# Patient Record
Sex: FEMALE | Race: WHITE | NOT HISPANIC OR LATINO
[De-identification: names, ages, dates, MRNs, and addresses within clinical notes are randomized per-mention and may not be internally consistent; named-entity substitution may affect disease eponyms.]

---

## 2017-03-31 ENCOUNTER — APPOINTMENT (OUTPATIENT)
Dept: ELECTROPHYSIOLOGY | Facility: CLINIC | Age: 82
End: 2017-03-31

## 2017-03-31 VITALS
HEART RATE: 58 BPM | DIASTOLIC BLOOD PRESSURE: 58 MMHG | HEIGHT: 63 IN | WEIGHT: 135 LBS | BODY MASS INDEX: 23.92 KG/M2 | SYSTOLIC BLOOD PRESSURE: 143 MMHG

## 2017-06-15 RX ORDER — ASPIRIN/CALCIUM CARB/MAGNESIUM 324 MG
0 TABLET ORAL
Qty: 0 | Refills: 0 | COMMUNITY

## 2017-06-15 NOTE — ASU PATIENT PROFILE, ADULT - PSH
Cardiac abnormality  internal cardiac monitor placed 8/2013  H/O: hysterectomy    S/P cataract surgery  Left eye.

## 2017-06-16 ENCOUNTER — OUTPATIENT (OUTPATIENT)
Dept: OUTPATIENT SERVICES | Facility: HOSPITAL | Age: 82
LOS: 1 days | Discharge: HOME | End: 2017-06-16
Payer: MEDICARE

## 2017-06-16 VITALS
SYSTOLIC BLOOD PRESSURE: 140 MMHG | TEMPERATURE: 98 F | RESPIRATION RATE: 18 BRPM | WEIGHT: 138.01 LBS | HEIGHT: 62 IN | DIASTOLIC BLOOD PRESSURE: 71 MMHG | HEART RATE: 61 BPM

## 2017-06-16 VITALS
HEART RATE: 60 BPM | DIASTOLIC BLOOD PRESSURE: 86 MMHG | RESPIRATION RATE: 18 BRPM | SYSTOLIC BLOOD PRESSURE: 121 MMHG | OXYGEN SATURATION: 100 %

## 2017-06-16 DIAGNOSIS — Z98.49 CATARACT EXTRACTION STATUS, UNSPECIFIED EYE: Chronic | ICD-10-CM

## 2017-06-16 DIAGNOSIS — Z90.710 ACQUIRED ABSENCE OF BOTH CERVIX AND UTERUS: Chronic | ICD-10-CM

## 2017-06-16 PROCEDURE — 33284: CPT | Mod: 26

## 2017-06-16 RX ORDER — BETHANECHOL CHLORIDE 25 MG
0 TABLET ORAL
Qty: 0 | Refills: 0 | COMMUNITY

## 2017-06-16 RX ORDER — AMLODIPINE BESYLATE 2.5 MG/1
0 TABLET ORAL
Qty: 0 | Refills: 0 | COMMUNITY

## 2017-06-16 RX ORDER — VANCOMYCIN HCL 1 G
500 VIAL (EA) INTRAVENOUS ONCE
Qty: 0 | Refills: 0 | Status: DISCONTINUED | OUTPATIENT
Start: 2017-06-16 | End: 2017-07-01

## 2017-06-16 RX ORDER — LISINOPRIL 2.5 MG/1
0 TABLET ORAL
Qty: 0 | Refills: 0 | COMMUNITY

## 2017-06-16 RX ORDER — SIMVASTATIN 20 MG/1
0 TABLET, FILM COATED ORAL
Qty: 0 | Refills: 0 | COMMUNITY

## 2017-06-16 RX ORDER — CARVEDILOL PHOSPHATE 80 MG/1
0 CAPSULE, EXTENDED RELEASE ORAL
Qty: 0 | Refills: 0 | COMMUNITY

## 2017-06-23 ENCOUNTER — APPOINTMENT (OUTPATIENT)
Dept: ELECTROPHYSIOLOGY | Facility: CLINIC | Age: 82
End: 2017-06-23

## 2017-06-23 VITALS
HEIGHT: 63 IN | DIASTOLIC BLOOD PRESSURE: 61 MMHG | HEART RATE: 63 BPM | SYSTOLIC BLOOD PRESSURE: 132 MMHG | WEIGHT: 136 LBS | BODY MASS INDEX: 24.1 KG/M2

## 2017-06-27 DIAGNOSIS — Z45.09 ENCOUNTER FOR ADJUSTMENT AND MANAGEMENT OF OTHER CARDIAC DEVICE: ICD-10-CM

## 2019-04-11 ENCOUNTER — APPOINTMENT (OUTPATIENT)
Dept: ORTHOPEDIC SURGERY | Facility: CLINIC | Age: 84
End: 2019-04-11
Payer: MEDICARE

## 2019-04-11 VITALS
WEIGHT: 135 LBS | BODY MASS INDEX: 23.92 KG/M2 | HEIGHT: 63 IN | DIASTOLIC BLOOD PRESSURE: 71 MMHG | SYSTOLIC BLOOD PRESSURE: 116 MMHG | TEMPERATURE: 97.7 F | HEART RATE: 103 BPM

## 2019-04-11 DIAGNOSIS — M22.41 CHONDROMALACIA PATELLAE, RIGHT KNEE: ICD-10-CM

## 2019-04-11 DIAGNOSIS — Z78.9 OTHER SPECIFIED HEALTH STATUS: ICD-10-CM

## 2019-04-11 PROCEDURE — 20610 DRAIN/INJ JOINT/BURSA W/O US: CPT | Mod: RT

## 2019-04-11 PROCEDURE — 73560 X-RAY EXAM OF KNEE 1 OR 2: CPT | Mod: RT

## 2019-04-11 PROCEDURE — 99214 OFFICE O/P EST MOD 30 MIN: CPT | Mod: 25

## 2019-04-22 PROBLEM — M22.41 CHONDROMALACIA OF RIGHT PATELLA: Status: ACTIVE | Noted: 2019-04-22

## 2019-04-22 NOTE — PROCEDURE
[de-identified] : Consent: \par At this time, I have recommended an injection to the right knee.  The risks and benefits of the procedure were discussed with the patient in detail.  Upon verbal consent of the patient, we proceeded with the injection as noted below.  \par \par Procedure:  \par After a sterile prep, the patient underwent an injection of 9 cc of 1% Lidocaine without epinephrine and 1 cc of Kenalog into the right knee.  The patient tolerated the procedure well.  There were no complications.  \par \par

## 2019-04-22 NOTE — HISTORY OF PRESENT ILLNESS
[4] : the ailment interference is 4/10 [3] : the ailment interference is 3/10 [7] : the ailment interference is 7/10 [6] : the ailment interference is 6/10 [de-identified] : The patient comes in today with increasing complaints of pain in the region of her right knee.  She states it has been going on for the last several weeks.  She states it is worse up and down hills and up and down stairs.  The patient states the pain is localized.  The patient describes the pain as sore, especially on stairs.\par  [de-identified] : bending, climbing, descending stairs and kneeling [de-identified] : ice and Aleve [] : No

## 2019-04-22 NOTE — ADDENDUM
[FreeTextEntry1] : This note was written by Nikolay Mohr on 04/22/2019, acting as a scribe for Ángel Mendoza III, MD

## 2019-04-22 NOTE — DISCUSSION/SUMMARY
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  She will be reassessed in 3-4 weeks.\par \par

## 2019-04-22 NOTE — PHYSICAL EXAM
[de-identified] : Left Knee: Range of Motion in Degrees	\par 	                           Claimant:	                                     Normal:	\par Flexion Active	          135 	                                  135-degrees	\par Flexion Passive	          135	                                  135-degrees	\par Extension Active	           0-5	                                  0-5-degrees	\par Extension Passive	           0-5	                                  0-5-degrees	\par \par No weakness to flexion/extension.  No evidence of instability in the AP plane or varus or valgus stress.  Negative  Lachman.  Negative pivot shift.  Negative anterior drawer test.  Negative posterior drawer test.  Negative Rosita.  Negative Apley grind.  No medial or lateral joint line tenderness.  No tenderness over the medial and lateral facet of the patella.  No patellofemoral crepitations.  No lateral tilting patella.  No patellar apprehension.  No crepitation in the medial and lateral femoral condyle.  No proximal or distal swelling, edema or tenderness.  No gross motor or sensory deficits.  No intra-articular swelling.  2+ DP and PT pulses. No varus or valgus malalignment.  Skin is intact.  No rashes, scars or lesions.  \par  \par Right Knee: Range of Motion in Degrees	\par 	                  Claimant:	Normal:	\par Flexion Active	  135 	                135-degrees	\par Flexion Passive	  135	                135-degrees	\par Extension Active	  0-5	                0-5-degrees	\par Extension Passive	  0-5	                0-5-degrees	\par \par No weakness to flexion/extension.  No evidence of instability in the AP plane or varus or valgus stress.  Negative  Lachman.  Negative pivot shift.  Negative anterior drawer test.  Negative posterior drawer test.  Negative Rosita.  Negative Apley grind.  No medial or lateral joint line tenderness.  Positive tenderness over the medial and lateral facet of the patella.  Positive patellofemoral crepitations.  No lateral tilting patella.  No patella apprehension.  Positive crepitation in the medial and lateral femoral condyle.  No proximal or distal swelling, edema or tenderness.  No gross motor or sensory deficits.  Mild intra-articular swelling.  2+ DP and PT pulses.  No varus or valgus malalignment.  Skin is intact.  No rashes, scars or lesions.  \par \par \par  [de-identified] : Gait and Station:  Ambulating with a slightly antalgic to antalgic gait.  Normal Station.  [de-identified] : Appearance:  Well developed, well-nourished female in no acute distress.  [de-identified] : Radiographs, one to two views of the right knee, show moderate degenerative changes.\par

## 2019-05-23 ENCOUNTER — APPOINTMENT (OUTPATIENT)
Dept: ORTHOPEDIC SURGERY | Facility: CLINIC | Age: 84
End: 2019-05-23
Payer: MEDICARE

## 2019-05-23 VITALS
DIASTOLIC BLOOD PRESSURE: 68 MMHG | SYSTOLIC BLOOD PRESSURE: 133 MMHG | WEIGHT: 133 LBS | HEART RATE: 60 BPM | HEIGHT: 63 IN | TEMPERATURE: 97.4 F | BODY MASS INDEX: 23.57 KG/M2

## 2019-05-23 PROCEDURE — 99213 OFFICE O/P EST LOW 20 MIN: CPT

## 2019-05-29 NOTE — DISCUSSION/SUMMARY
[de-identified] : The patient presents with osteoarthritis, right knee.  At this point I recommend she undergo a course of viscosupplementation.  She will be scheduled at this time.

## 2019-05-29 NOTE — ADDENDUM
[FreeTextEntry1] : This note was written by Trinidad Deng on 05/28/2019 acting as scribe for Ángel Mendoza III, MD

## 2019-05-30 ENCOUNTER — APPOINTMENT (OUTPATIENT)
Dept: ORTHOPEDIC SURGERY | Facility: CLINIC | Age: 84
End: 2019-05-30
Payer: MEDICARE

## 2019-05-30 PROCEDURE — 20610 DRAIN/INJ JOINT/BURSA W/O US: CPT | Mod: RT

## 2019-06-03 NOTE — ADDENDUM
[FreeTextEntry1] : This note was written by Nikolay Mohr on 06/03/2019, acting as a scribe for Ángel Mendoza III, MD

## 2019-06-03 NOTE — PROCEDURE
[de-identified] : CLOVIS GONCALVES comes in today for the first Supartz injection to the right knee.  \par \par Physical Examination:\par Right Knee:\par Knee: Range of Motion in Degrees  	\par    	                        Claimant:  	            Normal:  	\par Flexion Active   	         135     	            135-degrees  	\par Flexion Passive  	         135  	            135-degrees  	\par Extension Active  	         0-5  	            0-5-degrees  	\par Extension Passive            0-5  	            0-5-degrees  	\par \par No evidence of instability in the AP plane or varus or valgus stress.  Negative  Lachman. Negative pivot shift.  Negative anterior drawer test.  Negative posterior drawer test. Negative Rosita.  Negative Apley grind.  No medial or lateral joint line tenderness. Positive tenderness over the medial and lateral facet of the patella.  Positive patellofemoral crepitations.  No lateral tilting patella.  No patellar apprehension. Positive crepitation in the medial and lateral femoral condyle.  No proximal or distal swelling, edema or tenderness.  No gross motor or sensory deficits.  Mild intra-articular swelling.  No varus or valgus malalignment.  2+ DP and PT pulses.  Skin is intact.  No rashes, scars or lesions.  \par \par Impression: \par Osteoarthritis of the right knee\par \par Consent:\par The risks and benefits of the procedure were discussed with the patient in detail.  Upon verbal consent of the patient, we proceeded with the Supartz injection as noted below.  \par \par Procedure:\par After sterile prep, the patient underwent a Supartz injection of 25 mg of Sodium Hyaluronate in a 2ML syringe into the right knee.  The patient tolerated the procedure well.  There were no complications.  \par \par NDC#:  [address]-3\par Lot#:    [id number]\par Exp Date:  11/30/21\par \par Plan:\par I have recommended ice and elevation.  The patient will be reassessed in one week for the next Supartz injection for the right knee osteoarthritis.\par \par \par    \par \par \par

## 2019-06-06 ENCOUNTER — APPOINTMENT (OUTPATIENT)
Dept: ORTHOPEDIC SURGERY | Facility: CLINIC | Age: 84
End: 2019-06-06

## 2019-06-13 ENCOUNTER — APPOINTMENT (OUTPATIENT)
Dept: ORTHOPEDIC SURGERY | Facility: CLINIC | Age: 84
End: 2019-06-13
Payer: MEDICARE

## 2019-06-13 PROCEDURE — 20610 DRAIN/INJ JOINT/BURSA W/O US: CPT | Mod: RT

## 2019-06-19 NOTE — PROCEDURE
[de-identified] : CLOVIS GONCALVES comes in today for the second Supartz injection to the right knee.  \par \par Physical Examination:\par Right Knee:\par Knee: Range of Motion in Degrees  	\par    	                        Claimant:  	            Normal:  	\par Flexion Active   	         135     	            135-degrees  	\par Flexion Passive  	         135  	            135-degrees  	\par Extension Active  	         0-5  	            0-5-degrees  	\par Extension Passive            0-5  	            0-5-degrees  	\par \par No evidence of instability in the AP plane or varus or valgus stress.  Negative  Lachman. Negative pivot shift.  Negative anterior drawer test.  Negative posterior drawer test. Negative Rosita.  Negative Apley grind.  No medial or lateral joint line tenderness. Positive tenderness over the medial and lateral facet of the patella.  Positive patellofemoral crepitations.  No lateral tilting patella.  No patellar apprehension. Positive crepitation in the medial and lateral femoral condyle.  No proximal or distal swelling, edema or tenderness.  No gross motor or sensory deficits.  Mild intra-articular swelling.  No varus or valgus malalignment.  2+ DP and PT pulses.  Skin is intact.  No rashes, scars or lesions.  \par \par Impression: \par Osteoarthritis of the right knee\par \par Consent:\par The risks and benefits of the procedure were discussed with the patient in detail.  Upon verbal consent of the patient, we proceeded with the Supartz injection as noted below.  \par \par Procedure:\par After sterile prep, the patient underwent a Supartz injection of 25 mg of Sodium Hyaluronate in a 2ML syringe into the right knee.  The patient tolerated the procedure well.  There were no complications.  \par \par NDC#:  [address]-4\par Lot#:    [id number]\par Exp Date:  11/30/21\par \par Plan:\par I have recommended ice and elevation.  The patient will be reassessed in one week for the next Supartz injection for the right knee osteoarthritis.   \par \par \par

## 2019-06-19 NOTE — ADDENDUM
[FreeTextEntry1] : This note was written by Nikolay Mohr on 06/18/2019, acting as a scribe for Ángel Mendoza III, MD

## 2019-06-20 ENCOUNTER — APPOINTMENT (OUTPATIENT)
Dept: ORTHOPEDIC SURGERY | Facility: CLINIC | Age: 84
End: 2019-06-20
Payer: MEDICARE

## 2019-06-20 PROCEDURE — 20610 DRAIN/INJ JOINT/BURSA W/O US: CPT | Mod: RT

## 2019-06-24 NOTE — PROCEDURE
[de-identified] : CLOVIS GONCALVES comes in today for the third Supartz injection to the right knee.  \par \par Physical Examination:\par Right Knee:\par Knee: Range of Motion in Degrees  	\par    	                        Claimant:  	            Normal:  	\par Flexion Active   	         135     	            135-degrees  	\par Flexion Passive  	         135  	            135-degrees  	\par Extension Active  	         0-5  	            0-5-degrees  	\par Extension Passive            0-5  	            0-5-degrees  	\par \par No evidence of instability in the AP plane or varus or valgus stress.  Negative  Lachman. Negative pivot shift.  Negative anterior drawer test.  Negative posterior drawer test. Negative Rosita.  Negative Apley grind.  No medial or lateral joint line tenderness. Positive tenderness over the medial and lateral facet of the patella.  Positive patellofemoral crepitations.  No lateral tilting patella.  No patellar apprehension. Positive crepitation in the medial and lateral femoral condyle.  No proximal or distal swelling, edema or tenderness.  No gross motor or sensory deficits.  Mild intra-articular swelling.  No varus or valgus malalignment.  2+ DP and PT pulses.  Skin is intact.  No rashes, scars or lesions.  \par \par Impression: \par Osteoarthritis of the right knee\par \par Consent:\par The risks and benefits of the procedure were discussed with the patient in detail.  Upon verbal consent of the patient, we proceeded with the Supartz injection as noted below.  \par \par Procedure:\par After sterile prep, the patient underwent a Supartz injection of 25 mg of Sodium Hyaluronate in a 2ML syringe into the right knee.  The patient tolerated the procedure well.  There were no complications.  \par \par NDC#:  [address]-6\par Lot#:    [id number]\par Exp Date:  11/30/21\par \par Plan:\par I have recommended ice and elevation.  The patient will be reassessed in one week for the next Supartz injection for the right knee osteoarthritis.   \par \par \par

## 2019-06-24 NOTE — ADDENDUM
[FreeTextEntry1] : This note was written by Genny Fontana on 06/24/2019 acting as scribe for HUA Wright MD

## 2019-06-27 ENCOUNTER — APPOINTMENT (OUTPATIENT)
Dept: ORTHOPEDIC SURGERY | Facility: CLINIC | Age: 84
End: 2019-06-27
Payer: MEDICARE

## 2019-06-27 PROCEDURE — 20610 DRAIN/INJ JOINT/BURSA W/O US: CPT | Mod: RT

## 2019-07-03 NOTE — PROCEDURE
[de-identified] : CLOVIS GONCALVES comes in today for the fourth Supartz injection to the right knee.  \par \par Physical Examination:\par Right Knee:\par Knee: Range of Motion in Degrees  	\par    	                        Claimant:  	            Normal:  	\par Flexion Active   	         135     	            135-degrees  	\par Flexion Passive  	         135  	            135-degrees  	\par Extension Active  	         0-5  	            0-5-degrees  	\par Extension Passive            0-5  	            0-5-degrees  	\par \par No evidence of instability in the AP plane or varus or valgus stress.  Negative  Lachman. Negative pivot shift.  Negative anterior drawer test.  Negative posterior drawer test. Negative Rosita.  Negative Apley grind.  No medial or lateral joint line tenderness. Positive tenderness over the medial and lateral facet of the patella.  Positive patellofemoral crepitations.  No lateral tilting patella.  No patellar apprehension. Positive crepitation in the medial and lateral femoral condyle.  No proximal or distal swelling, edema or tenderness.  No gross motor or sensory deficits.  Mild intra-articular swelling.  No varus or valgus malalignment.  2+ DP and PT pulses.  Skin is intact.  No rashes, scars or lesions.  \par \par Impression: \par Osteoarthritis of the right knee\par \par Consent:\par The risks and benefits of the procedure were discussed with the patient in detail.  Upon verbal consent of the patient, we proceeded with the Supartz injection as noted below.  \par \par Procedure:\par After sterile prep, the patient underwent a Supartz injection of 25 mg of Sodium Hyaluronate in a 2ML syringe into the right knee.  The patient tolerated the procedure well.  There were no complications.  \par \par NDC#:  [address]-4\par Lot#:    [id number]\par Exp Date:  11/30/21\par \par Plan:\par I have recommended ice and elevation.  The patient will be reassessed in one week for the next Supartz injection for the right knee osteoarthritis.   \par \par \par

## 2019-07-03 NOTE — ADDENDUM
[FreeTextEntry1] : This note was written by Trinidad Deng on 07/02/2019 acting as scribe for Ángel Mendoza III, MD

## 2019-07-08 ENCOUNTER — APPOINTMENT (OUTPATIENT)
Dept: INTERNAL MEDICINE | Facility: CLINIC | Age: 84
End: 2019-07-08
Payer: MEDICARE

## 2019-07-08 VITALS
HEART RATE: 70 BPM | SYSTOLIC BLOOD PRESSURE: 110 MMHG | WEIGHT: 130 LBS | TEMPERATURE: 97.4 F | OXYGEN SATURATION: 97 % | RESPIRATION RATE: 18 BRPM | BODY MASS INDEX: 23.04 KG/M2 | DIASTOLIC BLOOD PRESSURE: 60 MMHG | HEIGHT: 63 IN

## 2019-07-08 DIAGNOSIS — Z95.818 PRESENCE OF OTHER CARDIAC IMPLANTS AND GRAFTS: ICD-10-CM

## 2019-07-08 DIAGNOSIS — J45.909 UNSPECIFIED ASTHMA, UNCOMPLICATED: ICD-10-CM

## 2019-07-08 PROCEDURE — 94727 GAS DIL/WSHOT DETER LNG VOL: CPT

## 2019-07-08 PROCEDURE — 99204 OFFICE O/P NEW MOD 45 MIN: CPT | Mod: 25

## 2019-07-08 PROCEDURE — ZZZZZ: CPT

## 2019-07-08 PROCEDURE — 94060 EVALUATION OF WHEEZING: CPT

## 2019-07-08 PROCEDURE — 94729 DIFFUSING CAPACITY: CPT

## 2019-07-08 NOTE — HISTORY OF PRESENT ILLNESS
[FreeTextEntry1] : As the first pulmonary visit for this pleasant 84-year-old female with a history including hypertension,. She was tells me she was treated for bronchitis or asthmatic bronchitis many times in the past.  her last episode was about 2006. She was noted only very slight cough during the last few months, she is not having sputum production, wheezing, or hemoptysis.  She does walk about 7-10,000 feet per day. Is not using a rescue inhaler. She is a nonsmoker. Not having fever or chills. \par \par She is not having chest pain, lightheadedness, or syncope. She does have a previous history of atrial fibrillation and is followed by Dr. Covington, cardiologist.\par \par

## 2019-07-08 NOTE — PHYSICAL EXAM
[General Appearance - Well Developed] : well developed [Normal Appearance] : normal appearance [Well Groomed] : well groomed [General Appearance - Well Nourished] : well nourished [No Deformities] : no deformities [General Appearance - In No Acute Distress] : no acute distress [Normal Conjunctiva] : the conjunctiva exhibited no abnormalities [Normal Oropharynx] : normal oropharynx [Eyelids - No Xanthelasma] : the eyelids demonstrated no xanthelasmas [Neck Appearance] : the appearance of the neck was normal [Jugular Venous Distention Increased] : there was no jugular-venous distention [Neck Cervical Mass (___cm)] : no neck mass was observed [Thyroid Diffuse Enlargement] : the thyroid was not enlarged [Thyroid Nodule] : there were no palpable thyroid nodules [Heart Rate And Rhythm] : heart rate and rhythm were normal [Murmurs] : no murmurs present [Heart Sounds] : normal S1 and S2 [Edema] : no peripheral edema present [Respiration, Rhythm And Depth] : normal respiratory rhythm and effort [Exaggerated Use Of Accessory Muscles For Inspiration] : no accessory muscle use [Auscultation Breath Sounds / Voice Sounds] : lungs were clear to auscultation bilaterally [Abdomen Soft] : soft [Abdomen Tenderness] : non-tender [Abnormal Walk] : normal gait [Nail Clubbing] : no clubbing of the fingernails [Cyanosis, Localized] : no localized cyanosis [Skin Turgor] : normal skin turgor [] : no rash [No Focal Deficits] : no focal deficits [Impaired Insight] : insight and judgment were intact [Affect] : the affect was normal [FreeTextEntry1] : infrequent PC

## 2019-07-08 NOTE — REVIEW OF SYSTEMS
[Fever] : no fever [Chills] : no chills [Sputum] : not coughing up ~M sputum [Hemoptysis] : no hemoptysis [Wheezing] : no wheezing [Dizziness] : no dizziness [Chest Discomfort] : no chest discomfort [Edema] : ~T edema was not present [Syncope] : no fainting

## 2019-07-08 NOTE — PROCEDURE
[FreeTextEntry1] : Full pulmonary function testing is within normal limits with an FEV1 of 1.37 or 81% predicted. Diffusing capacity is normal. \par

## 2019-07-09 ENCOUNTER — APPOINTMENT (OUTPATIENT)
Dept: ORTHOPEDIC SURGERY | Facility: CLINIC | Age: 84
End: 2019-07-09
Payer: MEDICARE

## 2019-07-09 ENCOUNTER — FORM ENCOUNTER (OUTPATIENT)
Age: 84
End: 2019-07-09

## 2019-07-09 DIAGNOSIS — Z80.9 FAMILY HISTORY OF MALIGNANT NEOPLASM, UNSPECIFIED: ICD-10-CM

## 2019-07-09 DIAGNOSIS — Z82.61 FAMILY HISTORY OF ARTHRITIS: ICD-10-CM

## 2019-07-09 DIAGNOSIS — Z82.49 FAMILY HISTORY OF ISCHEMIC HEART DISEASE AND OTHER DISEASES OF THE CIRCULATORY SYSTEM: ICD-10-CM

## 2019-07-09 DIAGNOSIS — Z83.3 FAMILY HISTORY OF DIABETES MELLITUS: ICD-10-CM

## 2019-07-09 DIAGNOSIS — Z82.3 FAMILY HISTORY OF STROKE: ICD-10-CM

## 2019-07-09 DIAGNOSIS — Z83.1 FAMILY HISTORY OF OTHER INFECTIOUS AND PARASITIC DISEASES: ICD-10-CM

## 2019-07-09 PROCEDURE — 20610 DRAIN/INJ JOINT/BURSA W/O US: CPT | Mod: RT

## 2019-07-10 ENCOUNTER — APPOINTMENT (OUTPATIENT)
Dept: RADIOLOGY | Facility: CLINIC | Age: 84
End: 2019-07-10
Payer: MEDICARE

## 2019-07-10 ENCOUNTER — OUTPATIENT (OUTPATIENT)
Dept: OUTPATIENT SERVICES | Facility: HOSPITAL | Age: 84
LOS: 1 days | End: 2019-07-10
Payer: MEDICARE

## 2019-07-10 ENCOUNTER — TRANSCRIPTION ENCOUNTER (OUTPATIENT)
Age: 84
End: 2019-07-10

## 2019-07-10 DIAGNOSIS — Z90.710 ACQUIRED ABSENCE OF BOTH CERVIX AND UTERUS: Chronic | ICD-10-CM

## 2019-07-10 DIAGNOSIS — Z98.49 CATARACT EXTRACTION STATUS, UNSPECIFIED EYE: Chronic | ICD-10-CM

## 2019-07-10 DIAGNOSIS — R05 COUGH: ICD-10-CM

## 2019-07-10 PROCEDURE — 71046 X-RAY EXAM CHEST 2 VIEWS: CPT

## 2019-07-10 PROCEDURE — 71046 X-RAY EXAM CHEST 2 VIEWS: CPT | Mod: 26

## 2019-07-14 NOTE — PROCEDURE
[de-identified] : CLOVIS GONCALVES comes in today for the fifth Supartz injection to the right knee.  \par \par Physical Examination:\par Right Knee:\par Knee: Range of Motion in Degrees  	\par    	                        Claimant:  	            Normal:  	\par Flexion Active   	         135     	            135-degrees  	\par Flexion Passive  	         135  	            135-degrees  	\par Extension Active  	         0-5  	            0-5-degrees  	\par Extension Passive            0-5  	            0-5-degrees  	\par \par No evidence of instability in the AP plane or varus or valgus stress.  Negative  Lachman. Negative pivot shift.  Negative anterior drawer test.  Negative posterior drawer test. Negative Rosita.  Negative Apley grind.  No medial or lateral joint line tenderness. Positive tenderness over the medial and lateral facet of the patella.  Positive patellofemoral crepitations.  No lateral tilting patella.  No patellar apprehension. Positive crepitation in the medial and lateral femoral condyle.  No proximal or distal swelling, edema or tenderness.  No gross motor or sensory deficits.  Mild intra-articular swelling.  No varus or valgus malalignment.  2+ DP and PT pulses.  Skin is intact.  No rashes, scars or lesions.  \par \par Impression: \par Osteoarthritis of the right knee\par \par Consent:\par The risks and benefits of the procedure were discussed with the patient in detail.  Upon verbal consent of the patient, we proceeded with the Supartz injection as noted below.  \par \par Procedure:\par After sterile prep, the patient underwent a Supartz injection of 25 mg of Sodium Hyaluronate in a 2ML syringe into the right knee.  The patient tolerated the procedure well.  There were no complications.  \par \par NDC#:  [address]-0\par Lot#:    [id number]\par Exp Date:  11/30/21\par \par Plan:\par I have recommended ice and elevation.  The patient will be reassessed in six to eight weeks following this series of Supartz injections for the right knee osteoarthritis.

## 2019-07-14 NOTE — ADDENDUM
[FreeTextEntry1] : This note was written by Jade Lui on 07/14/2019 acting as scribe for Anuja Granados, REINALDO/RD, PA\par

## 2020-08-01 ENCOUNTER — INPATIENT (INPATIENT)
Facility: HOSPITAL | Age: 85
LOS: 2 days | Discharge: HOME | DRG: 309 | End: 2020-08-04
Attending: INTERNAL MEDICINE | Admitting: HOSPITALIST
Payer: MEDICARE

## 2020-08-01 ENCOUNTER — TRANSCRIPTION ENCOUNTER (OUTPATIENT)
Age: 85
End: 2020-08-01

## 2020-08-01 VITALS
WEIGHT: 132.94 LBS | OXYGEN SATURATION: 95 % | HEART RATE: 109 BPM | SYSTOLIC BLOOD PRESSURE: 152 MMHG | DIASTOLIC BLOOD PRESSURE: 96 MMHG | RESPIRATION RATE: 16 BRPM | TEMPERATURE: 99 F | HEIGHT: 63 IN
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
## 2020-08-01 DIAGNOSIS — Z98.42 CATARACT EXTRACTION STATUS, LEFT EYE: ICD-10-CM

## 2020-08-01 DIAGNOSIS — Z90.710 ACQUIRED ABSENCE OF BOTH CERVIX AND UTERUS: ICD-10-CM

## 2020-08-01 DIAGNOSIS — E86.0 DEHYDRATION: ICD-10-CM

## 2020-08-01 DIAGNOSIS — I48.91 UNSPECIFIED ATRIAL FIBRILLATION: ICD-10-CM

## 2020-08-01 DIAGNOSIS — Z91.013 ALLERGY TO SEAFOOD: ICD-10-CM

## 2020-08-01 DIAGNOSIS — Z88.0 ALLERGY STATUS TO PENICILLIN: ICD-10-CM

## 2020-08-01 DIAGNOSIS — I10 ESSENTIAL (PRIMARY) HYPERTENSION: ICD-10-CM

## 2020-08-01 DIAGNOSIS — I25.10 ATHEROSCLEROTIC HEART DISEASE OF NATIVE CORONARY ARTERY WITHOUT ANGINA PECTORIS: ICD-10-CM

## 2020-08-01 DIAGNOSIS — D72.829 ELEVATED WHITE BLOOD CELL COUNT, UNSPECIFIED: ICD-10-CM
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
## 2020-08-01 DIAGNOSIS — Z98.49 CATARACT EXTRACTION STATUS, UNSPECIFIED EYE: Chronic | ICD-10-CM

## 2020-08-01 DIAGNOSIS — Z90.710 ACQUIRED ABSENCE OF BOTH CERVIX AND UTERUS: Chronic | ICD-10-CM

## 2020-08-01 DIAGNOSIS — Z79.82 LONG TERM (CURRENT) USE OF ASPIRIN: ICD-10-CM

## 2020-08-01 DIAGNOSIS — T67.8XXA OTHER EFFECTS OF HEAT AND LIGHT, INITIAL ENCOUNTER: ICD-10-CM

## 2020-08-01 DIAGNOSIS — E78.5 HYPERLIPIDEMIA, UNSPECIFIED: ICD-10-CM

## 2020-08-01 DIAGNOSIS — E87.1 HYPO-OSMOLALITY AND HYPONATREMIA: ICD-10-CM

## 2020-08-01 DIAGNOSIS — N95.8 OTHER SPECIFIED MENOPAUSAL AND PERIMENOPAUSAL DISORDERS: ICD-10-CM

## 2020-08-01 DIAGNOSIS — Z88.1 ALLERGY STATUS TO OTHER ANTIBIOTIC AGENTS STATUS: ICD-10-CM

## 2020-08-01 LAB
ALBUMIN SERPL ELPH-MCNC: 3.8 G/DL — SIGNIFICANT CHANGE UP (ref 3.3–5)
ALP SERPL-CCNC: 69 U/L — SIGNIFICANT CHANGE UP (ref 40–120)
ALT FLD-CCNC: 29 U/L — SIGNIFICANT CHANGE UP (ref 12–78)
ANION GAP SERPL CALC-SCNC: 6 MMOL/L — SIGNIFICANT CHANGE UP (ref 5–17)
AST SERPL-CCNC: 20 U/L — SIGNIFICANT CHANGE UP (ref 15–37)
BASOPHILS # BLD AUTO: 0.06 K/UL — SIGNIFICANT CHANGE UP (ref 0–0.2)
BASOPHILS NFR BLD AUTO: 0.6 % — SIGNIFICANT CHANGE UP (ref 0–2)
BILIRUB SERPL-MCNC: 0.4 MG/DL — SIGNIFICANT CHANGE UP (ref 0.2–1.2)
BUN SERPL-MCNC: 8 MG/DL — SIGNIFICANT CHANGE UP (ref 7–23)
CALCIUM SERPL-MCNC: 8.6 MG/DL — SIGNIFICANT CHANGE UP (ref 8.5–10.1)
CHLORIDE SERPL-SCNC: 101 MMOL/L — SIGNIFICANT CHANGE UP (ref 96–108)
CO2 SERPL-SCNC: 24 MMOL/L — SIGNIFICANT CHANGE UP (ref 22–31)
CREAT SERPL-MCNC: 0.67 MG/DL — SIGNIFICANT CHANGE UP (ref 0.5–1.3)
EOSINOPHIL # BLD AUTO: 0.17 K/UL — SIGNIFICANT CHANGE UP (ref 0–0.5)
EOSINOPHIL NFR BLD AUTO: 1.8 % — SIGNIFICANT CHANGE UP (ref 0–6)
GLUCOSE SERPL-MCNC: 108 MG/DL — HIGH (ref 70–99)
HCT VFR BLD CALC: 39.4 % — SIGNIFICANT CHANGE UP (ref 34.5–45)
HGB BLD-MCNC: 13.3 G/DL — SIGNIFICANT CHANGE UP (ref 11.5–15.5)
IMM GRANULOCYTES NFR BLD AUTO: 0.4 % — SIGNIFICANT CHANGE UP (ref 0–1.5)
LYMPHOCYTES # BLD AUTO: 1.9 K/UL — SIGNIFICANT CHANGE UP (ref 1–3.3)
LYMPHOCYTES # BLD AUTO: 19.7 % — SIGNIFICANT CHANGE UP (ref 13–44)
MAGNESIUM SERPL-MCNC: 2 MG/DL — SIGNIFICANT CHANGE UP (ref 1.6–2.6)
MCHC RBC-ENTMCNC: 29 PG — SIGNIFICANT CHANGE UP (ref 27–34)
MCHC RBC-ENTMCNC: 33.8 GM/DL — SIGNIFICANT CHANGE UP (ref 32–36)
MCV RBC AUTO: 85.8 FL — SIGNIFICANT CHANGE UP (ref 80–100)
MONOCYTES # BLD AUTO: 0.83 K/UL — SIGNIFICANT CHANGE UP (ref 0–0.9)
MONOCYTES NFR BLD AUTO: 8.6 % — SIGNIFICANT CHANGE UP (ref 2–14)
NEUTROPHILS # BLD AUTO: 6.63 K/UL — SIGNIFICANT CHANGE UP (ref 1.8–7.4)
NEUTROPHILS NFR BLD AUTO: 68.9 % — SIGNIFICANT CHANGE UP (ref 43–77)
PLATELET # BLD AUTO: 314 K/UL — SIGNIFICANT CHANGE UP (ref 150–400)
POTASSIUM SERPL-MCNC: 4.2 MMOL/L — SIGNIFICANT CHANGE UP (ref 3.5–5.3)
POTASSIUM SERPL-SCNC: 4.2 MMOL/L — SIGNIFICANT CHANGE UP (ref 3.5–5.3)
PROT SERPL-MCNC: 7.2 GM/DL — SIGNIFICANT CHANGE UP (ref 6–8.3)
RBC # BLD: 4.59 M/UL — SIGNIFICANT CHANGE UP (ref 3.8–5.2)
RBC # FLD: 12.5 % — SIGNIFICANT CHANGE UP (ref 10.3–14.5)
SODIUM SERPL-SCNC: 131 MMOL/L — LOW (ref 135–145)
TROPONIN I SERPL-MCNC: <0.015 NG/ML — SIGNIFICANT CHANGE UP (ref 0.01–0.04)
WBC # BLD: 9.63 K/UL — SIGNIFICANT CHANGE UP (ref 3.8–10.5)
WBC # FLD AUTO: 9.63 K/UL — SIGNIFICANT CHANGE UP (ref 3.8–10.5)

## 2020-08-01 PROCEDURE — 87635 SARS-COV-2 COVID-19 AMP PRB: CPT

## 2020-08-01 PROCEDURE — 93005 ELECTROCARDIOGRAM TRACING: CPT

## 2020-08-01 PROCEDURE — 85027 COMPLETE CBC AUTOMATED: CPT

## 2020-08-01 PROCEDURE — 83735 ASSAY OF MAGNESIUM: CPT

## 2020-08-01 PROCEDURE — 81003 URINALYSIS AUTO W/O SCOPE: CPT

## 2020-08-01 PROCEDURE — 93306 TTE W/DOPPLER COMPLETE: CPT

## 2020-08-01 PROCEDURE — 36415 COLL VENOUS BLD VENIPUNCTURE: CPT

## 2020-08-01 PROCEDURE — 84100 ASSAY OF PHOSPHORUS: CPT

## 2020-08-01 PROCEDURE — 80053 COMPREHEN METABOLIC PANEL: CPT

## 2020-08-01 PROCEDURE — 93325 DOPPLER ECHO COLOR FLOW MAPG: CPT

## 2020-08-01 PROCEDURE — 93320 DOPPLER ECHO COMPLETE: CPT

## 2020-08-01 PROCEDURE — 84484 ASSAY OF TROPONIN QUANT: CPT

## 2020-08-01 PROCEDURE — 93010 ELECTROCARDIOGRAM REPORT: CPT

## 2020-08-01 PROCEDURE — 71045 X-RAY EXAM CHEST 1 VIEW: CPT | Mod: 26

## 2020-08-01 PROCEDURE — 86769 SARS-COV-2 COVID-19 ANTIBODY: CPT

## 2020-08-01 PROCEDURE — 87086 URINE CULTURE/COLONY COUNT: CPT

## 2020-08-01 PROCEDURE — 85610 PROTHROMBIN TIME: CPT

## 2020-08-01 PROCEDURE — 92960 CARDIOVERSION ELECTRIC EXT: CPT

## 2020-08-01 PROCEDURE — 80048 BASIC METABOLIC PNL TOTAL CA: CPT

## 2020-08-01 PROCEDURE — 85730 THROMBOPLASTIN TIME PARTIAL: CPT

## 2020-08-01 PROCEDURE — 93312 ECHO TRANSESOPHAGEAL: CPT

## 2020-08-01 PROCEDURE — 84443 ASSAY THYROID STIM HORMONE: CPT

## 2020-08-01 RX ORDER — DILTIAZEM HCL 120 MG
10 CAPSULE, EXT RELEASE 24 HR ORAL ONCE
Refills: 0 | Status: COMPLETED | OUTPATIENT
Start: 2020-08-01 | End: 2020-08-01

## 2020-08-01 RX ORDER — SODIUM CHLORIDE 9 MG/ML
1000 INJECTION INTRAMUSCULAR; INTRAVENOUS; SUBCUTANEOUS ONCE
Refills: 0 | Status: COMPLETED | OUTPATIENT
Start: 2020-08-01 | End: 2020-08-01

## 2020-08-01 RX ORDER — ONDANSETRON 8 MG/1
4 TABLET, FILM COATED ORAL EVERY 6 HOURS
Refills: 0 | Status: DISCONTINUED | OUTPATIENT
Start: 2020-08-01 | End: 2020-08-04

## 2020-08-01 RX ORDER — DILTIAZEM HCL 120 MG
60 CAPSULE, EXT RELEASE 24 HR ORAL ONCE
Refills: 0 | Status: COMPLETED | OUTPATIENT
Start: 2020-08-01 | End: 2020-08-01

## 2020-08-01 RX ORDER — ACETAMINOPHEN 500 MG
650 TABLET ORAL EVERY 6 HOURS
Refills: 0 | Status: DISCONTINUED | OUTPATIENT
Start: 2020-08-01 | End: 2020-08-04

## 2020-08-01 RX ADMIN — Medication 60 MILLIGRAM(S): at 20:31

## 2020-08-01 RX ADMIN — SODIUM CHLORIDE 1000 MILLILITER(S): 9 INJECTION INTRAMUSCULAR; INTRAVENOUS; SUBCUTANEOUS at 20:23

## 2020-08-01 RX ADMIN — Medication 10 MILLIGRAM(S): at 20:24

## 2020-08-01 NOTE — ED ADULT NURSE NOTE - NSIMPLEMENTINTERV_GEN_ALL_ED
Implemented All Universal Safety Interventions:  [place] to call system. Call bell, personal items and telephone within reach. Instruct patient to call for assistance. Room bathroom lighting operational. Non-slip footwear when patient is off stretcher. Physically safe environment: no spills, clutter or unnecessary equipment. Stretcher in lowest position, wheels locked, appropriate side rails in place.

## 2020-08-01 NOTE — ED PROVIDER NOTE - CARE PROVIDER_API CALL
Johan Das  CARDIOVASCULAR DISEASE  [address] [place] [address]  Phone: [phone]  Fax: [phone]  Follow Up Time:

## 2020-08-01 NOTE — ED PROVIDER NOTE - OBJECTIVE STATEMENT
Okay to hold for PCP tomorrow.  However, if Mother calls today, then Anamika does not have scoliosis, but she may have Scheuermann's Disease - which can be treated much like scoliosis (such as core strengthening exercises, bracing, etc.)  I highly recommend she see Dr. Ford. 86 y/o female with a PMHx of A-fib, HLD, HTN, Syncope, presents to the ED c/o intermittent palpitations x 1 week. Pt thought sx was due to the heat, states when she is outside she would start breathing deeply and catching her breath with palpitations. Pt was sent from Urgent Care due to EKG showing A-fib with RVR. No hx of ablation.   Cardio: Dr. Covington

## 2020-08-01 NOTE — ED ADULT NURSE NOTE - OBJECTIVE STATEMENT
pt was seen at urgent care today and was sent to er for rapid HR. Pt denies any chest pain. Pt states she was feeling sick for a couple days and was feeling like her heart was racing.

## 2020-08-01 NOTE — ED ADULT TRIAGE NOTE - CHIEF COMPLAINT QUOTE
presents to ed complaining of palpitations, onset began 1 week ago, patient sent from , EKG from there showed 150 afib, with rvr according to ems.

## 2020-08-01 NOTE — ED PROVIDER NOTE - PROGRESS NOTE DETAILS
JG:  Received signout from Dr. Elizabeth to follow up 2nd troponin as patient did not want to be admitted.  Patient still feeling palpitations.  Wants to go home but is nervous.  Discussed need for monitoring, anticoagulation, risk of stroke.  Patient now agreeable to stay for admission.  Hospitalist notified.

## 2020-08-01 NOTE — ED PROVIDER NOTE - NS_ ATTENDINGSCRIBEDETAILS _ED_A_ED_FT
I, Savage Elizabeth MD,  performed the initial face to face bedside interview with this patient regarding history of present illness, review of symptoms and relevant past medical, social and family history.  I completed an independent physical examination.    The history, relevant review of systems, past medical and surgical history, medical decision making, and physical examination was documented by the scribe in my presence and I attest to the accuracy of the documentation.

## 2020-08-01 NOTE — ED PROVIDER NOTE - DISPOSITION TYPE
64 y M pmh prostate CA in remission, dm, htn, previous admission for pancreatitis cc epigastric pain radiating to chest associated with inability to tolerate PO, nausea, same pain as past bout of pancreatitis.
ADMIT

## 2020-08-02 DIAGNOSIS — I48.91 UNSPECIFIED ATRIAL FIBRILLATION: ICD-10-CM

## 2020-08-02 LAB
ADD ON TEST-SPECIMEN IN LAB: SIGNIFICANT CHANGE UP
ANION GAP SERPL CALC-SCNC: 8 MMOL/L — SIGNIFICANT CHANGE UP (ref 5–17)
APPEARANCE UR: CLEAR — SIGNIFICANT CHANGE UP
APTT BLD: 104.6 SEC — HIGH (ref 27.5–35.5)
APTT BLD: 87.4 SEC — HIGH (ref 27.5–35.5)
APTT BLD: > 200 SEC (ref 27.5–35.5)
BILIRUB UR-MCNC: NEGATIVE — SIGNIFICANT CHANGE UP
BUN SERPL-MCNC: 6 MG/DL — LOW (ref 7–23)
CALCIUM SERPL-MCNC: 8.6 MG/DL — SIGNIFICANT CHANGE UP (ref 8.5–10.1)
CHLORIDE SERPL-SCNC: 104 MMOL/L — SIGNIFICANT CHANGE UP (ref 96–108)
CO2 SERPL-SCNC: 24 MMOL/L — SIGNIFICANT CHANGE UP (ref 22–31)
COLOR SPEC: YELLOW — SIGNIFICANT CHANGE UP
CREAT SERPL-MCNC: 0.63 MG/DL — SIGNIFICANT CHANGE UP (ref 0.5–1.3)
DIFF PNL FLD: NEGATIVE — SIGNIFICANT CHANGE UP
GLUCOSE SERPL-MCNC: 105 MG/DL — HIGH (ref 70–99)
GLUCOSE UR QL: NEGATIVE MG/DL — SIGNIFICANT CHANGE UP
HCT VFR BLD CALC: 39.7 % — SIGNIFICANT CHANGE UP (ref 34.5–45)
HGB BLD-MCNC: 13 G/DL — SIGNIFICANT CHANGE UP (ref 11.5–15.5)
INR BLD: 1.1 RATIO — SIGNIFICANT CHANGE UP (ref 0.88–1.16)
KETONES UR-MCNC: NEGATIVE — SIGNIFICANT CHANGE UP
LEUKOCYTE ESTERASE UR-ACNC: NEGATIVE — SIGNIFICANT CHANGE UP
MAGNESIUM SERPL-MCNC: 2.2 MG/DL — SIGNIFICANT CHANGE UP (ref 1.6–2.6)
MCHC RBC-ENTMCNC: 28.3 PG — SIGNIFICANT CHANGE UP (ref 27–34)
MCHC RBC-ENTMCNC: 32.7 GM/DL — SIGNIFICANT CHANGE UP (ref 32–36)
MCV RBC AUTO: 86.3 FL — SIGNIFICANT CHANGE UP (ref 80–100)
NITRITE UR-MCNC: NEGATIVE — SIGNIFICANT CHANGE UP
PH UR: 6.5 — SIGNIFICANT CHANGE UP (ref 5–8)
PHOSPHATE SERPL-MCNC: 3.2 MG/DL — SIGNIFICANT CHANGE UP (ref 2.5–4.5)
PLATELET # BLD AUTO: 319 K/UL — SIGNIFICANT CHANGE UP (ref 150–400)
POTASSIUM SERPL-MCNC: 4.2 MMOL/L — SIGNIFICANT CHANGE UP (ref 3.5–5.3)
POTASSIUM SERPL-SCNC: 4.2 MMOL/L — SIGNIFICANT CHANGE UP (ref 3.5–5.3)
PROT UR-MCNC: NEGATIVE MG/DL — SIGNIFICANT CHANGE UP
PROTHROM AB SERPL-ACNC: 12.8 SEC — SIGNIFICANT CHANGE UP (ref 10.6–13.6)
RBC # BLD: 4.6 M/UL — SIGNIFICANT CHANGE UP (ref 3.8–5.2)
RBC # FLD: 12.7 % — SIGNIFICANT CHANGE UP (ref 10.3–14.5)
SARS-COV-2 IGG SERPL QL IA: NEGATIVE — SIGNIFICANT CHANGE UP
SARS-COV-2 IGM SERPL IA-ACNC: 0.08 INDEX — SIGNIFICANT CHANGE UP
SARS-COV-2 RNA SPEC QL NAA+PROBE: SIGNIFICANT CHANGE UP
SODIUM SERPL-SCNC: 136 MMOL/L — SIGNIFICANT CHANGE UP (ref 135–145)
SP GR SPEC: 1 — LOW (ref 1.01–1.02)
TROPONIN I SERPL-MCNC: <0.015 NG/ML — SIGNIFICANT CHANGE UP (ref 0.01–0.04)
TROPONIN I SERPL-MCNC: <0.015 NG/ML — SIGNIFICANT CHANGE UP (ref 0.01–0.04)
TSH SERPL-MCNC: 1.96 UU/ML — SIGNIFICANT CHANGE UP (ref 0.34–4.82)
UROBILINOGEN FLD QL: NEGATIVE MG/DL — SIGNIFICANT CHANGE UP
WBC # BLD: 11.53 K/UL — HIGH (ref 3.8–10.5)
WBC # FLD AUTO: 11.53 K/UL — HIGH (ref 3.8–10.5)

## 2020-08-02 PROCEDURE — 99221 1ST HOSP IP/OBS SF/LOW 40: CPT

## 2020-08-02 PROCEDURE — 99222 1ST HOSP IP/OBS MODERATE 55: CPT

## 2020-08-02 PROCEDURE — 12345: CPT | Mod: NC,GC

## 2020-08-02 RX ORDER — HEPARIN SODIUM 5000 [USP'U]/ML
5000 INJECTION INTRAVENOUS; SUBCUTANEOUS EVERY 6 HOURS
Refills: 0 | Status: DISCONTINUED | OUTPATIENT
Start: 2020-08-02 | End: 2020-08-03

## 2020-08-02 RX ORDER — HEPARIN SODIUM 5000 [USP'U]/ML
INJECTION INTRAVENOUS; SUBCUTANEOUS
Qty: 25000 | Refills: 0 | Status: DISCONTINUED | OUTPATIENT
Start: 2020-08-02 | End: 2020-08-03

## 2020-08-02 RX ORDER — ACETAMINOPHEN 500 MG
650 TABLET ORAL EVERY 6 HOURS
Refills: 0 | Status: DISCONTINUED | OUTPATIENT
Start: 2020-08-02 | End: 2020-08-04

## 2020-08-02 RX ORDER — SODIUM CHLORIDE 9 MG/ML
500 INJECTION INTRAMUSCULAR; INTRAVENOUS; SUBCUTANEOUS
Refills: 0 | Status: DISCONTINUED | OUTPATIENT
Start: 2020-08-02 | End: 2020-08-04

## 2020-08-02 RX ORDER — ASPIRIN/CALCIUM CARB/MAGNESIUM 324 MG
81 TABLET ORAL DAILY
Refills: 0 | Status: DISCONTINUED | OUTPATIENT
Start: 2020-08-02 | End: 2020-08-04

## 2020-08-02 RX ORDER — HEPARIN SODIUM 5000 [USP'U]/ML
5000 INJECTION INTRAVENOUS; SUBCUTANEOUS ONCE
Refills: 0 | Status: COMPLETED | OUTPATIENT
Start: 2020-08-02 | End: 2020-08-02

## 2020-08-02 RX ORDER — LISINOPRIL 2.5 MG/1
5 TABLET ORAL
Refills: 0 | Status: DISCONTINUED | OUTPATIENT
Start: 2020-08-02 | End: 2020-08-04

## 2020-08-02 RX ORDER — BETHANECHOL CHLORIDE 25 MG
25 TABLET ORAL
Qty: 0 | Refills: 0 | DISCHARGE

## 2020-08-02 RX ORDER — SIMVASTATIN 20 MG/1
20 TABLET, FILM COATED ORAL AT BEDTIME
Refills: 0 | Status: DISCONTINUED | OUTPATIENT
Start: 2020-08-02 | End: 2020-08-04

## 2020-08-02 RX ORDER — CARVEDILOL PHOSPHATE 80 MG/1
6.25 CAPSULE, EXTENDED RELEASE ORAL EVERY 12 HOURS
Refills: 0 | Status: DISCONTINUED | OUTPATIENT
Start: 2020-08-02 | End: 2020-08-04

## 2020-08-02 RX ORDER — DILTIAZEM HCL 120 MG
120 CAPSULE, EXT RELEASE 24 HR ORAL DAILY
Refills: 0 | Status: DISCONTINUED | OUTPATIENT
Start: 2020-08-02 | End: 2020-08-04

## 2020-08-02 RX ORDER — HEPARIN SODIUM 5000 [USP'U]/ML
2500 INJECTION INTRAVENOUS; SUBCUTANEOUS EVERY 6 HOURS
Refills: 0 | Status: DISCONTINUED | OUTPATIENT
Start: 2020-08-02 | End: 2020-08-03

## 2020-08-02 RX ORDER — AMLODIPINE BESYLATE 2.5 MG/1
2.5 TABLET ORAL DAILY
Refills: 0 | Status: DISCONTINUED | OUTPATIENT
Start: 2020-08-02 | End: 2020-08-03

## 2020-08-02 RX ADMIN — SODIUM CHLORIDE 1000 MILLILITER(S): 9 INJECTION INTRAMUSCULAR; INTRAVENOUS; SUBCUTANEOUS at 00:12

## 2020-08-02 RX ADMIN — CARVEDILOL PHOSPHATE 6.25 MILLIGRAM(S): 80 CAPSULE, EXTENDED RELEASE ORAL at 09:06

## 2020-08-02 RX ADMIN — HEPARIN SODIUM 5000 UNIT(S): 5000 INJECTION INTRAVENOUS; SUBCUTANEOUS at 01:25

## 2020-08-02 RX ADMIN — Medication 120 MILLIGRAM(S): at 09:06

## 2020-08-02 RX ADMIN — HEPARIN SODIUM 900 UNIT(S)/HR: 5000 INJECTION INTRAVENOUS; SUBCUTANEOUS at 09:28

## 2020-08-02 RX ADMIN — CARVEDILOL PHOSPHATE 6.25 MILLIGRAM(S): 80 CAPSULE, EXTENDED RELEASE ORAL at 22:02

## 2020-08-02 RX ADMIN — SIMVASTATIN 20 MILLIGRAM(S): 20 TABLET, FILM COATED ORAL at 22:03

## 2020-08-02 RX ADMIN — HEPARIN SODIUM 1100 UNIT(S)/HR: 5000 INJECTION INTRAVENOUS; SUBCUTANEOUS at 01:25

## 2020-08-02 RX ADMIN — HEPARIN SODIUM 0 UNIT(S)/HR: 5000 INJECTION INTRAVENOUS; SUBCUTANEOUS at 08:27

## 2020-08-02 RX ADMIN — HEPARIN SODIUM 800 UNIT(S)/HR: 5000 INJECTION INTRAVENOUS; SUBCUTANEOUS at 23:22

## 2020-08-02 RX ADMIN — AMLODIPINE BESYLATE 2.5 MILLIGRAM(S): 2.5 TABLET ORAL at 09:06

## 2020-08-02 RX ADMIN — LISINOPRIL 5 MILLIGRAM(S): 2.5 TABLET ORAL at 22:02

## 2020-08-02 RX ADMIN — Medication 81 MILLIGRAM(S): at 09:06

## 2020-08-02 RX ADMIN — LISINOPRIL 5 MILLIGRAM(S): 2.5 TABLET ORAL at 09:06

## 2020-08-02 RX ADMIN — HEPARIN SODIUM 800 UNIT(S)/HR: 5000 INJECTION INTRAVENOUS; SUBCUTANEOUS at 16:14

## 2020-08-02 NOTE — CONSULT NOTE ADULT - SUBJECTIVE AND OBJECTIVE BOX
HPI:  Patient is an 86 yo woman seen for AF/Tachy episodes.   She has  a PMH significant for HTN. Patient had AF noted in 2006 and prior syncope in 2017 ( dehydration).  She presented with recent onset palpitations. Episodes lasted few hours and she had mild SOB. No dizziness, chest pain or syncope. Upon evaluation at Urgent care, she was sent to  with EKG displaying Afib with RVR to 150's. At that time she denied any recent fevers,/chills, no known exposures to COVID.   PAST MEDICAL & SURGICAL HISTORY:  A-fib  Afib  Hyperlipidemia  HTN (hypertension)  Syncope: 2013  S/P cataract surgery: Left eye.  H/O: hysterectomy  Cardiac abnormality: internal cardiac monitor placed 2013      PREVIOUS DIAGNOSTIC TESTING:      ECHO  FINDINGS:    STRESS  FINDINGS:    CATHETERIZATION  FINDINGS:      Allergies    erythromycin (Diarrhea)  penicillin (Unknown)  shellfish (Unknown)    Intolerances        MEDICATIONS  (STANDING):  amLODIPine   Tablet 2.5 milliGRAM(s) Oral daily  aspirin  chewable 81 milliGRAM(s) Oral daily  carvedilol 6.25 milliGRAM(s) Oral every 12 hours  diltiazem    milliGRAM(s) Oral daily  heparin  Infusion.  Unit(s)/Hr (11 mL/Hr) IV Continuous <Continuous>  lisinopril 5 milliGRAM(s) Oral two times a day  simvastatin Oral Tab/Cap - Peds 20 milliGRAM(s) Oral at bedtime  sodium chloride 0.9%. 500 milliLiter(s) (75 mL/Hr) IV Continuous <Continuous>    MEDICATIONS  (PRN):  acetaminophen    Suspension .. 650 milliGRAM(s) Oral every 6 hours PRN Moderate Pain (4 - 6)  acetaminophen   Tablet .. 650 milliGRAM(s) Oral every 6 hours PRN Mild Pain (1 - 3)  heparin   Injectable 5000 Unit(s) IV Push every 6 hours PRN For aPTT less than 40  heparin   Injectable 2500 Unit(s) IV Push every 6 hours PRN For aPTT between 40 - 57  ondansetron Injectable 4 milliGRAM(s) IV Push every 6 hours PRN Nausea and/or Vomiting      FAMILY HISTORY:  No pertinent family history in first degree relatives      SOCIAL HISTORY:    CIGARETTES:  none    ALCOHOL:  none    REVIEW OF SYSTEMS:  CONSTITUTIONAL: No fever, weight loss, or fatigue  EYES: No eye pain, visual disturbances, or discharge  ENMT:  No difficulty hearing, tinnitus, vertigo; No sinus or throat pain  NECK: No pain or stiffness  RESPIRATORY: No cough, wheezing, chills or hemoptysis; No Shortness of Breath  GASTROINTESTINAL: No abdominal or epigastric pain. No nausea, vomiting, or hematemesis; No diarrhea or constipation. No melena or hematochezia.  GENITOURINARY: No dysuria, frequency, hematuria,  NEUROLOGICAL:  headaches  SKIN: No itching, burning, rashes, or lesions   LYMPH Nodes: No enlarged glands  HEME/LYMPH: No easy bruising, or bleeding gums  	    Vital Signs Last 24 Hrs  T(C): 36.6 (02 Aug 2020 19:37), Max: 36.6 (02 Aug 2020 19:37)  T(F): 97.9 (02 Aug 2020 19:37), Max: 97.9 (02 Aug 2020 19:37)  HR: 83 (02 Aug 2020 19:37) (83 - 118)  BP: 116/58 (02 Aug 2020 19:37) (114/90 - 158/76)  BP(mean): --  RR: 18 (02 Aug 2020 19:37) (17 - 18)  SpO2: 97% (02 Aug 2020 19:37) (95% - 98%)    Daily     Daily Weight in k.2 (02 Aug 2020 03:18)    I&O's Detail      PHYSICAL EXAM:  Appearance: Normal, well nourished	  HEENT:   sclera non-icteric	    Cardiovascular: irreg. Normal S1 S2, No JVD, No cardiac murmurs, No carotid bruits, No peripheral edema  Respiratory: Lungs clear to auscultation	  Psychiatry: A & O x 3, Mood & affect appropriate  Gastrointestinal:  Soft, Non-tender, + BS, no bruits	  Skin: No rashes, No ecchymoses, No cyanosis  Neurologic: Grossly non-focal   Extremities: No clubbing, cyanosis or edema  Vascular: Peripheral pulses palpable 2+ bilaterally      INTERPRETATION OF TELEMETRY:    ECG: AF    LABS:                        13.0   11.53 )-----------( 319      ( 02 Aug 2020 07:00 )             39.7     08-02    136  |  104  |  6<L>  ----------------------------<  105<H>  4.2   |  24  |  0.63    Ca    8.6      02 Aug 2020 07:00  Phos  3.2     08-02  Mg     2.2     08-02    TPro  7.2  /  Alb  3.8  /  TBili  0.4  /  DBili  x   /  AST  20  /  ALT  29  /  AlkPhos  69  08-01          PT/INR - ( 02 Aug 2020 07:00 )   PT: 12.8 sec;   INR: 1.10 ratio         PTT - ( 02 Aug 2020 15:13 )  PTT:104.6 sec  Urinalysis Basic - ( 02 Aug 2020 02:51 )    Color: Yellow / Appearance: Clear / S.005 / pH: x  Gluc: x / Ketone: Negative  / Bili: Negative / Urobili: Negative mg/dL   Blood: x / Protein: Negative mg/dL / Nitrite: Negative   Leuk Esterase: Negative / RBC: x / WBC x   Sq Epi: x / Non Sq Epi: x / Bacteria: x      I&O's Summary    BNP  RADIOLOGY & ADDITIONAL STUDIES:

## 2020-08-02 NOTE — CONSULT NOTE ADULT - ASSESSMENT
She has new onset AF and has mild symptoms of SOB  HR not well controlled.   No desiree or pauses  Agree with Rate control and AC and plan for DELONTE/CV  Await echo, TFTs  If recurrent Af post CV - will revaluate for AAD/Abl.

## 2020-08-02 NOTE — H&P ADULT - NSICDXPASTSURGICALHX_GEN_ALL_CORE_FT
PAST SURGICAL HISTORY:  Cardiac abnormality internal cardiac monitor placed 8/2013    H/O: hysterectomy     S/P cataract surgery Left eye.

## 2020-08-02 NOTE — H&P ADULT - ASSESSMENT
85F with a PMH significant for HLD, HTN, incidental ASx Afib on EKG in 2006 (w/o Rx management) and Syncope 2/2 Dehydration (2017). She presents to [facility] with a 1 week history of "heart palpitations". Pt reports the onset as over a few weeks ago. She states her palpitations were intermittent in nature and associated with mild dyspnea on exertion, weakness. In the ED he was found to be Afebrile, with RVR to the 140's and remaining vital signs stable. Physical exam was unremarkable. Laboratory findings were significant for Hyponatremia (Na 131) and Negative Trop x 1. CXR revealed no acute pathology/no signs of congestion. Pt was given a 1 time dose of PO + IV Cardizem, bolused 1L NS and initiated on Heparin AC gtt, then admitted to the floor with Afib with RVR , with  cardiology consulted.           #Afib w/RVR  -Admit to Telemetry   -Cardiology Consulted, f/u recc  -TTE ordered, f/u recc   -Continue Coreg 6.25BID  -Continue Heparin gtt per Full ACS normogram?  -CHADSVASc: 3    #Hyponatremia   -Clinically insignificant   -Appears euvolemic, with no signs of fluid over load  -s/p 1L NS IV Bolus   -F/u repeat BMP    #HTN  -Continue Coreg 6.25mg BID  -Continue Norvasc 2.5mg qD (recently decreased from BID)  -Continue Lisinopril 5mg BID     #Menopausal Vasoactive Syndrome  -On a course of Estradiol 1mg Qd  -Administered every 23days; f/b 5d break (ended 8/1)    #CAD Ppx  -Continue ASA    #HLD  -Continue home dose Statin   -Simvastatin 20mg qHs  -Continue DASH diet       #Advanced Directives   -Full Code per pt  -Pt with Capacity   -MOLST form completed on   -HCP: Son-Juwan yAala Jr    #DVT PPX  - Full AC Heparin gtt 85F with a PMH significant for HLD, HTN, incidental ASx Afib on EKG in 2006 (w/o Rx management) and Syncope 2/2 Dehydration (2017). She presents to [facility] with a 1 week history of "heart palpitations". Pt reports the onset as over a few weeks ago. She states her palpitations were intermittent in nature and associated with mild dyspnea on exertion, weakness. In the ED he was found to be Afebrile, with RVR to the 140's and remaining vital signs stable. Physical exam was unremarkable. Laboratory findings were significant for Hyponatremia (Na 131) and Negative Trop x 1. CXR revealed no acute pathology/no signs of congestion. Pt was given a 1 time dose of PO + IV Cardizem, bolused 1L NS and initiated on Heparin AC gtt, then admitted to the floor with Afib with RVR , with  cardiology consulted.           #Afib w/RVR  -Admit to Telemetry   -Cardiology Consulted, f/u recc  -TTE ordered, f/u recc   -Continue Coreg 6.25BID  -Continue Heparin gtt per Full ACS normogram, for possible cardioversion   -CHADSVASc: 4    #Hyponatremia   -Clinically insignificant   -Appears euvolemic, with no signs of fluid over load  -s/p 1L NS IV Bolus   -F/u repeat BMP    #HTN  -Continue Coreg 6.25mg BID  -Continue Norvasc 2.5mg qD (recently decreased from BID)  -Continue Lisinopril 5mg BID     #Menopausal Vasoactive Syndrome  -On a course of Estradiol 1mg Qd  -Administered every 23days; f/b 5d break (ended 8/1)    #CAD Ppx  -Continue ASA    #HLD  -Continue home dose Statin   -Simvastatin 20mg qHs  -Continue DASH diet       #Advanced Directives   -Full Code per pt  -Pt with Capacity   -MOLST form completed on   -HCP: Son-Juwan Ayala Jr    #DVT PPX  - Full AC Heparin gtt 85F with a PMH significant for HLD, HTN, incidental ASx Afib on EKG in 2006 (w/o Rx management) and Syncope 2/2 Dehydration (2017). She presents to [facility] with a 1 week history of "heart palpitations". Pt reports the onset as over a few weeks ago. She states her palpitations were intermittent in nature and associated with mild dyspnea on exertion, weakness. In the ED he was found to be Afebrile, with RVR to the 140's and remaining vital signs stable. Physical exam was unremarkable. Laboratory findings were significant for Hyponatremia (Na 131) and Negative Trop x 1. CXR revealed no acute pathology/no signs of congestion. Pt was given a 1 time dose of PO + IV Cardizem, bolused 1L NS and initiated on Heparin AC gtt, then admitted to the floor with Afib with RVR , with  cardiology consulted.           #Afib w/RVR  -Admit to Telemetry   -Cardiology Consulted, f/u recc  -TTE ordered, f/u recc   -started on Cardizem   -Continue Heparin gtt per Full ACS normogram, for possible cardioversion   -CHADSVASc: 4    #Hyponatremia   -Clinically insignificant   -Appears euvolemic, with no signs of fluid over load  -s/p 1L NS IV Bolus   -F/u repeat BMP    #HTN  -Continue Coreg 6.25mg BID  -Continue Norvasc 2.5mg qD (recently decreased from BID)  -Continue Lisinopril 5mg BID     #Menopausal Vasoactive Syndrome  -On a course of Estradiol 1mg Qd  -Administered every 23days; f/b 5d break (ended 8/1)    #CAD Ppx  -Continue ASA    #HLD  -Continue home dose Statin   -Simvastatin 20mg qHs  -Continue DASH diet       #Advanced Directives   -Full Code per pt  -Pt with Capacity   -MOLST form completed on   -HCP: Son-Juwan Ayala Jr    #DVT PPX  - Full AC Heparin gtt

## 2020-08-02 NOTE — H&P ADULT - HISTORY OF PRESENT ILLNESS
85F with a PMH significant for HLD, HTN and Syncope. She presents to [facility] with a 1 week history of "heart palpitations". Pt reports the onset as over a week ago. She states is was intermittent in nature, associated with mild dyspnea on exertion. Initially she attributed these tachycardic episodes to the heat, but the sxs persisteted dispite cooler temperatures in doors. She denies any recent fevers, URI, ...... On further ROS she denies diaphoresis, chest pain, blurres vision, HA, N/V. Remanig ROS unremarkable 85F with a PMH significant for HLD, HTN, incidental ASx Afib on EKG in 2006 (w/o Rx management) and Syncope 2/2 Dehydration (2017). She presents to ED with a 1 week history of "heart palpitations". Pt reports the onset as over a few weeks ago. She states her palpitations were intermittent in nature and associated with mild dyspnea on exertion, weakness, as well as mild HAs resolved with PO Tylenol. She reports exacerbation of her sxs with exertion (lifting heavy objs, walking up stairs), with relief initially obtained with rest in cooler climates. Initially she attributed these tachycardic episodes to the heat, but later her sxs persisted despite relocating to cooler temperatures in door. Upon evaluation at Urgent care, she was sent to  with EKG displaying Afib with RVR to 150's. At that time she denied any recent fevers,/chills, no known exposures to COVID. On further ROS she denies diaphoresis, chest pain, blurry vision,  N/V. Remaining ROS unremarkable 85F with a PMH significant for HLD, HTN, incidental ASx Afib on EKG in 2006 (w/o Rx management) and Syncope 2/2 Dehydration (2017). She presents to ED with a 1 week history of "heart palpitations". Pt reports the onset as over a few weeks ago. She states her palpitations were intermittent in nature and associated with mild dyspnea on exertion, weakness, as well as mild HAs resolved with PO Tylenol. She reports exacerbation of her sxs with exertion (lifting heavy objs, walking up stairs), with relief initially obtained with rest in cooler climates. Initially she attributed these tachycardic episodes to the heat, but later her sxs persisted despite relocating to cooler temperatures in door. Upon evaluation at Urgent care, she was sent to  with EKG displaying Afib with RVR to 150's. At that time she denied any recent fevers,/chills, no known exposures to COVID. On further ROS she denies diaphoresis, chest pain, blurry vision,  N/V. Remaining ROS unremarkable.

## 2020-08-02 NOTE — H&P ADULT - NSHPREVIEWOFSYSTEMS_GEN_ALL_CORE
REVIEW OF SYSTEMS:  CONSTITUTIONAL: No weakness, fevers or chills  EYES/ENT: No visual changes;  No vertigo or throat pain   NECK: No pain or stiffness  RESPIRATORY: No cough, wheezing, hemoptysis; No shortness of breath  CARDIOVASCULAR: No chest pain or palpitations  GASTROINTESTINAL: No abdominal or epigastric pain. No nausea, vomiting, or hematemesis; No diarrhea or constipation. No melena or hematochezia.  GENITOURINARY: No dysuria, frequency or hematuria  HEMATOLOGIC: No easy bruising, prolonged bleeding from everyday cuts,   NEUROLOGICAL: No numbness or weakness  PSYCH: Denies Insomnia, loss of interest in activities, feelings of guilt, decreased energy, difficulty concentrating, decreased appetite, SI/HI  SKIN: No itching, burning, rashes, or lesions   All other review of systems is negative unless indicated above REVIEW OF SYSTEMS:  CONSTITUTIONAL: No weakness, fevers or chills  EYES/ENT: No visual changes;  No vertigo or throat pain   NECK: No pain or stiffness  RESPIRATORY: No cough, wheezing, hemoptysis; No shortness of breath  CARDIOVASCULAR: No chest pain, +palpitations  GASTROINTESTINAL: No abdominal or epigastric pain. No nausea, vomiting, or hematemesis; No diarrhea or constipation. No melena or hematochezia.  GENITOURINARY: No dysuria, frequency or hematuria  HEMATOLOGIC: No easy bruising, prolonged bleeding from everyday cuts,   NEUROLOGICAL: No numbness or weakness  PSYCH: Denies Insomnia, loss of interest in activities, feelings of guilt, decreased energy, difficulty concentrating, decreased appetite, SI/HI  SKIN: No itching, burning, rashes, or lesions   All other review of systems is negative unless indicated above

## 2020-08-02 NOTE — H&P ADULT - ATTENDING COMMENTS
85F with a PMH significant for HLD, HTN, incidental ASx Afib on EKG in 2006 (w/o Rx management) and Syncope 2/2 Dehydration (2017). She presents to [facility] with a 1 week history of "heart palpitations". Pt reports the onset as over a few weeks ago. She states her palpitations were intermittent in nature and associated with mild dyspnea on exertion, weakness, as well as mild HAs resolved with PO Tylenol. She reports exacerbation of her sxs with exertion (lifting heavy objs, walking up stairs), with relief initially obtained with rest in cooler climates. Initially she attributed these tachycardic episodes to the heat, but later her sxs persisted despite relocating to cooler temperatures in door. Upon evaluation at Urgent care, she was sent to  with EKG displaying Afib with RVR to 150's. At that time she denied any recent fevers,/chills, no known exposures to COVID. On further ROS she denies diaphoresis, chest pain, blurry vision,  N/V. Remaining ROS unremarkable.   ROS: as above.  On exam: as above.  A/P:      #Afib w/RVR  -Admit to Telemetry   -Cardiology Consulted, f/u recc  -TTE ordered, f/u recc   -started on Cardizem   -Continue Heparin gtt per Full ACS normogram, for possible cardioversion   -CHADSVASc: 4    #Hyponatremia   -Clinically insignificant   -Appears euvolemic, with no signs of fluid over load  -s/p 1L NS IV Bolus   -F/u repeat BMP    #HTN  -Continue Coreg 6.25mg BID  -Continue Norvasc 2.5mg qD (recently decreased from BID)  -Continue Lisinopril 5mg BID     #Menopausal Vasoactive Syndrome  -On a course of Estradiol 1mg Qd  -Administered every 23days; f/b 5d break (ended 8/1)    #CAD Ppx  -Continue ASA    #HLD  -Continue home dose Statin   -Simvastatin 20mg qHs  -Continue DASH diet       #Advanced Directives   -Full Code per pt  -Pt with Capacity   -MOLST form completed on   -HCP: Son-Juwan Ayala Jr    #DVT PPX  - Full AC Heparin gtt     Patient is seen and examined with the resident. Patient presented with A fib with RVR. Got IV cardizem and Po cardizem; HR is better controlled now, She has CHADVASC score of 4. started on heparin drip. will follow echo and cardiology consult.

## 2020-08-02 NOTE — H&P ADULT - NSHPSOCIALHISTORY_GEN_ALL_CORE
Pt is a life time non-smoker, who reports occasional ETOH use and denies the use of illicit drugs. She resides at home with her  and self ambulates and drives without assistance at baseline, and is completely independent for their IDLs/IADLs.

## 2020-08-02 NOTE — PROGRESS NOTE ADULT - SUBJECTIVE AND OBJECTIVE BOX
CHIEF COMPLAINT:    SUBJECTIVE:     REVIEW OF SYSTEMS:  CONSTITUTIONAL: No weakness, fevers or chills  EYES/ENT: No visual changes;  No vertigo or throat pain   NECK: No pain or stiffness  RESPIRATORY: No cough, wheezing, hemoptysis; No shortness of breath  CARDIOVASCULAR: No chest pain or palpitations  GASTROINTESTINAL: No abdominal or epigastric pain. No nausea, vomiting, or hematemesis; No diarrhea or constipation. No melena or hematochezia.  GENITOURINARY: No dysuria, frequency or hematuria  NEUROLOGICAL: No numbness or weakness  SKIN: No itching, burning, rashes, or lesions   All other review of systems is negative unless indicated above    Vital Signs Last 24 Hrs  T(C): 36.4 (02 Aug 2020 03:18), Max: 37.1 (01 Aug 2020 19:59)  T(F): 97.5 (02 Aug 2020 03:18), Max: 98.7 (01 Aug 2020 19:59)  HR: 118 (02 Aug 2020 09:08) (88 - 138)  BP: 114/90 (02 Aug 2020 09:08) (110/64 - 158/76)  BP(mean): --  RR: 18 (02 Aug 2020 09:08) (16 - 18)  SpO2: 95% (02 Aug 2020 09:08) (95% - 98%)    I&O's Summary      CAPILLARY BLOOD GLUCOSE          PHYSICAL EXAM:  Constitutional: NAD, awake and alert, well-developed  HEENT: PERR, EOMI, Normal Hearing, MMM  Neck: Soft and supple, No LAD, No JVD  Respiratory: Breath sounds are clear bilaterally, No wheezing, rales or rhonchi  Cardiovascular: S1 and S2, regular rate and rhythm, no Murmurs, gallops or rubs  Gastrointestinal: Bowel Sounds present, soft, nontender, nondistended, no guarding, no rebound  Extremities: No peripheral edema  Vascular: 2+ peripheral pulses  Neurological: A/O x 3, no focal deficits  Musculoskeletal: 5/5 strength b/l upper and lower extremities  Skin: No rashes    MEDICATIONS:  MEDICATIONS  (STANDING):  amLODIPine   Tablet 2.5 milliGRAM(s) Oral daily  aspirin  chewable 81 milliGRAM(s) Oral daily  carvedilol 6.25 milliGRAM(s) Oral every 12 hours  diltiazem    milliGRAM(s) Oral daily  heparin  Infusion.  Unit(s)/Hr (11 mL/Hr) IV Continuous <Continuous>  lisinopril 5 milliGRAM(s) Oral two times a day  simvastatin Oral Tab/Cap - Peds 20 milliGRAM(s) Oral at bedtime  sodium chloride 0.9%. 500 milliLiter(s) (75 mL/Hr) IV Continuous <Continuous>      LABS: All Labs Reviewed:                        13.0   11.53 )-----------( 319      ( 02 Aug 2020 07:00 )             39.7     08-02    136  |  104  |  6<L>  ----------------------------<  105<H>  4.2   |  24  |  0.63    Ca    8.6      02 Aug 2020 07:00  Phos  3.2     08-02  Mg     2.2     08-02    TPro  7.2  /  Alb  3.8  /  TBili  0.4  /  DBili  x   /  AST  20  /  ALT  29  /  AlkPhos  69  08-01    PT/INR - ( 02 Aug 2020 07:00 )   PT: 12.8 sec;   INR: 1.10 ratio         PTT - ( 02 Aug 2020 07:00 )  PTT:> 200 sec  CARDIAC MARKERS ( 02 Aug 2020 02:51 )  <0.015 ng/mL / x     / x     / x     / x      CARDIAC MARKERS ( 01 Aug 2020 23:56 )  <0.015 ng/mL / x     / x     / x     / x      CARDIAC MARKERS ( 01 Aug 2020 20:18 )  <0.015 ng/mL / x     / x     / x     / x          Blood Culture:     RADIOLOGY/EKG:    DVT PPX:    ADVANCED DIRECTIVE:    DISPOSITION: 85F with PMH of HLD, HTN, incidental ASx Afib on EKG in 2006 (w/o Rx management) and Syncope 2/2 Dehydration (2017). Pt presented to ED with a 1 week history of "heart palpitations". Pt reports the onset as over a few weeks ago. She states her palpitations were intermittent in nature and associated with mild dyspnea on exertion, weakness, as well as mild HAs resolved with PO Tylenol. She reports exacerbation of her sxs with exertion (lifting heavy objs, walking up stairs), with relief initially obtained with rest in cooler climates. Initially she attributed these tachycardic episodes to the heat, but later her sxs persisted despite relocating to cooler temperatures in door. Upon evaluation at Urgent care, she was sent to  with EKG displaying Afib with RVR to 150's. At that time she denied any recent fevers,/chills, no known exposures to COVID. On further ROS she denies diaphoresis, chest pain, blurry vision,  N/V. Remaining ROS unremarkable.     SUBJECTIVE: Pt was seen and evaluated. Endorses HPI as above. HR still not controlled. States she feels intermittent heart fluttering. No other complaints or issues.    REVIEW OF SYSTEMS:  CONSTITUTIONAL: No weakness, fevers or chills  EYES/ENT: No visual changes;  No vertigo or throat pain   NECK: No pain or stiffness  RESPIRATORY: No cough, wheezing, hemoptysis; No shortness of breath  CARDIOVASCULAR: No chest pain. + palpitations  GASTROINTESTINAL: No abdominal or epigastric pain. No nausea, vomiting, or hematemesis; No diarrhea or constipation. No melena or hematochezia.  GENITOURINARY: No dysuria, frequency or hematuria  NEUROLOGICAL: No numbness or weakness  SKIN: No itching, burning, rashes, or lesions   All other review of systems is negative unless indicated above    Vital Signs Last 24 Hrs  T(C): 36.4 (02 Aug 2020 03:18), Max: 37.1 (01 Aug 2020 19:59)  T(F): 97.5 (02 Aug 2020 03:18), Max: 98.7 (01 Aug 2020 19:59)  HR: 118 (02 Aug 2020 09:08) (88 - 138)  BP: 114/90 (02 Aug 2020 09:08) (110/64 - 158/76)  RR: 18 (02 Aug 2020 09:08) (16 - 18)  SpO2: 95% (02 Aug 2020 09:08) (95% - 98%)      PHYSICAL EXAM:  Constitutional: NAD, awake and alert, well-developed  HEENT: PERR, EOMI, Normal Hearing, MMM  Neck: Soft and supple, No LAD, No JVD  Respiratory: Breath sounds are clear bilaterally, No wheezing, rales or rhonchi  Cardiovascular: Irregularly irregular heart beat. tachycardic  Gastrointestinal: Bowel Sounds present, soft, nontender, nondistended, no guarding, no rebound  Extremities: No peripheral edema  Vascular: 2+ peripheral pulses  Neurological: A/O x 3, no focal deficits  Skin: No rashes    MEDICATIONS:  MEDICATIONS  (STANDING):  amLODIPine   Tablet 2.5 milliGRAM(s) Oral daily  aspirin  chewable 81 milliGRAM(s) Oral daily  carvedilol 6.25 milliGRAM(s) Oral every 12 hours  diltiazem    milliGRAM(s) Oral daily  heparin  Infusion.  Unit(s)/Hr (11 mL/Hr) IV Continuous <Continuous>  lisinopril 5 milliGRAM(s) Oral two times a day  simvastatin Oral Tab/Cap - Peds 20 milliGRAM(s) Oral at bedtime  sodium chloride 0.9%. 500 milliLiter(s) (75 mL/Hr) IV Continuous <Continuous>      LABS: All Labs Reviewed:                        13.0   11.53 )-----------( 319      ( 02 Aug 2020 07:00 )             39.7     08-02    136  |  104  |  6<L>  ----------------------------<  105<H>  4.2   |  24  |  0.63    Ca    8.6      02 Aug 2020 07:00  Phos  3.2     08-02  Mg     2.2     08-02    TPro  7.2  /  Alb  3.8  /  TBili  0.4  /  DBili  x   /  AST  20  /  ALT  29  /  AlkPhos  69  08-01    PT/INR - ( 02 Aug 2020 07:00 )   PT: 12.8 sec;   INR: 1.10 ratio         PTT - ( 02 Aug 2020 07:00 )  PTT:> 200 sec  CARDIAC MARKERS ( 02 Aug 2020 02:51 )  <0.015 ng/mL / x     / x     / x     / x      CARDIAC MARKERS ( 01 Aug 2020 23:56 )  <0.015 ng/mL / x     / x     / x     / x      CARDIAC MARKERS ( 01 Aug 2020 20:18 )  <0.015 ng/mL / x     / x     / x     / x

## 2020-08-02 NOTE — CONSULT NOTE ADULT - ASSESSMENT
A Fib with RVR probably driven by heat and dehydration  On cardizem drip  On anticoagulation  Chads 2  DELONTE /DCCV in AM

## 2020-08-02 NOTE — H&P ADULT - NSHPLABSRESULTS_GEN_ALL_CORE
13.3   9.63  )-----------( 314      ( 01 Aug 2020 20:18 )             39.4     01 Aug 2020 20:18    131    |  101    |  8      ----------------------------<  108    4.2     |  24     |  0.67     Ca    8.6        01 Aug 2020 20:18  Mg     2.0       01 Aug 2020 20:18    TPro  7.2    /  Alb  3.8    /  TBili  0.4    /  DBili  x      /  AST  20     /  ALT  29     /  AlkPhos  69     01 Aug 2020 20:18    LIVER FUNCTIONS - ( 01 Aug 2020 20:18 )  Alb: 3.8 g/dL / Pro: 7.2 gm/dL / ALK PHOS: 69 U/L / ALT: 29 U/L / AST: 20 U/L / GGT: x           PT/INR - ( 01 Aug 2020 20:18 )   PT: 11.6 sec;   INR: 1.00 ratio         PTT - ( 01 Aug 2020 20:18 )  PTT:31.2 sec  CAPILLARY BLOOD GLUCOSE        CARDIAC MARKERS ( 01 Aug 2020 23:56 )  <0.015 ng/mL / x     / x     / x     / x      CARDIAC MARKERS ( 01 Aug 2020 20:18 )  <0.015 ng/mL / x     / x     / x     / x

## 2020-08-02 NOTE — CONSULT NOTE ADULT - SUBJECTIVE AND OBJECTIVE BOX
Patient is a 85y old  Female who presents with a chief complaint of New Onset PAF (02 Aug 2020 10:10)      HPI:  85F with a PMH significant for HLD, HTN, incidental ASx Afib on EKG in  (w/o Rx management) and Syncope 2/2 Dehydration (). She presents to [facility] with a 1 week history of "heart palpitations". Pt reports the onset as over a few weeks ago. She states her palpitations were intermittent in nature and associated with mild dyspnea on exertion, weakness, as well as mild HAs resolved with PO Tylenol. She reports exacerbation of her sxs with exertion (lifting heavy objs, walking up stairs), with relief initially obtained with rest in cooler climates. Initially she attributed these tachycardic episodes to the heat, but later her sxs persisted despite relocating to cooler temperatures in door. Upon evaluation at Urgent care, she was sent to  with EKG displaying Afib with RVR to 150's. At that time she denied any recent fevers,/chills, no known exposures to COVID. On further ROS she denies diaphoresis, chest pain, blurry vision,  N/V. Remaining ROS unremarkable. (02 Aug 2020 00:45)  Rate better controlled on cardizem drip, and Heparine  Denies any CP or SOB      PAST MEDICAL & SURGICAL HISTORY:  Afib  Hyperlipidemia  HTN (hypertension)  Syncope: 2013  S/P cataract surgery: Left eye.  H/O: hysterectomy  Cardiac abnormality: internal cardiac monitor placed 2013      HPI:                PREVIOUS DIAGNOSTIC TESTING:      ECHO  FINDINGS:    STRESS  FINDINGS:    CATHETERIZATION  FINDINGS:    MEDICATIONS  (STANDING):  amLODIPine   Tablet 2.5 milliGRAM(s) Oral daily  aspirin  chewable 81 milliGRAM(s) Oral daily  carvedilol 6.25 milliGRAM(s) Oral every 12 hours  diltiazem    milliGRAM(s) Oral daily  heparin  Infusion.  Unit(s)/Hr (11 mL/Hr) IV Continuous <Continuous>  lisinopril 5 milliGRAM(s) Oral two times a day  simvastatin Oral Tab/Cap - Peds 20 milliGRAM(s) Oral at bedtime  sodium chloride 0.9%. 500 milliLiter(s) (75 mL/Hr) IV Continuous <Continuous>    MEDICATIONS  (PRN):  acetaminophen    Suspension .. 650 milliGRAM(s) Oral every 6 hours PRN Moderate Pain (4 - 6)  acetaminophen   Tablet .. 650 milliGRAM(s) Oral every 6 hours PRN Mild Pain (1 - 3)  heparin   Injectable 5000 Unit(s) IV Push every 6 hours PRN For aPTT less than 40  heparin   Injectable 2500 Unit(s) IV Push every 6 hours PRN For aPTT between 40 - 57  ondansetron Injectable 4 milliGRAM(s) IV Push every 6 hours PRN Nausea and/or Vomiting      FAMILY HISTORY:  No pertinent family history in first degree relatives      SOCIAL HISTORY:    CIGARETTES:    ALCOHOL      Vital Signs Last 24 Hrs  T(C): 36.4 (02 Aug 2020 03:18), Max: 37.1 (01 Aug 2020 19:59)  T(F): 97.5 (02 Aug 2020 03:18), Max: 98.7 (01 Aug 2020 19:59)  HR: 118 (02 Aug 2020 09:08) (88 - 138)  BP: 114/90 (02 Aug 2020 09:08) (110/64 - 158/76)  BP(mean): --  RR: 18 (02 Aug 2020 09:08) (16 - 18)  SpO2: 95% (02 Aug 2020 09:08) (95% - 98%)    PHYSICAL EXAM-    Constitutional: The patient appears to be normal, well developed, well nourished and alert and oriented to time, place and person. The patient does not appear acutely ill. The patient is alert.     Head: Head is normocephalic and atraumatic.      Neck: The patient's neck is supple without enlargement, has no palpable thyromegaly nor thyroid nodules and has no jugular venous distention. No audible carotid bruits. There are strong carotid pulses bilaterally. No JVD.     Cardiovascular: Irregulary irregular with no significant murmurs    Respiratory:  The patient has no rales and no rhonchi. The patient has no wheezes.     Abdomen: Soft, nontender, nondistended with positive bowel sounds.      Extremity: No tenderness. There is no pitting edema, skin discoloration, clubbing and cyanosis.         INTERPRETATION OF TELEMETRY:    ECG:    I&O's Detail      LABS:                        13.0   11.53 )-----------( 319      ( 02 Aug 2020 07:00 )             39.7     08-02    136  |  104  |  6<L>  ----------------------------<  105<H>  4.2   |  24  |  0.63    Ca    8.6      02 Aug 2020 07:00  Phos  3.2     08-02  Mg     2.2     08-02    TPro  7.2  /  Alb  3.8  /  TBili  0.4  /  DBili  x   /  AST  20  /  ALT  29  /  AlkPhos  69  08-01    CARDIAC MARKERS ( 02 Aug 2020 02:51 )  <0.015 ng/mL / x     / x     / x     / x      CARDIAC MARKERS ( 01 Aug 2020 23:56 )  <0.015 ng/mL / x     / x     / x     / x      CARDIAC MARKERS ( 01 Aug 2020 20:18 )  <0.015 ng/mL / x     / x     / x     / x          PT/INR - ( 02 Aug 2020 07:00 )   PT: 12.8 sec;   INR: 1.10 ratio         PTT - ( 02 Aug 2020 07:00 )  PTT:> 200 sec  Urinalysis Basic - ( 02 Aug 2020 02:51 )    Color: Yellow / Appearance: Clear / S.005 / pH: x  Gluc: x / Ketone: Negative  / Bili: Negative / Urobili: Negative mg/dL   Blood: x / Protein: Negative mg/dL / Nitrite: Negative   Leuk Esterase: Negative / RBC: x / WBC x   Sq Epi: x / Non Sq Epi: x / Bacteria: x      I&O's Summary    BNP  RADIOLOGY & ADDITIONAL STUDIES:

## 2020-08-02 NOTE — H&P ADULT - NSHPPHYSICALEXAM_GEN_ALL_CORE
Vital Signs Last 24 Hrs  T(C): 37.1 (01 Aug 2020 19:59), Max: 37.1 (01 Aug 2020 19:59)  T(F): 98.7 (01 Aug 2020 19:59), Max: 98.7 (01 Aug 2020 19:59)  HR: 88 (01 Aug 2020 20:28) (88 - 138)  BP: 110/64 (01 Aug 2020 20:28) (110/64 - 152/96)  RR: 16 (01 Aug 2020 19:59) (16 - 16)  SpO2: 95% (01 Aug 2020 19:59) (95% - 95%)    PHYSICAL EXAM:  Constitutional: NAD, awake and alert, well-developed   HEENT: PERR, EOMI, Normal Hearing, MMM  Neck: Soft and supple, No LAD, No JVD  Respiratory: Breath sounds are clear bilaterally, No wheezing, rales or rhonchi  Cardiovascular: S1 and S2, irregular rhythm, regular rate no Murmurs, gallops or rubs  Gastrointestinal: Bowel Sounds present, soft, nontender, nondistended, no guarding, no rebound  Extremities: No peripheral edema  Vascular: 2+ peripheral pulses  Neurological: A/O x 3, no focal deficits  Musculoskeletal: 5/5 strength b/l upper and lower extremities  Skin: No rashes Vital Signs Last 24 Hrs  T(C): 37.1 (01 Aug 2020 19:59), Max: 37.1 (01 Aug 2020 19:59)  T(F): 98.7 (01 Aug 2020 19:59), Max: 98.7 (01 Aug 2020 19:59)  HR: 88 (01 Aug 2020 20:28) (88 - 138)  BP: 110/64 (01 Aug 2020 20:28) (110/64 - 152/96)  RR: 16 (01 Aug 2020 19:59) (16 - 16)  SpO2: 95% (01 Aug 2020 19:59) (95% - 95%)    PHYSICAL EXAM:  Constitutional: NAD, awake and alert, well-developed   HEENT: PERR, EOMI, Normal Hearing, MMM  Neck: Soft and supple, No LAD, No JVD  Respiratory: Breath sounds are clear bilaterally, No wheezing, rales or rhonchi  Cardiovascular: S1 and S2, irregular rate and rhythm, no Murmurs, gallops or rubs  Gastrointestinal: Bowel Sounds present, soft, nontender, nondistended, no guarding, no rebound  Extremities: No peripheral edema  Vascular: 2+ peripheral pulses  Neurological: A/O x 3, no focal deficits  Musculoskeletal: 5/5 strength b/l upper and lower extremities  Skin: No rashes

## 2020-08-03 LAB
APTT BLD: 78.9 SEC — HIGH (ref 27.5–35.5)
CULTURE RESULTS: SIGNIFICANT CHANGE UP
HCT VFR BLD CALC: 35.2 % — SIGNIFICANT CHANGE UP (ref 34.5–45)
HGB BLD-MCNC: 11.9 G/DL — SIGNIFICANT CHANGE UP (ref 11.5–15.5)
MCHC RBC-ENTMCNC: 29 PG — SIGNIFICANT CHANGE UP (ref 27–34)
MCHC RBC-ENTMCNC: 33.8 GM/DL — SIGNIFICANT CHANGE UP (ref 32–36)
MCV RBC AUTO: 85.6 FL — SIGNIFICANT CHANGE UP (ref 80–100)
PLATELET # BLD AUTO: 274 K/UL — SIGNIFICANT CHANGE UP (ref 150–400)
RBC # BLD: 4.11 M/UL — SIGNIFICANT CHANGE UP (ref 3.8–5.2)
RBC # FLD: 12.7 % — SIGNIFICANT CHANGE UP (ref 10.3–14.5)
SPECIMEN SOURCE: SIGNIFICANT CHANGE UP
WBC # BLD: 7.81 K/UL — SIGNIFICANT CHANGE UP (ref 3.8–10.5)
WBC # FLD AUTO: 7.81 K/UL — SIGNIFICANT CHANGE UP (ref 3.8–10.5)

## 2020-08-03 PROCEDURE — 93010 ELECTROCARDIOGRAM REPORT: CPT

## 2020-08-03 PROCEDURE — 99232 SBSQ HOSP IP/OBS MODERATE 35: CPT | Mod: GC

## 2020-08-03 RX ORDER — AMLODIPINE BESYLATE 2.5 MG/1
2.5 TABLET ORAL
Qty: 0 | Refills: 0 | DISCHARGE

## 2020-08-03 RX ORDER — SIMVASTATIN 20 MG/1
20 TABLET, FILM COATED ORAL
Qty: 0 | Refills: 0 | DISCHARGE

## 2020-08-03 RX ORDER — CARVEDILOL PHOSPHATE 80 MG/1
6.25 CAPSULE, EXTENDED RELEASE ORAL
Qty: 0 | Refills: 0 | DISCHARGE

## 2020-08-03 RX ORDER — RIVAROXABAN 15 MG-20MG
2.5 KIT ORAL
Refills: 0 | Status: DISCONTINUED | OUTPATIENT
Start: 2020-08-03 | End: 2020-08-03

## 2020-08-03 RX ORDER — RIVAROXABAN 15 MG-20MG
20 KIT ORAL
Refills: 0 | Status: DISCONTINUED | OUTPATIENT
Start: 2020-08-03 | End: 2020-08-04

## 2020-08-03 RX ORDER — LISINOPRIL 2.5 MG/1
10 TABLET ORAL
Qty: 0 | Refills: 0 | DISCHARGE

## 2020-08-03 RX ADMIN — CARVEDILOL PHOSPHATE 6.25 MILLIGRAM(S): 80 CAPSULE, EXTENDED RELEASE ORAL at 14:15

## 2020-08-03 RX ADMIN — Medication 650 MILLIGRAM(S): at 21:50

## 2020-08-03 RX ADMIN — RIVAROXABAN 20 MILLIGRAM(S): KIT at 17:06

## 2020-08-03 RX ADMIN — Medication 120 MILLIGRAM(S): at 14:15

## 2020-08-03 RX ADMIN — CARVEDILOL PHOSPHATE 6.25 MILLIGRAM(S): 80 CAPSULE, EXTENDED RELEASE ORAL at 21:08

## 2020-08-03 RX ADMIN — Medication 650 MILLIGRAM(S): at 21:08

## 2020-08-03 RX ADMIN — Medication 81 MILLIGRAM(S): at 14:12

## 2020-08-03 RX ADMIN — LISINOPRIL 5 MILLIGRAM(S): 2.5 TABLET ORAL at 14:15

## 2020-08-03 RX ADMIN — HEPARIN SODIUM 800 UNIT(S)/HR: 5000 INJECTION INTRAVENOUS; SUBCUTANEOUS at 07:05

## 2020-08-03 RX ADMIN — LISINOPRIL 5 MILLIGRAM(S): 2.5 TABLET ORAL at 21:08

## 2020-08-03 RX ADMIN — SIMVASTATIN 20 MILLIGRAM(S): 20 TABLET, FILM COATED ORAL at 21:08

## 2020-08-03 NOTE — PROGRESS NOTE ADULT - ASSESSMENT
85F with a PMH significant for HLD, HTN, incidental ASx Afib on EKG in 2006 (w/o Rx management) and Syncope 2/2 Dehydration (2017). She presents to [facility] with a 1 week history of "heart palpitations". Pt reports the onset as over a few weeks ago. She states her palpitations were intermittent in nature and associated with mild dyspnea on exertion, weakness. In the ED he was found to be Afebrile, with RVR to the 140's and remaining vital signs stable. Physical exam was unremarkable. Laboratory findings were significant for Hyponatremia (Na 131) and Negative Trop x 1. CXR revealed no acute pathology/no signs of congestion. Pt was given a 1 time dose of PO + IV Cardizem, bolused 1L NS and initiated on Heparin AC gtt, then admitted to the floor with Afib with RVR , with  cardiology consulted.     #Afib w/RVR  - CHADSVASc: 4 (Agex2, HTN, Female sex)  - Start Xarelto 20 with aptt  - Continue cardizem PO  - Cardio consult appreciated: S/P DELONTE/DVVC - Start Xarelto. /C Planning.    #Leukocytosis  - Likely stress related  - No signs of active infection: Pt is afebrile and asymtomatic  - Will monitor    #Hyponatremia   - Resolved  - Will monitor    #HTN  -Continue Coreg 6.25mg BID  -Continue Norvasc 2.5mg qD (recently decreased from BID)  -Continue Lisinopril 5mg BID     #Menopausal Vasoactive Syndrome  -On a course of Estradiol 1mg Qd  -Administered every 23days; f/b 5d break (ended 8/1)    #CAD Ppx  -Continue ASA    #HLD  -Continue home dose Statin   -Simvastatin 20mg qHs  -Continue DASH diet       #Advanced Directives   -Full Code per pt  -Pt with Capacity   -MOLST form completed on   -HCP: Son-Juwan Ayala Jr    #DVT PPX  - Full AC Heparin gtt 85F with a PMH significant for HLD, HTN, incidental ASx Afib on EKG in 2006 (w/o Rx management) and Syncope 2/2 Dehydration (2017). She presents to [facility] with a 1 week history of "heart palpitations". Pt reports the onset as over a few weeks ago. She states her palpitations were intermittent in nature and associated with mild dyspnea on exertion, weakness. In the ED he was found to be Afebrile, with RVR to the 140's and remaining vital signs stable. Physical exam was unremarkable. Laboratory findings were significant for Hyponatremia (Na 131) and Negative Trop x 1. CXR revealed no acute pathology/no signs of congestion. Pt was given a 1 time dose of PO + IV Cardizem, bolused 1L NS and initiated on Heparin AC gtt, then admitted to the floor with Afib with RVR , with  cardiology consulted.     #Afib w/RVR  - CHADSVASc: 4 (Agex2, HTN, Female sex)  - Start Xarelto 20 with aptt  - Continue cardizem PO  - Cardio consult appreciated: S/P DELONTE/DVVC - Start Xarelto. D/C Planning.    #Leukocytosis  - Likely stress related  - No signs of active infection: Pt is afebrile and asymtomatic  - Will monitor    #Hyponatremia   - Resolved  - Will monitor    #HTN  -Continue Coreg 6.25mg BID  -Continue Norvasc 2.5mg qD (recently decreased from BID)  -Continue Lisinopril 5mg BID     #Menopausal Vasoactive Syndrome  -On a course of Estradiol 1mg Qd  -Administered every 23days; f/b 5d break (ended 8/1)    #CAD Ppx  -Continue ASA    #HLD  -Continue home dose Statin   -Simvastatin 20mg qHs  -Continue DASH diet       #Advanced Directives   -Full Code per pt  -Pt with Capacity   -MOLST form completed  -HCP: Son-Juwan Ayala Jr    #DVT PPX  - Xarelto 20 85F with a PMH significant for HLD, HTN, incidental ASx Afib on EKG in 2006 (w/o Rx management) and Syncope 2/2 Dehydration (2017). She presents to [facility] with a 1 week history of "heart palpitations". Pt reports the onset as over a few weeks ago. She states her palpitations were intermittent in nature and associated with mild dyspnea on exertion, weakness. In the ED he was found to be Afebrile, with RVR to the 140's and remaining vital signs stable. Physical exam was unremarkable. Laboratory findings were significant for Hyponatremia (Na 131) and Negative Trop x 1. CXR revealed no acute pathology/no signs of congestion. Pt was given a 1 time dose of PO + IV Cardizem, bolused 1L NS and initiated on Heparin AC gtt, then admitted to the floor with Afib with RVR , with  cardiology consulted.     #Afib w/RVR  - CHADSVASc: 4 (Agex2, HTN, Female sex)  - Start Xarelto 20 with aptt  - Continue cardizem PO  - Cardio consult appreciated: S/P DELONTE/DVVC - Start Xarelto. D/C Planning.    #Leukocytosis  - Resolved  - Will monitor    #HTN  -Continue Coreg 6.25mg BID  -Continue Norvasc 2.5mg qD (recently decreased from BID)  -Continue Lisinopril 5mg BID     #Menopausal Vasoactive Syndrome  -On a course of Estradiol 1mg Qd  -Administered every 23days; f/b 5d break (ended 8/1)    #CAD Ppx  -Continue ASA    #HLD  -Continue home dose Statin   -Simvastatin 20mg qHs  -Continue DASH diet       #Advanced Directives   -Full Code per pt  -Pt with Capacity   -MOLST form completed  -HCP: Son-Juwan Ayala Jr    #DVT PPX  - Xarelto 20

## 2020-08-03 NOTE — PROGRESS NOTE ADULT - ATTENDING COMMENTS
As above, pt seen and examined with house staff and treatment plan formulated on rounds.
Patient seen and examined with Family Medicine Residents Constantino Calderón and Tod Jordan on the Family Medicine Teaching Service.  Agree with history, physical, labs and plan which were reviewed in detail after a face to face encounter with the patient.

## 2020-08-03 NOTE — CHART NOTE - NSCHARTNOTEFT_GEN_A_CORE
Pt seen and examined with house staff.  Plan formulated and reviewed on rounds     Briefly,  84 y/o female with HTN and HL admitted with new onset AFib.  For DELONTE and DCCV today.  On UFH gtt  No events overnight   ROS o/w negative     Awake and alert  NAD  Stable vitals  No fevers    Grossly non focal     Labs reviewed     NPO  DELONTE/DCCV  AC  Cont with statin and ACEI.  Would DC amlodipine and cont with Dilt CD for rate control--no need for both CCBx    DVT prophy    Tele

## 2020-08-03 NOTE — PROCEDURAL SAFETY CHECKLIST WITH OR WITHOUT SEDATION - NSPOSTCOMMENTFT_GEN_ALL_CORE
Pt. given viscous lidocaine, swish and swallow, by Dr. jJ at 1210; DELONTE probe inserted by Dr. Norwood at 1223; study completed and DELONTE probe removed at 1228; cardioversion shock of 200 joules delivered at 1229 w/ conversion to SR, 70's on cardiac monitor; ervin. well.

## 2020-08-03 NOTE — PROGRESS NOTE ADULT - SUBJECTIVE AND OBJECTIVE BOX
85F with PMH of HLD, HTN, incidental ASx Afib on EKG in 2006 (w/o Rx management) and Syncope 2/2 Dehydration (2017). Pt presented to ED with a 1 week history of "heart palpitations". Pt reports the onset as over a few weeks ago. She states her palpitations were intermittent in nature and associated with mild dyspnea on exertion, weakness, as well as mild HAs resolved with PO Tylenol. She reports exacerbation of her sxs with exertion (lifting heavy objs, walking up stairs), with relief initially obtained with rest in cooler climates. Initially she attributed these tachycardic episodes to the heat, but later her sxs persisted despite relocating to cooler temperatures in door. Upon evaluation at Urgent care, she was sent to  with EKG displaying Afib with RVR to 150's. At that time she denied any recent fevers,/chills. On further ROS she denies diaphoresis, chest pain, blurry vision,  N/V. Remaining ROS unremarkable. Patient S/P DELONTE/DCCV      SUBJECTIVE: Pt was seen and evaluated. Endorses HPI as above. HR still not controlled. She no longer feels intermittent heart fluttering. No other complaints or issues.      REVIEW OF SYSTEMS:  CONSTITUTIONAL: No weakness, fevers or chills  EYES/ENT: No visual changes;  No vertigo or throat pain   NECK: No pain or stiffness  RESPIRATORY: No cough, wheezing, hemoptysis; No shortness of breath  CARDIOVASCULAR: No chest pain or palpitations  GASTROINTESTINAL: No abdominal or epigastric pain. No nausea, vomiting, or hematemesis; No diarrhea or constipation. No melena or hematochezia.  GENITOURINARY: No dysuria, frequency or hematuria  NEUROLOGICAL: No numbness or weakness  SKIN: No itching, burning, rashes, or lesions   All other review of systems is negative unless indicated above    PHYSICAL EXAM:  Vital Signs Last 24 Hrs  T(C): 36.4 (03 Aug 2020 12:00), Max: 36.6 (02 Aug 2020 19:37)  T(F): 97.6 (03 Aug 2020 12:00), Max: 97.9 (02 Aug 2020 19:37)  HR: 69 (03 Aug 2020 13:25) (67 - 106)  BP: 150/67 (03 Aug 2020 13:25) (111/61 - 150/67)  BP(mean): 87 (03 Aug 2020 13:25) (72 - 94)  RR: 16 (03 Aug 2020 13:25) (16 - 18)  SpO2: 94% (03 Aug 2020 13:25) (94% - 99%)    Constitutional: Pt lying in bed, awake and alert, NAD  HEENT: EOMI, normal hearing, moist mucous membranes  Neck: Soft and supple, no JVD  Respiratory: CTABL, No wheezing, rales or rhonchi  Cardiovascular: S1S2+, RRR, no M/G/R  Gastrointestinal: BS+, soft, NT/ND, no guarding, no rebound  Extremities: No peripheral edema  Vascular: 2+ peripheral pulses  Neurological: AAOx3, no focal deficits  Musculoskeletal: 5/5 strength b/l upper and lower extremities  Skin: No rashes    MEDICATIONS:  MEDICATIONS  (STANDING):  aspirin  chewable 81 milliGRAM(s) Oral daily  carvedilol 6.25 milliGRAM(s) Oral every 12 hours  diltiazem    milliGRAM(s) Oral daily  lisinopril 5 milliGRAM(s) Oral two times a day  rivaroxaban 20 milliGRAM(s) Oral with dinner  simvastatin Oral Tab/Cap - Peds 20 milliGRAM(s) Oral at bedtime  sodium chloride 0.9%. 500 milliLiter(s) (75 mL/Hr) IV Continuous <Continuous>      LABS: All Labs Reviewed:                        11.9   7.81  )-----------( 274      ( 03 Aug 2020 05:38 )             35.2     08-02    136  |  104  |  6<L>  ----------------------------<  105<H>  4.2   |  24  |  0.63    Ca    8.6      02 Aug 2020 07:00  Phos  3.2     08-02  Mg     2.2     08-02    TPro  7.2  /  Alb  3.8  /  TBili  0.4  /  DBili  x   /  AST  20  /  ALT  29  /  AlkPhos  69  08-01    PT/INR - ( 02 Aug 2020 07:00 )   PT: 12.8 sec;   INR: 1.10 ratio         PTT - ( 03 Aug 2020 05:38 )  PTT:78.9 sec  CARDIAC MARKERS ( 02 Aug 2020 02:51 )  <0.015 ng/mL / x     / x     / x     / x      CARDIAC MARKERS ( 01 Aug 2020 23:56 )  <0.015 ng/mL / x     / x     / x     / x      CARDIAC MARKERS ( 01 Aug 2020 20:18 )  <0.015 ng/mL / x     / x     / x     / x          Blood Culture:   I&O's Summary

## 2020-08-03 NOTE — PACU DISCHARGE NOTE - COMMENTS
Report given to miller Hall RN on [facility].  Pt. replaced on portable cardiac telemetry monitor and reading confirmed w/ Abdoulaye, [facility] telemetry monitor tech.   Pt. transferred to inpt. room on [facility] on cardiac monitor via stretcher accompanied by transport tech.

## 2020-08-03 NOTE — PROGRESS NOTE ADULT - SUBJECTIVE AND OBJECTIVE BOX
Patient is a 85y old  Female who presents with a chief complaint of New Onset PAF (02 Aug 2020 10:10)      HPI:  85F with a PMH significant for HLD, HTN, incidental ASx Afib on EKG in  (w/o Rx management) and Syncope 2/2 Dehydration (). She presents to [facility] with a 1 week history of "heart palpitations". Pt reports the onset as over a few weeks ago. She states her palpitations were intermittent in nature and associated with mild dyspnea on exertion, weakness, as well as mild HAs resolved with PO Tylenol. She reports exacerbation of her sxs with exertion (lifting heavy objs, walking up stairs), with relief initially obtained with rest in cooler climates. Initially she attributed these tachycardic episodes to the heat, but later her sxs persisted despite relocating to cooler temperatures in door. Upon evaluation at Urgent care, she was sent to  with EKG displaying Afib with RVR to 150's. At that time she denied any recent fevers,/chills, no known exposures to COVID. On further ROS she denies diaphoresis, chest pain, blurry vision,  N/V. Remaining ROS unremarkable. (02 Aug 2020 00:45)  Rate better controlled on cardizem drip, and Heparine  Denies any CP or SOB    S/P DELONTE DCCV sinus at present    PAST MEDICAL & SURGICAL HISTORY:  Afib  Hyperlipidemia  HTN (hypertension)  Syncope: 2013  S/P cataract surgery: Left eye.  H/O: hysterectomy  Cardiac abnormality: internal cardiac monitor placed 2013      HPI:                PREVIOUS DIAGNOSTIC TESTING:      ECHO  FINDINGS:    STRESS  FINDINGS:    CATHETERIZATION  FINDINGS:    MEDICATIONS  (STANDING):  amLODIPine   Tablet 2.5 milliGRAM(s) Oral daily  aspirin  chewable 81 milliGRAM(s) Oral daily  carvedilol 6.25 milliGRAM(s) Oral every 12 hours  diltiazem    milliGRAM(s) Oral daily  heparin  Infusion.  Unit(s)/Hr (11 mL/Hr) IV Continuous <Continuous>  lisinopril 5 milliGRAM(s) Oral two times a day  simvastatin Oral Tab/Cap - Peds 20 milliGRAM(s) Oral at bedtime  sodium chloride 0.9%. 500 milliLiter(s) (75 mL/Hr) IV Continuous <Continuous>    MEDICATIONS  (PRN):  acetaminophen    Suspension .. 650 milliGRAM(s) Oral every 6 hours PRN Moderate Pain (4 - 6)  acetaminophen   Tablet .. 650 milliGRAM(s) Oral every 6 hours PRN Mild Pain (1 - 3)  heparin   Injectable 5000 Unit(s) IV Push every 6 hours PRN For aPTT less than 40  heparin   Injectable 2500 Unit(s) IV Push every 6 hours PRN For aPTT between 40 - 57  ondansetron Injectable 4 milliGRAM(s) IV Push every 6 hours PRN Nausea and/or Vomiting      FAMILY HISTORY:  No pertinent family history in first degree relatives      SOCIAL HISTORY:    CIGARETTES:    ALCOHOL      Vital Signs Last 24 Hrs  T(C): 36.4 (02 Aug 2020 03:18), Max: 37.1 (01 Aug 2020 19:59)  T(F): 97.5 (02 Aug 2020 03:18), Max: 98.7 (01 Aug 2020 19:59)  HR: 118 (02 Aug 2020 09:08) (88 - 138)  BP: 114/90 (02 Aug 2020 09:08) (110/64 - 158/76)  BP(mean): --  RR: 18 (02 Aug 2020 09:08) (16 - 18)  SpO2: 95% (02 Aug 2020 09:08) (95% - 98%)    PHYSICAL EXAM-    Constitutional: The patient appears to be normal, well developed, well nourished and alert and oriented to time, place and person. The patient does not appear acutely ill. The patient is alert.     Head: Head is normocephalic and atraumatic.      Neck: The patient's neck is supple without enlargement, has no palpable thyromegaly nor thyroid nodules and has no jugular venous distention. No audible carotid bruits. There are strong carotid pulses bilaterally. No JVD.     Cardiovascular: Irregulary irregular with no significant murmurs    Respiratory:  The patient has no rales and no rhonchi. The patient has no wheezes.     Abdomen: Soft, nontender, nondistended with positive bowel sounds.      Extremity: No tenderness. There is no pitting edema, skin discoloration, clubbing and cyanosis.         INTERPRETATION OF TELEMETRY:    ECG:    I&O's Detail      LABS:                        13.0   11.53 )-----------( 319      ( 02 Aug 2020 07:00 )             39.7     08-02    136  |  104  |  6<L>  ----------------------------<  105<H>  4.2   |  24  |  0.63    Ca    8.6      02 Aug 2020 07:00  Phos  3.2     08-02  Mg     2.2     08-02    TPro  7.2  /  Alb  3.8  /  TBili  0.4  /  DBili  x   /  AST  20  /  ALT  29  /  AlkPhos  69  08-01    CARDIAC MARKERS ( 02 Aug 2020 02:51 )  <0.015 ng/mL / x     / x     / x     / x      CARDIAC MARKERS ( 01 Aug 2020 23:56 )  <0.015 ng/mL / x     / x     / x     / x      CARDIAC MARKERS ( 01 Aug 2020 20:18 )  <0.015 ng/mL / x     / x     / x     / x          PT/INR - ( 02 Aug 2020 07:00 )   PT: 12.8 sec;   INR: 1.10 ratio         PTT - ( 02 Aug 2020 07:00 )  PTT:> 200 sec  Urinalysis Basic - ( 02 Aug 2020 02:51 )    Color: Yellow / Appearance: Clear / S.005 / pH: x  Gluc: x / Ketone: Negative  / Bili: Negative / Urobili: Negative mg/dL   Blood: x / Protein: Negative mg/dL / Nitrite: Negative   Leuk Esterase: Negative / RBC: x / WBC x   Sq Epi: x / Non Sq Epi: x / Bacteria: x      I&O's Summary    BNP  RADIOLOGY & ADDITIONAL STUDIES:

## 2020-08-03 NOTE — PROGRESS NOTE ADULT - ASSESSMENT
A Fib with RVR probably driven by heat and dehydration  On cardizem drip will change to PO  On anticoagulation to change on xaralto  S/P DELONTE /DCCV   D/C planning

## 2020-08-04 ENCOUNTER — TRANSCRIPTION ENCOUNTER (OUTPATIENT)
Age: 85
End: 2020-08-04

## 2020-08-04 VITALS
SYSTOLIC BLOOD PRESSURE: 121 MMHG | RESPIRATION RATE: 18 BRPM | DIASTOLIC BLOOD PRESSURE: 73 MMHG | TEMPERATURE: 98 F | OXYGEN SATURATION: 98 % | HEART RATE: 59 BPM

## 2020-08-04 LAB
ALBUMIN SERPL ELPH-MCNC: 3.1 G/DL — LOW (ref 3.3–5)
ALP SERPL-CCNC: 59 U/L — SIGNIFICANT CHANGE UP (ref 40–120)
ALT FLD-CCNC: 24 U/L — SIGNIFICANT CHANGE UP (ref 12–78)
ANION GAP SERPL CALC-SCNC: 7 MMOL/L — SIGNIFICANT CHANGE UP (ref 5–17)
APTT BLD: 32.5 SEC — SIGNIFICANT CHANGE UP (ref 27.5–35.5)
AST SERPL-CCNC: 19 U/L — SIGNIFICANT CHANGE UP (ref 15–37)
BILIRUB SERPL-MCNC: 0.7 MG/DL — SIGNIFICANT CHANGE UP (ref 0.2–1.2)
BUN SERPL-MCNC: 9 MG/DL — SIGNIFICANT CHANGE UP (ref 7–23)
CALCIUM SERPL-MCNC: 8.6 MG/DL — SIGNIFICANT CHANGE UP (ref 8.5–10.1)
CHLORIDE SERPL-SCNC: 104 MMOL/L — SIGNIFICANT CHANGE UP (ref 96–108)
CO2 SERPL-SCNC: 25 MMOL/L — SIGNIFICANT CHANGE UP (ref 22–31)
CREAT SERPL-MCNC: 0.58 MG/DL — SIGNIFICANT CHANGE UP (ref 0.5–1.3)
GLUCOSE SERPL-MCNC: 91 MG/DL — SIGNIFICANT CHANGE UP (ref 70–99)
HCT VFR BLD CALC: 36 % — SIGNIFICANT CHANGE UP (ref 34.5–45)
HGB BLD-MCNC: 11.8 G/DL — SIGNIFICANT CHANGE UP (ref 11.5–15.5)
MCHC RBC-ENTMCNC: 28.6 PG — SIGNIFICANT CHANGE UP (ref 27–34)
MCHC RBC-ENTMCNC: 32.8 GM/DL — SIGNIFICANT CHANGE UP (ref 32–36)
MCV RBC AUTO: 87.2 FL — SIGNIFICANT CHANGE UP (ref 80–100)
PLATELET # BLD AUTO: 270 K/UL — SIGNIFICANT CHANGE UP (ref 150–400)
POTASSIUM SERPL-MCNC: 3.8 MMOL/L — SIGNIFICANT CHANGE UP (ref 3.5–5.3)
POTASSIUM SERPL-SCNC: 3.8 MMOL/L — SIGNIFICANT CHANGE UP (ref 3.5–5.3)
PROT SERPL-MCNC: 6.3 GM/DL — SIGNIFICANT CHANGE UP (ref 6–8.3)
RBC # BLD: 4.13 M/UL — SIGNIFICANT CHANGE UP (ref 3.8–5.2)
RBC # FLD: 12.5 % — SIGNIFICANT CHANGE UP (ref 10.3–14.5)
SODIUM SERPL-SCNC: 136 MMOL/L — SIGNIFICANT CHANGE UP (ref 135–145)
WBC # BLD: 7.59 K/UL — SIGNIFICANT CHANGE UP (ref 3.8–10.5)
WBC # FLD AUTO: 7.59 K/UL — SIGNIFICANT CHANGE UP (ref 3.8–10.5)

## 2020-08-04 PROCEDURE — 93010 ELECTROCARDIOGRAM REPORT: CPT

## 2020-08-04 PROCEDURE — 99239 HOSP IP/OBS DSCHRG MGMT >30: CPT

## 2020-08-04 RX ORDER — AMLODIPINE BESYLATE 2.5 MG/1
1 TABLET ORAL
Qty: 0 | Refills: 0 | DISCHARGE

## 2020-08-04 RX ORDER — RIVAROXABAN 15 MG-20MG
1 KIT ORAL
Qty: 30 | Refills: 0
Start: 2020-08-04 | End: 2020-09-02

## 2020-08-04 RX ORDER — DILTIAZEM HCL 120 MG
1 CAPSULE, EXT RELEASE 24 HR ORAL
Qty: 30 | Refills: 0
Start: 2020-08-04 | End: 2020-09-02

## 2020-08-04 RX ADMIN — CARVEDILOL PHOSPHATE 6.25 MILLIGRAM(S): 80 CAPSULE, EXTENDED RELEASE ORAL at 09:28

## 2020-08-04 RX ADMIN — LISINOPRIL 5 MILLIGRAM(S): 2.5 TABLET ORAL at 09:28

## 2020-08-04 RX ADMIN — Medication 81 MILLIGRAM(S): at 09:28

## 2020-08-04 RX ADMIN — Medication 120 MILLIGRAM(S): at 09:28

## 2020-08-04 NOTE — DISCHARGE NOTE PROVIDER - HOSPITAL COURSE
85F with PMH of HLD, HTN, incidental ASx Afib on EKG in 2006 (w/o Rx management) and Syncope 2/2 Dehydration (2017) presented to [facility] with a 1 week history of "heart palpitations" which were intermittent in nature and was associated with mild dyspnea on exertion and weakness. In the ED she was found to be in Afib with RVR to the 140's on EKG. Remaining vital signs stable. Physical exam was unremarkable. Laboratory findings were significant for Hyponatremia (Na 131) and Negative Troponin. CXR revealed no acute pathology/no signs of congestion. Pt was given a 1 time dose of PO + IV Cardizem, bolused 1L NS and initiated on Heparin AC gtt, then admitted to the floor with Afib with RVR. TTE was obtained. Pt was evaluated by cardiology. S/p DELONTE cardioversion with successful conversion to sinus rhythm. Pt was monitored overnight and is currently still in sinus rhythm with HR controlled. Pt is medically stable currently and will be discharged home on oral anticoagulation and follow up with cardiology.        SUBJECTIVE: Pt was seen and examined. No acute complaints today. Anticipating discharge home. Discharge instructions discussed. Pt will f/u with cardiologist outpatient.         REVIEW OF SYSTEMS:    CONSTITUTIONAL: No weakness, fevers or chills    EYES/ENT: No visual changes;  No vertigo or throat pain     NECK: No pain or stiffness    RESPIRATORY: No cough, wheezing, hemoptysis; No shortness of breath    CARDIOVASCULAR: No chest pain or palpitations    GASTROINTESTINAL: No abdominal or epigastric pain. No nausea, vomiting, or hematemesis; No diarrhea or constipation. No melena or hematochezia.    GENITOURINARY: No dysuria, frequency or hematuria    NEUROLOGICAL: No numbness or weakness    SKIN: No itching, burning, rashes, or lesions     All other review of systems is negative unless indicated above        Vital Signs Last 24 Hrs    T(C): 36.4 (04 Aug 2020 08:17), Max: 37.2 (03 Aug 2020 19:35)    T(F): 97.5 (04 Aug 2020 08:17), Max: 98.9 (03 Aug 2020 19:35)    HR: 59 (04 Aug 2020 08:17) (59 - 106)    BP: 121/73 (04 Aug 2020 08:17) (111/61 - 150/67)    BP(mean): 87 (03 Aug 2020 13:25) (72 - 94)    RR: 18 (04 Aug 2020 08:17) (16 - 18)    SpO2: 98% (04 Aug 2020 08:17) (94% - 99%)                PHYSICAL EXAM:    Constitutional: NAD, awake and alert, well-developed    HEENT: PERR, EOMI, Normal Hearing, MMM    Neck: Soft and supple, No LAD, No JVD    Respiratory: Breath sounds are clear bilaterally, No wheezing, rales or rhonchi    Cardiovascular: S1 and S2, regular rate and rhythm, no Murmurs, gallops or rubs    Gastrointestinal: Bowel Sounds present, soft, nontender, nondistended, no guarding, no rebound    Extremities: No peripheral edema    Vascular: 2+ peripheral pulses    Neurological: A/O x 3, no focal deficits    Musculoskeletal: 5/5 strength b/l upper and lower extremities    Skin: No rashes

## 2020-08-04 NOTE — DISCHARGE NOTE PROVIDER - CARE PROVIDER_API CALL
Kalin Iniguez  FAMILY MEDICINE  [address] [place] [address]  Phone: [phone]  Fax: [phone]  Follow Up Time:     Rachelle Covington  CARDIOVASCULAR DISEASE  [address] [place] [address]  Phone: [phone]  Fax: [phone]  Follow Up Time:

## 2020-08-04 NOTE — PROGRESS NOTE ADULT - ASSESSMENT
Afib s/p DC CV- in SR.  DC home on current meds including full dose anticoagulation.   Normal LVEF.    HTN-  continue current meds.    Hyperlipidemia - continue simvastatin    Other medical issues- Management per primary team.   Thank you for allowing me to participate in the care of this patient. Please feel free to contact me with any questions.

## 2020-08-04 NOTE — CHART NOTE - NSCHARTNOTEFT_GEN_A_CORE
Pt seen and examined with house staff.  Plan formulated and reviewed on rounds    Briefly,  84 y/o female with HTN and HL admitted with new onset AFib.  S/P DELONTE and DCCV today.  Started on rivaroxaban   Remains in NSR  No events overnight   ROS o/w negative     Awake and alert  NAD  Stable vitals  No fevers    Grossly non focal     Labs reviewed     AC  Cont with statin and ACEI and Dilt CD for rate control--started on BBx  OOB    Stable for DC home--follow up outpt with cards

## 2020-08-04 NOTE — DISCHARGE NOTE PROVIDER - NSDCCPCAREPLAN_GEN_ALL_CORE_FT
PRINCIPAL DISCHARGE DIAGNOSIS  Diagnosis: A-fib  Assessment and Plan of Treatment: You were noted to be in Afib with elevated heart rate in the hospital.  - Take Xarelto 20mg at bedtime.   - Take cardizem 120mg daily  - Follow up with cardiology within 1 week.      SECONDARY DISCHARGE DIAGNOSES  Diagnosis: HTN (hypertension)  Assessment and Plan of Treatment: -Continue Coreg 6.25mg twice daily  -Continue Lisinopril 5mg daily  -Stop taking Amlodipine due to the addition of cardizem to your medications since it controls blood pressure as well. Follow up with your PCP for management for your blood pressure.    Diagnosis: Menopausal state  Assessment and Plan of Treatment: - Continue Estradiol 1mg daily    Diagnosis: CAD (coronary artery disease)  Assessment and Plan of Treatment: - Continue Aspirin    Diagnosis: HLD (hyperlipidemia)  Assessment and Plan of Treatment: -Continue simvastatin 20mg at bedtime

## 2020-08-04 NOTE — PROGRESS NOTE ADULT - SUBJECTIVE AND OBJECTIVE BOX
Patient is a 85y old  Female who presents with a chief complaint of New Onset PAF       HPI:  85F with a PMH significant for HLD, HTN, incidental ASx Afib on EKG in 2006 (w/o Rx management) and Syncope 2/2 Dehydration (2017). She presents to [facility] with a 1 week history of "heart palpitations". Pt reports the onset as over a few weeks ago. She states her palpitations were intermittent in nature and associated with mild dyspnea on exertion, weakness, as well as mild HAs resolved with PO Tylenol.     S/p DELONTE and CV which was succesful  this am c/op sore throat but no other symptoms.        PAST MEDICAL & SURGICAL HISTORY:  A-fib  Afib  Hyperlipidemia  HTN (hypertension)  Syncope: 7/2/2013  S/P cataract surgery: Left eye.  H/O: hysterectomy  Cardiac abnormality: internal cardiac monitor placed 8/2013      MEDICATIONS  (STANDING):  aspirin  chewable 81 milliGRAM(s) Oral daily  carvedilol 6.25 milliGRAM(s) Oral every 12 hours  diltiazem    milliGRAM(s) Oral daily  lisinopril 5 milliGRAM(s) Oral two times a day  rivaroxaban 20 milliGRAM(s) Oral with dinner  simvastatin Oral Tab/Cap - Peds 20 milliGRAM(s) Oral at bedtime  sodium chloride 0.9%. 500 milliLiter(s) (75 mL/Hr) IV Continuous <Continuous>    MEDICATIONS  (PRN):  acetaminophen    Suspension .. 650 milliGRAM(s) Oral every 6 hours PRN Moderate Pain (4 - 6)  acetaminophen   Tablet .. 650 milliGRAM(s) Oral every 6 hours PRN Mild Pain (1 - 3)  ondansetron Injectable 4 milliGRAM(s) IV Push every 6 hours PRN Nausea and/or Vomiting      FAMILY HISTORY:  No pertinent family history in first degree relatives      SOCIAL HISTORY: no recent smoking      REVIEW OF SYSTEMS:  CONSTITUTIONAL:    No fatigue, malaise, lethargy.  No fever or chills.     RESPIRATORY:  No cough.  No wheeze.  No hemoptysis.  No shortness of breath.  CARDIOVASCULAR:  No chest pains.  No palpitations. No shortness of breath, No orthopnea or PND.  GASTROINTESTINAL:  No abdominal pain.  No nausea or vomiting.    GENITOURINARY:    No hematuria.    MUSCULOSKELETAL:  No musculoskeletal pain.  No joint swelling.  No arthritis.  NEUROLOGICAL:  No tingling or numbness or weakness.  PSYCHIATRIC:  No confusion  SKIN:  No rashes.            Vital Signs Last 24 Hrs  T(C): 36.4 (04 Aug 2020 08:17), Max: 37.2 (03 Aug 2020 19:35)  T(F): 97.5 (04 Aug 2020 08:17), Max: 98.9 (03 Aug 2020 19:35)  HR: 59 (04 Aug 2020 08:17) (59 - 106)  BP: 121/73 (04 Aug 2020 08:17) (111/61 - 150/67)  BP(mean): 87 (03 Aug 2020 13:25) (72 - 94)  RR: 18 (04 Aug 2020 08:17) (16 - 18)  SpO2: 98% (04 Aug 2020 08:17) (94% - 99%)    PHYSICAL EXAM-    Constitutional: The patient appears to be normal, well developed, well nourished and alert and oriented to time, place and person. The patient does not appear acutely ill.     Head: Head is normocephalic and atraumatic.      Neck: No jugular venous distention. No audible carotid bruits. There are strong carotid pulses bilaterally. No JVD.     Cardiovascular: Regular rate and rhythm without S3, S4. No murmurs or rubs are appreciated.      Respiratory: Breathsounds are normal. No rales. No wheezing.    Abdomen: Soft, nontender, nondistended with positive bowel sounds.      Extremity: No tenderness. No  pitting edema     Neurologic: The patient is alert and oriented.      Skin: No rash, no obvious lesions noted.      Psychiatric: The patient appears to be emotionally stable.      INTERPRETATION OF TELEMETRY: SR     ECG:   I&O's Detail      LABS:                        11.8   7.59  )-----------( 270      ( 04 Aug 2020 06:56 )             36.0     08-04    136  |  104  |  9   ----------------------------<  91  3.8   |  25  |  0.58    Ca    8.6      04 Aug 2020 06:56    TPro  6.3  /  Alb  3.1<L>  /  TBili  0.7  /  DBili  x   /  AST  19  /  ALT  24  /  AlkPhos  59  08-04        PTT - ( 04 Aug 2020 06:56 )  PTT:32.5 sec    I&O's Summary    BNP  RADIOLOGY & ADDITIONAL STUDIES:

## 2020-08-04 NOTE — DISCHARGE NOTE PROVIDER - NSDCMRMEDTOKEN_GEN_ALL_CORE_FT
aspirin 81 mg oral tablet: 1 tab(s) orally once a day  Coreg 6.25 mg oral tablet: 1 tab(s) orally 2 times a day  dilTIAZem 120 mg/24 hours oral capsule, extended release: 1 cap(s) orally once a day  estradiol 1 mg oral tablet: 1 tab(s) orally once a day  lisinopril 5 mg oral tablet: 1 tab(s) orally 2 times a day  Melatonin 5 mg oral tablet: 1 tab(s) orally once a day (at bedtime), As Needed  Multiple Vitamins oral tablet: 1 tab(s) orally once a day  rivaroxaban 20 mg oral tablet: 1 tab(s) orally once a day (before a meal)  simvastatin 20 mg oral tablet: 1 tab(s) orally once a day (at bedtime)  Vitamin B-12 1000 mcg oral tablet: 2 tab(s) orally once a day

## 2020-08-04 NOTE — DISCHARGE NOTE NURSING/CASE MANAGEMENT/SOCIAL WORK - PATIENT PORTAL LINK FT
You can access the FollowMyHealth Patient Portal offered by [facility] by registering at the following website: [email or web address]. By joining [employer]’s FollowMyHealth portal, you will also be able to view your health information using other applications (apps) compatible with our system.

## 2020-09-04 ENCOUNTER — RX RENEWAL (OUTPATIENT)
Age: 85
End: 2020-09-04

## 2021-03-12 PROBLEM — I48.91 UNSPECIFIED ATRIAL FIBRILLATION: Chronic | Status: ACTIVE | Noted: 2020-08-02

## 2021-03-17 ENCOUNTER — APPOINTMENT (OUTPATIENT)
Dept: ORTHOPEDIC SURGERY | Facility: CLINIC | Age: 86
End: 2021-03-17
Payer: MEDICARE

## 2021-03-17 DIAGNOSIS — M20.12 HALLUX VALGUS (ACQUIRED), LEFT FOOT: ICD-10-CM

## 2021-03-17 DIAGNOSIS — M20.5X2 OTHER DEFORMITIES OF TOE(S) (ACQUIRED), LEFT FOOT: ICD-10-CM

## 2021-03-17 PROCEDURE — 73630 X-RAY EXAM OF FOOT: CPT | Mod: LT

## 2021-03-17 PROCEDURE — 99214 OFFICE O/P EST MOD 30 MIN: CPT

## 2021-03-17 NOTE — ADDENDUM
[FreeTextEntry1] : I, Quang Jacobs, acted solely as a scribe for Dr. Jose Mckenna on this date 03/17/2021 .\par All medical record entries made by the Scribe were at my, Dr. Jose Mckenna, direction and personally dictated by me on 03/17/2021 . I have reviewed the chart and agree that the record accurately reflects my personal performance of the history, physical exam, assessment and plan. I have also personally directed, reviewed, and agreed with the chart.

## 2021-03-17 NOTE — CONSULT LETTER
[Consult Letter:] : I had the pleasure of evaluating your patient, [unfilled]. [Please see my note below.] : Please see my note below. [Consult Closing:] : Thank you very much for allowing me to participate in the care of this patient.  If you have any questions, please do not hesitate to contact me. [Sincerely,] : Sincerely, [FreeTextEntry3] : Jose Mckenna, DO\par Foot and Ankle Surgery\par

## 2021-03-17 NOTE — DISCUSSION/SUMMARY
[de-identified] : Today I had a lengthy discussion with the patient regarding their left foot pain. I have addressed all the patient's concerns surrounding the pathology of their condition. A discussion was had about conservative treatment vs a possible fusion, hammertoe correction, and metatarsal shortening, surgery. A discussion was had about shoe wear modifications. I advised the patient to utilize a wide, supportive shoe. The patient can follow up with the office when she would like to move forward with surgery, likely in the fall. I would like to see the patient back in the office PRN to reassess their condition. The patient understood and verbally agreed to the treatment plan. All of their questions were answered and they were satisfied with the visit. The patient should call the office if they have any questions or experience worsening symptoms.

## 2021-03-17 NOTE — PHYSICAL EXAM
[de-identified] : General: Alert and oriented x3. In no acute distress. Pleasant in nature with a normal affect. No apparent respiratory distress.\par \par L Foot Exam\par Skin: Clean, dry, intact\par Inspection: +Hallux valgus, +rigid hammertoe 2nd toe with crossover toe over first toe. No masses, no swelling, no effusion\par Pulses: 2+ DP/PT pulses\par ROM: FOOT Full ROM of digits, ANKLE 10 degrees of dorsiflexion, 40 degrees of plantarflexion, 10 degrees of subtalar motion.\par Painful ROM: None\par Tenderness: No tenderness over the medial malleolus, No tenderness over the lateral malleolus, no CFL/ATFL/PTFL pain, no deltoid ligament pain. No heel pain. No Achilles tenderness. No 5th metatarsal pain. No pain to the LisFranc joint. No ttp over the posterior tibial tendon.\par Stability: Negative anterior/posterior drawer.\par Strength: 5/5 ADD/ABD/TA/GS/EHL/FHL/EDL\par Neuro: Sensation in tact to light touch throughout\par Additional tests: Negative Mortons test, negative tarsal tunnel tinels, negative single heel rise.  [de-identified] : 3V of the left foot were ordered obtained and reviewed by me today, 03/17/2021 , revealed: Hallux valgus. Hammertoe, crossover toe deformity

## 2021-04-02 ENCOUNTER — TRANSCRIPTION ENCOUNTER (OUTPATIENT)
Age: 86
End: 2021-04-02

## 2021-04-02 ENCOUNTER — APPOINTMENT (OUTPATIENT)
Dept: CT IMAGING | Facility: CLINIC | Age: 86
End: 2021-04-02
Payer: MEDICARE

## 2021-04-02 ENCOUNTER — OUTPATIENT (OUTPATIENT)
Dept: OUTPATIENT SERVICES | Facility: HOSPITAL | Age: 86
LOS: 1 days | End: 2021-04-02
Payer: MEDICARE

## 2021-04-02 DIAGNOSIS — Z90.710 ACQUIRED ABSENCE OF BOTH CERVIX AND UTERUS: Chronic | ICD-10-CM

## 2021-04-02 DIAGNOSIS — Z98.49 CATARACT EXTRACTION STATUS, UNSPECIFIED EYE: Chronic | ICD-10-CM

## 2021-04-02 DIAGNOSIS — S09.90XA UNSPECIFIED INJURY OF HEAD, INITIAL ENCOUNTER: ICD-10-CM

## 2021-04-02 PROCEDURE — 76376 3D RENDER W/INTRP POSTPROCES: CPT

## 2021-04-02 PROCEDURE — 72125 CT NECK SPINE W/O DYE: CPT | Mod: 26,MH

## 2021-04-02 PROCEDURE — 76376 3D RENDER W/INTRP POSTPROCES: CPT | Mod: 26

## 2021-04-02 PROCEDURE — 70450 CT HEAD/BRAIN W/O DYE: CPT

## 2021-04-02 PROCEDURE — 70450 CT HEAD/BRAIN W/O DYE: CPT | Mod: 26,MH

## 2021-04-02 PROCEDURE — 72125 CT NECK SPINE W/O DYE: CPT

## 2021-12-02 ENCOUNTER — APPOINTMENT (OUTPATIENT)
Dept: ORTHOPEDIC SURGERY | Facility: CLINIC | Age: 86
End: 2021-12-02
Payer: MEDICARE

## 2021-12-02 DIAGNOSIS — M17.11 UNILATERAL PRIMARY OSTEOARTHRITIS, RIGHT KNEE: ICD-10-CM

## 2021-12-02 PROCEDURE — 99441: CPT | Mod: 95

## 2021-12-08 ENCOUNTER — APPOINTMENT (OUTPATIENT)
Dept: ORTHOPEDIC SURGERY | Facility: CLINIC | Age: 86
End: 2021-12-08
Payer: MEDICARE

## 2021-12-08 VITALS
HEIGHT: 62 IN | WEIGHT: 121 LBS | BODY MASS INDEX: 22.26 KG/M2 | DIASTOLIC BLOOD PRESSURE: 100 MMHG | SYSTOLIC BLOOD PRESSURE: 160 MMHG | HEART RATE: 123 BPM

## 2021-12-08 PROCEDURE — 99213 OFFICE O/P EST LOW 20 MIN: CPT | Mod: 25

## 2021-12-08 PROCEDURE — 20610 DRAIN/INJ JOINT/BURSA W/O US: CPT | Mod: RT

## 2021-12-09 PROBLEM — M17.11 PRIMARY OSTEOARTHRITIS OF RIGHT KNEE: Status: ACTIVE | Noted: 2019-04-11

## 2021-12-10 NOTE — DISCUSSION/SUMMARY
[de-identified] : At this time, due to trochanteric bursitis of the right hip, I recommend ice, elevation and topical anti-inflammatory cream.  She will be reassessed in one week.

## 2021-12-10 NOTE — PROCEDURE
[de-identified] : Consent: \par At this time, I have recommended an injection to the right hip.  The risks and benefits of the procedure were discussed with the patient in detail.  Upon verbal consent of the patient, we proceeded with the injection as noted below.  \par \par Procedure:  \par After a sterile prep, the patient underwent an injection of 9 cc of 1% Lidocaine without epinephrine and 1 cc of Kenalog into the right hip.  The patient tolerated the procedure well.  There were no complications.  \par

## 2021-12-10 NOTE — HISTORY OF PRESENT ILLNESS
[de-identified] : The patient comes in today with complaints of pain in her right hip.  It has been going on for the last several weeks and it is getting worse recently.

## 2021-12-10 NOTE — PHYSICAL EXAM
[de-identified] : Left Hip: \par Range of Motion in Degrees:\par 	                                 Claimant:	   Normal:	\par Flexion (Active) 	                 120 	   120-degrees	\par Flexion (Passive)	                 120	   120-degrees	\par Extension (Active)	                 -30	   -30-degrees	\par Extension (Passive)	 -30	   -30-degrees	\par Abduction (Active)	                 45-50	   16-91-pkgmgyt	\par Abduction (Passive)	 45-50	   12-67-cjvkntw	\par Adduction (Active)  	 20-30	   09-05-nwemavh	\par Adduction (Passive)	 20-30	   22-07-pginunx	\par Internal Rotation (Active) 	 35	   35-degrees	\par Internal Rotation (Passive)	 35	   35-degrees	\par External Rotation (Active)	 45	   45-degrees	\par External Rotation (Passive)	 45	   45-degrees	\par \par No tenderness with internal or external rotation or axial load.  No tenderness to palpation over the greater trochanter.  Negative Trendelenburg.  No tenderness with resisted abduction.  No weakness to flexion, extension, abduction or adduction.  No evidence of instability.  No motor or sensory deficits.  2+ DP and PT pulses.  Skin is intact.  No scars, rashes or lesions.  \par  \par Right Hip: \par Range of Motion in Degrees:\par 	                                 Claimant:	          Normal:	\par Flexion (Active) 	                 120 	          120-degrees	\par Flexion (Passive)	                 120	          120-degrees	\par Extension (Active)	                 -30	          -30-degrees	\par Extension (Passive)	 -30	          -30-degrees	\par Abduction (Active)	                45-50	          37-63-ucovbij	\par Abduction (Passive)	45-50	          22-82-pyaczdx	\par Adduction (Active)                	20-30	          70-00-nefdwpn	\par Adduction (Passive)	20-30	          76-42-tgrkwhp	\par Internal Rotation (Active) 	35	          35-degrees	\par Internal Rotation (Passive)	35	          35-degrees	\par External Rotation (Active)	45	          45-degrees	\par External Rotation (Passive)	45	          45-degrees	\par \par No tenderness with internal or external rotation or axial load.  Point tenderness over the greater trochanter with pain with resisted abduction.  Negative Trendelenburg.  No weakness to flexion, extension, abduction or adduction.  No evidence of instability.  No motor or sensory deficits.  2+ DP and PT pulses.  Skin is intact.  No scars, rashes or lesions.  \par   [de-identified] : Ambulating with a slightly antalgic to antalgic gait.  Station:  Normal.  [de-identified] : Appearance:  Well-developed, well-nourished female in no acute distress.\par   [de-identified] : Outside radiographs were reviewed and show minimal degenerative changes.

## 2021-12-10 NOTE — ADDENDUM
[FreeTextEntry1] : This note was written by Jennifer Ellis on 12/10/2021 acting as a scribe for NOEL STARKS III, MD

## 2021-12-15 ENCOUNTER — APPOINTMENT (OUTPATIENT)
Dept: ORTHOPEDIC SURGERY | Facility: CLINIC | Age: 86
End: 2021-12-15
Payer: MEDICARE

## 2021-12-15 DIAGNOSIS — M70.61 TROCHANTERIC BURSITIS, RIGHT HIP: ICD-10-CM

## 2021-12-15 PROCEDURE — 99441: CPT | Mod: 95

## 2021-12-29 PROBLEM — M70.61 TROCHANTERIC BURSITIS OF RIGHT HIP: Status: ACTIVE | Noted: 2021-12-08

## 2022-05-15 ENCOUNTER — EMERGENCY (EMERGENCY)
Facility: HOSPITAL | Age: 87
LOS: 0 days | Discharge: HOME | End: 2022-05-15
Attending: EMERGENCY MEDICINE
Payer: MEDICARE

## 2022-05-15 VITALS
OXYGEN SATURATION: 94 % | DIASTOLIC BLOOD PRESSURE: 86 MMHG | TEMPERATURE: 98 F | SYSTOLIC BLOOD PRESSURE: 150 MMHG | RESPIRATION RATE: 18 BRPM | HEART RATE: 87 BPM

## 2022-05-15 VITALS — HEIGHT: 62 IN | WEIGHT: 119.93 LBS

## 2022-05-15 DIAGNOSIS — R00.2 PALPITATIONS: ICD-10-CM

## 2022-05-15 DIAGNOSIS — Z88.1 ALLERGY STATUS TO OTHER ANTIBIOTIC AGENTS STATUS: ICD-10-CM

## 2022-05-15 DIAGNOSIS — R53.1 WEAKNESS: ICD-10-CM

## 2022-05-15 DIAGNOSIS — Z79.01 LONG TERM (CURRENT) USE OF ANTICOAGULANTS: ICD-10-CM

## 2022-05-15 DIAGNOSIS — I48.91 UNSPECIFIED ATRIAL FIBRILLATION: ICD-10-CM

## 2022-05-15 DIAGNOSIS — I10 ESSENTIAL (PRIMARY) HYPERTENSION: ICD-10-CM

## 2022-05-15 DIAGNOSIS — Z88.0 ALLERGY STATUS TO PENICILLIN: ICD-10-CM

## 2022-05-15 DIAGNOSIS — Z91.013 ALLERGY TO SEAFOOD: ICD-10-CM

## 2022-05-15 DIAGNOSIS — R19.7 DIARRHEA, UNSPECIFIED: ICD-10-CM

## 2022-05-15 DIAGNOSIS — E78.5 HYPERLIPIDEMIA, UNSPECIFIED: ICD-10-CM

## 2022-05-15 DIAGNOSIS — Z79.82 LONG TERM (CURRENT) USE OF ASPIRIN: ICD-10-CM

## 2022-05-15 DIAGNOSIS — Z98.49 CATARACT EXTRACTION STATUS, UNSPECIFIED EYE: Chronic | ICD-10-CM

## 2022-05-15 DIAGNOSIS — Z90.710 ACQUIRED ABSENCE OF BOTH CERVIX AND UTERUS: Chronic | ICD-10-CM

## 2022-05-15 DIAGNOSIS — Z90.711 ACQUIRED ABSENCE OF UTERUS WITH REMAINING CERVICAL STUMP: ICD-10-CM

## 2022-05-15 LAB
ALBUMIN SERPL ELPH-MCNC: 4 G/DL — SIGNIFICANT CHANGE UP (ref 3.3–5)
ALP SERPL-CCNC: 92 U/L — SIGNIFICANT CHANGE UP (ref 40–120)
ALT FLD-CCNC: 86 U/L — HIGH (ref 12–78)
ANION GAP SERPL CALC-SCNC: 7 MMOL/L — SIGNIFICANT CHANGE UP (ref 5–17)
APPEARANCE UR: CLEAR — SIGNIFICANT CHANGE UP
APTT BLD: 34.2 SEC — SIGNIFICANT CHANGE UP (ref 27.5–35.5)
AST SERPL-CCNC: 57 U/L — HIGH (ref 15–37)
BASOPHILS # BLD AUTO: 0.04 K/UL — SIGNIFICANT CHANGE UP (ref 0–0.2)
BASOPHILS NFR BLD AUTO: 0.4 % — SIGNIFICANT CHANGE UP (ref 0–2)
BILIRUB SERPL-MCNC: 0.7 MG/DL — SIGNIFICANT CHANGE UP (ref 0.2–1.2)
BILIRUB UR-MCNC: NEGATIVE — SIGNIFICANT CHANGE UP
BUN SERPL-MCNC: 6 MG/DL — LOW (ref 7–23)
CALCIUM SERPL-MCNC: 9.2 MG/DL — SIGNIFICANT CHANGE UP (ref 8.5–10.1)
CHLORIDE SERPL-SCNC: 104 MMOL/L — SIGNIFICANT CHANGE UP (ref 96–108)
CO2 SERPL-SCNC: 29 MMOL/L — SIGNIFICANT CHANGE UP (ref 22–31)
COLOR SPEC: YELLOW — SIGNIFICANT CHANGE UP
CREAT SERPL-MCNC: 0.6 MG/DL — SIGNIFICANT CHANGE UP (ref 0.5–1.3)
DIFF PNL FLD: NEGATIVE — SIGNIFICANT CHANGE UP
EGFR: 88 ML/MIN/1.73M2 — SIGNIFICANT CHANGE UP
EOSINOPHIL # BLD AUTO: 0.07 K/UL — SIGNIFICANT CHANGE UP (ref 0–0.5)
EOSINOPHIL NFR BLD AUTO: 0.7 % — SIGNIFICANT CHANGE UP (ref 0–6)
GLUCOSE SERPL-MCNC: 115 MG/DL — HIGH (ref 70–99)
GLUCOSE UR QL: NEGATIVE — SIGNIFICANT CHANGE UP
HCT VFR BLD CALC: 45.1 % — HIGH (ref 34.5–45)
HGB BLD-MCNC: 14.8 G/DL — SIGNIFICANT CHANGE UP (ref 11.5–15.5)
IMM GRANULOCYTES NFR BLD AUTO: 0.2 % — SIGNIFICANT CHANGE UP (ref 0–1.5)
INR BLD: 1.27 RATIO — HIGH (ref 0.88–1.16)
KETONES UR-MCNC: ABNORMAL
LEUKOCYTE ESTERASE UR-ACNC: ABNORMAL
LYMPHOCYTES # BLD AUTO: 1.8 K/UL — SIGNIFICANT CHANGE UP (ref 1–3.3)
LYMPHOCYTES # BLD AUTO: 18.4 % — SIGNIFICANT CHANGE UP (ref 13–44)
MCHC RBC-ENTMCNC: 28.2 PG — SIGNIFICANT CHANGE UP (ref 27–34)
MCHC RBC-ENTMCNC: 32.8 GM/DL — SIGNIFICANT CHANGE UP (ref 32–36)
MCV RBC AUTO: 85.9 FL — SIGNIFICANT CHANGE UP (ref 80–100)
MONOCYTES # BLD AUTO: 0.76 K/UL — SIGNIFICANT CHANGE UP (ref 0–0.9)
MONOCYTES NFR BLD AUTO: 7.8 % — SIGNIFICANT CHANGE UP (ref 2–14)
NEUTROPHILS # BLD AUTO: 7.1 K/UL — SIGNIFICANT CHANGE UP (ref 1.8–7.4)
NEUTROPHILS NFR BLD AUTO: 72.5 % — SIGNIFICANT CHANGE UP (ref 43–77)
NITRITE UR-MCNC: NEGATIVE — SIGNIFICANT CHANGE UP
PH UR: 7 — SIGNIFICANT CHANGE UP (ref 5–8)
PLATELET # BLD AUTO: 320 K/UL — SIGNIFICANT CHANGE UP (ref 150–400)
POTASSIUM SERPL-MCNC: 3.7 MMOL/L — SIGNIFICANT CHANGE UP (ref 3.5–5.3)
POTASSIUM SERPL-SCNC: 3.7 MMOL/L — SIGNIFICANT CHANGE UP (ref 3.5–5.3)
PROT SERPL-MCNC: 7.5 GM/DL — SIGNIFICANT CHANGE UP (ref 6–8.3)
PROT UR-MCNC: NEGATIVE — SIGNIFICANT CHANGE UP
PROTHROM AB SERPL-ACNC: 14.8 SEC — HIGH (ref 10.5–13.4)
RBC # BLD: 5.25 M/UL — HIGH (ref 3.8–5.2)
RBC # FLD: 12.5 % — SIGNIFICANT CHANGE UP (ref 10.3–14.5)
SODIUM SERPL-SCNC: 140 MMOL/L — SIGNIFICANT CHANGE UP (ref 135–145)
SP GR SPEC: 1.01 — SIGNIFICANT CHANGE UP (ref 1.01–1.02)
TROPONIN I, HIGH SENSITIVITY RESULT: 6.02 NG/L — SIGNIFICANT CHANGE UP
UROBILINOGEN FLD QL: NEGATIVE — SIGNIFICANT CHANGE UP
WBC # BLD: 9.79 K/UL — SIGNIFICANT CHANGE UP (ref 3.8–10.5)
WBC # FLD AUTO: 9.79 K/UL — SIGNIFICANT CHANGE UP (ref 3.8–10.5)

## 2022-05-15 PROCEDURE — 99285 EMERGENCY DEPT VISIT HI MDM: CPT | Mod: FS

## 2022-05-15 PROCEDURE — 80053 COMPREHEN METABOLIC PANEL: CPT

## 2022-05-15 PROCEDURE — 85730 THROMBOPLASTIN TIME PARTIAL: CPT

## 2022-05-15 PROCEDURE — 85610 PROTHROMBIN TIME: CPT

## 2022-05-15 PROCEDURE — 71045 X-RAY EXAM CHEST 1 VIEW: CPT | Mod: 26

## 2022-05-15 PROCEDURE — 81001 URINALYSIS AUTO W/SCOPE: CPT

## 2022-05-15 PROCEDURE — 85025 COMPLETE CBC W/AUTO DIFF WBC: CPT

## 2022-05-15 PROCEDURE — 93010 ELECTROCARDIOGRAM REPORT: CPT

## 2022-05-15 PROCEDURE — 84484 ASSAY OF TROPONIN QUANT: CPT

## 2022-05-15 PROCEDURE — 93005 ELECTROCARDIOGRAM TRACING: CPT

## 2022-05-15 PROCEDURE — 99285 EMERGENCY DEPT VISIT HI MDM: CPT | Mod: 25

## 2022-05-15 PROCEDURE — 36415 COLL VENOUS BLD VENIPUNCTURE: CPT

## 2022-05-15 PROCEDURE — 71045 X-RAY EXAM CHEST 1 VIEW: CPT

## 2022-05-15 PROCEDURE — 96374 THER/PROPH/DIAG INJ IV PUSH: CPT

## 2022-05-15 RX ORDER — DIAZEPAM 5 MG
2 TABLET ORAL ONCE
Refills: 0 | Status: DISCONTINUED | OUTPATIENT
Start: 2022-05-15 | End: 2022-05-15

## 2022-05-15 RX ORDER — KETOROLAC TROMETHAMINE 30 MG/ML
15 SYRINGE (ML) INJECTION ONCE
Refills: 0 | Status: DISCONTINUED | OUTPATIENT
Start: 2022-05-15 | End: 2022-05-15

## 2022-05-15 RX ORDER — SODIUM CHLORIDE 9 MG/ML
1000 INJECTION INTRAMUSCULAR; INTRAVENOUS; SUBCUTANEOUS ONCE
Refills: 0 | Status: COMPLETED | OUTPATIENT
Start: 2022-05-15 | End: 2022-05-15

## 2022-05-15 RX ADMIN — Medication 15 MILLIGRAM(S): at 17:35

## 2022-05-15 RX ADMIN — SODIUM CHLORIDE 2000 MILLILITER(S): 9 INJECTION INTRAMUSCULAR; INTRAVENOUS; SUBCUTANEOUS at 17:08

## 2022-05-15 RX ADMIN — Medication 2 MILLIGRAM(S): at 17:36

## 2022-05-15 NOTE — ED STATDOCS - PROGRESS NOTE DETAILS
Barbara elevated LFts and ketones in urine.  Stress importance of food and liquid intake and to to follow with PMD.  Troponin negative and CBC unremarkable.   Elevated BP at DC but patient without HA, N/V.  I discussed return precautions with her.  Larisa Soto PA-C Pt is an 87 year old female presenting with weakness, diarrhoea.  Pt. having watery diarrhea x 1 week which initially improved with Imodium.  .  Finished a course of ABX end of march for UTI.  Neg bloody diarrhea. Mild palpitations without chest pain this morning.  Neg. abdomina pain.  , N/V or recent travel.  Pt. concerned for dehydration.   Pt. also concerned about her blood pressure being elevated.  She took it Multiple times at home and became very anxious.  Larisa Soto PA-C

## 2022-05-15 NOTE — ED STATDOCS - CLINICAL SUMMARY MEDICAL DECISION MAKING FREE TEXT BOX
EKG, labs EKG, labs              Barbara elevated LFts and ketones in urine.  Stress importance of food and liquid intake and to to follow with PMD.  Troponin negative and CBC unremarkable.   Elevated BP at DC but patient without HA, N/V.  I discussed return precautions with her.  Larisa Soto PA-C EKG, labs obtained, And did not reveal any acute process, patient is very anxious due to her blood pressure, she has not taken her second dose of Coreg yet.  She is once again denying any chest pain, abdominal pain, nausea, vomiting, her only symptom is that she has had on and off diarrhea for 2 weeks.  She stated that initially it had gone away and starting yesterday she had a couple episodes of watery diarrhea.,  No recent antibiotics.  Patient's blood pressure has been elevated a couple of readings here but she has remained asymptomatic, at this point in time I discussed with her close follow-up with her PCP, she expresses understanding of this plan of care she is given strict return precautions and she was discharged in stable condition.-MD Barbara Marrero elevated LFts and ketones in urine.  Stress importance of food and liquid intake and to to follow with PMD.  Troponin negative and CBC unremarkable.   Elevated BP at DC but patient without HA, N/V.  I discussed return precautions with her.  Larisa Soto PA-C

## 2022-05-15 NOTE — ED ADULT TRIAGE NOTE - CHIEF COMPLAINT QUOTE
pt presents to ED c/o elevated BP today. Pt was checking BP at home and had readings systolic >139, diastolic >84. Pt states she feels restless.

## 2022-05-15 NOTE — ED STATDOCS - OBJECTIVE STATEMENT
Patient is an 87-year-old female presenting with a chief complaint of weakness, she stated that she has been having watery diarrhea for little over a week, most recently she was on antibiotics in March for UTI, denies any blood in her diarrhea.  She is anticoagulated on Xarelto for history of A. fib, stated that she felt a little palpitations this morning, denies any chest pain, stated that she always feels a little short of breath and this is her baseline.  She stated that when she checked her blood pressure at home it was elevated multiple times, denies any headache, visual changes.  She stated that she has had dehydration many times in the past and was concerned for dehydration.

## 2022-05-15 NOTE — ED STATDOCS - CARE PROVIDER_API CALL
Kalin Iniguez)  Family Medicine  [address]  [address]  [place] [address]  Phone: [phone]  Fax: [phone]  Follow Up Time:     Reno Richter)  Gastroenterology; Internal Medicine  [address] [place] [address], [address]  [place] [address]  Phone: [phone]  Fax: [phone]  Follow Up Time:

## 2022-05-15 NOTE — ED STATDOCS - PHYSICAL EXAMINATION
Constitutional: NAD AAOx3  Eyes: PERRL, EOMI  Head: Normocephalic atraumatic  Mouth: MMM  Cardiac: regular rate   Resp: Lungs CTAB  GI: Abd s/nt/nd  Neuro: CN2-12 intact  Extremities: Intact distal pulses b/l, no calf tenderness, normal ROM b/l UE and LE   Skin: No rashes

## 2022-05-15 NOTE — ED STATDOCS - NSFOLLOWUPINSTRUCTIONS_ED_ALL_ED_FT
Diarrhea, Adult      Diarrhea is frequent loose and watery bowel movements. Diarrhea can make you feel weak and cause you to become dehydrated. Dehydration can make you tired and thirsty, cause you to have a dry mouth, and decrease how often you urinate.    Diarrhea typically lasts 2–3 days. However, it can last longer if it is a sign of something more serious. It is important to treat your diarrhea as told by your health care provider.      Follow these instructions at home:      Eating and drinking                   Follow these recommendations as told by your health care provider:  •Take an oral rehydration solution (ORS). This is an over-the-counter medicine that helps return your body to its normal balance of nutrients and water. It is found at pharmacies and retail stores.      •Drink plenty of fluids, such as water, ice chips, diluted fruit juice, and low-calorie sports drinks. You can drink milk also, if desired.      •Avoid drinking fluids that contain a lot of sugar or caffeine, such as energy drinks, sports drinks, and soda.      •Eat bland, easy-to-digest foods in small amounts as you are able. These foods include bananas, applesauce, rice, lean meats, toast, and crackers.      •Avoid alcohol.      •Avoid spicy or fatty foods.      Medicines     •Take over-the-counter and prescription medicines only as told by your health care provider.      •If you were prescribed an antibiotic medicine, take it as told by your health care provider. Do not stop using the antibiotic even if you start to feel better.        General instructions    •Wash your hands often using soap and water. If soap and water are not available, use a hand . Others in the household should wash their hands as well. Hands should be washed:  •After using the toilet or changing a diaper.       •Before preparing, cooking, or serving food.       •While caring for a sick person or while visiting someone in a hospital.         •Drink enough fluid to keep your urine pale yellow.      •Rest at home while you recover.      •Watch your condition for any changes.      •Take a warm bath to relieve any burning or pain from frequent diarrhea episodes.      •Keep all follow-up visits as told by your health care provider. This is important.        Contact a health care provider if:    •You have a fever.      •Your diarrhea gets worse.      •You have new symptoms.      •You cannot keep fluids down.      •You feel light-headed or dizzy.      •You have a headache.      •You have muscle cramps.        Get help right away if:    •You have chest pain.      •You feel extremely weak or you faint.      •You have bloody or black stools or stools that look like tar.      •You have severe pain, cramping, or bloating in your abdomen.      •You have trouble breathing or you are breathing very quickly.      •Your heart is beating very quickly.      •Your skin feels cold and clammy.      •You feel confused.    •You have signs of dehydration, such as:  •Dark urine, very little urine, or no urine.      •Cracked lips.      •Dry mouth.      •Sunken eyes.      •Sleepiness.      •Weakness.          Summary    •Diarrhea is frequent loose and watery bowel movements. Diarrhea can make you feel weak and cause you to become dehydrated.      •Drink enough fluids to keep your urine pale yellow.      •Make sure that you wash your hands after using the toilet. If soap and water are not available, use hand .      •Contact a health care provider if your diarrhea gets worse or you have new symptoms.      •Get help right away if you have signs of dehydration.      This information is not intended to replace advice given to you by your health care provider. Make sure you discuss any questions you have with your health care provider.      Hypertension, Adult      High blood pressure (hypertension) is when the force of blood pumping through the arteries is too strong. The arteries are the blood vessels that carry blood from the heart throughout the body. Hypertension forces the heart to work harder to pump blood and may cause arteries to become narrow or stiff. Untreated or uncontrolled hypertension can cause a heart attack, heart failure, a stroke, kidney disease, and other problems.    A blood pressure reading consists of a higher number over a lower number. Ideally, your blood pressure should be below 120/80. The first ("top") number is called the systolic pressure. It is a measure of the pressure in your arteries as your heart beats. The second ("bottom") number is called the diastolic pressure. It is a measure of the pressure in your arteries as the heart relaxes.      What are the causes?    The exact cause of this condition is not known. There are some conditions that result in or are related to high blood pressure.      What increases the risk?    Some risk factors for high blood pressure are under your control. The following factors may make you more likely to develop this condition:  •Smoking.      •Having type 2 diabetes mellitus, high cholesterol, or both.      •Not getting enough exercise or physical activity.      •Being overweight.      •Having too much fat, sugar, calories, or salt (sodium) in your diet.      •Drinking too much alcohol.      Some risk factors for high blood pressure may be difficult or impossible to change. Some of these factors include:  •Having chronic kidney disease.      •Having a family history of high blood pressure.      •Age. Risk increases with age.      •Race. You may be at higher risk if you are .      •Gender. Men are at higher risk than women before age 45. After age 65, women are at higher risk than men.      •Having obstructive sleep apnea.      •Stress.        What are the signs or symptoms?    High blood pressure may not cause symptoms. Very high blood pressure (hypertensive crisis) may cause:  •Headache.      •Anxiety.      •Shortness of breath.      •Nosebleed.      •Nausea and vomiting.      •Vision changes.      •Severe chest pain.      •Seizures.        How is this diagnosed?    This condition is diagnosed by measuring your blood pressure while you are seated, with your arm resting on a flat surface, your legs uncrossed, and your feet flat on the floor. The cuff of the blood pressure monitor will be placed directly against the skin of your upper arm at the level of your heart. It should be measured at least twice using the same arm. Certain conditions can cause a difference in blood pressure between your right and left arms.    Certain factors can cause blood pressure readings to be lower or higher than normal for a short period of time:  •When your blood pressure is higher when you are in a health care provider's office than when you are at home, this is called white coat hypertension. Most people with this condition do not need medicines.      •When your blood pressure is higher at home than when you are in a health care provider's office, this is called masked hypertension. Most people with this condition may need medicines to control blood pressure.      If you have a high blood pressure reading during one visit or you have normal blood pressure with other risk factors, you may be asked to:  •Return on a different day to have your blood pressure checked again.      •Monitor your blood pressure at home for 1 week or longer.      If you are diagnosed with hypertension, you may have other blood or imaging tests to help your health care provider understand your overall risk for other conditions.      How is this treated?    This condition is treated by making healthy lifestyle changes, such as eating healthy foods, exercising more, and reducing your alcohol intake. Your health care provider may prescribe medicine if lifestyle changes are not enough to get your blood pressure under control, and if:  •Your systolic blood pressure is above 130.      •Your diastolic blood pressure is above 80.      Your personal target blood pressure may vary depending on your medical conditions, your age, and other factors.      Follow these instructions at home:      Eating and drinking    •Eat a diet that is high in fiber and potassium, and low in sodium, added sugar, and fat. An example eating plan is called the DASH (Dietary Approaches to Stop Hypertension) diet. To eat this way:  •Eat plenty of fresh fruits and vegetables. Try to fill one half of your plate at each meal with fruits and vegetables.      •Eat whole grains, such as whole-wheat pasta, brown rice, or whole-grain bread. Fill about one fourth of your plate with whole grains.      •Eat or drink low-fat dairy products, such as skim milk or low-fat yogurt.      •Avoid fatty cuts of meat, processed or cured meats, and poultry with skin. Fill about one fourth of your plate with lean proteins, such as fish, chicken without skin, beans, eggs, or tofu.      •Avoid pre-made and processed foods. These tend to be higher in sodium, added sugar, and fat.        •Reduce your daily sodium intake. Most people with hypertension should eat less than 1,500 mg of sodium a day.    • Do not drink alcohol if:  •Your health care provider tells you not to drink.      •You are pregnant, may be pregnant, or are planning to become pregnant.      •If you drink alcohol:•Limit how much you use to:  •0–1 drink a day for women.      •0–2 drinks a day for men.        •Be aware of how much alcohol is in your drink. In the [place] one drink equals one 12 oz bottle of beer (355 mL), one 5 oz glass of wine (148 mL), or one 1½ oz glass of hard liquor (44 mL).          Lifestyle      •Work with your health care provider to maintain a healthy body weight or to lose weight. Ask what an ideal weight is for you.      •Get at least 30 minutes of exercise most days of the week. Activities may include walking, swimming, or biking.      •Include exercise to strengthen your muscles (resistance exercise), such as Pilates or lifting weights, as part of your weekly exercise routine. Try to do these types of exercises for 30 minutes at least 3 days a week.      • Do not use any products that contain nicotine or tobacco, such as cigarettes, e-cigarettes, and chewing tobacco. If you need help quitting, ask your health care provider.      •Monitor your blood pressure at home as told by your health care provider.      •Keep all follow-up visits as told by your health care provider. This is important.      Medicines     •Take over-the-counter and prescription medicines only as told by your health care provider. Follow directions carefully. Blood pressure medicines must be taken as prescribed.      • Do not skip doses of blood pressure medicine. Doing this puts you at risk for problems and can make the medicine less effective.      •Ask your health care provider about side effects or reactions to medicines that you should watch for.        Contact a health care provider if you:    •Think you are having a reaction to a medicine you are taking.      •Have headaches that keep coming back (recurring).      •Feel dizzy.      •Have swelling in your ankles.      •Have trouble with your vision.        Get help right away if you:    •Develop a severe headache or confusion.      •Have unusual weakness or numbness.      •Feel faint.      •Have severe pain in your chest or abdomen.      •Vomit repeatedly.      •Have trouble breathing.        Summary    •Hypertension is when the force of blood pumping through your arteries is too strong. If this condition is not controlled, it may put you at risk for serious complications.      •Your personal target blood pressure may vary depending on your medical conditions, your age, and other factors. For most people, a normal blood pressure is less than 120/80.      •Hypertension is treated with lifestyle changes, medicines, or a combination of both. Lifestyle changes include losing weight, eating a healthy, low-sodium diet, exercising more, and limiting alcohol.      This information is not intended to replace advice given to you by your health care provider. Make sure you discuss any questions you have with your health care provider.

## 2022-05-15 NOTE — ED STATDOCS - ATTENDING APP SHARED VISIT CONTRIBUTION OF CARE
I, Paris Cardoza MD, personally saw the patient with ACP.  I have personally performed a face to face diagnostic evaluation on this patient.  I have reviewed the ACP note and agree with the history, exam, and plan of care, except as noted.  I personally saw the patient and performed a substantive portion of the visit including all aspects of the medical decision making.

## 2022-05-15 NOTE — ED STATDOCS - PATIENT PORTAL LINK FT
You can access the FollowMyHealth Patient Portal offered by [facility] by registering at the following website: [email or web address]. By joining [employer]’s FollowMyHealth portal, you will also be able to view your health information using other applications (apps) compatible with our system.

## 2022-05-15 NOTE — ED STATDOCS - NSICDXPASTMEDICALHX_GEN_ALL_CORE_FT
PAST MEDICAL HISTORY:  A-fib     Afib     HTN (hypertension)     Hyperlipidemia     Syncope 7/2/2013

## 2022-05-15 NOTE — ED STATDOCS - NS ED ROS FT
Constitutional: No fever or chills  Eyes: No visual changes  HEENT: No throat pain  CV: No chest pain  Resp: No SOB no cough  GI: No abd pain, nausea or vomiting, +diarrhea  : No dysuria  MSK: No musculoskeletal pain  Skin: No rash  Neuro: No headache

## 2022-05-15 NOTE — ED STATDOCS - NS ED ATTENDING STATEMENT MOD
This was a shared visit with the MATTHEW. I reviewed and verified the documentation and independently performed the documented:

## 2022-05-18 ENCOUNTER — INPATIENT (INPATIENT)
Facility: HOSPITAL | Age: 87
LOS: 4 days | Discharge: HOME HEALTH | DRG: 291 | End: 2022-05-23
Attending: STUDENT IN AN ORGANIZED HEALTH CARE EDUCATION/TRAINING PROGRAM | Admitting: INTERNAL MEDICINE
Payer: MEDICARE

## 2022-05-18 VITALS
HEIGHT: 62 IN | SYSTOLIC BLOOD PRESSURE: 92 MMHG | TEMPERATURE: 98 F | DIASTOLIC BLOOD PRESSURE: 73 MMHG | WEIGHT: 126.99 LBS | HEART RATE: 73 BPM | RESPIRATION RATE: 20 BRPM

## 2022-05-18 DIAGNOSIS — I50.9 HEART FAILURE, UNSPECIFIED: ICD-10-CM

## 2022-05-18 DIAGNOSIS — Z98.49 CATARACT EXTRACTION STATUS, UNSPECIFIED EYE: Chronic | ICD-10-CM

## 2022-05-18 DIAGNOSIS — Z90.710 ACQUIRED ABSENCE OF BOTH CERVIX AND UTERUS: Chronic | ICD-10-CM

## 2022-05-18 LAB
ADD ON TEST-SPECIMEN IN LAB: SIGNIFICANT CHANGE UP
ADD ON TEST-SPECIMEN IN LAB: SIGNIFICANT CHANGE UP
ALBUMIN SERPL ELPH-MCNC: 3.5 G/DL — SIGNIFICANT CHANGE UP (ref 3.3–5)
ALP SERPL-CCNC: 84 U/L — SIGNIFICANT CHANGE UP (ref 40–120)
ALT FLD-CCNC: 67 U/L — SIGNIFICANT CHANGE UP (ref 12–78)
ANION GAP SERPL CALC-SCNC: 9 MMOL/L — SIGNIFICANT CHANGE UP (ref 5–17)
APPEARANCE UR: CLEAR — SIGNIFICANT CHANGE UP
AST SERPL-CCNC: 40 U/L — HIGH (ref 15–37)
BASOPHILS # BLD AUTO: 0.05 K/UL — SIGNIFICANT CHANGE UP (ref 0–0.2)
BASOPHILS NFR BLD AUTO: 0.4 % — SIGNIFICANT CHANGE UP (ref 0–2)
BILIRUB SERPL-MCNC: 0.5 MG/DL — SIGNIFICANT CHANGE UP (ref 0.2–1.2)
BILIRUB UR-MCNC: NEGATIVE — SIGNIFICANT CHANGE UP
BUN SERPL-MCNC: 7 MG/DL — SIGNIFICANT CHANGE UP (ref 7–23)
CALCIUM SERPL-MCNC: 8.8 MG/DL — SIGNIFICANT CHANGE UP (ref 8.5–10.1)
CHLORIDE SERPL-SCNC: 102 MMOL/L — SIGNIFICANT CHANGE UP (ref 96–108)
CO2 SERPL-SCNC: 27 MMOL/L — SIGNIFICANT CHANGE UP (ref 22–31)
COLOR SPEC: YELLOW — SIGNIFICANT CHANGE UP
CREAT SERPL-MCNC: 0.55 MG/DL — SIGNIFICANT CHANGE UP (ref 0.5–1.3)
DIFF PNL FLD: ABNORMAL
EGFR: 90 ML/MIN/1.73M2 — SIGNIFICANT CHANGE UP
EOSINOPHIL # BLD AUTO: 0.15 K/UL — SIGNIFICANT CHANGE UP (ref 0–0.5)
EOSINOPHIL NFR BLD AUTO: 1.3 % — SIGNIFICANT CHANGE UP (ref 0–6)
GLUCOSE SERPL-MCNC: 112 MG/DL — HIGH (ref 70–99)
GLUCOSE UR QL: NEGATIVE — SIGNIFICANT CHANGE UP
HCT VFR BLD CALC: 38.7 % — SIGNIFICANT CHANGE UP (ref 34.5–45)
HGB BLD-MCNC: 12.6 G/DL — SIGNIFICANT CHANGE UP (ref 11.5–15.5)
IMM GRANULOCYTES NFR BLD AUTO: 0.5 % — SIGNIFICANT CHANGE UP (ref 0–1.5)
KETONES UR-MCNC: NEGATIVE — SIGNIFICANT CHANGE UP
LEUKOCYTE ESTERASE UR-ACNC: NEGATIVE — SIGNIFICANT CHANGE UP
LIDOCAIN IGE QN: 54 U/L — LOW (ref 73–393)
LYMPHOCYTES # BLD AUTO: 1.78 K/UL — SIGNIFICANT CHANGE UP (ref 1–3.3)
LYMPHOCYTES # BLD AUTO: 14.9 % — SIGNIFICANT CHANGE UP (ref 13–44)
MCHC RBC-ENTMCNC: 28.2 PG — SIGNIFICANT CHANGE UP (ref 27–34)
MCHC RBC-ENTMCNC: 32.6 GM/DL — SIGNIFICANT CHANGE UP (ref 32–36)
MCV RBC AUTO: 86.6 FL — SIGNIFICANT CHANGE UP (ref 80–100)
MONOCYTES # BLD AUTO: 1.13 K/UL — HIGH (ref 0–0.9)
MONOCYTES NFR BLD AUTO: 9.5 % — SIGNIFICANT CHANGE UP (ref 2–14)
NEUTROPHILS # BLD AUTO: 8.76 K/UL — HIGH (ref 1.8–7.4)
NEUTROPHILS NFR BLD AUTO: 73.4 % — SIGNIFICANT CHANGE UP (ref 43–77)
NITRITE UR-MCNC: NEGATIVE — SIGNIFICANT CHANGE UP
PH UR: 6 — SIGNIFICANT CHANGE UP (ref 5–8)
PLATELET # BLD AUTO: 308 K/UL — SIGNIFICANT CHANGE UP (ref 150–400)
POTASSIUM SERPL-MCNC: 3.2 MMOL/L — LOW (ref 3.5–5.3)
POTASSIUM SERPL-SCNC: 3.2 MMOL/L — LOW (ref 3.5–5.3)
PROT SERPL-MCNC: 6.6 GM/DL — SIGNIFICANT CHANGE UP (ref 6–8.3)
PROT UR-MCNC: NEGATIVE — SIGNIFICANT CHANGE UP
RBC # BLD: 4.47 M/UL — SIGNIFICANT CHANGE UP (ref 3.8–5.2)
RBC # FLD: 12.7 % — SIGNIFICANT CHANGE UP (ref 10.3–14.5)
SODIUM SERPL-SCNC: 138 MMOL/L — SIGNIFICANT CHANGE UP (ref 135–145)
SP GR SPEC: 1.01 — SIGNIFICANT CHANGE UP (ref 1.01–1.02)
UROBILINOGEN FLD QL: NEGATIVE — SIGNIFICANT CHANGE UP
WBC # BLD: 11.93 K/UL — HIGH (ref 3.8–10.5)
WBC # FLD AUTO: 11.93 K/UL — HIGH (ref 3.8–10.5)

## 2022-05-18 PROCEDURE — 36415 COLL VENOUS BLD VENIPUNCTURE: CPT

## 2022-05-18 PROCEDURE — 73564 X-RAY EXAM KNEE 4 OR MORE: CPT | Mod: RT

## 2022-05-18 PROCEDURE — 80048 BASIC METABOLIC PNL TOTAL CA: CPT

## 2022-05-18 PROCEDURE — 93306 TTE W/DOPPLER COMPLETE: CPT

## 2022-05-18 PROCEDURE — 71045 X-RAY EXAM CHEST 1 VIEW: CPT | Mod: 26

## 2022-05-18 PROCEDURE — 99223 1ST HOSP IP/OBS HIGH 75: CPT

## 2022-05-18 PROCEDURE — 99285 EMERGENCY DEPT VISIT HI MDM: CPT

## 2022-05-18 PROCEDURE — 83735 ASSAY OF MAGNESIUM: CPT

## 2022-05-18 PROCEDURE — 97116 GAIT TRAINING THERAPY: CPT | Mod: GP

## 2022-05-18 PROCEDURE — 83880 ASSAY OF NATRIURETIC PEPTIDE: CPT

## 2022-05-18 PROCEDURE — 93010 ELECTROCARDIOGRAM REPORT: CPT

## 2022-05-18 PROCEDURE — 80053 COMPREHEN METABOLIC PANEL: CPT

## 2022-05-18 PROCEDURE — 99497 ADVNCD CARE PLAN 30 MIN: CPT | Mod: 25

## 2022-05-18 PROCEDURE — 85730 THROMBOPLASTIN TIME PARTIAL: CPT

## 2022-05-18 PROCEDURE — 83036 HEMOGLOBIN GLYCOSYLATED A1C: CPT

## 2022-05-18 PROCEDURE — 97163 PT EVAL HIGH COMPLEX 45 MIN: CPT | Mod: GP

## 2022-05-18 PROCEDURE — 85610 PROTHROMBIN TIME: CPT

## 2022-05-18 PROCEDURE — 85027 COMPLETE CBC AUTOMATED: CPT

## 2022-05-18 PROCEDURE — 84484 ASSAY OF TROPONIN QUANT: CPT

## 2022-05-18 PROCEDURE — 87493 C DIFF AMPLIFIED PROBE: CPT

## 2022-05-18 PROCEDURE — 84100 ASSAY OF PHOSPHORUS: CPT

## 2022-05-18 PROCEDURE — 87507 IADNA-DNA/RNA PROBE TQ 12-25: CPT

## 2022-05-18 RX ORDER — FUROSEMIDE 40 MG
20 TABLET ORAL
Refills: 0 | Status: DISCONTINUED | OUTPATIENT
Start: 2022-05-19 | End: 2022-05-22

## 2022-05-18 RX ORDER — FUROSEMIDE 40 MG
40 TABLET ORAL ONCE
Refills: 0 | Status: COMPLETED | OUTPATIENT
Start: 2022-05-18 | End: 2022-05-18

## 2022-05-18 RX ORDER — LANOLIN ALCOHOL/MO/W.PET/CERES
1 CREAM (GRAM) TOPICAL
Qty: 0 | Refills: 0 | DISCHARGE

## 2022-05-18 RX ORDER — SIMVASTATIN 20 MG/1
20 TABLET, FILM COATED ORAL AT BEDTIME
Refills: 0 | Status: DISCONTINUED | OUTPATIENT
Start: 2022-05-18 | End: 2022-05-23

## 2022-05-18 RX ORDER — CARVEDILOL PHOSPHATE 80 MG/1
1 CAPSULE, EXTENDED RELEASE ORAL
Qty: 0 | Refills: 0 | DISCHARGE

## 2022-05-18 RX ORDER — CARVEDILOL PHOSPHATE 80 MG/1
25 CAPSULE, EXTENDED RELEASE ORAL EVERY 12 HOURS
Refills: 0 | Status: DISCONTINUED | OUTPATIENT
Start: 2022-05-18 | End: 2022-05-18

## 2022-05-18 RX ORDER — ACETAMINOPHEN 500 MG
2 TABLET ORAL
Qty: 0 | Refills: 0 | DISCHARGE

## 2022-05-18 RX ORDER — SODIUM CHLORIDE 9 MG/ML
500 INJECTION INTRAMUSCULAR; INTRAVENOUS; SUBCUTANEOUS ONCE
Refills: 0 | Status: COMPLETED | OUTPATIENT
Start: 2022-05-18 | End: 2022-05-18

## 2022-05-18 RX ORDER — ASPIRIN/CALCIUM CARB/MAGNESIUM 324 MG
1 TABLET ORAL
Qty: 0 | Refills: 0 | DISCHARGE

## 2022-05-18 RX ORDER — METHYLCELLULOSE 500 MG
0 TABLET ORAL
Qty: 0 | Refills: 0 | DISCHARGE

## 2022-05-18 RX ORDER — LANOLIN ALCOHOL/MO/W.PET/CERES
3 CREAM (GRAM) TOPICAL
Qty: 0 | Refills: 0 | DISCHARGE

## 2022-05-18 RX ORDER — RIVAROXABAN 15 MG-20MG
20 KIT ORAL
Refills: 0 | Status: DISCONTINUED | OUTPATIENT
Start: 2022-05-18 | End: 2022-05-23

## 2022-05-18 RX ORDER — CARVEDILOL PHOSPHATE 80 MG/1
6.25 CAPSULE, EXTENDED RELEASE ORAL EVERY 12 HOURS
Refills: 0 | Status: DISCONTINUED | OUTPATIENT
Start: 2022-05-18 | End: 2022-05-23

## 2022-05-18 RX ORDER — DILTIAZEM HCL 120 MG
1 CAPSULE, EXT RELEASE 24 HR ORAL
Qty: 0 | Refills: 0 | DISCHARGE

## 2022-05-18 RX ORDER — LISINOPRIL 2.5 MG/1
1 TABLET ORAL
Qty: 0 | Refills: 0 | DISCHARGE

## 2022-05-18 RX ORDER — POTASSIUM CHLORIDE 20 MEQ
40 PACKET (EA) ORAL ONCE
Refills: 0 | Status: COMPLETED | OUTPATIENT
Start: 2022-05-18 | End: 2022-05-18

## 2022-05-18 RX ORDER — DILTIAZEM HCL 120 MG
180 CAPSULE, EXT RELEASE 24 HR ORAL DAILY
Refills: 0 | Status: DISCONTINUED | OUTPATIENT
Start: 2022-05-18 | End: 2022-05-23

## 2022-05-18 RX ORDER — CARVEDILOL PHOSPHATE 80 MG/1
0.5 CAPSULE, EXTENDED RELEASE ORAL
Qty: 0 | Refills: 0 | DISCHARGE

## 2022-05-18 RX ORDER — CHOLECALCIFEROL (VITAMIN D3) 125 MCG
1000 CAPSULE ORAL
Refills: 0 | Status: DISCONTINUED | OUTPATIENT
Start: 2022-05-18 | End: 2022-05-23

## 2022-05-18 RX ADMIN — SODIUM CHLORIDE 500 MILLILITER(S): 9 INJECTION INTRAMUSCULAR; INTRAVENOUS; SUBCUTANEOUS at 19:24

## 2022-05-18 RX ADMIN — CARVEDILOL PHOSPHATE 25 MILLIGRAM(S): 80 CAPSULE, EXTENDED RELEASE ORAL at 22:11

## 2022-05-18 RX ADMIN — Medication 40 MILLIGRAM(S): at 22:10

## 2022-05-18 RX ADMIN — SODIUM CHLORIDE 500 MILLILITER(S): 9 INJECTION INTRAMUSCULAR; INTRAVENOUS; SUBCUTANEOUS at 18:24

## 2022-05-18 RX ADMIN — Medication 40 MILLIEQUIVALENT(S): at 22:10

## 2022-05-18 NOTE — H&P ADULT - NSICDXPASTMEDICALHX_GEN_ALL_CORE_FT
PAST MEDICAL HISTORY:  A-fib     Afib     Bronchitis     HTN (hypertension)     Hyperlipidemia     Syncope 7/2/2013

## 2022-05-18 NOTE — H&P ADULT - HISTORY OF PRESENT ILLNESS
Pt is an 86 yo female with a pmh/o afib on xarelto, HLD, HTN, syncope, dehydration, who presents to Ed due to worsening shortness of breath and diarrhea. Pt states she has had liquid stool x 3 weeks, with up to five non bloody, dark brown, episodes a day without recent abx use or sick contacts. Pt states she was drinking 'plenty of water to stay hydrated' during this time however noticed worsening sob over past few days with inability to lie flat due to wheezing and dry cough. Pt also noted slight leg swelling. Pt was evaluated by GI and cardiology over past few days and has been on pepto bismol and coconut water for sx x two days with reduction to three BM but stool still watery.

## 2022-05-18 NOTE — ED PROVIDER NOTE - OBJECTIVE STATEMENT
86 y/o female with a PMhx of afib on Xarelto, HLD, HTN on simvastatin, cardizem, and caredilol BID, s/p hysterectomy on estradiol, recurrent bronchitis and syncope presents to the ED c/o diarrhea x4 weeks. Pt states loose stool x4 weeks, slightly better today and had stool study done by gastroenterologist Dr. Davis. pt notes developing lightheadedness, SOB/FALLON, wheezing, and worsening weakness x few days. 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 pt was d/c from  on Sunday. Pt had blood pressure of 183 systolic in the hospital. Pt went to cardiologist on 5/17 and was told to take Pepto-Bismol and coconut water. Pt was at PCP on 5/16 who sent pt for CT of chest w/o contrast at [facility]. pt notes b/l LE edema last night, resolved this morning. PCP: Dr. Iniguez. Gastro: Dr. Davis. cardio: Dr. Covington.

## 2022-05-18 NOTE — H&P ADULT - NSICDXFAMILYHX_GEN_ALL_CORE_FT
FAMILY HISTORY:  Family history of CVA  Family history of diabetes mellitus  Family history of primary TB

## 2022-05-18 NOTE — PROVIDER CONTACT NOTE (OTHER) - SITUATION
CHF exacerbation    left message with ans service for dr to see pt in the morning
diarrhea    left message with ans service for dr to see pt in the morning

## 2022-05-18 NOTE — ED ADULT NURSE NOTE - OBJECTIVE STATEMENT
Pt presented to the ER with c/o diarrhea x2 weeks. Pt was at her GI doctor today who referred her to come to the ER for evaluations. Pt stated that she was here for similar symptoms on Sunday and was D/C home. Pt stated that today she felt dizzy. Pt denies any CP, SOB, or N/V at this time.

## 2022-05-18 NOTE — ED PROVIDER NOTE - CARDIAC, MLM
irregularly irregular rhythm, mildly tachycardic.  Heart sounds S1, S2.  No murmurs, rubs or gallops.

## 2022-05-18 NOTE — ED PROVIDER NOTE - NSICDXPASTMEDICALHX_GEN_ALL_CORE_FT
PAST MEDICAL HISTORY:  A-fib     Afib     HTN (hypertension)     Hyperlipidemia     Syncope 7/2/2013      Bronchitis

## 2022-05-18 NOTE — H&P ADULT - ASSESSMENT
88 yo female with a pmh/o afib on xarelto, HLD, HTN, syncope, dehydration, who presents to Ed due to worsening shortness of breath and diarrhea. Admitted  86 yo female with a pmh/o afib on xarelto, HLD, HTN, syncope, dehydration, who presents to Ed due to worsening shortness of breath and diarrhea. Admitted due to:    Acute CHF exacerbation:  admit to telemetry  cardiology consult  strict i/o's  daily weights  TTE  ACE or ARB contraindicated- d/c'd as outpt  Beta blocker cont   EKG afib rbr hr 108  serial troponins  Pro-BnP notably elevated >3000k  cbc, cmp, coags, mg, phos  IV diuresis with close monitoring of renal function  Chest x ray with vascular congestion , np consolidation, effusion    Hypoxia:  likely due to hypervolemia  c/w supplemental O2 with plan to wean  resolved with 2L to 95%  CXR as above    HyoK:  supplemented in ED  f/u AM chem    Leukocytosis:  Intractable diarrhea, likely infectious etiology:  CXR w/o consolidation, pna  f/u covid, flu  monitor on cbc  f/u GI PCR  hold abx as pt afebrile and w/o tenderness  i/o's  hold ivf as pt hypervolemic  GI Consult  isolation  UA w/o evidence of infxn  hold pepto    Transaminitis:  likely due to hepatic congestion, reactive in setting of diarrhea  monitor LFTs, improved from prior    Hyperglycemia:  monitor on serum chem  non fasting  f/u a1c  pening trend and result start aiss/carb rest    Afib RVR:  Resolving  SBP s/p lasix <100  now s/p carvedilol 25, sbp 133     HTN/HLD:  c/w home meds  c/w dash/tlc

## 2022-05-18 NOTE — ED ADULT TRIAGE NOTE - CHIEF COMPLAINT QUOTE
PT to ED for c/o diarrhea x2 weeks. PT seen by MD a couple days ago but has not had improvement. Denies dizziness. Denies nausea, vomiting, and abdominal pain.

## 2022-05-19 LAB
A1C WITH ESTIMATED AVERAGE GLUCOSE RESULT: 6.2 % — HIGH (ref 4–5.6)
ADD ON TEST-SPECIMEN IN LAB: SIGNIFICANT CHANGE UP
ALBUMIN SERPL ELPH-MCNC: 3.5 G/DL — SIGNIFICANT CHANGE UP (ref 3.3–5)
ALP SERPL-CCNC: 83 U/L — SIGNIFICANT CHANGE UP (ref 40–120)
ALT FLD-CCNC: 57 U/L — SIGNIFICANT CHANGE UP (ref 12–78)
ANION GAP SERPL CALC-SCNC: 9 MMOL/L — SIGNIFICANT CHANGE UP (ref 5–17)
APTT BLD: 27.8 SEC — SIGNIFICANT CHANGE UP (ref 27.5–35.5)
AST SERPL-CCNC: 33 U/L — SIGNIFICANT CHANGE UP (ref 15–37)
BILIRUB SERPL-MCNC: 0.9 MG/DL — SIGNIFICANT CHANGE UP (ref 0.2–1.2)
BUN SERPL-MCNC: 6 MG/DL — LOW (ref 7–23)
CALCIUM SERPL-MCNC: 9 MG/DL — SIGNIFICANT CHANGE UP (ref 8.5–10.1)
CHLORIDE SERPL-SCNC: 99 MMOL/L — SIGNIFICANT CHANGE UP (ref 96–108)
CO2 SERPL-SCNC: 29 MMOL/L — SIGNIFICANT CHANGE UP (ref 22–31)
CREAT SERPL-MCNC: 0.52 MG/DL — SIGNIFICANT CHANGE UP (ref 0.5–1.3)
EGFR: 91 ML/MIN/1.73M2 — SIGNIFICANT CHANGE UP
ESTIMATED AVERAGE GLUCOSE: 131 MG/DL — HIGH (ref 68–114)
GLUCOSE SERPL-MCNC: 99 MG/DL — SIGNIFICANT CHANGE UP (ref 70–99)
INR BLD: 1.18 RATIO — HIGH (ref 0.88–1.16)
MAGNESIUM SERPL-MCNC: 2 MG/DL — SIGNIFICANT CHANGE UP (ref 1.6–2.6)
PHOSPHATE SERPL-MCNC: 3.4 MG/DL — SIGNIFICANT CHANGE UP (ref 2.5–4.5)
POTASSIUM SERPL-MCNC: 3.1 MMOL/L — LOW (ref 3.5–5.3)
POTASSIUM SERPL-SCNC: 3.1 MMOL/L — LOW (ref 3.5–5.3)
PROT SERPL-MCNC: 6.7 GM/DL — SIGNIFICANT CHANGE UP (ref 6–8.3)
PROTHROM AB SERPL-ACNC: 13.7 SEC — HIGH (ref 10.5–13.4)
SODIUM SERPL-SCNC: 137 MMOL/L — SIGNIFICANT CHANGE UP (ref 135–145)
TROPONIN I, HIGH SENSITIVITY RESULT: 7.4 NG/L — SIGNIFICANT CHANGE UP

## 2022-05-19 PROCEDURE — 99233 SBSQ HOSP IP/OBS HIGH 50: CPT

## 2022-05-19 PROCEDURE — 93306 TTE W/DOPPLER COMPLETE: CPT | Mod: 26

## 2022-05-19 RX ORDER — POTASSIUM CHLORIDE 20 MEQ
40 PACKET (EA) ORAL EVERY 4 HOURS
Refills: 0 | Status: COMPLETED | OUTPATIENT
Start: 2022-05-19 | End: 2022-05-19

## 2022-05-19 RX ADMIN — CARVEDILOL PHOSPHATE 6.25 MILLIGRAM(S): 80 CAPSULE, EXTENDED RELEASE ORAL at 09:25

## 2022-05-19 RX ADMIN — Medication 20 MILLIGRAM(S): at 18:28

## 2022-05-19 RX ADMIN — Medication 1000 UNIT(S): at 20:54

## 2022-05-19 RX ADMIN — RIVAROXABAN 20 MILLIGRAM(S): KIT at 16:34

## 2022-05-19 RX ADMIN — Medication 180 MILLIGRAM(S): at 09:25

## 2022-05-19 RX ADMIN — Medication 1000 UNIT(S): at 09:25

## 2022-05-19 RX ADMIN — CARVEDILOL PHOSPHATE 6.25 MILLIGRAM(S): 80 CAPSULE, EXTENDED RELEASE ORAL at 20:54

## 2022-05-19 RX ADMIN — Medication 20 MILLIGRAM(S): at 09:25

## 2022-05-19 RX ADMIN — Medication 40 MILLIEQUIVALENT(S): at 16:34

## 2022-05-19 RX ADMIN — Medication 1 TABLET(S): at 09:25

## 2022-05-19 RX ADMIN — Medication 40 MILLIEQUIVALENT(S): at 11:30

## 2022-05-19 RX ADMIN — SIMVASTATIN 20 MILLIGRAM(S): 20 TABLET, FILM COATED ORAL at 20:54

## 2022-05-19 NOTE — PROGRESS NOTE ADULT - SUBJECTIVE AND OBJECTIVE BOX
Chief Complaint: Acute CHF, Hypoxia, Intractable diarrhea     Subjective and objective    Pt is an 88 yo female with a pmh/o afib on xarelto, HLD, HTN, syncope, dehydration, who presents to Ed due to worsening shortness of breath and diarrhea. Pt states she has had liquid stool x 3 weeks, with up to five non bloody, dark brown, episodes a day without recent abx use or sick contacts. Pt states she was drinking 'plenty of water to stay hydrated' during this time however noticed worsening sob over past few days with inability to lie flat due to wheezing and dry cough. Pt also noted slight leg swelling. Pt was evaluated by GI and cardiology over past few days and has been on pepto bismol and coconut water for sx x two days with reduction to three BM but stool still watery.     REVIEW OF SYSTEMS:  All other review of systems is negative unless indicated above    Vital Signs Last 24 Hrs  T(C): 36.4 (19 May 2022 08:13), Max: 36.7 (18 May 2022 22:30)  T(F): 97.5 (19 May 2022 08:13), Max: 98 (18 May 2022 22:30)  HR: 94 (19 May 2022 08:13) (73 - 118)  BP: 155/91 (19 May 2022 08:13) (92/73 - 172/109)  BP(mean): 103 (19 May 2022 00:08) (103 - 103)  RR: 20 (19 May 2022 08:13) (20 - 20)  SpO2: 100% (19 May 2022 08:13) (85% - 100%)    PHYSICAL EXAM:    Constitutional: NAD, awake and alert, well-developed  HEENT: PERR, EOMI, Normal Hearing, MMM  Neck: Soft and supple, No LAD, No JVD  Respiratory: Breath sounds are clear bilaterally, No wheezing, rales or rhonchi  Cardiovascular: S1 and S2, iregularly irregular , no Murmurs, gallops or rubs  Gastrointestinal: Bowel Sounds present, soft, nontender, nondistended, no guarding, no rebound  Extremities: No peripheral edema  Vascular: 2+ peripheral pulses  Neurological: A/O x 3, no focal deficits  Musculoskeletal: 5/5 strength b/l upper and lower extremities  Skin: No rashes    Medications:  MEDICATIONS  (STANDING):  carvedilol 6.25 milliGRAM(s) Oral every 12 hours  cholecalciferol 1000 Unit(s) Oral two times a day  diltiazem    milliGRAM(s) Oral daily  furosemide   Injectable 20 milliGRAM(s) IV Push two times a day  multivitamin 1 Tablet(s) Oral daily  potassium chloride    Tablet ER 40 milliEquivalent(s) Oral every 4 hours  rivaroxaban 20 milliGRAM(s) Oral with dinner  simvastatin 20 milliGRAM(s) Oral at bedtime      Labs: All Labs Reviewed:                        12.6    )-----------( 308      ( 18 May 2022 18:11 )             38.7         137  |  99  |  6<L>  ----------------------------<  99  3.1<L>   |  29  |  0.52    Ca    9.0      19 May 2022 07:47  Phos  3.4       Mg     2.0         TPro  6.7  /  Alb  3.5  /  TBili  0.9  /  DBili  x   /  AST  33  /  ALT  57  /  AlkPhos  83      PT/INR - ( 19 May 2022 07:47 )   PT: 13.7 sec;   INR: 1.18 ratio         PTT - ( 19 May 2022 07:47 )  PTT:27.8 sec  Urinalysis Basic - ( 18 May 2022 18:11 )    Color: Yellow / Appearance: Clear / S.010 / pH: x  Gluc: x / Ketone: Negative  / Bili: Negative / Urobili: Negative   Blood: x / Protein: Negative / Nitrite: Negative   Leuk Esterase: Negative / RBC: 3-5 /HPF / WBC 3-5   Sq Epi: x / Non Sq Epi: Occasional / Bacteria: Occasional    RADIOLOGY/EKG: all tests were reviewed  r< from: Xray Chest 1 View- PORTABLE-Urgent (Xray Chest 1 View- PORTABLE-Urgent .) (22 @ 19:07) >  IMPRESSION:   Mild cardiomegaly.  Bilateral diffuse airspace disease.  Pulmonary vascular congestion..    < end of copied text >      DVT PPX: xarelto     Advance Directive: full code     Disposition: f/u stool tests

## 2022-05-19 NOTE — CONSULT NOTE ADULT - SUBJECTIVE AND OBJECTIVE BOX
HPI:  Pt is an 86 yo female with a pmh/o afib on xarelto, HLD, HTN, syncope, dehydration, who presents to Ed due to worsening shortness of breath and diarrhea. Pt states she has had liquid stool x 3 weeks, with up to five non bloody, dark brown, episodes a day without recent abx use or sick contacts. Pt states she was drinking 'plenty of water to stay hydrated' during this time however noticed worsening sob over past few days with inability to lie flat due to wheezing and dry cough. Pt also noted slight leg swelling. Pt was evaluated by GI and cardiology over past few days and has been on pepto bismol and coconut water for sx x two days with reduction to three BM but stool still watery.  (18 May 2022 23:53)      PAST MEDICAL & SURGICAL HISTORY:  Syncope  7/2/2013      HTN (hypertension)      Hyperlipidemia      Afib      A-fib      Bronchitis      Cardiac abnormality  internal cardiac monitor placed 8/2013      H/O: hysterectomy      S/P cataract surgery  Left eye.          MEDICATIONS  (STANDING):  carvedilol 6.25 milliGRAM(s) Oral every 12 hours  cholecalciferol 1000 Unit(s) Oral two times a day  diltiazem    milliGRAM(s) Oral daily  furosemide   Injectable 20 milliGRAM(s) IV Push two times a day  multivitamin 1 Tablet(s) Oral daily  rivaroxaban 20 milliGRAM(s) Oral with dinner  simvastatin 20 milliGRAM(s) Oral at bedtime    MEDICATIONS  (PRN):      Allergies    erythromycin (Diarrhea)  penicillin (Unknown)  shellfish (Unknown)    Intolerances        SOCIAL HISTORY:    FAMILY HISTORY:  Family history of primary TB    Family history of CVA    Family history of diabetes mellitus     Non-contributory    REVIEW OF SYSTEMS      General:	    Respiratory and Thorax:  	  Cardiovascular:	    Gastrointestinal:	    Musculoskeletal:	   Vital Signs Last 24 Hrs  T(C): 36.3 (19 May 2022 00:30), Max: 36.7 (18 May 2022 22:30)  T(F): 97.3 (19 May 2022 00:30), Max: 98 (18 May 2022 22:30)  HR: 87 (19 May 2022 00:30) (73 - 118)  BP: 134/91 (19 May 2022 00:30) (92/73 - 172/109)  BP(mean): 103 (19 May 2022 00:08) (103 - 103)  RR: 20 (19 May 2022 00:30) (20 - 20)  SpO2: 98% (19 May 2022 00:30) (85% - 100%)    HEENT :No Pallor.No icterus. EOMI,PERLAA  Chest : Few B/L Rales  CVS : S1S2 Normal.No murmurs.  Abdomen: Soft.Non tender .Normal bowel sounds.No Organomegaly.  CNS: Alert.Oriented to Time,Place and Person.No focal deficit.  EXT: Normal Range of motion.No pitting edema.    LABS:                        12.6   11.93 )-----------( 308      ( 18 May 2022 18:11 )             38.7     05-18    138  |  102  |  7   ----------------------------<  112<H>  3.2<L>   |  27  |  0.55    Ca    8.8      18 May 2022 18:11  Mg     2.0     05-18    TPro  6.6  /  Alb  3.5  /  TBili  0.5  /  DBili  x   /  AST  40<H>  /  ALT  67  /  AlkPhos  84  05-18      LIVER FUNCTIONS - ( 18 May 2022 18:11 )  Alb: 3.5 g/dL / Pro: 6.6 gm/dL / ALK PHOS: 84 U/L / ALT: 67 U/L / AST: 40 U/L / GGT: x             RADIOLOGY & ADDITIONAL STUDIES:

## 2022-05-19 NOTE — PHARMACOTHERAPY INTERVENTION NOTE - COMMENTS
recommended potassium supplementation 
Medication reconciliation completed.  Reviewed Medication list and confirmed med allergies with patient; confirmed with Dr. First Medlokesh. 
recommended obtaining mg level

## 2022-05-19 NOTE — PATIENT PROFILE ADULT - FALL HARM RISK - HARM RISK INTERVENTIONS

## 2022-05-19 NOTE — PROGRESS NOTE ADULT - ASSESSMENT
# Acute hypoxic respiratory failure 2/2 Acute on chronic   strict i/o's  daily weights  TTE  ACE or ARB contraindicated- d/c'd as outpt  Beta blocker cont   EKG afib rbr hr 108  Lasix 20 mg ivp bid   serial troponins  Pro-BnP notably elevated >3000k  cbc, cmp, coags, mg, phos  IV diuresis with close monitoring of renal function  Chest x ray with vascular congestion , no consolidation, effusion  Cardiology consult - Dr. Stein     # Hypokalemia   in the setting of prolonged diarrhea   magnesium - wnl  replete and monitor     # Leukocytosis  Intractable diarrhea, likely infectious etiology:  CXR w/o consolidation, pna  f/u covid, flu  monitor on cbc  f/u GI PCR and C.diff  hold abx as pt afebrile and w/o tenderness  i/o's  hold ivf as pt hypervolemic  GI Consult appreciated   isolation  UA w/o evidence of infxn  hold pepto    # Transaminitis - resolved   likely due to hepatic congestion, reactive in setting of diarrhea    # Hyperglycemia - resolved   monitor on serum chem    # Afib RVR:  now rate controlled  cont. cardizem and coreg     # HTN/HLD:  c/w home meds  c/w dash/tlc

## 2022-05-19 NOTE — CONSULT NOTE ADULT - SUBJECTIVE AND OBJECTIVE BOX
Patient is a 87y old  Female who presents with a chief complaint of Acute CHF, Hypoxia, Intractable diarrhea.     HPI:  Pt is an 86 yo female with a pmh/o afib on xarelto, HLD, HTN, syncope, dehydration, who presents to Ed due to worsening shortness of breath and diarrhea.   Pt states that she was having         PAST MEDICAL & SURGICAL HISTORY:  Syncope  2013      HTN (hypertension)      Hyperlipidemia      Afib      A-fib      Bronchitis      Cardiac abnormality  internal cardiac monitor placed 2013      H/O: hysterectomy      S/P cataract surgery  Left eye.          MEDICATIONS  (STANDING):  carvedilol 6.25 milliGRAM(s) Oral every 12 hours  cholecalciferol 1000 Unit(s) Oral two times a day  diltiazem    milliGRAM(s) Oral daily  furosemide   Injectable 20 milliGRAM(s) IV Push two times a day  multivitamin 1 Tablet(s) Oral daily  potassium chloride    Tablet ER 40 milliEquivalent(s) Oral every 4 hours  rivaroxaban 20 milliGRAM(s) Oral with dinner  simvastatin 20 milliGRAM(s) Oral at bedtime    MEDICATIONS  (PRN):      FAMILY HISTORY:  Family history of primary TB    Family history of CVA    Family history of diabetes mellitus        SOCIAL HISTORY:    REVIEW OF SYSTEMS:  CONSTITUTIONAL:    No fatigue, malaise, lethargy.  No fever or chills.  HEENT:  Eyes:  No visual changes.     ENT:  No epistaxis.  No sinus pain.    RESPIRATORY:  No cough.  No wheeze.  No hemoptysis.  No shortness of breath.  CARDIOVASCULAR:  No chest pains.  No palpitations. No shortness of breath, No orthopnea or PND.  GASTROINTESTINAL:  No abdominal pain.  No nausea or vomiting.    GENITOURINARY:    No hematuria.    MUSCULOSKELETAL:  No musculoskeletal pain.  No joint swelling.  No arthritis.  NEUROLOGICAL:  No tingling or numbness or weakness.  PSYCHIATRIC:  No confusion  SKIN:  No rashes.    ENDOCRINE:  No unexplained weight loss.  No polydipsia.   HEMATOLOGIC:  No anemia.  No prolonged or excessive bleeding.   ALLERGIC AND IMMUNOLOGIC:  No pruritus.          Vital Signs Last 24 Hrs  T(C): 36.4 (19 May 2022 08:13), Max: 36.7 (18 May 2022 22:30)  T(F): 97.5 (19 May 2022 08:13), Max: 98 (18 May 2022 22:30)  HR: 94 (19 May 2022 08:13) (73 - 118)  BP: 155/91 (19 May 2022 08:13) (92/73 - 172/109)  BP(mean): 103 (19 May 2022 00:08) (103 - 103)  RR: 20 (19 May 2022 08:13) (20 - 20)  SpO2: 100% (19 May 2022 08:13) (85% - 100%)    PHYSICAL EXAM-    Constitutional: The patient appears to be normal, well developed, well nourished and alert and oriented to time, place and person. The patient does not appear acutely ill.     Head: Head is normocephalic and atraumatic.      Neck: No jugular venous distention. No audible carotid bruits. There are strong carotid pulses bilaterally. No JVD.     Cardiovascular: Regular rate and rhythm without S3, S4. No murmurs or rubs are appreciated.      Respiratory: Breathsounds are normal. No rales. No wheezing.    Abdomen: Soft, nontender, nondistended with positive bowel sounds.      Extremity: No tenderness. No  pitting edema     Neurologic: The patient is alert and oriented.      Skin: No rash, no obvious lesions noted.      Psychiatric: The patient appears to be emotionally stable.      INTERPRETATION OF TELEMETRY:    ECG: Sinus rythm , normal axis, no ST T changes.     I&O's Detail      LABS:                        12.6   11.93 )-----------( 308      ( 18 May 2022 18:11 )             38.7     05-    137  |  99  |  6<L>  ----------------------------<  99  3.1<L>   |  29  |  0.52    Ca    9.0      19 May 2022 07:47  Phos  3.4     -  Mg     2.0         TPro  6.7  /  Alb  3.5  /  TBili  0.9  /  DBili  x   /  AST  33  /  ALT  57  /  AlkPhos  83  05-19        PT/INR - ( 19 May 2022 07:47 )   PT: 13.7 sec;   INR: 1.18 ratio         PTT - ( 19 May 2022 07:47 )  PTT:27.8 sec  Urinalysis Basic - ( 18 May 2022 18:11 )    Color: Yellow / Appearance: Clear / S.010 / pH: x  Gluc: x / Ketone: Negative  / Bili: Negative / Urobili: Negative   Blood: x / Protein: Negative / Nitrite: Negative   Leuk Esterase: Negative / RBC: 3-5 /HPF / WBC 3-5   Sq Epi: x / Non Sq Epi: Occasional / Bacteria: Occasional      I&O's Summary    BNPSerum Pro-Brain Natriuretic Peptide: 3538 pg/mL ( @ 18:11)    RADIOLOGY & ADDITIONAL STUDIES: Patient is a 87y old  Female who presents with a chief complaint of Acute CHF, Hypoxia, Intractable diarrhea.     HPI:  Pt is an 88 yo female with a pmh/o afib on xarelto, HLD, HTN, syncope, dehydration, who presents to Ed due to worsening shortness of breath and diarrhea.   Pt states that she was having diarrhea 4  for weeks and 3 stools per day.  Pt states that she saw her PCP Dr Iniguez and was given Immodium and it was better for one week and diarrhea worse again.  She saw her cardiologist Dr Covington- and she was asked to take Peptobismol and coconut water which did not improve her symptoms and presented to the ER.    Pt states that she had SOB when she presented but now improved with O2 supplementation.  Pt was seen and examined by me around 930 am today.         PAST MEDICAL & SURGICAL HISTORY:  Syncope  2013      HTN (hypertension)      Hyperlipidemia      Afib      A-fib      Bronchitis      Cardiac abnormality  internal cardiac monitor placed 2013      H/O: hysterectomy      S/P cataract surgery  Left eye.           MEDICATIONS  (STANDING):  carvedilol 6.25 milliGRAM(s) Oral every 12 hours  cholecalciferol 1000 Unit(s) Oral two times a day  diltiazem    milliGRAM(s) Oral daily  furosemide   Injectable 20 milliGRAM(s) IV Push two times a day  multivitamin 1 Tablet(s) Oral daily  potassium chloride    Tablet ER 40 milliEquivalent(s) Oral every 4 hours  rivaroxaban 20 milliGRAM(s) Oral with dinner  simvastatin 20 milliGRAM(s) Oral at bedtime    MEDICATIONS  (PRN):      FAMILY HISTORY:  Family history of primary TB    Family history of CVA    Family history of diabetes mellitus        SOCIAL HISTORY: denies any recent smoking      REVIEW OF SYSTEMS:  CONSTITUTIONAL:    No fatigue, malaise, lethargy.  No fever or chills.  RESPIRATORY:  No cough.  No wheeze.  No hemoptysis.  c/o shortness of breath.  CARDIOVASCULAR:  No chest pains.  No palpitations. c/o shortness of breath, No orthopnea or PND.  GASTROINTESTINAL:  No abdominal pain.  No nausea or vomiting.   c/o diarrhea  GENITOURINARY:    No hematuria.    MUSCULOSKELETAL:  No musculoskeletal pain.  No joint swelling.  No arthritis.  NEUROLOGICAL:  generalized weakness present  PSYCHIATRIC:  No confusion  SKIN:  No rashes.          Vital Signs Last 24 Hrs  T(C): 36.4 (19 May 2022 08:13), Max: 36.7 (18 May 2022 22:30)  T(F): 97.5 (19 May 2022 08:13), Max: 98 (18 May 2022 22:30)  HR: 94 (19 May 2022 08:13) (73 - 118)  BP: 155/91 (19 May 2022 08:13) (92/73 - 172/109)  BP(mean): 103 (19 May 2022 00:08) (103 - 103)  RR: 20 (19 May 2022 08:13) (20 - 20)  SpO2: 100% (19 May 2022 08:13) (85% - 100%)    PHYSICAL EXAM-    Constitutional: elderly frail ill looking female. no distress     Head: Head is normocephalic and atraumatic.      Neck:  No JVD.     Cardiovascular: Regular rate and rhythm without S3, S4. No murmurs or rubs are appreciated.      Respiratory: B/l rales and wheezing.    Abdomen: Soft, nontender, nondistended with positive bowel sounds.      Extremity: No tenderness. No  pitting edema     Neurologic: The patient is alert and oriented.      Skin: No rash, no obvious lesions noted.      Psychiatric: The patient appears to be emotionally stable.      INTERPRETATION OF TELEMETRY: Afib     ECG: afib, poor R wave progression, isolated PVCs     I&O's Detail      LABS:                        12.6   11.93 )-----------( 308      ( 18 May 2022 18:11 )             38.7     05-    137  |  99  |  6<L>  ----------------------------<  99  3.1<L>   |  29  |  0.52    Ca    9.0      19 May 2022 07:47  Phos  3.4     05-  Mg     2.0     -    TPro  6.7  /  Alb  3.5  /  TBili  0.9  /  DBili  x   /  AST  33  /  ALT  57  /  AlkPhos  83  05-19        PT/INR - ( 19 May 2022 07:47 )   PT: 13.7 sec;   INR: 1.18 ratio         PTT - ( 19 May 2022 07:47 )  PTT:27.8 sec  Urinalysis Basic - ( 18 May 2022 18:11 )    Color: Yellow / Appearance: Clear / S.010 / pH: x  Gluc: x / Ketone: Negative  / Bili: Negative / Urobili: Negative   Blood: x / Protein: Negative / Nitrite: Negative   Leuk Esterase: Negative / RBC: 3-5 /HPF / WBC 3-5   Sq Epi: x / Non Sq Epi: Occasional / Bacteria: Occasional      I&O's Summary    BNPSerum Pro-Brain Natriuretic Peptide: 3538 pg/mL ( @ 18:11)    RADIOLOGY & ADDITIONAL STUDIES:  ch< from: Xray Chest 1 View- PORTABLE-Urgent (Xray Chest 1 View- PORTABLE-Urgent .) (22 @ 19:07) >    IMPRESSION:   Mild cardiomegaly.  Bilateral diffuse airspace disease.  Pulmonary vascular congestion..    --- End of Report ---            SAGE SMART MD; Attending Radiologist  Thisdocument has been electronically signed. May 18 2022  9:18PM    < end of copied text >

## 2022-05-20 LAB
ALBUMIN SERPL ELPH-MCNC: 3.2 G/DL — LOW (ref 3.3–5)
ALP SERPL-CCNC: 81 U/L — SIGNIFICANT CHANGE UP (ref 40–120)
ALT FLD-CCNC: 55 U/L — SIGNIFICANT CHANGE UP (ref 12–78)
ANION GAP SERPL CALC-SCNC: 6 MMOL/L — SIGNIFICANT CHANGE UP (ref 5–17)
AST SERPL-CCNC: 35 U/L — SIGNIFICANT CHANGE UP (ref 15–37)
BILIRUB SERPL-MCNC: 0.8 MG/DL — SIGNIFICANT CHANGE UP (ref 0.2–1.2)
BUN SERPL-MCNC: 12 MG/DL — SIGNIFICANT CHANGE UP (ref 7–23)
C DIFF BY PCR RESULT: SIGNIFICANT CHANGE UP
C DIFF TOX GENS STL QL NAA+PROBE: SIGNIFICANT CHANGE UP
CALCIUM SERPL-MCNC: 9.1 MG/DL — SIGNIFICANT CHANGE UP (ref 8.5–10.1)
CHLORIDE SERPL-SCNC: 96 MMOL/L — SIGNIFICANT CHANGE UP (ref 96–108)
CO2 SERPL-SCNC: 30 MMOL/L — SIGNIFICANT CHANGE UP (ref 22–31)
CREAT SERPL-MCNC: 0.61 MG/DL — SIGNIFICANT CHANGE UP (ref 0.5–1.3)
CULTURE RESULTS: SIGNIFICANT CHANGE UP
CULTURE RESULTS: SIGNIFICANT CHANGE UP
EGFR: 88 ML/MIN/1.73M2 — SIGNIFICANT CHANGE UP
GLUCOSE SERPL-MCNC: 110 MG/DL — HIGH (ref 70–99)
HCT VFR BLD CALC: 41.6 % — SIGNIFICANT CHANGE UP (ref 34.5–45)
HGB BLD-MCNC: 13.9 G/DL — SIGNIFICANT CHANGE UP (ref 11.5–15.5)
MAGNESIUM SERPL-MCNC: 1.8 MG/DL — SIGNIFICANT CHANGE UP (ref 1.6–2.6)
MCHC RBC-ENTMCNC: 28.1 PG — SIGNIFICANT CHANGE UP (ref 27–34)
MCHC RBC-ENTMCNC: 33.4 GM/DL — SIGNIFICANT CHANGE UP (ref 32–36)
MCV RBC AUTO: 84.2 FL — SIGNIFICANT CHANGE UP (ref 80–100)
NT-PROBNP SERPL-SCNC: 1308 PG/ML — HIGH (ref 0–450)
PLATELET # BLD AUTO: 294 K/UL — SIGNIFICANT CHANGE UP (ref 150–400)
POTASSIUM SERPL-MCNC: 3.9 MMOL/L — SIGNIFICANT CHANGE UP (ref 3.5–5.3)
POTASSIUM SERPL-SCNC: 3.9 MMOL/L — SIGNIFICANT CHANGE UP (ref 3.5–5.3)
PROT SERPL-MCNC: 6.5 GM/DL — SIGNIFICANT CHANGE UP (ref 6–8.3)
RBC # BLD: 4.94 M/UL — SIGNIFICANT CHANGE UP (ref 3.8–5.2)
RBC # FLD: 12.8 % — SIGNIFICANT CHANGE UP (ref 10.3–14.5)
SODIUM SERPL-SCNC: 132 MMOL/L — LOW (ref 135–145)
SPECIMEN SOURCE: SIGNIFICANT CHANGE UP
SPECIMEN SOURCE: SIGNIFICANT CHANGE UP
WBC # BLD: 13.05 K/UL — HIGH (ref 3.8–10.5)
WBC # FLD AUTO: 13.05 K/UL — HIGH (ref 3.8–10.5)

## 2022-05-20 PROCEDURE — 99232 SBSQ HOSP IP/OBS MODERATE 35: CPT

## 2022-05-20 RX ORDER — VANCOMYCIN HCL 1 G
125 VIAL (EA) INTRAVENOUS EVERY 6 HOURS
Refills: 0 | Status: DISCONTINUED | OUTPATIENT
Start: 2022-05-20 | End: 2022-05-21

## 2022-05-20 RX ORDER — SODIUM CHLORIDE 9 MG/ML
1000 INJECTION INTRAMUSCULAR; INTRAVENOUS; SUBCUTANEOUS
Refills: 0 | Status: COMPLETED | OUTPATIENT
Start: 2022-05-20 | End: 2022-05-20

## 2022-05-20 RX ADMIN — Medication 125 MILLIGRAM(S): at 23:02

## 2022-05-20 RX ADMIN — Medication 1000 UNIT(S): at 09:21

## 2022-05-20 RX ADMIN — CARVEDILOL PHOSPHATE 6.25 MILLIGRAM(S): 80 CAPSULE, EXTENDED RELEASE ORAL at 22:45

## 2022-05-20 RX ADMIN — Medication 125 MILLIGRAM(S): at 12:34

## 2022-05-20 RX ADMIN — SODIUM CHLORIDE 75 MILLILITER(S): 9 INJECTION INTRAMUSCULAR; INTRAVENOUS; SUBCUTANEOUS at 12:34

## 2022-05-20 RX ADMIN — Medication 20 MILLIGRAM(S): at 17:22

## 2022-05-20 RX ADMIN — Medication 125 MILLIGRAM(S): at 17:22

## 2022-05-20 RX ADMIN — Medication 180 MILLIGRAM(S): at 09:21

## 2022-05-20 RX ADMIN — Medication 1 TABLET(S): at 09:21

## 2022-05-20 RX ADMIN — RIVAROXABAN 20 MILLIGRAM(S): KIT at 17:22

## 2022-05-20 RX ADMIN — Medication 1000 UNIT(S): at 22:45

## 2022-05-20 RX ADMIN — SIMVASTATIN 20 MILLIGRAM(S): 20 TABLET, FILM COATED ORAL at 22:45

## 2022-05-20 RX ADMIN — CARVEDILOL PHOSPHATE 6.25 MILLIGRAM(S): 80 CAPSULE, EXTENDED RELEASE ORAL at 09:21

## 2022-05-20 RX ADMIN — Medication 20 MILLIGRAM(S): at 09:22

## 2022-05-20 NOTE — CONSULT NOTE ADULT - ASSESSMENT
Pt is an 86 yo female with a pmh/o afib on xarelto, HLD, HTN, syncope, dehydration, admitted on 5/18 for evaluation of shortness of breath, multiple loose stools, the patient had been on antibiotics in March for urinary tract infection, then in Mid april started with numerous loose bowel movements, was taking imodium, with mild relief, then started up again, for which she saw her PMD, studies sent, however no Cdiff test was done, O &P was negative. The patient has not travelled and did not eat any unusual food. She did drink plenty of water, prior to admission and took Pepto bismol, which turned her stool black and then she developed lower extremity swelling, and imaging was done suggestive of CHF. Denies fever, chills or any other complaints.     1. Patient admitted with diarrhea, also noted with leukocytosis, possibly related to infection; the patient had increase in water intake and there is suggestion of chf which is new for this patient  - follow up cultures   - serial cbc and monitor temperature   - oxygen and nebs as needed   - cardiology evaluation in progress and patient is being given diuretics  - continue isolation  - will empirically start po vancomycin, pending CDiff assay, given that patient was on prior antibiotics and then subsequently symptoms started  - also will have GI PCR checked  2. other issues; per medicine
Diarrhea-No BM today  CHF  Plan  Supportive GI Care  F/U up GI PCR  Lactose free diet
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  Continue low dose lasix iv.  It is not clear how she decompensated when she was having diarrhea for 4 weeks and instead became hypervolemic.  ? if she was taking high sodium electrolytes.  Diuresis with close monitoring of the renal function and electrolytes.  Goal potassium of 4 and magnesium of 2.   Strict I/O and daily wt checks. Low sodium diet. Nutrition education.   Echo pending.    Atrial fibrillation- chronic in nature.   Rate controlled.  full dose anticoagulation to continue with xarelto.     HTN- BP mildly elevated.  continue home meds and monitor the BP.    Diarrhea- C diff check pending.  Management per primary team.     Other medical issues- Management per primary team.   Thank you for allowing me to participate in the care of this patient. Please feel free to contact me with any questions.

## 2022-05-20 NOTE — PROGRESS NOTE ADULT - SUBJECTIVE AND OBJECTIVE BOX
Chief Complaint: Acute CHF, Hypoxia, Intractable diarrhea     Subjective and objective    Pt is an 86 yo female with a pmh/o afib on xarelto, HLD, HTN, syncope, dehydration, who presents to Ed due to worsening shortness of breath and diarrhea. Pt states she has had liquid stool x 3 weeks, with up to five non bloody, dark brown, episodes a day without recent abx use or sick contacts. Pt states she was drinking 'plenty of water to stay hydrated' during this time however noticed worsening sob over past few days with inability to lie flat due to wheezing and dry cough. Pt also noted slight leg swelling. Pt was evaluated by GI and cardiology over past few days and has been on pepto bismol and coconut water for sx x two days with reduction to three BM but stool still watery.     REVIEW OF SYSTEMS:  All other review of systems is negative unless indicated above    PHYSICAL EXAM:  Vital Signs Last 24 Hrs  T(C): 36.9 (20 May 2022 08:44), Max: 36.9 (20 May 2022 08:44)  T(F): 98.4 (20 May 2022 08:44), Max: 98.4 (20 May 2022 08:44)  HR: 86 (20 May 2022 08:44) (80 - 86)  BP: 144/87 (20 May 2022 08:44) (131/77 - 144/87)  BP(mean): --  RR: 18 (20 May 2022 08:44) (18 - 18)  SpO2: 97% (20 May 2022 08:44) (97% - 97%)  Constitutional: NAD, awake and alert, well-developed  HEENT: PERR, EOMI, Normal Hearing, MMM  Neck: Soft and supple, No LAD, No JVD  Respiratory: Breath sounds are clear bilaterally, No wheezing, rales or rhonchi  Cardiovascular: S1 and S2, iregularly irregular , no Murmurs, gallops or rubs  Gastrointestinal: Bowel Sounds present, soft, nontender, nondistended, no guarding, no rebound  Extremities: No peripheral edema  Vascular: 2+ peripheral pulses  Neurological: A/O x 3, no focal deficits  Musculoskeletal: 5/5 strength b/l upper and lower extremities  Skin: No rashes    Medications:  MEDICATIONS  (STANDING):  carvedilol 6.25 milliGRAM(s) Oral every 12 hours  cholecalciferol 1000 Unit(s) Oral two times a day  diltiazem    milliGRAM(s) Oral daily  furosemide   Injectable 20 milliGRAM(s) IV Push two times a day  multivitamin 1 Tablet(s) Oral daily  potassium chloride    Tablet ER 40 milliEquivalent(s) Oral every 4 hours  rivaroxaban 20 milliGRAM(s) Oral with dinner  simvastatin 20 milliGRAM(s) Oral at bedtime      Labs: All Labs Reviewed:                        12.6   11.93 )-----------( 308      ( 18 May 2022 18:11 )             38.7         137  |  99  |  6<L>  ----------------------------<  99  3.1<L>   |  29  |  0.52    Ca    9.0      19 May 2022 07:47  Phos  3.4       Mg     2.0         TPro  6.7  /  Alb  3.5  /  TBili  0.9  /  DBili  x   /  AST  33  /  ALT  57  /  AlkPhos  83      PT/INR - ( 19 May 2022 07:47 )   PT: 13.7 sec;   INR: 1.18 ratio         PTT - ( 19 May 2022 07:47 )  PTT:27.8 sec  Urinalysis Basic - ( 18 May 2022 18:11 )    Color: Yellow / Appearance: Clear / S.010 / pH: x  Gluc: x / Ketone: Negative  / Bili: Negative / Urobili: Negative   Blood: x / Protein: Negative / Nitrite: Negative   Leuk Esterase: Negative / RBC: 3-5 /HPF / WBC 3-5   Sq Epi: x / Non Sq Epi: Occasional / Bacteria: Occasional    RADIOLOGY/EKG: all tests were reviewed  r< from: Xray Chest 1 View- PORTABLE-Urgent (Xray Chest 1 View- PORTABLE-Urgent .) (22 @ 19:07) >  IMPRESSION:   Mild cardiomegaly.  Bilateral diffuse airspace disease.  Pulmonary vascular congestion..    < end of copied text >    ght ventricle structure and function.    < end of copied text >    < from: TTE Echo Complete w/o Contrast w/ Doppler (22 @ 15:37) >   Impression     Summary     Estimated left ventricular ejection fraction is 60-65 %.   The left ventricle is normal in size, wall thickness, wall motion and   contractility as seen in limited views.   The left atrium is mildly dilated.   Normal appearing right ventricle structure and function.   The mitral valve leaflets appear thin and normal.   Mild mitral annular calcification is present.   Mild (1+) mitral regurgitation is present.   Normal appearing tricuspid valve structure.   Moderate (2+) tricuspid valve regurgitation is present.   Mild pulmonary hypertension.   Pulmonic valve not well seen.   Mild pulmonic valvular regurgitation (1+) is present.   No evidence of pericardial effusion.   Pleural effusion - small right pleural effusion.    < end of copied text >      DVT PPX: xarelto     Advance Directive: full code     Disposition: f/u stool tests

## 2022-05-20 NOTE — PROGRESS NOTE ADULT - ASSESSMENT
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  Continue low dose lasix iv.  It is not clear how she decompensated when she was having diarrhea for 4 weeks and instead became hypervolemic.  ? if she was taking high sodium electrolytes.  Diuresis with close monitoring of the renal function and electrolytes.  Goal potassium of 4 and magnesium of 2.   Strict I/O and daily wt checks. Low sodium diet. Nutrition education.   Echo pending.    Atrial fibrillation- chronic in nature.   Rate controlled.  full dose anticoagulation to continue with xarelto.     HTN- BP mildly elevated.  continue home meds and monitor the BP.    Diarrhea- C diff check pending.  Management per primary team.     Other medical issues- Management per primary team.   Thank you for allowing me to participate in the care of this patient. Please feel free to contact me with any questions.          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  Continue low dose lasix iv today.  Renal function stable but mild hyponatremia. .   Pt was drinking pedialyte for the diarrhea and this might have caused decompensated HF.     Diuresis with close monitoring of the renal function and electrolytes.  Goal potassium of 4 and magnesium of 2.   Strict I/O and daily wt checks. Low sodium diet. Nutrition education.   Echo results as stated above.    Atrial fibrillation- chronic in nature.   Rate controlled.  full dose anticoagulation to continue with xarelto.     HTN- BP mildly elevated.  continue home meds and monitor the BP.    Diarrhea- C diff check pending.  Management per primary team.     Other medical issues- Management per primary team.   Thank you for allowing me to participate in the care of this patient. Please feel free to contact me with any questions.

## 2022-05-20 NOTE — PROGRESS NOTE ADULT - ASSESSMENT
# Acute hypoxic respiratory failure 2/2 Acute on chronic HFpEF   strict i/o's  daily weights  TTE - with preserved EF and moderate TR  ACE or ARB contraindicated- d/c'd as outpt  Beta blocker cont   EKG afib rbr hr 108  Lasix 20 mg ivp bid   Pro-BnP notably elevated >3000k->1308 trending down   IV diuresis with close monitoring of renal function  Chest x ray with vascular congestion , no consolidation, effusion  Cardiology consult - Dr. Stein - appreciated     # Hypokalemia - resolved   in the setting of prolonged diarrhea   magnesium - wnl  replete and monitor     # Leukocytosis  Intractable diarrhea, likely infectious etiology:  CXR w/o consolidation, pna  f/u GI PCR and C.diff  C.diff isolation   start po vanco - D/W ID   ID and GI Consult appreciated   UA w/o evidence of infxn  hold pepto    # Transaminitis - resolved   likely due to hepatic congestion, reactive in setting of diarrhea    # Hyperglycemia - resolved   monitor on serum chem    # Afib RVR:  now rate controlled  cont. cardizem and coreg     # HTN/HLD:  c/w home meds  c/w dash/tlc

## 2022-05-20 NOTE — CONSULT NOTE ADULT - SUBJECTIVE AND OBJECTIVE BOX
Patient is a 87y old  Female who presents with a chief complaint of Acute CHF, Hypoxia, Intractable diarrhea (20 May 2022 07:16)    HPI:  Pt is an 86 yo female with a pmh/o afib on xarelto, HLD, HTN, syncope, dehydration, admitted on  for evaluation of shortness of breath, multiple loose stools, the patient had been on antibiotics in March for urinary tract infection, then in Mid april started with numerous loose bowel movements, was taking imodium, with mild relief, then started up again, for which she saw her PMD, studies sent, however no Cdiff test was done, O &P was negative. The patient has not travelled and did not eat any unusual food. She did drink plenty of water, prior to admission and took Pepto bismol, which turned her stool black and then she developed lower extremity swelling, and imaging was done suggestive of CHF. Denies fever, chills or any other complaints.         PMH: as above  PSH: as above  Meds: per reconciliation sheet, noted below  MEDICATIONS  (STANDING):  carvedilol 6.25 milliGRAM(s) Oral every 12 hours  cholecalciferol 1000 Unit(s) Oral two times a day  diltiazem    milliGRAM(s) Oral daily  furosemide   Injectable 20 milliGRAM(s) IV Push two times a day  multivitamin 1 Tablet(s) Oral daily  rivaroxaban 20 milliGRAM(s) Oral with dinner  simvastatin 20 milliGRAM(s) Oral at bedtime  vancomycin    Solution 125 milliGRAM(s) Oral every 6 hours    MEDICATIONS  (PRN):    Allergies    erythromycin (Diarrhea)  penicillin (Unknown)  shellfish (Unknown)    Intolerances      Social: no smoking, no alcohol, no illegal drugs; no recent travel, no exposure to TB  FAMILY HISTORY:  Family history of primary TB    Family history of CVA    Family history of diabetes mellitus       no history of premature cardiovascular disease in first degree relatives  ROS: the patient denies fever, no chills, no HA, no dizziness, no sore throat, no blurry vision, no CP, no palpitations, no SOB, no cough, no abdominal pain, no N/V, no dysuria, no leg pain, no claudication, no rash, no joint aches, no rectal pain or bleeding, no night sweats  All other systems reviewed and are negative    Vital Signs Last 24 Hrs  T(C): 36.9 (20 May 2022 08:44), Max: 36.9 (20 May 2022 08:44)  T(F): 98.4 (20 May 2022 08:44), Max: 98.4 (20 May 2022 08:44)  HR: 86 (20 May 2022 08:44) (80 - 86)  BP: 144/87 (20 May 2022 08:44) (131/77 - 144/87)  BP(mean): --  RR: 18 (20 May 2022 08:44) (18 - 18)  SpO2: 97% (20 May 2022 08:44) (97% - 97%)  Daily     Daily Weight in k.7 (20 May 2022 05:30)    PE:    Constitutional: frail looking  HEENT: NC/AT, EOMI, PERRLA, conjunctivae clear; ears and nose atraumatic; pharynx clear  Neck: supple; thyroid not palpable  Back: no tenderness  Respiratory: respiratory effort normal; clear to auscultation  Cardiovascular: S1S2 regular, no murmurs  Abdomen: soft, not tender, not distended, positive BS; no liver or spleen organomegaly  Genitourinary: no suprapubic tenderness  Musculoskeletal: no muscle tenderness, no joint swelling or tenderness  Neurological/ Psychiatric: AxOx3, judgement and insight normal;  moving all extremities  Skin: no rashes; no palpable lesions    Labs: all available labs reviewed                        13.9   13.05 )-----------( 294      ( 20 May 2022 07:43 )             41.6     05-20    132<L>  |  96  |  12  ----------------------------<  110<H>  3.9   |  30  |  0.61    Ca    9.1      20 May 2022 07:43  Phos  3.4     05-19  Mg     1.8     05-20    TPro  6.5  /  Alb  3.2<L>  /  TBili  0.8  /  DBili  x   /  AST  35  /  ALT  55  /  AlkPhos  81  05-20     LIVER FUNCTIONS - ( 20 May 2022 07:43 )  Alb: 3.2 g/dL / Pro: 6.5 gm/dL / ALK PHOS: 81 U/L / ALT: 55 U/L / AST: 35 U/L / GGT: x           Urinalysis Basic - ( 18 May 2022 18:11 )    Color: Yellow / Appearance: Clear / S.010 / pH: x  Gluc: x / Ketone: Negative  / Bili: Negative / Urobili: Negative   Blood: x / Protein: Negative / Nitrite: Negative   Leuk Esterase: Negative / RBC: 3-5 /HPF / WBC 3-5   Sq Epi: x / Non Sq Epi: Occasional / Bacteria: Occasional      < from: Xray Chest 1 View- PORTABLE-Urgent (Xray Chest 1 View- PORTABLE-Urgent .) (22 @ 19:07) >  IMPRESSION:   Mild cardiomegaly.  Bilateral diffuse airspace disease.  Pulmonary vascular congestion..    < end of copied text >      Radiology: all available radiological tests reviewed    Advanced directives addressed: full resuscitation Patient is a 87y old  Female who presents with a chief complaint of Acute CHF, Hypoxia, Intractable diarrhea (20 May 2022 07:16)    HPI:  Pt is an 86 yo female with a pmh/o afib on xarelto, HLD, HTN, syncope, dehydration, admitted on  for evaluation of shortness of breath, multiple loose stools, the patient had been on antibiotics in March for urinary tract infection, then in Mid april started with numerous loose bowel movements, was taking imodium, with mild relief, then started up again, for which she saw her PMD, studies sent, however no Cdiff test was done, O &P was negative. The patient has not travelled and did not eat any unusual food. She did drink plenty of water, prior to admission and took Pepto bismol, which turned her stool black and then she developed lower extremity swelling, and imaging was done suggestive of CHF. Denies fever, chills or any other complaints.         PMH: as above  PSH: as above  Meds: per reconciliation sheet, noted below  MEDICATIONS  (STANDING):  carvedilol 6.25 milliGRAM(s) Oral every 12 hours  cholecalciferol 1000 Unit(s) Oral two times a day  diltiazem    milliGRAM(s) Oral daily  furosemide   Injectable 20 milliGRAM(s) IV Push two times a day  multivitamin 1 Tablet(s) Oral daily  rivaroxaban 20 milliGRAM(s) Oral with dinner  simvastatin 20 milliGRAM(s) Oral at bedtime  vancomycin    Solution 125 milliGRAM(s) Oral every 6 hours    MEDICATIONS  (PRN):    Allergies    erythromycin (Diarrhea)  penicillin (Unknown)  shellfish (Unknown)    Intolerances      Social: no smoking, no alcohol, no illegal drugs; no recent travel, no exposure to TB  FAMILY HISTORY:  Family history of primary TB    Family history of CVA    Family history of diabetes mellitus       no history of premature cardiovascular disease in first degree relatives  ROS: the patient denies fever, no chills, no HA, no dizziness, no sore throat, no blurry vision, no CP, no palpitations, no SOB, no cough, no abdominal pain, no N/V, no dysuria, no leg pain, no claudication, no rash, no joint aches, no rectal pain or bleeding, no night sweats  All other systems reviewed and are negative    Vital Signs Last 24 Hrs  T(C): 36.9 (20 May 2022 08:44), Max: 36.9 (20 May 2022 08:44)  T(F): 98.4 (20 May 2022 08:44), Max: 98.4 (20 May 2022 08:44)  HR: 86 (20 May 2022 08:44) (80 - 86)  BP: 144/87 (20 May 2022 08:44) (131/77 - 144/87)  BP(mean): --  RR: 18 (20 May 2022 08:44) (18 - 18)  SpO2: 97% (20 May 2022 08:44) (97% - 97%)  Daily     Daily Weight in k.7 (20 May 2022 05:30)    PE:    Constitutional: frail looking  HEENT: NC/AT, EOMI, PERRLA, conjunctivae clear; ears and nose atraumatic; pharynx clear  Neck: supple; thyroid not palpable  Back: no tenderness  Respiratory: respiratory effort normal; clear to auscultation  Cardiovascular: S1S2 regular, no murmurs  Abdomen: soft, not tender, not distended, positive BS; no liver or spleen organomegaly  Genitourinary: no suprapubic tenderness  Musculoskeletal: no muscle tenderness, no joint swelling or tenderness  Neurological/ Psychiatric: AxOx3, judgement and insight normal;  moving all extremities  Skin: no rashes; no palpable lesions    Labs: all available labs reviewed                        13.9   13.05 )-----------( 294      ( 20 May 2022 07:43 )             41.6     05-20    132<L>  |  96  |  12  ----------------------------<  110<H>  3.9   |  30  |  0.61    Ca    9.1      20 May 2022 07:43  Phos  3.4     05-19  Mg     1.8     05-20    TPro  6.5  /  Alb  3.2<L>  /  TBili  0.8  /  DBili  x   /  AST  35  /  ALT  55  /  AlkPhos  81  05-20     LIVER FUNCTIONS - ( 20 May 2022 07:43 )  Alb: 3.2 g/dL / Pro: 6.5 gm/dL / ALK PHOS: 81 U/L / ALT: 55 U/L / AST: 35 U/L / GGT: x           Urinalysis Basic - ( 18 May 2022 18:11 )    Color: Yellow / Appearance: Clear / S.010 / pH: x  Gluc: x / Ketone: Negative  / Bili: Negative / Urobili: Negative   Blood: x / Protein: Negative / Nitrite: Negative   Leuk Esterase: Negative / RBC: 3-5 /HPF / WBC 3-5   Sq Epi: x / Non Sq Epi: Occasional / Bacteria: Occasional      < from: Xray Chest 1 View- PORTABLE-Urgent (Xray Chest 1 View- PORTABLE-Urgent .) (22 @ 19:07) >  IMPRESSION:   Mild cardiomegaly.  Bilateral diffuse airspace disease.  Pulmonary vascular congestion..    < end of copied text >      Radiology: all available radiological tests reviewed    Advanced directives addressed: DNR

## 2022-05-20 NOTE — CONSULT NOTE ADULT - REASON FOR ADMISSION
Acute CHF, Hypoxia, Intractable diarrhea

## 2022-05-20 NOTE — PROGRESS NOTE ADULT - SUBJECTIVE AND OBJECTIVE BOX
Patient is a 87y old  Female who presents with a chief complaint of Acute CHF, Hypoxia, Intractable diarrhea.     HPI:  Pt is an 86 yo female with a pmh/o afib on xarelto, HLD, HTN, syncope, dehydration, who presents to Ed due to worsening shortness of breath and diarrhea.   Pt states that she was having diarrhea 4  for weeks and 3 stools per day.  Pt states that she saw her PCP Dr Iniguez and was given Immodium and it was better for one week and diarrhea worse again.  She saw her cardiologist Dr Covington- and she was asked to take Peptobismol and coconut water which did not improve her symptoms and presented to the ER.    Pt states that she had SOB when she presented but now improved with O2 supplementation.  Pt was seen and examined by me around 930 am today.         PAST MEDICAL & SURGICAL HISTORY:  Syncope  2013      HTN (hypertension)      Hyperlipidemia      Afib      A-fib      Bronchitis      Cardiac abnormality  internal cardiac monitor placed 2013      H/O: hysterectomy      S/P cataract surgery  Left eye.           MEDICATIONS  (STANDING):  carvedilol 6.25 milliGRAM(s) Oral every 12 hours  cholecalciferol 1000 Unit(s) Oral two times a day  diltiazem    milliGRAM(s) Oral daily  furosemide   Injectable 20 milliGRAM(s) IV Push two times a day  multivitamin 1 Tablet(s) Oral daily  potassium chloride    Tablet ER 40 milliEquivalent(s) Oral every 4 hours  rivaroxaban 20 milliGRAM(s) Oral with dinner  simvastatin 20 milliGRAM(s) Oral at bedtime    MEDICATIONS  (PRN):      FAMILY HISTORY:  Family history of primary TB    Family history of CVA    Family history of diabetes mellitus        SOCIAL HISTORY: denies any recent smoking      REVIEW OF SYSTEMS:  CONSTITUTIONAL:    No fatigue, malaise, lethargy.  No fever or chills.  RESPIRATORY:  No cough.  No wheeze.  No hemoptysis.  c/o shortness of breath.  CARDIOVASCULAR:  No chest pains.  No palpitations. c/o shortness of breath, No orthopnea or PND.  GASTROINTESTINAL:  No abdominal pain.  No nausea or vomiting.   c/o diarrhea  GENITOURINARY:    No hematuria.    MUSCULOSKELETAL:  No musculoskeletal pain.  No joint swelling.  No arthritis.  NEUROLOGICAL:  generalized weakness present  PSYCHIATRIC:  No confusion  SKIN:  No rashes.          Vital Signs Last 24 Hrs  T(C): 36.4 (19 May 2022 08:13), Max: 36.7 (18 May 2022 22:30)  T(F): 97.5 (19 May 2022 08:13), Max: 98 (18 May 2022 22:30)  HR: 94 (19 May 2022 08:13) (73 - 118)  BP: 155/91 (19 May 2022 08:13) (92/73 - 172/109)  BP(mean): 103 (19 May 2022 00:08) (103 - 103)  RR: 20 (19 May 2022 08:13) (20 - 20)  SpO2: 100% (19 May 2022 08:13) (85% - 100%)    PHYSICAL EXAM-    Constitutional: elderly frail ill looking female. no distress     Head: Head is normocephalic and atraumatic.      Neck:  No JVD.     Cardiovascular: Regular rate and rhythm without S3, S4. No murmurs or rubs are appreciated.      Respiratory: B/l rales and wheezing.    Abdomen: Soft, nontender, nondistended with positive bowel sounds.      Extremity: No tenderness. No  pitting edema     Neurologic: The patient is alert and oriented.      Skin: No rash, no obvious lesions noted.      Psychiatric: The patient appears to be emotionally stable.      INTERPRETATION OF TELEMETRY: Afib     ECG: afib, poor R wave progression, isolated PVCs     I&O's Detail      LABS:                        12.6   11.93 )-----------( 308      ( 18 May 2022 18:11 )             38.7     05-    137  |  99  |  6<L>  ----------------------------<  99  3.1<L>   |  29  |  0.52    Ca    9.0      19 May 2022 07:47  Phos  3.4     05-  Mg     2.0     -    TPro  6.7  /  Alb  3.5  /  TBili  0.9  /  DBili  x   /  AST  33  /  ALT  57  /  AlkPhos  83  05-19        PT/INR - ( 19 May 2022 07:47 )   PT: 13.7 sec;   INR: 1.18 ratio         PTT - ( 19 May 2022 07:47 )  PTT:27.8 sec  Urinalysis Basic - ( 18 May 2022 18:11 )    Color: Yellow / Appearance: Clear / S.010 / pH: x  Gluc: x / Ketone: Negative  / Bili: Negative / Urobili: Negative   Blood: x / Protein: Negative / Nitrite: Negative   Leuk Esterase: Negative / RBC: 3-5 /HPF / WBC 3-5   Sq Epi: x / Non Sq Epi: Occasional / Bacteria: Occasional      I&O's Summary    BNPSerum Pro-Brain Natriuretic Peptide: 3538 pg/mL ( @ 18:11)    RADIOLOGY & ADDITIONAL STUDIES:  ch< from: Xray Chest 1 View- PORTABLE-Urgent (Xray Chest 1 View- PORTABLE-Urgent .) (22 @ 19:07) >    IMPRESSION:   Mild cardiomegaly.  Bilateral diffuse airspace disease.  Pulmonary vascular congestion..    --- End of Report ---            SAGE SMART MD; Attending Radiologist  Thisdocument has been electronically signed. May 18 2022  9:18PM    < end of copied text >   Patient is a 87y old  Female who presents with a chief complaint of Acute CHF, Hypoxia, Intractable diarrhea.     HPI:  Pt is an 88 yo female with a pmh/o afib on xarelto, HLD, HTN, syncope, dehydration, who presents to Ed due to worsening shortness of breath and diarrhea.   Pt states that she was having diarrhea 4  for weeks and 3 stools per day.  Pt states that she saw her PCP Dr Iniguez and was given Immodium and it was better for one week and diarrhea worse again.  She saw her cardiologist Dr Covington- and she was asked to take Peptobismol and coconut water which did not improve her symptoms and presented to the ER.    -   Pt states that she had SOB when she presented but now improved with O2 supplementation.  Pt was seen and examined by me around 930 am today.     - pt seen this am.  Pt c/o watery stool since yesterday.     PAST MEDICAL & SURGICAL HISTORY:  Syncope  2013      HTN (hypertension)      Hyperlipidemia      Afib      A-fib      Bronchitis      Cardiac abnormality  internal cardiac monitor placed 2013      H/O: hysterectomy      S/P cataract surgery  Left eye.           MEDICATIONS  (STANDING):  carvedilol 6.25 milliGRAM(s) Oral every 12 hours  cholecalciferol 1000 Unit(s) Oral two times a day  diltiazem    milliGRAM(s) Oral daily  furosemide   Injectable 20 milliGRAM(s) IV Push two times a day  multivitamin 1 Tablet(s) Oral daily  potassium chloride    Tablet ER 40 milliEquivalent(s) Oral every 4 hours  rivaroxaban 20 milliGRAM(s) Oral with dinner  simvastatin 20 milliGRAM(s) Oral at bedtime    MEDICATIONS  (PRN):      FAMILY HISTORY:  Family history of primary TB    Family history of CVA    Family history of diabetes mellitus        SOCIAL HISTORY: denies any recent smoking      REVIEW OF SYSTEMS:  CONSTITUTIONAL:    No fatigue, malaise, lethargy.  No fever or chills.  RESPIRATORY:  No cough.  No wheeze.  No hemoptysis.  c/o shortness of breath.  CARDIOVASCULAR:  No chest pains.  No palpitations. c/o shortness of breath, No orthopnea or PND.  GASTROINTESTINAL:  No abdominal pain.  No nausea or vomiting.   c/o diarrhea  GENITOURINARY:    No hematuria.    MUSCULOSKELETAL:  No musculoskeletal pain.  No joint swelling.  No arthritis.  NEUROLOGICAL:  generalized weakness present  PSYCHIATRIC:  No confusion  SKIN:  No rashes.          ICU Vital Signs Last 24 Hrs  T(C): 36.8 (19 May 2022 20:54), Max: 36.8 (19 May 2022 20:54)  T(F): 98.2 (19 May 2022 20:54), Max: 98.2 (19 May 2022 20:54)  HR: 80 (19 May 2022 20:54) (80 - 80)  BP: 137/76 (19 May 2022 20:54) (131/77 - 137/76)  BP(mean): --  ABP: --  ABP(mean): --  RR: 18 (19 May 2022 20:54) (18 - 18)  SpO2: 97% (19 May 2022 20:54) (97% - 97%)    PHYSICAL EXAM-    Constitutional: elderly frail ill looking female. no distress     Head: Head is normocephalic and atraumatic.      Neck:  No JVD.     Cardiovascular: Regular rate and rhythm without S3, S4. No murmurs or rubs are appreciated.      Respiratory: B/l rales and wheezing.    Abdomen: Soft, nontender, nondistended with positive bowel sounds.      Extremity: No tenderness. No  pitting edema     Neurologic: The patient is alert and oriented.      Skin: No rash, no obvious lesions noted.      Psychiatric: The patient appears to be emotionally stable.      INTERPRETATION OF TELEMETRY: Afib     ECG: afib, poor R wave progression, isolated PVCs     I&O's Detail      LABS:                                   13.9   13.05 )-----------( 294      ( 20 May 2022 07:43 )             41.6     05-20    132<L>  |  96  |  12  ----------------------------<  110<H>  3.9   |  30  |  0.61    Ca    9.1      20 May 2022 07:43  Phos  3.4     05-19  Mg     1.8     05-20    TPro  6.5  /  Alb  3.2<L>  /  TBili  0.8  /  DBili  x   /  AST  35  /  ALT  55  /  AlkPhos  81  05-20        LIVER FUNCTIONS - ( 20 May 2022 07:43 )  Alb: 3.2 g/dL / Pro: 6.5 gm/dL / ALK PHOS: 81 U/L / ALT: 55 U/L / AST: 35 U/L / GGT: x           PT/INR - ( 19 May 2022 07:47 )   PT: 13.7 sec;   INR: 1.18 ratio         PTT - ( 19 May 2022 07:47 )  PTT:27.8 sec         PTT - ( 19 May 2022 07:47 )  PTT:27.8 sec  Urinalysis Basic - ( 18 May 2022 18:11 )    Color: Yellow / Appearance: Clear / S.010 / pH: x  Gluc: x / Ketone: Negative  / Bili: Negative / Urobili: Negative   Blood: x / Protein: Negative / Nitrite: Negative   Leuk Esterase: Negative / RBC: 3-5 /HPF / WBC 3-5   Sq Epi: x / Non Sq Epi: Occasional / Bacteria: Occasional      I&O's Summary    BNPSerum Pro-Brain Natriuretic Peptide: 3538 pg/mL ( @ 18:11)    RADIOLOGY & ADDITIONAL STUDIES:  ch< from: Xray Chest 1 View- PORTABLE-Urgent (Xray Chest 1 View- PORTABLE-Urgent .) (22 @ 19:07) >    IMPRESSION:   Mild cardiomegaly.  Bilateral diffuse airspace disease.  Pulmonary vascular congestion..    --- End of Report ---            SAGE SMART MD; Attending Radiologist  Thisdocument has been electronically signed. May 18 2022  9:18PM    < end of copied text >  < from: TTE Echo Complete w/o Contrast w/ Doppler (22 @ 15:37) >   Impression     Summary     Estimated left ventricular ejection fraction is 60-65 %.   The left ventricle is normal in size, wall thickness, wall motion and   contractility as seen in limited views.   The left atrium is mildly dilated.   Normal appearing right ventricle structure and function.   The mitral valve leaflets appear thin and normal.   Mild mitral annular calcification is present.   Mild (1+) mitral regurgitation is present.   Normal appearing tricuspid valve structure.   Moderate (2+) tricuspid valve regurgitation is present.   Mild pulmonary hypertension.   Pulmonic valve not well seen.   Mild pulmonic valvular regurgitation (1+) is present.   No evidence of pericardial effusion.   Pleural effusion - small right pleural effusion.     Signature     ----------------------------------------------------------------   Electronically signed by Jose Abraham DO(Interpreting    < end of copied text >  < from: Xray Chest 1 View- PORTABLE-Urgent (Xray Chest 1 View- PORTABLE-Urgent .) (22 @ 19:07) >    IMPRESSION:   Mild cardiomegaly.  Bilateral diffuse airspace disease.  Pulmonary vascular congestion..    --- End of Report ---            SAGE SMART MD; Attending Radiologist    < end of copied text >  < from: TTE Echo Complete w/o Contrast w/ Doppler (22 @ 15:37) >   Summary     Estimated left ventricular ejection fraction is 60-65 %.   The left ventricle is normal in size, wall thickness, wall motion and   contractility as seen in limited views.   The left atrium is mildly dilated.   Normal appearing right ventricle structure and function.   The mitral valve leaflets appear thin and normal.   Mild mitral annular calcification is present.   Mild (1+) mitral regurgitation is present.   Normal appearing tricuspid valve structure.   Moderate (2+) tricuspid valve regurgitation is present.   Mild pulmonary hypertension.   Pulmonic valve not well seen.   Mild pulmonic valvular regurgitation (1+) is present.   No evidence of pericardial effusion.   Pleural effusion - small right pleural effusion.     Signature     ----------------------------------------------------------------   Electronically signed by Jose Abraham DO(Interpreting    < end of copied text >

## 2022-05-21 LAB
ANION GAP SERPL CALC-SCNC: 6 MMOL/L — SIGNIFICANT CHANGE UP (ref 5–17)
BUN SERPL-MCNC: 11 MG/DL — SIGNIFICANT CHANGE UP (ref 7–23)
CALCIUM SERPL-MCNC: 9 MG/DL — SIGNIFICANT CHANGE UP (ref 8.5–10.1)
CHLORIDE SERPL-SCNC: 97 MMOL/L — SIGNIFICANT CHANGE UP (ref 96–108)
CO2 SERPL-SCNC: 29 MMOL/L — SIGNIFICANT CHANGE UP (ref 22–31)
CREAT SERPL-MCNC: 0.53 MG/DL — SIGNIFICANT CHANGE UP (ref 0.5–1.3)
EGFR: 91 ML/MIN/1.73M2 — SIGNIFICANT CHANGE UP
GLUCOSE SERPL-MCNC: 110 MG/DL — HIGH (ref 70–99)
HCT VFR BLD CALC: 39.2 % — SIGNIFICANT CHANGE UP (ref 34.5–45)
HGB BLD-MCNC: 12.7 G/DL — SIGNIFICANT CHANGE UP (ref 11.5–15.5)
MAGNESIUM SERPL-MCNC: 1.8 MG/DL — SIGNIFICANT CHANGE UP (ref 1.6–2.6)
MCHC RBC-ENTMCNC: 27.7 PG — SIGNIFICANT CHANGE UP (ref 27–34)
MCHC RBC-ENTMCNC: 32.4 GM/DL — SIGNIFICANT CHANGE UP (ref 32–36)
MCV RBC AUTO: 85.6 FL — SIGNIFICANT CHANGE UP (ref 80–100)
PLATELET # BLD AUTO: 293 K/UL — SIGNIFICANT CHANGE UP (ref 150–400)
POTASSIUM SERPL-MCNC: 3.6 MMOL/L — SIGNIFICANT CHANGE UP (ref 3.5–5.3)
POTASSIUM SERPL-SCNC: 3.6 MMOL/L — SIGNIFICANT CHANGE UP (ref 3.5–5.3)
RBC # BLD: 4.58 M/UL — SIGNIFICANT CHANGE UP (ref 3.8–5.2)
RBC # FLD: 12.8 % — SIGNIFICANT CHANGE UP (ref 10.3–14.5)
SODIUM SERPL-SCNC: 132 MMOL/L — LOW (ref 135–145)
WBC # BLD: 13.87 K/UL — HIGH (ref 3.8–10.5)
WBC # FLD AUTO: 13.87 K/UL — HIGH (ref 3.8–10.5)

## 2022-05-21 PROCEDURE — 99233 SBSQ HOSP IP/OBS HIGH 50: CPT

## 2022-05-21 RX ORDER — ACETAMINOPHEN 500 MG
650 TABLET ORAL EVERY 6 HOURS
Refills: 0 | Status: DISCONTINUED | OUTPATIENT
Start: 2022-05-21 | End: 2022-05-23

## 2022-05-21 RX ADMIN — CARVEDILOL PHOSPHATE 6.25 MILLIGRAM(S): 80 CAPSULE, EXTENDED RELEASE ORAL at 21:49

## 2022-05-21 RX ADMIN — Medication 650 MILLIGRAM(S): at 23:46

## 2022-05-21 RX ADMIN — Medication 125 MILLIGRAM(S): at 06:09

## 2022-05-21 RX ADMIN — CARVEDILOL PHOSPHATE 6.25 MILLIGRAM(S): 80 CAPSULE, EXTENDED RELEASE ORAL at 09:15

## 2022-05-21 RX ADMIN — Medication 20 MILLIGRAM(S): at 09:15

## 2022-05-21 RX ADMIN — Medication 650 MILLIGRAM(S): at 22:46

## 2022-05-21 RX ADMIN — Medication 1 TABLET(S): at 09:15

## 2022-05-21 RX ADMIN — Medication 1000 UNIT(S): at 21:49

## 2022-05-21 RX ADMIN — Medication 180 MILLIGRAM(S): at 09:15

## 2022-05-21 RX ADMIN — Medication 125 MILLIGRAM(S): at 12:22

## 2022-05-21 RX ADMIN — SIMVASTATIN 20 MILLIGRAM(S): 20 TABLET, FILM COATED ORAL at 21:49

## 2022-05-21 RX ADMIN — RIVAROXABAN 20 MILLIGRAM(S): KIT at 17:42

## 2022-05-21 RX ADMIN — Medication 1000 UNIT(S): at 09:15

## 2022-05-21 RX ADMIN — Medication 20 MILLIGRAM(S): at 17:42

## 2022-05-21 NOTE — PROGRESS NOTE ADULT - SUBJECTIVE AND OBJECTIVE BOX
Chief Complaint: Acute CHF, Hypoxia, Intractable diarrhea     Subjective and objective    Pt is an 88 yo female with a pmh/o afib on xarelto, HLD, HTN, syncope, dehydration, who presents to Ed due to worsening shortness of breath and diarrhea. Pt states she has had liquid stool x 3 weeks, with up to five non bloody, dark brown, episodes a day without recent abx use or sick contacts. Pt states she was drinking 'plenty of water to stay hydrated' during this time however noticed worsening sob over past few days with inability to lie flat due to wheezing and dry cough. Pt also noted slight leg swelling. Pt was evaluated by GI and cardiology over past few days and has been on pepto bismol and coconut water for sx x two days with reduction to three BM but stool still watery.     Medical progress: Denies any HA, CP, SOB. Formed stools  Complaints: no new complaints  State of mind: normal    REVIEW OF SYSTEMS:  All other review of systems is negative unless indicated above    PHYSICAL EXAM:  T(C): 37 (21 May 2022 08:45), Max: 37 (21 May 2022 08:45)  T(F): 98.6 (21 May 2022 08:45), Max: 98.6 (21 May 2022 08:45)  HR: 100 (21 May 2022 08:45) (78 - 100)  BP: 125/75 (21 May 2022 08:45) (104/63 - 125/75)  RR: 18 (21 May 2022 08:45) (18 - 18)  SpO2: 96% (21 May 2022 08:45) (96% - 99%)  Constitutional: NAD, awake and alert, well-developed  HEENT: PERR, EOMI, Normal Hearing, MMM  Neck: Soft and supple, No LAD, No JVD  Respiratory: Breath sounds are clear bilaterally, No wheezing, rales or rhonchi  Cardiovascular: S1 and S2, iregularly irregular , no Murmurs, gallops or rubs  Gastrointestinal: Bowel Sounds present, soft, nontender, nondistended, no guarding, no rebound  Extremities: No peripheral edema  Vascular: 2+ peripheral pulses  Neurological: A/O x 3, no focal deficits  Musculoskeletal: 5/5 strength b/l upper and lower extremities  Skin: No rashes    Labs:              CBC Full  -  ( 21 May 2022 06:48 )  WBC Count : 13.87 K/uL  RBC Count : 4.58 M/uL  Hemoglobin : 12.7 g/dL  Hematocrit : 39.2 %  Platelet Count - Automated : 293 K/uL    132<L>  |  97  |  11  ----------------------------<  110<H>  3.6   |  29  |  0.53    Ca    9.0      21 May 2022 06:48  Mg     1.8     05-21    TPro  6.5  /  Alb  3.2<L>  /  TBili  0.8  /  DBili  x   /  AST  35  /  ALT  55  /  AlkPhos  81  05-20    # Acute hypoxic respiratory failure 2/2 Acute on chronic HFpEF   - strict i/o's  - daily weights  - TTE - with preserved EF and moderate TR  - ACE or ARB contraindicated- d/c'd as outpt  - Beta blocker cont   - EKG afib rbr hr 108  - Lasix 20 mg ivp bid   - Pro-BnP notably elevated >3000k->1308 trending down   - IV diuresis with close monitoring of renal function  - Chest x ray with vascular congestion , no consolidation, effusion    # Hypokalemia - resolved   - in the setting of prolonged diarrhea   - magnesium - wnl     # Leukocytosis  - CXR w/o consolidation, pna  - C.diff isolation   - start po vanco - D/W ID   - UA w/o evidence of infxn    # Transaminitis - resolved   - likely due to hepatic congestion, reactive in setting of diarrhea    # Hyperglycemia - resolved   - monitor on serum chem    # Afib RVR:  now rate controlled  cont. cardizem and coreg     # HTN/HLD:  c/w home meds  c/w dash/tlc   Chief Complaint: Acute CHF, Hypoxia, Intractable diarrhea     Subjective and objective    Pt is an 86 yo female with a pmh/o afib on xarelto, HLD, HTN, syncope, dehydration, who presents to Ed due to worsening shortness of breath and diarrhea. Pt states she has had liquid stool x 3 weeks, with up to five non bloody, dark brown, episodes a day without recent abx use or sick contacts. Pt states she was drinking 'plenty of water to stay hydrated' during this time however noticed worsening sob over past few days with inability to lie flat due to wheezing and dry cough. Pt also noted slight leg swelling. Pt was evaluated by GI and cardiology over past few days and has been on pepto bismol and coconut water for sx x two days with reduction to three BM but stool still watery.     Medical progress: Denies any HA, CP, SOB. diarrhea times 1.   Complaints: no new complaints  State of mind: normal    REVIEW OF SYSTEMS:  All other review of systems is negative unless indicated above    PHYSICAL EXAM:  T(C): 37 (21 May 2022 08:45), Max: 37 (21 May 2022 08:45)  T(F): 98.6 (21 May 2022 08:45), Max: 98.6 (21 May 2022 08:45)  HR: 100 (21 May 2022 08:45) (78 - 100)  BP: 125/75 (21 May 2022 08:45) (104/63 - 125/75)  RR: 18 (21 May 2022 08:45) (18 - 18)  SpO2: 96% (21 May 2022 08:45) (96% - 99%)  Constitutional: NAD, awake and alert, well-developed  HEENT: PERR, EOMI, Normal Hearing, MMM  Neck: Soft and supple, No LAD, No JVD  Respiratory: Breath sounds are clear bilaterally, No wheezing, rales or rhonchi  Cardiovascular: S1 and S2, iregularly irregular , no Murmurs, gallops or rubs  Gastrointestinal: Bowel Sounds present, soft, nontender, nondistended, no guarding, no rebound  Extremities: No peripheral edema  Vascular: 2+ peripheral pulses  Neurological: A/O x 3, no focal deficits  Musculoskeletal: 5/5 strength b/l upper and lower extremities  Skin: No rashes    Labs:              CBC Full  -  ( 21 May 2022 06:48 )  WBC Count : 13.87 K/uL  RBC Count : 4.58 M/uL  Hemoglobin : 12.7 g/dL  Hematocrit : 39.2 %  Platelet Count - Automated : 293 K/uL    132<L>  |  97  |  11  ----------------------------<  110<H>  3.6   |  29  |  0.53    Ca    9.0      21 May 2022 06:48  Mg     1.8     05-21    TPro  6.5  /  Alb  3.2<L>  /  TBili  0.8  /  DBili  x   /  AST  35  /  ALT  55  /  AlkPhos  81  05-20    # Acute hypoxic respiratory failure 2/2 Acute on chronic HFpEF   - strict i/o's  - daily weights  - TTE - with preserved EF and moderate TR  - ACE or ARB contraindicated- d/c'd as outpt  - Beta blocker cont   - EKG afib rbr hr 108  - Lasix 20 mg ivp bid   - Pro-BnP notably elevated >3000k->1308 trending down   - IV diuresis with close monitoring of renal function  - Chest x ray with vascular congestion , no consolidation, effusion    # Hypokalemia - resolved   - in the setting of prolonged diarrhea   - magnesium - wnl     # Leukocytosis  - CXR w/o consolidation, pna  - C.diff isolation   - start po vanco - D/W ID   - UA w/o evidence of infxn    # Transaminitis - resolved   - likely due to hepatic congestion, reactive in setting of diarrhea    # Hyperglycemia - resolved   - monitor on serum chem    # Afib RVR:  now rate controlled  cont. cardizem and coreg     # HTN/HLD:  c/w home meds  c/w dash/tlc   Chief Complaint: Acute CHF, Hypoxia, Intractable diarrhea     Subjective and objective    Pt is an 88 yo female with a pmh/o afib on xarelto, HLD, HTN, syncope, dehydration, who presents to Ed due to worsening shortness of breath and diarrhea. Pt states she has had liquid stool x 3 weeks, with up to five non bloody, dark brown, episodes a day without recent abx use or sick contacts. Pt states she was drinking 'plenty of water to stay hydrated' during this time however noticed worsening sob over past few days with inability to lie flat due to wheezing and dry cough. Pt also noted slight leg swelling. Pt was evaluated by GI and cardiology over past few days and has been on pepto bismol and coconut water for sx x two days with reduction to three BM but stool still watery.     Medical progress: Denies any HA, CP, SOB. diarrhea times 1.   Complaints: no new complaints  State of mind: normal    REVIEW OF SYSTEMS:  All other review of systems is negative unless indicated above    PHYSICAL EXAM:  T(C): 37 (21 May 2022 08:45), Max: 37 (21 May 2022 08:45)  T(F): 98.6 (21 May 2022 08:45), Max: 98.6 (21 May 2022 08:45)  HR: 100 (21 May 2022 08:45) (78 - 100)  BP: 125/75 (21 May 2022 08:45) (104/63 - 125/75)  RR: 18 (21 May 2022 08:45) (18 - 18)  SpO2: 96% (21 May 2022 08:45) (96% - 99%)  Constitutional: NAD, awake and alert, well-developed  HEENT: PERR, EOMI, Normal Hearing, MMM  Neck: Soft and supple, No LAD, No JVD  Respiratory: Breath sounds are clear bilaterally, No wheezing, rales or rhonchi  Cardiovascular: S1 and S2, iregularly irregular , no Murmurs, gallops or rubs  Gastrointestinal: Bowel Sounds present, soft, nontender, nondistended, no guarding, no rebound  Extremities: No peripheral edema  Vascular: 2+ peripheral pulses  Neurological: A/O x 3, no focal deficits  Musculoskeletal: 5/5 strength b/l upper and lower extremities  Skin: No rashes    Labs:              CBC Full  -  ( 21 May 2022 06:48 )  WBC Count : 13.87 K/uL  RBC Count : 4.58 M/uL  Hemoglobin : 12.7 g/dL  Hematocrit : 39.2 %  Platelet Count - Automated : 293 K/uL    132<L>  |  97  |  11  ----------------------------<  110<H>  3.6   |  29  |  0.53    Ca    9.0      21 May 2022 06:48  Mg     1.8     05-21    TPro  6.5  /  Alb  3.2<L>  /  TBili  0.8  /  DBili  x   /  AST  35  /  ALT  55  /  AlkPhos  81  05-20    # Acute hypoxic respiratory failure 2/2 Acute on chronic HFpEF   - strict i/o's  - daily weights  - TTE - with preserved EF and moderate TR  - ACE or ARB contraindicated- d/c'd as outpt  - Beta blocker cont   - EKG afib rbr hr 108  - Lasix 20 mg ivp bid   - Pro-BnP notably elevated >3000k->1308 trending down   - IV diuresis with close monitoring of renal function  - Chest x ray with vascular congestion , no consolidation, effusion    # Hypokalemia - resolved   - in the setting of prolonged diarrhea   - magnesium - wnl     # Leukocytosis  - CXR w/o consolidation, pna  - d/c po vanco     # Transaminitis - resolved   - likely due to hepatic congestion, reactive in setting of diarrhea    # Hyperglycemia - resolved   - monitor on serum chem    # Afib RVR:  - now rate controlled  - cont. cardizem and coreg     # HTN/HLD:  c/w home meds  c/w dash/tlc   Chief Complaint: Acute CHF, Hypoxia, Intractable diarrhea     Subjective and objective    Pt is an 88 yo female with a pmh/o afib on xarelto, HLD, HTN, syncope, dehydration, who presents to Ed due to worsening shortness of breath and diarrhea. Pt states she has had liquid stool x 3 weeks, with up to five non bloody, dark brown, episodes a day without recent abx use or sick contacts. Pt states she was drinking 'plenty of water to stay hydrated' during this time however noticed worsening sob over past few days with inability to lie flat due to wheezing and dry cough. Pt also noted slight leg swelling. Pt was evaluated by GI and cardiology over past few days and has been on pepto bismol and coconut water for sx x two days with reduction to three BM but stool still watery.     Medical progress: Denies any HA, CP, SOB. States stools are more formed. Very comfortable. Feels better than yesterday.   Complaints: (R) sided knee pain  State of mind: normal   Will check labs in the am /  D/C planning in the am.     REVIEW OF SYSTEMS:  All other review of systems is negative unless indicated above    PHYSICAL EXAM:  T(C): 37 (21 May 2022 08:45), Max: 37 (21 May 2022 08:45)  T(F): 98.6 (21 May 2022 08:45), Max: 98.6 (21 May 2022 08:45)  HR: 100 (21 May 2022 08:45) (78 - 100)  BP: 125/75 (21 May 2022 08:45) (104/63 - 125/75)  RR: 18 (21 May 2022 08:45) (18 - 18)  SpO2: 96% (21 May 2022 08:45) (96% - 99%)  Constitutional: NAD, awake and alert, well-developed  HEENT: PERR, EOMI, Normal Hearing, MMM  Neck: Soft and supple, No LAD, No JVD  Respiratory: Breath sounds are clear bilaterally, No wheezing, rales or rhonchi  Cardiovascular: S1 and S2, iregularly irregular , no Murmurs, gallops or rubs  Gastrointestinal: Bowel Sounds present, soft, nontender, nondistended, no guarding, no rebound  Extremities: No peripheral edema  Vascular: 2+ peripheral pulses  Neurological: A/O x 3, no focal deficits  Musculoskeletal: 5/5 strength b/l upper and lower extremities  Skin: No rashes    Labs:     CBC Full  -  ( 21 May 2022 06:48 )  WBC Count : 13.87 K/uL  RBC Count : 4.58 M/uL  Hemoglobin : 12.7 g/dL  Hematocrit : 39.2 %  Platelet Count - Automated : 293 K/uL  Mean Cell Volume : 85.6 fl  Mean Cell Hemoglobin : 27.7 pg  Mean Cell Hemoglobin Concentration : 32.4 gm/dL  Auto Neutrophil # : x  Auto Lymphocyte # : x  Auto Monocyte # : x  Auto Eosinophil # : x  Auto Basophil # : x  Auto Neutrophil % : x  Auto Lymphocyte % : x  Auto Monocyte % : x  Auto Eosinophil % : x  Auto Basophil % : x    132<L>  |  97  |  11  ----------------------------<  110<H>  3.6   |  29  |  0.53    Ca    9.0      21 May 2022 06:48  Mg     1.8     05-21              # Acute hypoxic respiratory failure 2/2 Acute on chronic HFpEF   - PO lasix  - strict i/o's  - daily weights  - TTE - with preserved EF and moderate TR  - ACE or ARB contraindicated- d/c'd as outpt  - Beta blocker cont   - EKG afib rbr hr 108  - Pro-BnP notably elevated >3000k->1308 trending down     # Hypokalemia - resolved   - in the setting of prolonged diarrhea   - magnesium - wnl     # Leukocytosis  - CXR w/o consolidation, pna  - d/c po vanco     # Transaminitis - resolved   - likely due to hepatic congestion, reactive in setting of diarrhea    # Hyperglycemia - resolved   - monitor on serum chem    # Afib RVR:  - now rate controlled  - cont. cardizem and coreg     # HTN/HLD:  c/w home meds  c/w dash/tlc

## 2022-05-22 PROCEDURE — 73564 X-RAY EXAM KNEE 4 OR MORE: CPT | Mod: 26,RT

## 2022-05-22 PROCEDURE — 99233 SBSQ HOSP IP/OBS HIGH 50: CPT

## 2022-05-22 RX ORDER — FUROSEMIDE 40 MG
20 TABLET ORAL EVERY 12 HOURS
Refills: 0 | Status: DISCONTINUED | OUTPATIENT
Start: 2022-05-22 | End: 2022-05-23

## 2022-05-22 RX ADMIN — Medication 20 MILLIGRAM(S): at 09:17

## 2022-05-22 RX ADMIN — CARVEDILOL PHOSPHATE 6.25 MILLIGRAM(S): 80 CAPSULE, EXTENDED RELEASE ORAL at 22:30

## 2022-05-22 RX ADMIN — Medication 650 MILLIGRAM(S): at 22:30

## 2022-05-22 RX ADMIN — Medication 650 MILLIGRAM(S): at 23:30

## 2022-05-22 RX ADMIN — Medication 1000 UNIT(S): at 09:18

## 2022-05-22 RX ADMIN — Medication 1000 UNIT(S): at 22:29

## 2022-05-22 RX ADMIN — SIMVASTATIN 20 MILLIGRAM(S): 20 TABLET, FILM COATED ORAL at 22:30

## 2022-05-22 RX ADMIN — Medication 180 MILLIGRAM(S): at 09:18

## 2022-05-22 RX ADMIN — Medication 20 MILLIGRAM(S): at 17:45

## 2022-05-22 RX ADMIN — RIVAROXABAN 20 MILLIGRAM(S): KIT at 17:43

## 2022-05-22 RX ADMIN — Medication 1 TABLET(S): at 09:18

## 2022-05-22 RX ADMIN — CARVEDILOL PHOSPHATE 6.25 MILLIGRAM(S): 80 CAPSULE, EXTENDED RELEASE ORAL at 09:18

## 2022-05-23 ENCOUNTER — TRANSCRIPTION ENCOUNTER (OUTPATIENT)
Age: 87
End: 2022-05-23

## 2022-05-23 VITALS
OXYGEN SATURATION: 98 % | SYSTOLIC BLOOD PRESSURE: 122 MMHG | HEART RATE: 67 BPM | TEMPERATURE: 98 F | RESPIRATION RATE: 18 BRPM | DIASTOLIC BLOOD PRESSURE: 75 MMHG

## 2022-05-23 LAB
ANION GAP SERPL CALC-SCNC: 7 MMOL/L — SIGNIFICANT CHANGE UP (ref 5–17)
BUN SERPL-MCNC: 15 MG/DL — SIGNIFICANT CHANGE UP (ref 7–23)
CALCIUM SERPL-MCNC: 9.2 MG/DL — SIGNIFICANT CHANGE UP (ref 8.5–10.1)
CHLORIDE SERPL-SCNC: 92 MMOL/L — LOW (ref 96–108)
CO2 SERPL-SCNC: 31 MMOL/L — SIGNIFICANT CHANGE UP (ref 22–31)
CREAT SERPL-MCNC: 0.54 MG/DL — SIGNIFICANT CHANGE UP (ref 0.5–1.3)
EGFR: 90 ML/MIN/1.73M2 — SIGNIFICANT CHANGE UP
GLUCOSE SERPL-MCNC: 107 MG/DL — HIGH (ref 70–99)
HCT VFR BLD CALC: 41.1 % — SIGNIFICANT CHANGE UP (ref 34.5–45)
HGB BLD-MCNC: 14 G/DL — SIGNIFICANT CHANGE UP (ref 11.5–15.5)
MCHC RBC-ENTMCNC: 28.6 PG — SIGNIFICANT CHANGE UP (ref 27–34)
MCHC RBC-ENTMCNC: 34.1 GM/DL — SIGNIFICANT CHANGE UP (ref 32–36)
MCV RBC AUTO: 83.9 FL — SIGNIFICANT CHANGE UP (ref 80–100)
PLATELET # BLD AUTO: 319 K/UL — SIGNIFICANT CHANGE UP (ref 150–400)
POTASSIUM SERPL-MCNC: 3.2 MMOL/L — LOW (ref 3.5–5.3)
POTASSIUM SERPL-SCNC: 3.2 MMOL/L — LOW (ref 3.5–5.3)
RBC # BLD: 4.9 M/UL — SIGNIFICANT CHANGE UP (ref 3.8–5.2)
RBC # FLD: 12.7 % — SIGNIFICANT CHANGE UP (ref 10.3–14.5)
SODIUM SERPL-SCNC: 130 MMOL/L — LOW (ref 135–145)
WBC # BLD: 9.81 K/UL — SIGNIFICANT CHANGE UP (ref 3.8–10.5)
WBC # FLD AUTO: 9.81 K/UL — SIGNIFICANT CHANGE UP (ref 3.8–10.5)

## 2022-05-23 RX ORDER — MAGNESIUM OXIDE 400 MG ORAL TABLET 241.3 MG
2 TABLET ORAL
Qty: 0 | Refills: 0 | DISCHARGE

## 2022-05-23 RX ORDER — POTASSIUM CHLORIDE 20 MEQ
40 PACKET (EA) ORAL EVERY 4 HOURS
Refills: 0 | Status: COMPLETED | OUTPATIENT
Start: 2022-05-23 | End: 2022-05-23

## 2022-05-23 RX ADMIN — Medication 1000 UNIT(S): at 09:06

## 2022-05-23 RX ADMIN — Medication 650 MILLIGRAM(S): at 11:39

## 2022-05-23 RX ADMIN — Medication 40 MILLIEQUIVALENT(S): at 11:00

## 2022-05-23 RX ADMIN — CARVEDILOL PHOSPHATE 6.25 MILLIGRAM(S): 80 CAPSULE, EXTENDED RELEASE ORAL at 09:06

## 2022-05-23 RX ADMIN — Medication 650 MILLIGRAM(S): at 09:09

## 2022-05-23 RX ADMIN — Medication 180 MILLIGRAM(S): at 09:06

## 2022-05-23 RX ADMIN — Medication 20 MILLIGRAM(S): at 09:06

## 2022-05-23 RX ADMIN — Medication 40 MILLIEQUIVALENT(S): at 13:48

## 2022-05-23 RX ADMIN — Medication 1 TABLET(S): at 09:07

## 2022-05-23 NOTE — DISCHARGE NOTE PROVIDER - NSDCCPCAREPLAN_GEN_ALL_CORE_FT
PRINCIPAL DISCHARGE DIAGNOSIS  Diagnosis: Acute hypoxemic respiratory failure  Assessment and Plan of Treatment:       SECONDARY DISCHARGE DIAGNOSES  Diagnosis: Diarrhea  Assessment and Plan of Treatment: your diarrhea resolved   your stool tests are negative - you do ot have any infection    Diagnosis: Hyponatremia  Assessment and Plan of Treatment: your sodium was low because you were treated with diuretics   you need to check your sodium levels at the PCP office in one week    Diagnosis: Hypokalemia  Assessment and Plan of Treatment: your potassium is low bevause you took diuretics   you need to check your potassium level outpatient at PCP office

## 2022-05-23 NOTE — PHYSICAL THERAPY INITIAL EVALUATION ADULT - ADDITIONAL COMMENTS
Patient was ambulating Independently with SAC Inside the home and with RW outside the home PTA. Patient was also driving to the grocery store and performing all ADL's Independently with no issues. Patient reports she was ambulating 1 mile a day PTA.

## 2022-05-23 NOTE — PROGRESS NOTE ADULT - REASON FOR ADMISSION
Acute CHF, Hypoxia, Intractable diarrhea

## 2022-05-23 NOTE — DISCHARGE NOTE NURSING/CASE MANAGEMENT/SOCIAL WORK - PATIENT PORTAL LINK FT
You can access the FollowMyHealth Patient Portal offered by [facility] by registering at the following website: [email or web address]. By joining [employer]’s FollowMyHealth portal, you will also be able to view your health information using other applications (apps) compatible with our system.

## 2022-05-23 NOTE — PROGRESS NOTE ADULT - SUBJECTIVE AND OBJECTIVE BOX
Patient is a 87y old  Female who presents with a chief complaint of Acute CHF, Hypoxia, Intractable diarrhea.     HPI:  Pt is an 86 yo female with a pmh/o afib on xarelto, HLD, HTN, syncope, dehydration, who presents to Ed due to worsening shortness of breath and diarrhea.   Pt states that she was having diarrhea 4  for weeks and 3 stools per day.  Pt states that she saw her PCP Dr Iniguez and was given Immodium and it was better for one week and diarrhea worse again.  She saw her cardiologist Dr Covington- and she was asked to take Peptobismol and coconut water which did not improve her symptoms and presented to the ER.    -   Pt states that she had SOB when she presented but now improved with O2 supplementation.  Pt was seen and examined by me around 930 am today.     - pt seen this am.  Pt c/o watery stool since yesterday.     - pt seen this am.  Pt states yesterday she had 3 semi solid stools.  Was able to lie down flat in bed without any SOB.       PAST MEDICAL & SURGICAL HISTORY:  Syncope  2013      HTN (hypertension)      Hyperlipidemia      Afib      A-fib      Bronchitis      Cardiac abnormality  internal cardiac monitor placed 2013      H/O: hysterectomy      S/P cataract surgery  Left eye.           MEDICATIONS  (STANDING):  carvedilol 6.25 milliGRAM(s) Oral every 12 hours  cholecalciferol 1000 Unit(s) Oral two times a day  diltiazem    milliGRAM(s) Oral daily  furosemide   Injectable 20 milliGRAM(s) IV Push two times a day  multivitamin 1 Tablet(s) Oral daily  potassium chloride    Tablet ER 40 milliEquivalent(s) Oral every 4 hours  rivaroxaban 20 milliGRAM(s) Oral with dinner  simvastatin 20 milliGRAM(s) Oral at bedtime    MEDICATIONS  (PRN):      FAMILY HISTORY:  Family history of primary TB    Family history of CVA    Family history of diabetes mellitus        SOCIAL HISTORY: denies any recent smoking      REVIEW OF SYSTEMS:  CONSTITUTIONAL:    No fatigue, malaise, lethargy.  No fever or chills.  RESPIRATORY:  No cough.  No wheeze.  No hemoptysis. no shortness of breath.  CARDIOVASCULAR:  No chest pains.  No palpitations.no shortness of breath, No orthopnea or PND.  GASTROINTESTINAL:  No abdominal pain.  No nausea or vomiting.   c/o diarrhea  GENITOURINARY:    No hematuria.    MUSCULOSKELETAL:  No musculoskeletal pain.  No joint swelling.  No arthritis.  NEUROLOGICAL:  generalized weakness present  PSYCHIATRIC:  No confusion  SKIN:  No rashes.          ICU Vital Signs Last 24 Hrs  T(C): 36.6 (22 May 2022 22:15), Max: 36.8 (22 May 2022 09:10)  T(F): 97.9 (22 May 2022 22:15), Max: 98.3 (22 May 2022 09:10)  HR: 93 (22 May 2022 22:15) (84 - 93)  BP: 126/59 (22 May 2022 22:15) (119/69 - 126/59)  BP(mean): --  ABP: --  ABP(mean): --  RR: 18 (22 May 2022 22:15) (18 - 18)  SpO2: 94% (22 May 2022 22:15) (94% - 96%)      PHYSICAL EXAM-    Constitutional: elderly frail ill looking female. no distress     Head: Head is normocephalic and atraumatic.      Neck:  No JVD.     Cardiovascular: Regular rate and rhythm without S3, S4. No murmurs or rubs are appreciated.      Respiratory:CTA b/l     Abdomen: Soft, nontender, nondistended with positive bowel sounds.      Extremity: No tenderness. No  pitting edema     Neurologic: The patient is alert and oriented.      Skin: No rash, no obvious lesions noted.      Psychiatric: The patient appears to be emotionally stable.      INTERPRETATION OF TELEMETRY: Afib 70/min PVCs     ECG: afib, poor R wave progression, isolated PVCs     I&O's Detail      LABS:                  PT/INR - ( 19 May 2022 07:47 )   PT: 13.7 sec;   INR: 1.18 ratio         PTT - ( 19 May 2022 07:47 )  PTT:27.8 sec         PTT - ( 19 May 2022 07:47 )  PTT:27.8 sec  Urinalysis Basic - ( 18 May 2022 18:11 )    Color: Yellow / Appearance: Clear / S.010 / pH: x  Gluc: x / Ketone: Negative  / Bili: Negative / Urobili: Negative   Blood: x / Protein: Negative / Nitrite: Negative   Leuk Esterase: Negative / RBC: 3-5 /HPF / WBC 3-5   Sq Epi: x / Non Sq Epi: Occasional / Bacteria: Occasional      I&O's Summary    BNPSerum Pro-Brain Natriuretic Peptide: 3538 pg/mL ( @ 18:11)    RADIOLOGY & ADDITIONAL STUDIES:  ch< from: Xray Chest 1 View- PORTABLE-Urgent (Xray Chest 1 View- PORTABLE-Urgent .) (22 @ 19:07) >    IMPRESSION:   Mild cardiomegaly.  Bilateral diffuse airspace disease.  Pulmonary vascular congestion..    --- End of Report ---            SAGE SMART MD; Attending Radiologist  Thisdocument has been electronically signed. May 18 2022  9:18PM    < end of copied text >  < from: TTE Echo Complete w/o Contrast w/ Doppler (22 @ 15:37) >   Impression     Summary     Estimated left ventricular ejection fraction is 60-65 %.   The left ventricle is normal in size, wall thickness, wall motion and   contractility as seen in limited views.   The left atrium is mildly dilated.   Normal appearing right ventricle structure and function.   The mitral valve leaflets appear thin and normal.   Mild mitral annular calcification is present.   Mild (1+) mitral regurgitation is present.   Normal appearing tricuspid valve structure.   Moderate (2+) tricuspid valve regurgitation is present.   Mild pulmonary hypertension.   Pulmonic valve not well seen.   Mild pulmonic valvular regurgitation (1+) is present.   No evidence of pericardial effusion.   Pleural effusion - small right pleural effusion.     Signature     ----------------------------------------------------------------   Electronically signed by Jose Abraham DO(Interpreting    < end of copied text >  < from: Xray Chest 1 View- PORTABLE-Urgent (Xray Chest 1 View- PORTABLE-Urgent .) (22 @ 19:07) >    IMPRESSION:   Mild cardiomegaly.  Bilateral diffuse airspace disease.  Pulmonary vascular congestion..    --- End of Report ---            SAGE SMART MD; Attending Radiologist    < end of copied text >  < from: TTE Echo Complete w/o Contrast w/ Doppler (22 @ 15:37) >   Summary     Estimated left ventricular ejection fraction is 60-65 %.   The left ventricle is normal in size, wall thickness, wall motion and   contractility as seen in limited views.   The left atrium is mildly dilated.   Normal appearing right ventricle structure and function.   The mitral valve leaflets appear thin and normal.   Mild mitral annular calcification is present.   Mild (1+) mitral regurgitation is present.   Normal appearing tricuspid valve structure.   Moderate (2+) tricuspid valve regurgitation is present.   Mild pulmonary hypertension.   Pulmonic valve not well seen.   Mild pulmonic valvular regurgitation (1+) is present.   No evidence of pericardial effusion.   Pleural effusion - small right pleural effusion.     Signature     ----------------------------------------------------------------   Electronically signed by Jose Abraham DO(Interpreting    < end of copied text >

## 2022-05-23 NOTE — DISCHARGE NOTE NURSING/CASE MANAGEMENT/SOCIAL WORK - NSDCPEFALRISK_GEN_ALL_CORE
For information on Fall & Injury Prevention, visit: [email or web address] OR  [email or web address] OR  https://www.cdc.gov/steadi/patient.html

## 2022-05-23 NOTE — PHYSICAL THERAPY INITIAL EVALUATION ADULT - MODALITIES TREATMENT COMMENTS
Pt left OOB in chair in NAD with HM Intact. CBIR. Pt instructed to not get up alone. MELANIE Hernandez aware

## 2022-05-23 NOTE — DISCHARGE NOTE PROVIDER - NSDCFUADDINST_GEN_ALL_CORE_FT
- follow up with cardiology - Dr. Covington and PCP in one week  - check your sodium and potassium levels at the PCP's office within one week   - check your weight every day in the morning

## 2022-05-23 NOTE — DISCHARGE NOTE PROVIDER - HOSPITAL COURSE
Pt is an 86 yo female with a pmh/o afib on xarelto, HLD, HTN, syncope, dehydration, who presents to Ed due to worsening shortness of breath and diarrhea. Pt states she has had liquid stool x 3 weeks, with up to five non bloody, dark brown, episodes a day without recent abx use or sick contacts. Pt states she was drinking 'plenty of water to stay hydrated' during this time however noticed worsening sob over past few days with inability to lie flat due to wheezing and dry cough. Pt also noted slight leg swelling. Pt was evaluated by GI and cardiology over past few days and has been on pepto bismol and coconut water for sx x two days with reduction to three BM but stool still watery.     # Acute hypoxic respiratory failure 2/2 Acute on chronic HFpEF   - treated with iv lasix 20 mg ivp bid then switched to po - no need to continue diuretics on discharge, pt. is euvolemic - D/W Dr. Stein   - strict i/o's  - daily weights  - TTE - with preserved EF and moderate TR  - ACE or ARB contraindicated  - Beta blocker cont   - EKG afib rbr hr 108  - Pro-BnP notably elevated >3000k->1308 trending down     # Hyponatremia   due to diuretics   no diuretics on discharge   check sodium and potassium in one week with PCP    # Hypokalemia - repleted  - in the setting of diuretics   - no diuretis on discharge    - magnesium - wnl     # Leukocytosis - resolved   - CXR w/o consolidation, pna  - d/c po vanco   - Stool PCR and C/diff - negative     # Transaminitis - resolved   - likely due to hepatic congestion, reactive in setting of diarrhea    # Hyperglycemia - resolved   - monitor on serum chem    # Afib RVR:  - now rate controlled  - cont. cardizem and coreg     # HTN/HLD:  c/w home meds  c/w dash/tlc    Pt. is stable for discharge, will follow up with cardiology and PCP next week.     PHYSICAL EXAM:  Vital Signs Last 24 Hrs  T(C): 36.4 (23 May 2022 09:05), Max: 36.6 (22 May 2022 22:15)  T(F): 97.5 (23 May 2022 09:05), Max: 97.9 (22 May 2022 22:15)  HR: 90 (23 May 2022 09:05) (84 - 93)  BP: 139/84 (23 May 2022 09:05) (119/82 - 139/84)  BP(mean): --  RR: 18 (23 May 2022 09:05) (18 - 18)  SpO2: 95% (23 May 2022 09:05) (94% - 96%)  Constitutional: NAD, awake and alert, well-developed  HEENT: PERR, EOMI, Normal Hearing, MMM  Neck: Soft and supple, No LAD, No JVD  Respiratory: Breath sounds are clear bilaterally, No wheezing, rales or rhonchi  Cardiovascular: S1 and S2, iregularly irregular , no Murmurs, gallops or rubs  Gastrointestinal: Bowel Sounds present, soft, nontender, nondistended, no guarding, no rebound  Extremities: No peripheral edema  Vascular: 2+ peripheral pulses  Neurological: A/O x 3, no focal deficits  Musculoskeletal: 5/5 strength b/l upper and lower extremities  Skin: No rashes

## 2022-05-23 NOTE — PHYSICAL THERAPY INITIAL EVALUATION ADULT - IMPAIRMENTS CONTRIBUTING TO GAIT DEVIATIONS, PT EVAL
R Knee OA Pain 6/10. Decreased Cardiopulmonary Endurance/impaired balance/pain/impaired postural control/decreased strength

## 2022-05-23 NOTE — PROGRESS NOTE ADULT - ASSESSMENT
SOB, Acute on chronic decompensated HFPEF- euvolemic  Continue low dose lasix po today.  Renal function stable but mild hyponatremia. .   Pt was drinking pedialyte for the diarrhea and this might have caused decompensated HF.     Diuresis with close monitoring of the renal function and electrolytes.  Goal potassium of 4 and magnesium of 2.   Strict I/O and daily wt checks. Low sodium diet. Nutrition education.   Echo results as stated above- Normal LVEF with moderate TR.     Atrial fibrillation- chronic in nature.   Rate controlled.  full dose anticoagulation to continue with xarelto.     HTN-   continue home meds and monitor the BP.    Diarrhea- C diff neg as well as GI PCR.  ID team following.  Management per primary team.     From cardiology standpoint will sign off for now.     Other medical issues- Management per primary team.   Thank you for allowing me to participate in the care of this patient. Please feel free to contact me with any questions.

## 2022-05-23 NOTE — DISCHARGE NOTE PROVIDER - NSDCMRMEDTOKEN_GEN_ALL_CORE_FT
Coreg 6.25 mg oral tablet: 1 tab(s) orally 2 times a day  dilTIAZem 180 mg/24 hours oral capsule, extended release: 1 cap(s) orally once a day  estradiol 1 mg oral tablet: 1 tab(s) orally once a day  Multi Vitamin+:   rivaroxaban 20 mg oral tablet: 1 tab(s) orally once a day (before a meal)  simvastatin 20 mg oral tablet: 1 tab(s) orally once a day (at bedtime)  Vitamin B-12 1000 mcg oral tablet: 2 tab(s) orally once a day  Vitamin D3 25 mcg (1000 intl units) oral tablet: 1 tab(s) orally 2 times a day

## 2022-05-23 NOTE — DISCHARGE NOTE PROVIDER - CARE PROVIDER_API CALL
Kalin Iniguez)  Family Medicine  [address] [place] [address]  Phone: [phone]  Fax: [phone]  Follow Up Time:     Rachelle Covington)  Cardiovascular Disease; Critical Care Medicine; Internal Medicine; Interventional Cardiology  [address] [place] [address]  Phone: [phone]  Fax: [phone]  Follow Up Time:

## 2022-05-23 NOTE — PROGRESS NOTE ADULT - SUBJECTIVE AND OBJECTIVE BOX
Date of service: 05-23-22 @ 11:07      Patient sitting in chair; afebrile, had one bowel movement today, no other complaints, stool studies were negative      ROS: no fever or chills; denies dizziness, no HA, no SOB or cough, no abdominal pain, no diarrhea or constipation; no dysuria, no urinary frequency, no legs pain, no rashes    MEDICATIONS  (STANDING):  carvedilol 6.25 milliGRAM(s) Oral every 12 hours  cholecalciferol 1000 Unit(s) Oral two times a day  diltiazem    milliGRAM(s) Oral daily  furosemide    Tablet 20 milliGRAM(s) Oral every 12 hours  multivitamin 1 Tablet(s) Oral daily  potassium chloride    Tablet ER 40 milliEquivalent(s) Oral every 4 hours  rivaroxaban 20 milliGRAM(s) Oral with dinner  simvastatin 20 milliGRAM(s) Oral at bedtime    MEDICATIONS  (PRN):  acetaminophen     Tablet .. 650 milliGRAM(s) Oral every 6 hours PRN Temp greater or equal to 38C (100.4F), Moderate Pain (4 - 6)      Vital Signs Last 24 Hrs  T(C): 36.4 (23 May 2022 09:05), Max: 36.6 (22 May 2022 22:15)  T(F): 97.5 (23 May 2022 09:05), Max: 97.9 (22 May 2022 22:15)  HR: 90 (23 May 2022 09:05) (84 - 93)  BP: 139/84 (23 May 2022 09:05) (119/82 - 139/84)  BP(mean): --  RR: 18 (23 May 2022 09:05) (18 - 18)  SpO2: 95% (23 May 2022 09:05) (94% - 96%)        Physical Exam:          Constitutional: frail looking  HEENT: NC/AT, EOMI, PERRLA, conjunctivae clear; ears and nose atraumatic; pharynx clear  Neck: supple; thyroid not palpable  Back: no tenderness  Respiratory: respiratory effort normal; clear to auscultation  Cardiovascular: S1S2 regular, no murmurs  Abdomen: soft, not tender, not distended, positive BS; no liver or spleen organomegaly  Genitourinary: no suprapubic tenderness  Musculoskeletal: no muscle tenderness, no joint swelling or tenderness  Neurological/ Psychiatric: AxOx3, judgement and insight normal;  moving all extremities  Skin: no rashes; no palpable lesions    Labs: all available labs reviewed                       Labs:                        14.0   9.81  )-----------( 319      ( 23 May 2022 08:12 )             41.1     05-23    130<L>  |  92<L>  |  15  ----------------------------<  107<H>  3.2<L>   |  31  |  0.54    Ca    9.2      23 May 2022 08:12             Cultures:       GI PCR Panel, Stool (collected 05-20-22 @ 07:00)  Source: .Stool Feces  Final Report (05-20-22 @ 14:49):    GI PCR Results: NOT detected    *******Please Note:*******    GI panel PCR evaluates for:    Campylobacter, Plesiomonas shigelloides, Salmonella,    Vibrio, Yersinia enterocolitica, Enteroaggregative    Escherichia coli (EAEC), Enteropathogenic E.coli (EPEC),    Enterotoxigenic E. coli (ETEC) lt/st, Shiga-like    toxin-producing E. coli (STEC) stx1/stx2,    Shigella/ Enteroinvasive E. coli (EIEC), Cryptosporidium,    Cyclospora cayetanensis, Entamoeba histolytica,    Giardia lamblia, Adenovirus F 40/41, Astrovirus,    Norovirus GI/GII, Rotavirus A, Sapovirus    Culture - Urine (collected 05-18-22 @ 18:11)  Source: Clean Catch None  Final Report (05-20-22 @ 08:46):    <10,000 CFU/mL Normal Urogenital Britany      Clostridium difficile Toxin by PCR (05.20.22 @ 07:00)    Clostridium difficile Toxin by PCR: The results of this test should be interpreted with consideration of all  clinical and laboratory findings. This test determines the presence of  the C. difficile tcdB gene at a given time and is not intended to  identify antibiotic associated disease or C. difficile infection without  clinical context.  Successful treatment is based on the resolution of clinical symptoms.  This test should not be used as a "test of cure" because C. difficile DNA  will persist after successful treatment. Repeat testing will not be  permitted.    This Real-Time PCR assay is FDA-approved, and its performance has been  established by [facility], [place], [place].    C Diff by PCR Result: NotDetec              < from: Xray Chest 1 View- PORTABLE-Urgent (Xray Chest 1 View- PORTABLE-Urgent .) (05.18.22 @ 19:07) >  IMPRESSION:   Mild cardiomegaly.  Bilateral diffuse airspace disease.  Pulmonary vascular congestion..    < end of copied text >      Radiology: all available radiological tests reviewed    Advanced directives addressed: DNR no

## 2022-05-25 ENCOUNTER — APPOINTMENT (OUTPATIENT)
Dept: CARE COORDINATION | Facility: HOME HEALTH | Age: 87
End: 2022-05-25
Payer: MEDICARE

## 2022-05-25 VITALS
SYSTOLIC BLOOD PRESSURE: 114 MMHG | HEART RATE: 78 BPM | DIASTOLIC BLOOD PRESSURE: 62 MMHG | BODY MASS INDEX: 21.42 KG/M2 | RESPIRATION RATE: 18 BRPM | OXYGEN SATURATION: 96 % | WEIGHT: 117.13 LBS

## 2022-05-25 DIAGNOSIS — I50.33 ACUTE ON CHRONIC DIASTOLIC (CONGESTIVE) HEART FAILURE: ICD-10-CM

## 2022-05-25 PROBLEM — J40 BRONCHITIS, NOT SPECIFIED AS ACUTE OR CHRONIC: Chronic | Status: ACTIVE | Noted: 2022-05-18

## 2022-05-25 PROCEDURE — 99496 TRANSJ CARE MGMT HIGH F2F 7D: CPT

## 2022-05-25 RX ORDER — CHLORHEXIDINE GLUCONATE 4 %
1000 LIQUID (ML) TOPICAL DAILY
Refills: 0 | Status: ACTIVE | COMMUNITY
Start: 2022-05-25

## 2022-05-25 RX ORDER — MULTIVITAMIN
TABLET ORAL DAILY
Refills: 0 | Status: ACTIVE | COMMUNITY
Start: 2022-05-25

## 2022-05-25 RX ORDER — MOMETASONE FUROATE 220 UG/1
220 INHALANT RESPIRATORY (INHALATION)
Qty: 1 | Refills: 1 | Status: DISCONTINUED | COMMUNITY
Start: 2019-07-08 | End: 2022-05-25

## 2022-05-25 NOTE — PLAN
[FreeTextEntry1] : A/P\par 1. s/p hospitalization CHF exacerbation:\par - no signs of volume overload at present, euvolemic, no need for diuretic on DC per Card\par - daily weights stable\par - Enforced with patient need for daily weights. Advised to call for an increase of greater than 2 lbs in a day or 5 pounds in a week.\par - Adhere to low salt diet and educated patient on foods that should be  avoided such as processed or fast food.\par - Limit fluids to 1 liter a day which is 4-5 glasses.\par - Continue medications as ordered. \par - Follow up with Cardiologist.\par - TCM program reinforced and 24/7 contact number of CN provided. Pt advised to call for any worsening symptoms, questions or concerns. Pt verbalized understanding.\par \par 2. Afib:\par - rate controlled on current meds\par - cont CCB, BB, xarelto\par - f/u card for routine management\par \par 3. Hypertension:\par - controlled on current meds\par - con't BB, CCB.\par - f/u PCP routine management\par \par 4. Hyperlipidemia:\par - low fat, chol diet\par - con't statin\par - f/u PCP routine monitoring\par

## 2022-05-25 NOTE — ASSESSMENT
[FreeTextEntry1] : Pt is being seen after discharge home from [facility]. She was admitted on 05/18/2022 for diarrhea/SOB and discharged home on 05/23/2022. Discharge medications were reviewed and reconciled with the current medication list and medications in home.\par \par CC:\par Pt is seen at home s/p recent admission for CHF exac and diarrhea. Pt is temporarily unable to leave home as it requires a considerable and taxing effort\par HPI:\par Pt Is a 88 y/o female enrolled in the [facility] seen at home s/p a recent admission for CHF exac and diarrhea. Pt was contacted by TCM and med rec was done within 48 hours of DC.\par \par Hospital Course\par PMH:  afib on xarelto, HLD, HTN, syncope, dehydration, who presents to ED due to worsening shortness of breath and diarrhea. Pt states she has had liquid stool x 3 weeks, with up to five non bloody, dark brown, episodes a day without recent abx use or sick contacts. Pt states she was drinking 'plenty of water to stay hydrated' during this time however noticed worsening sob over past few days with inability to lie flat due to wheezing and dry cough. Pt also noted slight leg swelling. Pt was evaluated by GI and cardiology over past few days and has been on pepto bismol and coconut water for sx x two days with reduction to three BM but stool still watery. \par Admited with:\par # Acute hypoxic respiratory failure 2/2 Acute on chronic HFpEF \par - treated with iv lasix 20 mg ivp bid then switched to po - no need to continue diuretics on discharge, pt. is euvolemic - D/W Dr. Stein \par - strict i/o's\par - daily weights\par - TTE - with preserved EF and moderate TR\par - ACE or ARB contraindicated\par - Beta blocker cont \par - EKG afib rbr hr 108\par - Pro-BnP notably elevated >3000k->1308 trending down \par \par Upon examination A&O x 3 NAD. Denies CP, SOB, headache, dizziness, pain, abd discomfort or difficulty with bowel or bladder. Had her first formed stool today and heard a "plop." All stool studies in hospital negative. She has f/u visit on 5/31 with GI Dr. Davis. PCP Hira next week and cardiology the following week. She is a recent , son Juwan lives nearby and assists as needed. Daily weights have been stable at 117.2. [facility] [facility] in home\par \par

## 2022-05-25 NOTE — HISTORY OF PRESENT ILLNESS
[Post-hospitalization from ___ Hospital] : Post-hospitalization from [unfilled] Hospital [Admitted on: ___] : The patient was admitted on [unfilled] [Discharged on ___] : discharged on [unfilled] [Discharge Summary] : discharge summary [Discharge Med List] : discharge medication list [Med Reconciliation] : medication reconciliation has been completed [Patient Contacted By: ____] : and contacted by [unfilled] [FreeTextEntry2] : FROM [facility] SONG NOTE PROVIDER\par Discharge Note Provider [Charted Location: [facility] [address] [address]] [Authored: 23-May-2022 14:21]- for Visit: [id number], Complete, Not Revised, Signed in Full, Available to Patient\par \par \par  Hospital Course:\par Discharge Date	23-May-2022\par Admission Date	18-May-2022 22:15\par Reason for Admission	Acute CHF, Hypoxia, Intractable diarrhea\par Hospital Course	\par Pt is an 86 yo female with a pmh/o afib on xarelto, HLD, HTN, syncope, dehydration, who presents to Ed due to worsening shortness of breath and diarrhea. Pt states she has had liquid stool x 3 weeks, with up to five non bloody, dark brown, episodes a day without recent abx use or sick contacts. Pt states she was drinking 'plenty of water to stay hydrated' during this time however noticed worsening sob over past few days with inability to lie flat due to wheezing and dry cough. Pt also noted slight leg swelling. Pt was evaluated by GI and cardiology over past few days and has been on pepto bismol and coconut water for sx x two days with reduction to three BM but stool still watery. \par \par # Acute hypoxic respiratory failure 2/2 Acute on chronic HFpEF \par - treated with iv lasix 20 mg ivp bid then switched to po - no need to continue diuretics on discharge, pt. is euvolemic - D/W Dr. Stein \par - strict i/o's\par - daily weights\par - TTE - with preserved EF and moderate TR\par - ACE or ARB contraindicated\par - Beta blocker cont \par - EKG afib rbr hr 108\par - Pro-BnP notably elevated >3000k->1308 trending down \par \par # Hyponatremia \par due to diuretics \par no diuretics on discharge \par check sodium and potassium in one week with PCP\par \par # Hypokalemia - repleted\par - in the setting of diuretics \par - no diuretis on discharge  \par - magnesium - wnl \par \par # Leukocytosis - resolved \par - CXR w/o consolidation, pna\par - d/c po vanco \par - Stool PCR and C/diff - negative \par \par # Transaminitis - resolved \par - likely due to hepatic congestion, reactive in setting of diarrhea\par \par # Hyperglycemia - resolved \par - monitor on serum chem\par \par # Afib RVR:\par - now rate controlled\par - cont. cardizem and coreg \par \par # HTN/HLD:\par c/w home meds\par c/w dash/tlc\par \par Pt. is stable for discharge, will follow up with cardiology and PCP next week. \par \par PHYSICAL EXAM:\par Vital Signs Last 24 Hrs\par T(C): 36.4 (23 May 2022 09:05), Max: 36.6 (22 May 2022 22:15)\par T(F): 97.5 (23 May 2022 09:05), Max: 97.9 (22 May 2022 22:15)\par HR: 90 (23 May 2022 09:05) (84 - 93)\par BP: 139/84 (23 May 2022 09:05) (119/82 - 139/84)\par BP(mean): --\par RR: 18 (23 May 2022 09:05) (18 - 18)\par SpO2: 95% (23 May 2022 09:05) (94% - 96%)\par Constitutional: NAD, awake and alert, well-developed\par HEENT: PERR, EOMI, Normal Hearing, MMM\par Neck: Soft and supple, No LAD, No JVD\par Respiratory: Breath sounds are clear bilaterally, No wheezing, rales or rhonchi\par Cardiovascular: S1 and S2, iregularly irregular , no Murmurs, gallops or rubs\par Gastrointestinal: Bowel Sounds present, soft, nontender, nondistended, no guarding, no rebound\par Extremities: No peripheral edema\par Vascular: 2+ peripheral pulses\par Neurological: A/O x 3, no focal deficits\par Musculoskeletal: 5/5 strength b/l upper and lower extremities\par Skin: No rashes\par \par \par  Med Reconciliation:\par Medication Reconciliation Status	Admission Reconciliation is Completed\par Discharge Reconciliation is Completed\par Discharge Medications	Coreg 6.25 mg oral tablet: 1 tab(s) orally 2 times a day\par dilTIAZem 180 mg/24 hours oral capsule, extended release: 1 cap(s) orally once a day\par estradiol 1 mg oral tablet: 1 tab(s) orally once a day\par Multi Vitamin+: \par rivaroxaban 20 mg oral tablet: 1 tab(s) orally once a day (before a meal)\par simvastatin 20 mg oral tablet: 1 tab(s) orally once a day (at bedtime)\par Vitamin B-12 1000 mcg oral tablet: 2 tab(s) orally once a day\par Vitamin D3 25 mcg (1000 intl units) oral tablet: 1 tab(s) orally 2 times a day\par ,\par ,\par \par  Care Plan/Procedures:\par Discharge Diagnoses, Assessment and Plan of Treatment	PRINCIPAL DISCHARGE DIAGNOSIS\par Diagnosis: Acute hypoxemic respiratory failure\par Assessment and Plan of Treatment: \par \par \par SECONDARY DISCHARGE DIAGNOSES\par Diagnosis: Diarrhea\par Assessment and Plan of Treatment: your diarrhea resolved \par your stool tests are negative - you do ot have any infection\par \par Diagnosis: Hyponatremia\par Assessment and Plan of Treatment: your sodium was low because you were treated with diuretics \par you need to check your sodium levels at the PCP office in one week\par \par Diagnosis: Hypokalemia\par Assessment and Plan of Treatment: your potassium is low bevause you took diuretics \par you need to check your potassium level outpatient at PCP office\par Goal(s)	To get better and follow your care plan as instructed.\par \par  Follow Up:\par Care Providers for Follow up (PCP/Outpatient Provider)	Kalin Iniguez)\par Family Medicine\par [address] [facility] [address]\[place] [address]\par Phone: [phone]\par Fax: [phone]\par Follow Up Time: \par \par Rachelle Covington)\par Cardiovascular Disease; Critical Care Medicine; Internal Medicine; Interventional Cardiology\par [address] [facility]\[place] [address]\par Phone: [phone]\par Fax: [phone]\par Follow Up Time:\par Additional Scheduled Appointments	Follow-up appointment with Dr. Covington on Monday, June 06, 2022 at 1:20pm\par Discharge Diet	DASH Diet\par Activity	No heavy lifting/straining\par Additional Instructions	- follow up with cardiology - Dr. Covington and PCP in one week\par - check your sodium and potassium levels at the PCP's office within one week \par - check your weight every day in the morning\par \par  Quality Measures:\par Patient Condition	Stable\par Hospice Patient	No\par Tobacco Usage Within the Last Year	No\par Does the patient have difficulty running errands alone like visiting a doctor’s office or shopping?	No\par Cognition: The patient has	No difficulties\par Does the patient have a principal diagnosis of ischemic stroke, hemorrhagic stroke, or TIA?	No\par Does the patient have a principal diagnosis of Acute Myocardial Infarction?	No\par Has the patient had a Percutaneous Coronary Intervention?	No\par \par  Home Health:\par Discharged with Home Health Care Services?	Yes\par Face-To-Face Contact	As certified below, I, or a nurse practitioner or physician assistant working with me, had a face-to-face encounter that meets the physician face-to-face encounter requirements.\par Need for Skilled Services	Observation and assessment  Rehabilitation services\par Based on the above findings, the following intermittent skilled services are medically necessary home health services:	Nursing  Physical therapy\par Home Bound Status	Fall risk\par Patient Needs Assistance to Leave Residence...\par Attending Certification	My signature below certifies that the above stated patient is homebound and upon completion of the Face-To-Face encounter, has the need for intermittent skilled nursing, physical therapy and/or speech or occupational therapy services in their home for their current diagnosis as outlined in their initial plan of care. These services will continue to be monitored by myself or another physician.\par Encounter Date	23-May-2022\par \par  Document Complete:\par Care Provider Seen in Hospital	[facility]\par Lara Alves MD\par Yudelka Kimbrough MD\par Jeanette Anderson NP\par Physician Section Complete	This document is complete and the patient is ready for discharge.\par For questions about your prescriptions, please call:	[phone]\par Is this contact telephone number correct?	Yes\par \par \par \par Electronic Signatures:\par Jeanette Anderson (NP)   (Signed 23-May-2022 14:37)\par 	Authored: Hospital Course, Med Reconciliation, Care Plan/Procedures, Follow Up, Quality Measures, Home Health, Document Complete\par Karl Olmos ()   (Signed 24-May-2022 15:21)\par 	Co-Signer: Hospital Course, Med Reconciliation, Care Plan/Procedures, Follow Up, Quality Measures, Home Health, Document Complete\par \par Last Updated: 24-May-2022 15:21 by Karl Olmos ()\par \par \par

## 2022-05-25 NOTE — PHYSICAL EXAM
[No Acute Distress] : no acute distress [Well Nourished] : well nourished [Well Developed] : well developed [Well-Appearing] : well-appearing [Normal Sclera/Conjunctiva] : normal sclera/conjunctiva [Normal Outer Ear/Nose] : the outer ears and nose were normal in appearance [Normal Oropharynx] : the oropharynx was normal [Supple] : supple [No Respiratory Distress] : no respiratory distress  [Clear to Auscultation] : lungs were clear to auscultation bilaterally [No Accessory Muscle Use] : no accessory muscle use [No Edema] : there was no peripheral edema [No Extremity Clubbing/Cyanosis] : no extremity clubbing/cyanosis [Soft] : abdomen soft [Non Tender] : non-tender [Non-distended] : non-distended [Normal Bowel Sounds] : normal bowel sounds [Grossly Normal Strength/Tone] : grossly normal strength/tone [No Rash] : no rash [Normal Gait] : normal gait [Coordination Grossly Intact] : coordination grossly intact [No Focal Deficits] : no focal deficits [Normal Affect] : the affect was normal [Alert and Oriented x3] : oriented to person, place, and time [Normal Insight/Judgement] : insight and judgment were intact [de-identified] : NO THRUSH [de-identified] : IRREG AFIB

## 2022-05-27 DIAGNOSIS — I50.33 ACUTE ON CHRONIC DIASTOLIC (CONGESTIVE) HEART FAILURE: ICD-10-CM

## 2022-05-27 DIAGNOSIS — Z88.8 ALLERGY STATUS TO OTHER DRUGS, MEDICAMENTS AND BIOLOGICAL SUBSTANCES: ICD-10-CM

## 2022-05-27 DIAGNOSIS — J96.01 ACUTE RESPIRATORY FAILURE WITH HYPOXIA: ICD-10-CM

## 2022-05-27 DIAGNOSIS — R73.9 HYPERGLYCEMIA, UNSPECIFIED: ICD-10-CM

## 2022-05-27 DIAGNOSIS — I48.20 CHRONIC ATRIAL FIBRILLATION, UNSPECIFIED: ICD-10-CM

## 2022-05-27 DIAGNOSIS — E78.5 HYPERLIPIDEMIA, UNSPECIFIED: ICD-10-CM

## 2022-05-27 DIAGNOSIS — I11.0 HYPERTENSIVE HEART DISEASE WITH HEART FAILURE: ICD-10-CM

## 2022-05-27 DIAGNOSIS — Z90.710 ACQUIRED ABSENCE OF BOTH CERVIX AND UTERUS: ICD-10-CM

## 2022-05-27 DIAGNOSIS — Z88.0 ALLERGY STATUS TO PENICILLIN: ICD-10-CM

## 2022-05-27 DIAGNOSIS — E86.0 DEHYDRATION: ICD-10-CM

## 2022-05-27 DIAGNOSIS — Z79.01 LONG TERM (CURRENT) USE OF ANTICOAGULANTS: ICD-10-CM

## 2022-05-27 DIAGNOSIS — E87.6 HYPOKALEMIA: ICD-10-CM

## 2022-05-27 DIAGNOSIS — E87.1 HYPO-OSMOLALITY AND HYPONATREMIA: ICD-10-CM

## 2022-05-27 DIAGNOSIS — Z91.013 ALLERGY TO SEAFOOD: ICD-10-CM

## 2022-07-12 NOTE — PROGRESS NOTE ADULT - ASSESSMENT
Pt is an 88 yo female with a pmh/o afib on xarelto, HLD, HTN, syncope, dehydration, admitted on 5/18 for evaluation of shortness of breath, multiple loose stools, the patient had been on antibiotics in March for urinary tract infection, then in Mid april started with numerous loose bowel movements, was taking imodium, with mild relief, then started up again, for which she saw her PMD, studies sent, however no Cdiff test was done, O &P was negative. The patient has not travelled and did not eat any unusual food. She did drink plenty of water, prior to admission and took Pepto bismol, which turned her stool black and then she developed lower extremity swelling, and imaging was done suggestive of CHF. Denies fever, chills or any other complaints.     1. Patient admitted with diarrhea, also noted with leukocytosis, possibly related to infection; the patient had increase in water intake and there is suggestion of chf which is new for this patient  - follow up cultures   - serial cbc and monitor temperature   - oxygen and nebs as needed   - all stool studies were negative, po vancomycin stopped  - should have GI evaluation as outpatient with colonoscopy  2. other issues; per medicine Detail Level: Simple Price (Do Not Change): 0.00 Instructions: This plan will send the code FBSE to the PM system.  DO NOT or CHANGE the price.

## 2022-09-29 ENCOUNTER — NON-APPOINTMENT (OUTPATIENT)
Age: 87
End: 2022-09-29

## 2022-09-30 ENCOUNTER — APPOINTMENT (OUTPATIENT)
Dept: RADIOLOGY | Facility: CLINIC | Age: 87
End: 2022-09-30

## 2022-09-30 ENCOUNTER — OUTPATIENT (OUTPATIENT)
Dept: OUTPATIENT SERVICES | Facility: HOSPITAL | Age: 87
LOS: 1 days | End: 2022-09-30
Payer: MEDICARE

## 2022-09-30 DIAGNOSIS — Z98.49 CATARACT EXTRACTION STATUS, UNSPECIFIED EYE: Chronic | ICD-10-CM

## 2022-09-30 DIAGNOSIS — Z90.710 ACQUIRED ABSENCE OF BOTH CERVIX AND UTERUS: Chronic | ICD-10-CM

## 2022-09-30 DIAGNOSIS — S99.922A UNSPECIFIED INJURY OF LEFT FOOT, INITIAL ENCOUNTER: ICD-10-CM

## 2022-09-30 PROCEDURE — 73660 X-RAY EXAM OF TOE(S): CPT | Mod: 26,LT

## 2022-09-30 PROCEDURE — 73660 X-RAY EXAM OF TOE(S): CPT

## 2023-05-11 NOTE — DISCHARGE NOTE NURSING/CASE MANAGEMENT/SOCIAL WORK - NSDPLANG ASIS_GEN_ALL_CORE
Vss, herberth po fluids, denies pain, ambulates easily. IV removed, catheter intact. Discharge instructions provided and states understanding. States ready to go home.  Discharged from facility with family per wheelchair.     No

## 2023-05-17 NOTE — HISTORY OF PRESENT ILLNESS
[FreeTextEntry1] : 85 year old female presenting with left foot pain. The patient cannot attribute their pain to any specific injury, fall, or trauma. The patient c/o pain to the 2nd toe, and that the toe is crossing over the big toe. She reports minimal pain. No recent injury or fall. She states many years ago she did drop something on the toe. The patient c/o increased pain with certain shoe wear.  The patient previously saw a podiatrist. She presents today in [facility]. She is currently taking no pain medication. No other complaints at this time.  Quality 47: Advance Care Plan: Advance Care Planning discussed and documented; advance care plan or surrogate decision maker documented in the medical record. Quality 431: Preventive Care And Screening: Unhealthy Alcohol Use - Screening: Patient not identified as an unhealthy alcohol user when screened for unhealthy alcohol use using a systematic screening method Quality 110: Preventive Care And Screening: Influenza Immunization: Influenza Immunization Administered during Influenza season Quality 226: Preventive Care And Screening: Tobacco Use: Screening And Cessation Intervention: Patient screened for tobacco use and is an ex/non-smoker Detail Level: Detailed Quality 111:Pneumonia Vaccination Status For Older Adults: Pneumococcal vaccine administered on or after patientâs 60th birthday and before the end of the measurement period

## 2023-05-27 ENCOUNTER — INPATIENT (INPATIENT)
Facility: HOSPITAL | Age: 88
LOS: 3 days | Discharge: NURSING, REHAB OR LONG-TERM CARE | DRG: 521 | End: 2023-05-31
Attending: INTERNAL MEDICINE | Admitting: INTERNAL MEDICINE
Payer: MEDICARE

## 2023-05-27 VITALS
WEIGHT: 123.02 LBS | RESPIRATION RATE: 20 BRPM | SYSTOLIC BLOOD PRESSURE: 161 MMHG | TEMPERATURE: 98 F | HEART RATE: 83 BPM | DIASTOLIC BLOOD PRESSURE: 89 MMHG | OXYGEN SATURATION: 94 %

## 2023-05-27 DIAGNOSIS — Z90.710 ACQUIRED ABSENCE OF BOTH CERVIX AND UTERUS: Chronic | ICD-10-CM

## 2023-05-27 DIAGNOSIS — Z98.49 CATARACT EXTRACTION STATUS, UNSPECIFIED EYE: Chronic | ICD-10-CM

## 2023-05-27 DIAGNOSIS — S72.001A FRACTURE OF UNSPECIFIED PART OF NECK OF RIGHT FEMUR, INITIAL ENCOUNTER FOR CLOSED FRACTURE: ICD-10-CM

## 2023-05-27 LAB
ALBUMIN SERPL ELPH-MCNC: 3.8 G/DL — SIGNIFICANT CHANGE UP (ref 3.3–5)
ALP SERPL-CCNC: 95 U/L — SIGNIFICANT CHANGE UP (ref 40–120)
ALT FLD-CCNC: 26 U/L — SIGNIFICANT CHANGE UP (ref 12–78)
ANION GAP SERPL CALC-SCNC: 7 MMOL/L — SIGNIFICANT CHANGE UP (ref 5–17)
APTT BLD: 29.6 SEC — SIGNIFICANT CHANGE UP (ref 27.5–35.5)
AST SERPL-CCNC: 26 U/L — SIGNIFICANT CHANGE UP (ref 15–37)
BASOPHILS # BLD AUTO: 0 K/UL — SIGNIFICANT CHANGE UP (ref 0–0.2)
BASOPHILS NFR BLD AUTO: 0 % — SIGNIFICANT CHANGE UP (ref 0–2)
BILIRUB SERPL-MCNC: 1.1 MG/DL — SIGNIFICANT CHANGE UP (ref 0.2–1.2)
BLD GP AB SCN SERPL QL: SIGNIFICANT CHANGE UP
BUN SERPL-MCNC: 8 MG/DL — SIGNIFICANT CHANGE UP (ref 7–23)
CALCIUM SERPL-MCNC: 9.2 MG/DL — SIGNIFICANT CHANGE UP (ref 8.5–10.1)
CHLORIDE SERPL-SCNC: 101 MMOL/L — SIGNIFICANT CHANGE UP (ref 96–108)
CK SERPL-CCNC: 64 U/L — SIGNIFICANT CHANGE UP (ref 26–192)
CO2 SERPL-SCNC: 25 MMOL/L — SIGNIFICANT CHANGE UP (ref 22–31)
CREAT SERPL-MCNC: 0.59 MG/DL — SIGNIFICANT CHANGE UP (ref 0.5–1.3)
EGFR: 88 ML/MIN/1.73M2 — SIGNIFICANT CHANGE UP
EOSINOPHIL # BLD AUTO: 0 K/UL — SIGNIFICANT CHANGE UP (ref 0–0.5)
EOSINOPHIL NFR BLD AUTO: 0 % — SIGNIFICANT CHANGE UP (ref 0–6)
GLUCOSE BLDC GLUCOMTR-MCNC: 120 MG/DL — HIGH (ref 70–99)
GLUCOSE SERPL-MCNC: 120 MG/DL — HIGH (ref 70–99)
HCT VFR BLD CALC: 41 % — SIGNIFICANT CHANGE UP (ref 34.5–45)
HGB BLD-MCNC: 13.7 G/DL — SIGNIFICANT CHANGE UP (ref 11.5–15.5)
INR BLD: 1.48 RATIO — HIGH (ref 0.88–1.16)
LIDOCAIN IGE QN: 58 U/L — LOW (ref 73–393)
LYMPHOCYTES # BLD AUTO: 0.2 K/UL — LOW (ref 1–3.3)
LYMPHOCYTES # BLD AUTO: 1 % — LOW (ref 13–44)
MANUAL SMEAR VERIFICATION: SIGNIFICANT CHANGE UP
MCHC RBC-ENTMCNC: 29.9 PG — SIGNIFICANT CHANGE UP (ref 27–34)
MCHC RBC-ENTMCNC: 33.4 GM/DL — SIGNIFICANT CHANGE UP (ref 32–36)
MCV RBC AUTO: 89.5 FL — SIGNIFICANT CHANGE UP (ref 80–100)
MONOCYTES # BLD AUTO: 1.19 K/UL — HIGH (ref 0–0.9)
MONOCYTES NFR BLD AUTO: 6 % — SIGNIFICANT CHANGE UP (ref 2–14)
NEUTROPHILS # BLD AUTO: 18.27 K/UL — HIGH (ref 1.8–7.4)
NEUTROPHILS NFR BLD AUTO: 90 % — HIGH (ref 43–77)
NEUTS BAND # BLD: 2 % — SIGNIFICANT CHANGE UP (ref 0–8)
NRBC # BLD: 0 /100 — SIGNIFICANT CHANGE UP (ref 0–0)
NRBC # BLD: SIGNIFICANT CHANGE UP /100 WBCS (ref 0–0)
PLAT MORPH BLD: NORMAL — SIGNIFICANT CHANGE UP
PLATELET # BLD AUTO: 294 K/UL — SIGNIFICANT CHANGE UP (ref 150–400)
POTASSIUM SERPL-MCNC: 3.8 MMOL/L — SIGNIFICANT CHANGE UP (ref 3.5–5.3)
POTASSIUM SERPL-SCNC: 3.8 MMOL/L — SIGNIFICANT CHANGE UP (ref 3.5–5.3)
PROT SERPL-MCNC: 7.3 GM/DL — SIGNIFICANT CHANGE UP (ref 6–8.3)
PROTHROM AB SERPL-ACNC: 17.2 SEC — HIGH (ref 10.5–13.4)
RBC # BLD: 4.58 M/UL — SIGNIFICANT CHANGE UP (ref 3.8–5.2)
RBC # FLD: 12.9 % — SIGNIFICANT CHANGE UP (ref 10.3–14.5)
RBC BLD AUTO: NORMAL — SIGNIFICANT CHANGE UP
SARS-COV-2 RNA SPEC QL NAA+PROBE: SIGNIFICANT CHANGE UP
SODIUM SERPL-SCNC: 133 MMOL/L — LOW (ref 135–145)
TROPONIN I, HIGH SENSITIVITY RESULT: 5.18 NG/L — SIGNIFICANT CHANGE UP
VARIANT LYMPHS # BLD: 1 % — SIGNIFICANT CHANGE UP (ref 0–6)
WBC # BLD: 19.86 K/UL — HIGH (ref 3.8–10.5)
WBC # FLD AUTO: 19.86 K/UL — HIGH (ref 3.8–10.5)

## 2023-05-27 PROCEDURE — 85730 THROMBOPLASTIN TIME PARTIAL: CPT

## 2023-05-27 PROCEDURE — 82306 VITAMIN D 25 HYDROXY: CPT

## 2023-05-27 PROCEDURE — 88311 DECALCIFY TISSUE: CPT

## 2023-05-27 PROCEDURE — 97535 SELF CARE MNGMENT TRAINING: CPT | Mod: GO

## 2023-05-27 PROCEDURE — 93005 ELECTROCARDIOGRAM TRACING: CPT

## 2023-05-27 PROCEDURE — 82962 GLUCOSE BLOOD TEST: CPT

## 2023-05-27 PROCEDURE — 97163 PT EVAL HIGH COMPLEX 45 MIN: CPT | Mod: GP

## 2023-05-27 PROCEDURE — 73502 X-RAY EXAM HIP UNI 2-3 VIEWS: CPT | Mod: 26,RT

## 2023-05-27 PROCEDURE — 93010 ELECTROCARDIOGRAM REPORT: CPT

## 2023-05-27 PROCEDURE — 81003 URINALYSIS AUTO W/O SCOPE: CPT

## 2023-05-27 PROCEDURE — 80053 COMPREHEN METABOLIC PANEL: CPT

## 2023-05-27 PROCEDURE — 73552 X-RAY EXAM OF FEMUR 2/>: CPT | Mod: 26,RT

## 2023-05-27 PROCEDURE — 84100 ASSAY OF PHOSPHORUS: CPT

## 2023-05-27 PROCEDURE — 83735 ASSAY OF MAGNESIUM: CPT

## 2023-05-27 PROCEDURE — 82607 VITAMIN B-12: CPT

## 2023-05-27 PROCEDURE — 97116 GAIT TRAINING THERAPY: CPT | Mod: GP

## 2023-05-27 PROCEDURE — 97166 OT EVAL MOD COMPLEX 45 MIN: CPT | Mod: GO

## 2023-05-27 PROCEDURE — 82746 ASSAY OF FOLIC ACID SERUM: CPT

## 2023-05-27 PROCEDURE — 86140 C-REACTIVE PROTEIN: CPT

## 2023-05-27 PROCEDURE — 71260 CT THORAX DX C+: CPT | Mod: 26,MA

## 2023-05-27 PROCEDURE — 80048 BASIC METABOLIC PNL TOTAL CA: CPT

## 2023-05-27 PROCEDURE — 72125 CT NECK SPINE W/O DYE: CPT | Mod: 26,MA

## 2023-05-27 PROCEDURE — 85025 COMPLETE CBC W/AUTO DIFF WBC: CPT

## 2023-05-27 PROCEDURE — 82728 ASSAY OF FERRITIN: CPT

## 2023-05-27 PROCEDURE — 97530 THERAPEUTIC ACTIVITIES: CPT | Mod: GO

## 2023-05-27 PROCEDURE — 99285 EMERGENCY DEPT VISIT HI MDM: CPT

## 2023-05-27 PROCEDURE — 71045 X-RAY EXAM CHEST 1 VIEW: CPT | Mod: 26

## 2023-05-27 PROCEDURE — 83550 IRON BINDING TEST: CPT

## 2023-05-27 PROCEDURE — 83540 ASSAY OF IRON: CPT

## 2023-05-27 PROCEDURE — 99223 1ST HOSP IP/OBS HIGH 75: CPT

## 2023-05-27 PROCEDURE — 70450 CT HEAD/BRAIN W/O DYE: CPT | Mod: 26,MA

## 2023-05-27 PROCEDURE — 74177 CT ABD & PELVIS W/CONTRAST: CPT | Mod: 26,MA

## 2023-05-27 PROCEDURE — 84443 ASSAY THYROID STIM HORMONE: CPT

## 2023-05-27 PROCEDURE — 85027 COMPLETE CBC AUTOMATED: CPT

## 2023-05-27 PROCEDURE — C1713: CPT

## 2023-05-27 PROCEDURE — 36415 COLL VENOUS BLD VENIPUNCTURE: CPT

## 2023-05-27 PROCEDURE — 83880 ASSAY OF NATRIURETIC PEPTIDE: CPT

## 2023-05-27 PROCEDURE — 84484 ASSAY OF TROPONIN QUANT: CPT

## 2023-05-27 PROCEDURE — 73501 X-RAY EXAM HIP UNI 1 VIEW: CPT | Mod: RT

## 2023-05-27 PROCEDURE — 85610 PROTHROMBIN TIME: CPT

## 2023-05-27 PROCEDURE — C1776: CPT

## 2023-05-27 PROCEDURE — 88305 TISSUE EXAM BY PATHOLOGIST: CPT

## 2023-05-27 PROCEDURE — 87635 SARS-COV-2 COVID-19 AMP PRB: CPT

## 2023-05-27 RX ORDER — CARVEDILOL PHOSPHATE 80 MG/1
1 CAPSULE, EXTENDED RELEASE ORAL
Qty: 0 | Refills: 0 | DISCHARGE

## 2023-05-27 RX ORDER — DILTIAZEM HCL 120 MG
180 CAPSULE, EXT RELEASE 24 HR ORAL DAILY
Refills: 0 | Status: DISCONTINUED | OUTPATIENT
Start: 2023-05-27 | End: 2023-05-31

## 2023-05-27 RX ORDER — RIVAROXABAN 15 MG-20MG
1 KIT ORAL
Refills: 0 | DISCHARGE

## 2023-05-27 RX ORDER — SODIUM CHLORIDE 9 MG/ML
1000 INJECTION INTRAMUSCULAR; INTRAVENOUS; SUBCUTANEOUS
Refills: 0 | Status: DISCONTINUED | OUTPATIENT
Start: 2023-05-27 | End: 2023-05-29

## 2023-05-27 RX ORDER — HYDROMORPHONE HYDROCHLORIDE 2 MG/ML
1 INJECTION INTRAMUSCULAR; INTRAVENOUS; SUBCUTANEOUS
Refills: 0 | Status: DISCONTINUED | OUTPATIENT
Start: 2023-05-27 | End: 2023-05-28

## 2023-05-27 RX ORDER — SIMVASTATIN 20 MG/1
20 TABLET, FILM COATED ORAL AT BEDTIME
Refills: 0 | Status: DISCONTINUED | OUTPATIENT
Start: 2023-05-27 | End: 2023-05-31

## 2023-05-27 RX ORDER — MORPHINE SULFATE 50 MG/1
4 CAPSULE, EXTENDED RELEASE ORAL EVERY 4 HOURS
Refills: 0 | Status: DISCONTINUED | OUTPATIENT
Start: 2023-05-27 | End: 2023-05-27

## 2023-05-27 RX ORDER — DILTIAZEM HCL 120 MG
180 CAPSULE, EXT RELEASE 24 HR ORAL ONCE
Refills: 0 | Status: COMPLETED | OUTPATIENT
Start: 2023-05-27 | End: 2023-05-27

## 2023-05-27 RX ORDER — CARVEDILOL PHOSPHATE 80 MG/1
12.5 CAPSULE, EXTENDED RELEASE ORAL ONCE
Refills: 0 | Status: COMPLETED | OUTPATIENT
Start: 2023-05-27 | End: 2023-05-27

## 2023-05-27 RX ORDER — CARVEDILOL PHOSPHATE 80 MG/1
12.5 CAPSULE, EXTENDED RELEASE ORAL EVERY 12 HOURS
Refills: 0 | Status: DISCONTINUED | OUTPATIENT
Start: 2023-05-27 | End: 2023-05-29

## 2023-05-27 RX ORDER — ACETAMINOPHEN 500 MG
1000 TABLET ORAL ONCE
Refills: 0 | Status: COMPLETED | OUTPATIENT
Start: 2023-05-27 | End: 2023-05-27

## 2023-05-27 RX ORDER — SODIUM CHLORIDE 9 MG/ML
500 INJECTION INTRAMUSCULAR; INTRAVENOUS; SUBCUTANEOUS ONCE
Refills: 0 | Status: COMPLETED | OUTPATIENT
Start: 2023-05-27 | End: 2023-05-27

## 2023-05-27 RX ORDER — ONDANSETRON 8 MG/1
4 TABLET, FILM COATED ORAL ONCE
Refills: 0 | Status: COMPLETED | OUTPATIENT
Start: 2023-05-27 | End: 2023-05-27

## 2023-05-27 RX ORDER — PREGABALIN 225 MG/1
1000 CAPSULE ORAL DAILY
Refills: 0 | Status: DISCONTINUED | OUTPATIENT
Start: 2023-05-27 | End: 2023-05-30

## 2023-05-27 RX ORDER — MORPHINE SULFATE 50 MG/1
4 CAPSULE, EXTENDED RELEASE ORAL ONCE
Refills: 0 | Status: DISCONTINUED | OUTPATIENT
Start: 2023-05-27 | End: 2023-05-27

## 2023-05-27 RX ORDER — MORPHINE SULFATE 50 MG/1
4 CAPSULE, EXTENDED RELEASE ORAL
Refills: 0 | Status: DISCONTINUED | OUTPATIENT
Start: 2023-05-27 | End: 2023-05-28

## 2023-05-27 RX ADMIN — SODIUM CHLORIDE 500 MILLILITER(S): 9 INJECTION INTRAMUSCULAR; INTRAVENOUS; SUBCUTANEOUS at 10:55

## 2023-05-27 RX ADMIN — HYDROMORPHONE HYDROCHLORIDE 1 MILLIGRAM(S): 2 INJECTION INTRAMUSCULAR; INTRAVENOUS; SUBCUTANEOUS at 14:31

## 2023-05-27 RX ADMIN — CARVEDILOL PHOSPHATE 12.5 MILLIGRAM(S): 80 CAPSULE, EXTENDED RELEASE ORAL at 11:58

## 2023-05-27 RX ADMIN — CARVEDILOL PHOSPHATE 12.5 MILLIGRAM(S): 80 CAPSULE, EXTENDED RELEASE ORAL at 21:17

## 2023-05-27 RX ADMIN — Medication 180 MILLIGRAM(S): at 11:58

## 2023-05-27 RX ADMIN — MORPHINE SULFATE 4 MILLIGRAM(S): 50 CAPSULE, EXTENDED RELEASE ORAL at 11:39

## 2023-05-27 RX ADMIN — Medication 400 MILLIGRAM(S): at 10:55

## 2023-05-27 RX ADMIN — SODIUM CHLORIDE 75 MILLILITER(S): 9 INJECTION INTRAMUSCULAR; INTRAVENOUS; SUBCUTANEOUS at 23:55

## 2023-05-27 RX ADMIN — ONDANSETRON 4 MILLIGRAM(S): 8 TABLET, FILM COATED ORAL at 17:53

## 2023-05-27 RX ADMIN — SIMVASTATIN 20 MILLIGRAM(S): 20 TABLET, FILM COATED ORAL at 21:17

## 2023-05-27 NOTE — CONSULT NOTE ADULT - SUBJECTIVE AND OBJECTIVE BOX
88y Female active community ambulator with occasional cane presents to the ED with right hip pain after mechanical fall today. Patient denies headstrike or LOC. Patient takes Xarelto last dose was 1900 on 5/26. Of note, patient had hit her head on a door frame several days prior and has abrasion on forehead. Patient noticed immediate pain and inability to ambulate over the affected hip. Patient subsequently came to the ED for further evaluation and management. In the ED, endorses pain over the hip/groin area and pain with range of motion. Patient denies any fevers, chills, numbness, tingling, weakness, or any other orthopaedic complaint. Patient regularly participates in 1.5 hours of water aerobics 5 days per week. She endorses having "hip bursitis" in the past. Patient follows with Dr. Mendoza as her outpatient orthopaedist. Patient does not history of orthopaedic surgery, but follows with Dr. Mendoza regularly for evaluation. Patient asked for Dr. Mendoza's recommendation for orthopaedic surgeon and Dr. Edwards was recommended.     Physical Exam  Vital Signs Last 24 Hrs  T(C): 36.6 (05-27-23 @ 11:58), Max: 36.6 (05-27-23 @ 09:18)  T(F): 97.8 (05-27-23 @ 11:58), Max: 97.9 (05-27-23 @ 09:18)  HR: 116 (05-27-23 @ 11:58) (83 - 116)  BP: 149/73 (05-27-23 @ 11:58) (149/73 - 161/89)  BP(mean): 95 (05-27-23 @ 11:58) (95 - 95)  RR: 18 (05-27-23 @ 11:58) (18 - 20)  SpO2: 97% (05-27-23 @ 11:58) (94% - 97%)    Gen: Resting in bed, NAD  R LE:   Skin intact. No edema, erythema, or lesions throughout the extremity. Extremity is shortened and externally rotated.   TTP over the hip/groin. Pain with AROM/PROM of the hip. NTTP throughout the rest of the extremity.   SILT L2-S1  GSC/TA/EHL/FHL intact; Q/H unable to assess 2/2 pain.   DP pulse palpable.   No calf tenderness bilaterally.   Compartments soft and compressible.     Secondary Assessment:  abrasion over forehead, NC/AT, NTTP of clavicles, NTTP of C-spine,T-spine, or L-spine in the midline and paraspinal areas; NTTP of pelvis  LUE: NTTP of Shoulder, Elbow, Wrist, Hand; NT with AROM/PROM of Shoulder, Elbow, Wrist, Hand; AIN/PIN/Med/Uln/Msc/Rad/Ax intact  RUE: NTTP of Shoulder, Elbow, Wrist, Hand; NT with AROM/PROM of Shoulder, Elbow, Wrist, Hand; AIN/PIN/Med/Uln/Msc/Rad/Ax intact   LLE: Able to SLR, NT with Log Roll, NT with Heel Strike, NTTP of Hip, Knee, Ankle, Foot; NT with AROM/PROM of Hip, Knee, Ankle, Foot; Q/H/GSC/TA/EHL/FHL intact                            13.7   19.86 )-----------( 294      ( 27 May 2023 11:28 )             41.0     27 May 2023 11:28    133    |  101    |  8      ----------------------------<  120    3.8     |  25     |  0.59     Ca    9.2        27 May 2023 11:28    TPro  7.3    /  Alb  3.8    /  TBili  1.1    /  DBili  x      /  AST  26     /  ALT  26     /  AlkPhos  95     27 May 2023 11:28    PT/INR - ( 27 May 2023 11:28 )   PT: 17.2 sec;   INR: 1.48 ratio         PTT - ( 27 May 2023 11:28 )  PTT:29.6 sec    Imaging: XR R hip reviewed demonstrating R displaced femoral neck fracture    Assessment and Plan  88y Female with R femoral neck hip fracture    Imaging findings reviewed and discussed with the patient. Need for operative intervention discussed with patient and patient's son Juwan Cabello at bedside.  Plan for OR for operative fixation of hip fracture Saturday first case- please document medical/other clearance as necessary  NWB, strict bedrest  NPO at midnight  DVT ppx hold chemical anticoagulation, SCDs  Preoperative labs, imaging  Dispo: pending OR for fixation of hip fracture  Presentation and imaging discussed with Dr. Edwards who agrees with plan. Will advise if plan changes.

## 2023-05-27 NOTE — ED PROVIDER NOTE - PHYSICAL EXAMINATION
Constitutional: Awake, Alert, non-toxic. No acute distress.  HEAD: Normocephalic, small abrasion over forehead.  EYES: PERRL, EOM intact, conjunctiva and sclera are clear bilaterally.  ENT: External ears normal. No rhinorrhea, no tracheal deviation   NECK: Supple, non-tender  CARDIOVASCULAR: irregular rate and rhythm.  RESPIRATORY: Normal respiratory effort; breath sounds CTAB, no wheezes, rhonchi, or rales. Speaking in full sentences. No accessory muscle use.   ABDOMEN: Soft; non-tender, non-distended. No rebound or guarding.   MSK:  no lower extremity edema, right hip TTP, worse with movement. Intact pedal pulses. Soft compartments.   SKIN: Warm, dry. No bruising seen to chest or back.  NEURO: A&O x3. Sensory and motor functions are grossly intact other than right leg secondary to pain. Speech is normal. No facial droop.  PSYCH: Appearance and judgement seem appropriate for gender and age.

## 2023-05-27 NOTE — CONSULT NOTE ADULT - ASSESSMENT
This is a 87 yo with h/o permanant AFIB , multiple attempts at cardioversion unsucessful on Xeralto , h/o hyperlipedemia , HTN , presents after getting up from bed and her leg gave out which lead to a fall and resultant hip Fx . Of not she took Ambien for the first time. She is usually very active and actually participates in [employer] classes at the pool. She has no Sxs of CP or SOB . She has never had syncope before.   5/15- Echo showed EF 55-60 with mild MR and aortic sclerosisi but no stenosis  Cardiac PET 11/21 - negative for ischemia   last dose of DOAC last night   given recent negative cardiac PET and echo and lack of SXs with good functional capacity she is optimized to proceed with ORIF from a cardiac standpoint,  elevated HR and BP from not taking meds and pain most likely   1) hold doac resume ASAP post op   2) resume diltiazem er 180 daily , coreg 12.5 BID, simivastatib 20 qhs  3) elevated WBC ct , stress ? infection ? has had prior UTIs lomeli as per primary team

## 2023-05-27 NOTE — PATIENT PROFILE ADULT - FALL HARM RISK - HARM RISK INTERVENTIONS

## 2023-05-27 NOTE — ED PROVIDER NOTE - OBJECTIVE STATEMENT
89 y/o female with PMHx of HTN, HLD, Afib on Xarelto, CHF presents to the ED s/p fall lat night. Circumstances of fall unclear-- denies syncope. Pt was unable to get up after fall and reports she had to slide in order to call for help. Currently c/o pain to right leg, unable to bear weight. Allergic to penicillin.   Dr. Iniguez- PMD 87 y/o female with PMHx of HTN, HLD, Afib on Xarelto, CHF presents to the ED s/p fall last night. Circumstances of fall unclear-- denies syncope. Pt was unable to get up after fall and reports she had to slide in order to call for help. Currently c/o pain to right leg, unable to bear weight. Allergic to penicillin.   Dr. Iniguez- PMD

## 2023-05-27 NOTE — ED PROVIDER NOTE - CLINICAL SUMMARY MEDICAL DECISION MAKING FREE TEXT BOX
Pt with unwitnessed fall although states non-syncopal, possible hip fracture. Given on AC, will make trauma alert. Will evaluate for rhabdomyolysis given prolonged time on ground. Check EKG for arrythmia. Plan on labs, meds, imaging. Pt with unwitnessed fall although states non-syncopal. possible hip fracture. Given on AC, will make trauma alert. Will evaluate for rhabdomyolysis given prolonged time on ground. Check EKG for arrythmia. Plan on labs, meds, imaging.

## 2023-05-27 NOTE — H&P ADULT - ASSESSMENT
* RIGHT transcervical femoral neck fracture   remote tele  low risk for periop complications if AF remains controlled  prn analgesia    * BLADDER: Distended urinary bladder per CT  bladder scan q8H    * Leukocytosis  predominantly neutrophiles  repeat in am  no localizing signs  abnormal CT chest tho Pulm consult      * Chronic AF  remote tele  resume Cardizem and Coreg  hold AC for tonight    * Multiple incidental findings:    1. Anterior RUL reticular changes/centrilobular   nodularity/bronchiectasis suggests age-indeterminate inflammatory/   infectious disease including but not limited to non-tuberculous   mycobacterial infection.  pulm consult    2. Nonspecific punctate LEFT breast calcification  OP f/up    3. lung nodules OP f/up      Pt is 10/10 pain will address code status by the end of the shift

## 2023-05-27 NOTE — H&P ADULT - NSHPPHYSICALEXAM_GEN_ALL_CORE
Vital Signs Last 24 Hrs  T(C): 36.6 (27 May 2023 11:58), Max: 36.6 (27 May 2023 09:18)  T(F): 97.8 (27 May 2023 11:58), Max: 97.9 (27 May 2023 09:18)  HR: 116 (27 May 2023 11:58) (83 - 116)  BP: 149/73 (27 May 2023 11:58) (149/73 - 161/89)  BP(mean): 95 (27 May 2023 11:58) (95 - 95)  RR: 18 (27 May 2023 11:58) (18 - 20)  SpO2: 97% (27 May 2023 11:58) (94% - 97%)    Parameters below as of 27 May 2023 11:58  Patient On (Oxygen Delivery Method): room air    PHYSICAL EXAM:      Constitutional: NAD  Eyes: perrl, no conjunctival changes  ENMT: no exudates, moist oral muc, uvula midline  Neck: no JVD, no LAD  Back: no cva tenderness  Respiratory: CTA, no exp wheezes  Cardiovascular: S1S2 reg, no murmur gallop or rub  Gastrointestinal: abd soft, NT/ND + BS  Genitourinary: voiding  Extremities: R LE:   Skin intact. No edema, erythema, or lesions throughout the extremity. Extremity is shortened and externally rotated, TTP  Vascular: pedal pulses + bilateral, warm extremities  Neurological: aaox3  Skin: no rashes  Lymph Nodes: no LAD

## 2023-05-27 NOTE — H&P ADULT - NSHPLABSRESULTS_GEN_ALL_CORE
13.7   19.86 )-----------( 294      ( 27 May 2023 11:28 )             41.0   05-27    133<L>  |  101  |  8   ----------------------------<  120<H>  3.8   |  25  |  0.59    Ca    9.2      27 May 2023 11:28    TPro  7.3  /  Alb  3.8  /  TBili  1.1  /  DBili  x   /  AST  26  /  ALT  26  /  AlkPhos  95  05-27      < from: CT Abdomen and Pelvis w/ IV Cont (05.27.23 @ 10:11) >    LUNGS AND LARGE AIRWAYS: Patent central airways.  Ill-defined 10 mm   posterior RUL focal airspace opacity or nodule (4/118). 4 mm subpleural   RUL (106) and JEFFREY nodules (105). Segmental anterior-inferior RUL   reticular changes, indistinct centrilobular nodularity and bronchiectasis   (4/130). Apical scarring and minimal interlobular septal thickening      MEDIASTINUM AND SHANNA: Multiple predominantly centimeter/subcentimeter   sized mediastinal lymph nodes. Largest: Precarinal 19 x 13 mm (4/95))  CHEST WALL AND LOWER NECK: Nonspecific punctate LEFT breast calcification      IMPRESSION:  1.  RIGHT transcervical femoral neck fracture in varus position  2   Anterior RUL reticular changes/centrilobular   nodularity/bronchiectasis suggests age-indeterminate inflammatory/   infectious disease including but not limited to non-tuberculous   mycobacterial infection.    3.  Indeterminant Ill-defined 10 mm posterior RUL focal airspace opacity   or  nodule (4/118). 4 mm subpleural RUL (106) and JEFFREY nodules (105).  4.  Borderline-sized mediastinal lymph nodes favor a reactive etiology.      ekg AF RVR now rate is better ctr      < from: TTE Echo Complete w/o Contrast w/ Doppler (05.19.22 @ 15:37) >     Summary     Estimated left ventricular ejection fraction is 60-65 %.   The left ventricle is normal in size, wall thickness, wall motion and   contractility as seen in limited views.   The left atrium is mildly dilated.   Normal appearing right ventricle structure and function.   The mitral valve leaflets appear thin and normal.   Mild mitral annular calcification is present.   Mild (1+) mitral regurgitation is present.   Normal appearing tricuspid valve structure.   Moderate (2+) tricuspid valve regurgitation is present.   Mild pulmonary hypertension.   Pulmonic valve not well seen.   Mild pulmonic valvular regurgitation (1+) is present.   No evidence of pericardial effusion.   Pleural effusion - small right pleural effusion.

## 2023-05-27 NOTE — CONSULT NOTE ADULT - SUBJECTIVE AND OBJECTIVE BOX
Patient is a 88y old  Female who presents with a chief complaint of     HPI:  This is a 89 yo with h/o permanant AFIB , multiple attempts at cardioversion unsucessful on Xeralto , h/o hyperlipedemia , HTN , presents after getting up from bed and her leg gave out which lead to a fall and resultant hip Fx . Of not she took Ambien for the first time. She is usually very active and actually participates in [employer] classes at the pool. She has no Sxs of CP or SOB . She has never had syncope before.   5/15- Echo showed EF 55-60 with mild MR and aortic sclerosisi but no stenosis  Cardiac PET 11/21 - negative for ischemia     PAST MEDICAL & SURGICAL HISTORY:  Syncope  7/2/2013      HTN (hypertension)      Hyperlipidemia      Afib      A-fib      Bronchitis      Cardiac abnormality  internal cardiac monitor placed 8/2013      H/O: hysterectomy      S/P cataract surgery  Left eye.                                        MEDICATIONS  (STANDING): simvasatin 20   diltiazem er 180   xeralto 20 last dose last night   coreg 12.5 BID     MEDICATIONS  (PRN):  morphine  - Injectable 4 milliGRAM(s) IV Push every 4 hours PRN Severe Pain (7 - 10)      FAMILY HISTORY:  Family history of primary TB    Family history of CVA    Family history of diabetes mellitus        SOCIAL HISTORY:    CIGARETTES:        Vital Signs Last 24 Hrs  T(C): 36.6 (27 May 2023 11:58), Max: 36.6 (27 May 2023 09:18)  T(F): 97.8 (27 May 2023 11:58), Max: 97.9 (27 May 2023 09:18)  HR: 116 (27 May 2023 11:58) (83 - 116)  BP: 149/73 (27 May 2023 11:58) (149/73 - 161/89)  BP(mean): 95 (27 May 2023 11:58) (95 - 95)  RR: 18 (27 May 2023 11:58) (18 - 20)  SpO2: 97% (27 May 2023 11:58) (94% - 97%)    Parameters below as of 27 May 2023 11:58  Patient On (Oxygen Delivery Method): room air                INTERPRETATION OF TELEMETRY:    ECG:    I&O's Detail      LABS:                        13.7   19.86 )-----------( 294      ( 27 May 2023 11:28 )             41.0     05-27    133<L>  |  101  |  8   ----------------------------<  120<H>  3.8   |  25  |  0.59    Ca    9.2      27 May 2023 11:28    TPro  7.3  /  Alb  3.8  /  TBili  1.1  /  DBili  x   /  AST  26  /  ALT  26  /  AlkPhos  95  05-27    CARDIAC MARKERS ( 27 May 2023 11:28 )  x     / x     / 64 U/L / x     / x          PT/INR - ( 27 May 2023 11:28 )   PT: 17.2 sec;   INR: 1.48 ratio         PTT - ( 27 May 2023 11:28 )  PTT:29.6 sec    I&O's Summary    BNP  RADIOLOGY & ADDITIONAL STUDIES:

## 2023-05-27 NOTE — ED ADULT TRIAGE NOTE - CHIEF COMPLAINT QUOTE
pt presents to ED from home s/p fall 1130 pm last night. took ambien prior. unsure how she fell. denies LOC, headstrike. states hit head day before during fall. on xarelto. unable to get up after fall. this morning crawled to life alert and called EMS. c/o R hip pain. GCS 15. TA 0917.

## 2023-05-27 NOTE — ED PROVIDER NOTE - RATE
Pre-application: Motor, sensory, and vascular responses intact in the injured extremity./Post-application: Motor, sensory, and vascular responses intact in the injured extremity./The patient/caregiver verbalized understanding of how to care for the injured extremity with splint
137

## 2023-05-27 NOTE — H&P ADULT - HISTORY OF PRESENT ILLNESS
Pt is an 86 yo female with a pmh/o afib on xarelto, HLD, HTN, syncope, dehydration, who presents to Ed due to worsening shortness of breath and diarrhea. Pt states she has had liquid stool x 3 weeks, with up to five non bloody, dark brown, episodes a day without recent abx use or sick contacts. Pt states she was drinking 'plenty of water to stay hydrated' during this time however noticed worsening sob over past few days with inability to lie flat due to wheezing and dry cough. Pt also noted slight leg swelling. Pt was evaluated by GI and cardiology over past few days and has been on pepto bismol and coconut water for sx x two days with reduction to three BM but stool still watery.  Pt is an 88 yo female with a pmh/o afib on xarelto, HLD, HTN, syncope, dehydration,  presents to the ED with right hip pain after mechanical fall today. Patient denies headstrike or LOC. Patient takes Xarelto last dose was 1900 on 5/26. Of note, patient had hit her head on a door frame several days prior and has abrasion on forehead. Patient noticed immediate pain and inability to ambulate over the affected hip. Patient subsequently came to the ED for further evaluation and management. In the ED, endorses pain over the hip/groin area and pain with range of motion.    CT: RIGHT transcervical femoral neck fracture in varus position

## 2023-05-28 ENCOUNTER — RESULT REVIEW (OUTPATIENT)
Age: 88
End: 2023-05-28

## 2023-05-28 ENCOUNTER — TRANSCRIPTION ENCOUNTER (OUTPATIENT)
Age: 88
End: 2023-05-28

## 2023-05-28 LAB
ANION GAP SERPL CALC-SCNC: 10 MMOL/L — SIGNIFICANT CHANGE UP (ref 5–17)
ANION GAP SERPL CALC-SCNC: 8 MMOL/L — SIGNIFICANT CHANGE UP (ref 5–17)
APTT BLD: 28.5 SEC — SIGNIFICANT CHANGE UP (ref 27.5–35.5)
BUN SERPL-MCNC: 14 MG/DL — SIGNIFICANT CHANGE UP (ref 7–23)
BUN SERPL-MCNC: 15 MG/DL — SIGNIFICANT CHANGE UP (ref 7–23)
CALCIUM SERPL-MCNC: 8.4 MG/DL — LOW (ref 8.5–10.1)
CALCIUM SERPL-MCNC: 8.4 MG/DL — LOW (ref 8.5–10.1)
CHLORIDE SERPL-SCNC: 102 MMOL/L — SIGNIFICANT CHANGE UP (ref 96–108)
CHLORIDE SERPL-SCNC: 98 MMOL/L — SIGNIFICANT CHANGE UP (ref 96–108)
CO2 SERPL-SCNC: 23 MMOL/L — SIGNIFICANT CHANGE UP (ref 22–31)
CO2 SERPL-SCNC: 25 MMOL/L — SIGNIFICANT CHANGE UP (ref 22–31)
CREAT SERPL-MCNC: 0.62 MG/DL — SIGNIFICANT CHANGE UP (ref 0.5–1.3)
CREAT SERPL-MCNC: 0.66 MG/DL — SIGNIFICANT CHANGE UP (ref 0.5–1.3)
EGFR: 85 ML/MIN/1.73M2 — SIGNIFICANT CHANGE UP
EGFR: 87 ML/MIN/1.73M2 — SIGNIFICANT CHANGE UP
GLUCOSE BLDC GLUCOMTR-MCNC: 124 MG/DL — HIGH (ref 70–99)
GLUCOSE BLDC GLUCOMTR-MCNC: 154 MG/DL — HIGH (ref 70–99)
GLUCOSE SERPL-MCNC: 108 MG/DL — HIGH (ref 70–99)
GLUCOSE SERPL-MCNC: 112 MG/DL — HIGH (ref 70–99)
HCT VFR BLD CALC: 36.5 % — SIGNIFICANT CHANGE UP (ref 34.5–45)
HCT VFR BLD CALC: 37.4 % — SIGNIFICANT CHANGE UP (ref 34.5–45)
HGB BLD-MCNC: 12.2 G/DL — SIGNIFICANT CHANGE UP (ref 11.5–15.5)
HGB BLD-MCNC: 12.4 G/DL — SIGNIFICANT CHANGE UP (ref 11.5–15.5)
INR BLD: 1.24 RATIO — HIGH (ref 0.88–1.16)
MCHC RBC-ENTMCNC: 29.5 PG — SIGNIFICANT CHANGE UP (ref 27–34)
MCHC RBC-ENTMCNC: 30.3 PG — SIGNIFICANT CHANGE UP (ref 27–34)
MCHC RBC-ENTMCNC: 33.2 GM/DL — SIGNIFICANT CHANGE UP (ref 32–36)
MCHC RBC-ENTMCNC: 33.4 GM/DL — SIGNIFICANT CHANGE UP (ref 32–36)
MCV RBC AUTO: 88.8 FL — SIGNIFICANT CHANGE UP (ref 80–100)
MCV RBC AUTO: 90.6 FL — SIGNIFICANT CHANGE UP (ref 80–100)
PLATELET # BLD AUTO: 233 K/UL — SIGNIFICANT CHANGE UP (ref 150–400)
PLATELET # BLD AUTO: 257 K/UL — SIGNIFICANT CHANGE UP (ref 150–400)
POTASSIUM SERPL-MCNC: 3.7 MMOL/L — SIGNIFICANT CHANGE UP (ref 3.5–5.3)
POTASSIUM SERPL-MCNC: 4.1 MMOL/L — SIGNIFICANT CHANGE UP (ref 3.5–5.3)
POTASSIUM SERPL-SCNC: 3.7 MMOL/L — SIGNIFICANT CHANGE UP (ref 3.5–5.3)
POTASSIUM SERPL-SCNC: 4.1 MMOL/L — SIGNIFICANT CHANGE UP (ref 3.5–5.3)
PROTHROM AB SERPL-ACNC: 14.4 SEC — HIGH (ref 10.5–13.4)
RBC # BLD: 4.03 M/UL — SIGNIFICANT CHANGE UP (ref 3.8–5.2)
RBC # BLD: 4.21 M/UL — SIGNIFICANT CHANGE UP (ref 3.8–5.2)
RBC # FLD: 13.1 % — SIGNIFICANT CHANGE UP (ref 10.3–14.5)
RBC # FLD: 13.1 % — SIGNIFICANT CHANGE UP (ref 10.3–14.5)
SODIUM SERPL-SCNC: 133 MMOL/L — LOW (ref 135–145)
SODIUM SERPL-SCNC: 133 MMOL/L — LOW (ref 135–145)
WBC # BLD: 15.29 K/UL — HIGH (ref 3.8–10.5)
WBC # BLD: 18.81 K/UL — HIGH (ref 3.8–10.5)
WBC # FLD AUTO: 15.29 K/UL — HIGH (ref 3.8–10.5)
WBC # FLD AUTO: 18.81 K/UL — HIGH (ref 3.8–10.5)

## 2023-05-28 PROCEDURE — 73501 X-RAY EXAM HIP UNI 1 VIEW: CPT | Mod: 26,RT

## 2023-05-28 PROCEDURE — 99232 SBSQ HOSP IP/OBS MODERATE 35: CPT

## 2023-05-28 PROCEDURE — 88305 TISSUE EXAM BY PATHOLOGIST: CPT | Mod: 26

## 2023-05-28 PROCEDURE — 88311 DECALCIFY TISSUE: CPT | Mod: 26

## 2023-05-28 RX ORDER — TRANEXAMIC ACID 100 MG/ML
1000 INJECTION, SOLUTION INTRAVENOUS ONCE
Refills: 0 | Status: COMPLETED | OUTPATIENT
Start: 2023-05-28 | End: 2023-05-28

## 2023-05-28 RX ORDER — ONDANSETRON 8 MG/1
4 TABLET, FILM COATED ORAL EVERY 6 HOURS
Refills: 0 | Status: DISCONTINUED | OUTPATIENT
Start: 2023-05-28 | End: 2023-05-31

## 2023-05-28 RX ORDER — OXYCODONE HYDROCHLORIDE 5 MG/1
5 TABLET ORAL
Refills: 0 | Status: DISCONTINUED | OUTPATIENT
Start: 2023-05-28 | End: 2023-05-29

## 2023-05-28 RX ORDER — OXYCODONE HYDROCHLORIDE 5 MG/1
2.5 TABLET ORAL
Refills: 0 | Status: DISCONTINUED | OUTPATIENT
Start: 2023-05-28 | End: 2023-05-29

## 2023-05-28 RX ORDER — CEFAZOLIN SODIUM 1 G
2000 VIAL (EA) INJECTION EVERY 8 HOURS
Refills: 0 | Status: COMPLETED | OUTPATIENT
Start: 2023-05-28 | End: 2023-05-28

## 2023-05-28 RX ORDER — POLYETHYLENE GLYCOL 3350 17 G/17G
17 POWDER, FOR SOLUTION ORAL AT BEDTIME
Refills: 0 | Status: DISCONTINUED | OUTPATIENT
Start: 2023-05-28 | End: 2023-05-31

## 2023-05-28 RX ORDER — ACETAMINOPHEN 500 MG
975 TABLET ORAL EVERY 8 HOURS
Refills: 0 | Status: DISCONTINUED | OUTPATIENT
Start: 2023-05-28 | End: 2023-05-31

## 2023-05-28 RX ORDER — MAGNESIUM HYDROXIDE 400 MG/1
30 TABLET, CHEWABLE ORAL DAILY
Refills: 0 | Status: DISCONTINUED | OUTPATIENT
Start: 2023-05-28 | End: 2023-05-31

## 2023-05-28 RX ORDER — RIVAROXABAN 15 MG-20MG
20 KIT ORAL
Refills: 0 | Status: DISCONTINUED | OUTPATIENT
Start: 2023-05-29 | End: 2023-05-31

## 2023-05-28 RX ORDER — SENNA PLUS 8.6 MG/1
2 TABLET ORAL AT BEDTIME
Refills: 0 | Status: DISCONTINUED | OUTPATIENT
Start: 2023-05-28 | End: 2023-05-31

## 2023-05-28 RX ORDER — FENTANYL CITRATE 50 UG/ML
50 INJECTION INTRAVENOUS
Refills: 0 | Status: DISCONTINUED | OUTPATIENT
Start: 2023-05-28 | End: 2023-05-28

## 2023-05-28 RX ORDER — HYDROMORPHONE HYDROCHLORIDE 2 MG/ML
0.5 INJECTION INTRAMUSCULAR; INTRAVENOUS; SUBCUTANEOUS
Refills: 0 | Status: DISCONTINUED | OUTPATIENT
Start: 2023-05-28 | End: 2023-05-28

## 2023-05-28 RX ADMIN — SIMVASTATIN 20 MILLIGRAM(S): 20 TABLET, FILM COATED ORAL at 21:57

## 2023-05-28 RX ADMIN — HYDROMORPHONE HYDROCHLORIDE 0.5 MILLIGRAM(S): 2 INJECTION INTRAMUSCULAR; INTRAVENOUS; SUBCUTANEOUS at 10:28

## 2023-05-28 RX ADMIN — Medication 110 MILLIGRAM(S): at 17:02

## 2023-05-28 RX ADMIN — TRANEXAMIC ACID 220 MILLIGRAM(S): 100 INJECTION, SOLUTION INTRAVENOUS at 08:30

## 2023-05-28 RX ADMIN — Medication 975 MILLIGRAM(S): at 21:57

## 2023-05-28 RX ADMIN — HYDROMORPHONE HYDROCHLORIDE 0.5 MILLIGRAM(S): 2 INJECTION INTRAMUSCULAR; INTRAVENOUS; SUBCUTANEOUS at 10:41

## 2023-05-28 RX ADMIN — HYDROMORPHONE HYDROCHLORIDE 1 MILLIGRAM(S): 2 INJECTION INTRAMUSCULAR; INTRAVENOUS; SUBCUTANEOUS at 06:15

## 2023-05-28 RX ADMIN — TRANEXAMIC ACID 220 MILLIGRAM(S): 100 INJECTION, SOLUTION INTRAVENOUS at 09:31

## 2023-05-28 RX ADMIN — Medication 100 MILLIGRAM(S): at 21:57

## 2023-05-28 RX ADMIN — Medication 100 MILLIGRAM(S): at 14:14

## 2023-05-28 RX ADMIN — CARVEDILOL PHOSPHATE 12.5 MILLIGRAM(S): 80 CAPSULE, EXTENDED RELEASE ORAL at 14:32

## 2023-05-28 RX ADMIN — Medication 975 MILLIGRAM(S): at 22:08

## 2023-05-28 RX ADMIN — HYDROMORPHONE HYDROCHLORIDE 1 MILLIGRAM(S): 2 INJECTION INTRAMUSCULAR; INTRAVENOUS; SUBCUTANEOUS at 05:53

## 2023-05-28 RX ADMIN — CARVEDILOL PHOSPHATE 12.5 MILLIGRAM(S): 80 CAPSULE, EXTENDED RELEASE ORAL at 18:48

## 2023-05-28 RX ADMIN — HYDROMORPHONE HYDROCHLORIDE 0.5 MILLIGRAM(S): 2 INJECTION INTRAMUSCULAR; INTRAVENOUS; SUBCUTANEOUS at 11:00

## 2023-05-28 NOTE — PROGRESS NOTE ADULT - SUBJECTIVE AND OBJECTIVE BOX
Patient seen and examined at bedside. Pain is controlled. Patient denies any numbness, tingling, weakness, or any other orthopaedic complaint.    Exam:  T(C): 36.2 (05-28-23 @ 11:48), Max: 37.4 (05-28-23 @ 10:12)  T(F): 97.2 (05-28-23 @ 11:48), Max: 99.3 (05-28-23 @ 10:12)  HR: 86 (05-28-23 @ 11:48) (64 - 86)  BP: 119/70 (05-28-23 @ 11:48) (119/70 - 143/86)  RR: 18 (05-28-23 @ 11:48) (11 - 20)  SpO2: 98% (05-28-23 @ 11:48) (94% - 98%)    Gen: Resting in bed, no acute distress  RLE  Dressing in place, clean/dry/intact. [place] Abduction Pillow in Place.  Meredith-incisional tenderness to palpation; otherwise, NTTP throughout the rest of the extremity.   SILT L2-S1.  GSC/TA/EHL/FHL intact. Q/H unable to assess 2' pain.   DP pulse palpable.   No calf tenderness bilaterally.   Compartments soft and compressible.         PT/INR - ( 28 May 2023 03:33 )   PT: 14.4 sec;   INR: 1.24 ratio         PTT - ( 28 May 2023 03:33 )  PTT:28.5 sec    Assessment and Plan:  88y Female with R femoral neck hip fx s/p POD0 R PADDY    Follow up postoperative labs  WBAT/PT/OT  Posterior hip precautions  Pain management PRN  Continue prophylactic antibiotics  DVT PPx: hold until POD1, per primary team/AC team, xarelto  Incentive spirometry  Dispo: pending recommendations per PT  Will discuss with Dr. Edwards and advise of any changes to the plan.

## 2023-05-28 NOTE — DISCHARGE NOTE PROVIDER - NSDCMRMEDTOKEN_GEN_ALL_CORE_FT
ascorbic acid 500 mg oral tablet: 1 tab(s) orally once a day  carvedilol 12.5 mg oral tablet: 1 tab(s) orally 2 times a day  dilTIAZem 180 mg/24 hours oral capsule, extended release: 1 cap(s) orally once a day  Multiple Vitamins oral tablet: 1 tab(s) orally once a day  rivaroxaban 20 mg oral tablet: 1 tab(s) orally once a day (before a meal)  simvastatin 20 mg oral tablet: 1 tab(s) orally once a day (at bedtime)  Tylenol 325 mg oral tablet: 2 tab(s) orally 2 times a day as needed for  Vitamin B-12 1000 mcg oral tablet: 2 tab(s) orally once a day  Vitamin D3 25 mcg (1000 intl units) oral tablet: 1 tab(s) orally 2 times a day   acetaminophen 500 mg oral tablet: 2 orally every 8 hours as needed for  mild pain  ascorbic acid 500 mg oral tablet: 1 tab(s) orally once a day  dilTIAZem 180 mg/24 hours oral capsule, extended release: 1 cap(s) orally once a day  doxycycline hyclate 100 mg oral tablet: 1 orally every 12 hours last dose 6/3/23 to complete 7 days  HYDROmorphone 2 mg oral tablet: 1 tab(s) orally every 4 hours as needed for  moderate pain  HYDROmorphone 4 mg oral tablet: 1 tab(s) orally every 4 hours as needed for  severe pain  metoprolol tartrate 37.5 mg oral tablet: 1 tab(s) orally 2 times a day  Multiple Vitamins oral tablet: 1 tab(s) orally once a day  polyethylene glycol 3350 oral powder for reconstitution: 17 gram(s) orally once a day (at bedtime)  rivaroxaban 20 mg oral tablet: 1 tab(s) orally once a day (before a meal)  senna leaf extract oral tablet: 2 tab(s) orally once a day (at bedtime)  simvastatin 20 mg oral tablet: 1 tab(s) orally once a day (at bedtime)  Vitamin B-12 1000 mcg oral tablet: 2 tab(s) orally once a day  Vitamin D3 25 mcg (1000 intl units) oral tablet: 1 tab(s) orally 2 times a day

## 2023-05-28 NOTE — PROGRESS NOTE ADULT - SUBJECTIVE AND OBJECTIVE BOX
Patient is a 88y old  Female who presents with a chief complaint of fall fracture (28 May 2023 07:05)    5/28- postop , denies CP or SOB     MEDICATIONS  (STANDING):  acetaminophen     Tablet .. 975 milliGRAM(s) Oral every 8 hours  carvedilol 12.5 milliGRAM(s) Oral every 12 hours  ceFAZolin   IVPB 2000 milliGRAM(s) IV Intermittent every 8 hours  cyanocobalamin 1000 MICROGram(s) Oral daily  diltiazem    milliGRAM(s) Oral daily  doxycycline IVPB 100 milliGRAM(s) IV Intermittent once  doxycycline IVPB      doxycycline IVPB 100 milliGRAM(s) IV Intermittent every 12 hours  multivitamin 1 Tablet(s) Oral daily  polyethylene glycol 3350 17 Gram(s) Oral at bedtime  senna 2 Tablet(s) Oral at bedtime  simvastatin 20 milliGRAM(s) Oral at bedtime  sodium chloride 0.9%. 1000 milliLiter(s) (75 mL/Hr) IV Continuous <Continuous>    MEDICATIONS  (PRN):  magnesium hydroxide Suspension 30 milliLiter(s) Oral daily PRN Constipation  ondansetron Injectable 4 milliGRAM(s) IV Push every 6 hours PRN Nausea and/or Vomiting  oxyCODONE    IR 5 milliGRAM(s) Oral every 3 hours PRN Severe Pain (7 - 10)  oxyCODONE    IR 2.5 milliGRAM(s) Oral every 3 hours PRN Moderate Pain (4 - 6)            Vital Signs Last 24 Hrs  T(C): 36.2 (28 May 2023 11:48), Max: 37.4 (28 May 2023 10:12)  T(F): 97.2 (28 May 2023 11:48), Max: 99.3 (28 May 2023 10:12)  HR: 86 (28 May 2023 11:48) (64 - 86)  BP: 119/70 (28 May 2023 11:48) (119/70 - 143/86)  BP(mean): --  RR: 18 (28 May 2023 11:48) (11 - 20)  SpO2: 98% (28 May 2023 11:48) (93% - 99%)    Parameters below as of 28 May 2023 11:48  Patient On (Oxygen Delivery Method): room air                INTERPRETATION OF TELEMETRY:    ECG:        LABS:                        12.2   18.81 )-----------( 233      ( 28 May 2023 10:38 )             36.5     05-28    133<L>  |  102  |  14  ----------------------------<  108<H>  4.1   |  23  |  0.66    Ca    8.4<L>      28 May 2023 10:38    TPro  7.3  /  Alb  3.8  /  TBili  1.1  /  DBili  x   /  AST  26  /  ALT  26  /  AlkPhos  95  05-27    CARDIAC MARKERS ( 27 May 2023 11:28 )  x     / x     / 64 U/L / x     / x          PT/INR - ( 28 May 2023 03:33 )   PT: 14.4 sec;   INR: 1.24 ratio         PTT - ( 28 May 2023 03:33 )  PTT:28.5 sec    I&O's Summary    27 May 2023 07:01  -  28 May 2023 07:00  --------------------------------------------------------  IN: 0 mL / OUT: 1600 mL / NET: -1600 mL    28 May 2023 07:01  -  28 May 2023 12:01  --------------------------------------------------------  IN: 800 mL / OUT: 0 mL / NET: 800 mL      BNP  RADIOLOGY & ADDITIONAL STUDIES:

## 2023-05-28 NOTE — DISCHARGE NOTE PROVIDER - NSDCFUADDINST_GEN_ALL_CORE_FT
Discharge Instructions Total Hip Arthroplasty    1. ACTIVITY: You may weight bear as tolerated on your lower extremity. Please use assistive devices (i.e. rolling walker or cane). You should receive Physical Therapy daily. Please adhere to the posterior hip dislocation precautions. You should wear the Abduction Pillow while in bed or while sitting in chair for the first 6 weeks after surgery.   2. CALL with fever over 101F, wound redness, drainage or open area, calf pain/calf swelling.  3. BANDAGE: On postoperative day 7, please place a new Mepilex Ag bandage over the incision. You may change sooner ONLY if leaks or falls off. DO NOT REMOVE BANDAGE TO CHECK WOUND ON INTAKE.  4. SHOWER:  You are allowed to shower. Do NOT submerge surgical site in water. No baths/pools/hot tubs.  5. STAPLES: RN Remove Staples POD14 (6/11/23)   6. DVT PE Prophylaxis: Managed by Anticoag Team. See Med Rec.  7.GI: Continue Protonix daily while on Anticoagulant. an eRx has been sent to your pharmacy.  8. LABS: INR if on Coumadin.  9. FOLLOW UP: Dr. Edwards in 1 month. Please call office to schedule.   10. MEDICATIONS:  If going home, eRX sent to your pharmacy for .   11. Please call the office if medications are not covered under your insurance, especially BLOOD CLOT PREVENTION/anticoagulant medication.     Discharge Instructions for Right Total Hip Arthroplasty for fracture:    1. PAIN CONTROL: See Med Rec.  2. ACTIVITY: Weight Bearing as Tolerated with assistance and rolling walker. Posterior Hip Precautions. Abduction pillow while in bed or chair for 6 weeks.  3. PT: daily, Posterior Hip Precautions.  4. DVT/PE PROPHYLAXIS: Continue DVT/PE Prophylaxis. See Med Rec for Duration and dose.  5. BANDAGE: Change dressing to a new Mepilex Ag bandage POD7 (6/4/23). May change sooner if dressing saturated or falling off. DO NOT REMOVE BANDAGE TO CHECK WOUND ON INTAKE.  6. STAPLES: RN Remove Staples POD14 (6/11/23).  7. SHOWER: Okay to shower. Do not soak, submerge or let shower stream beat on dressing/wound.  8. FOLLOW UP: Follow-up with Dr. Edwards in 1 month. Call office for appointment. Please perform portable x-rays of right hip and femur at Rehab POD 14 (6/11/23) before follow up.

## 2023-05-28 NOTE — DISCHARGE NOTE PROVIDER - PROVIDER TOKENS
PROVIDER:[TOKEN:[[id number]],ESTABLISHEDPATIENT:[T]] PROVIDER:[TOKEN:[[id number]],ESTABLISHEDPATIENT:[T]],PROVIDER:[TOKEN:[[id number]],FOLLOWUP:[2 weeks]],PROVIDER:[TOKEN:[[id number]],FOLLOWUP:[2 weeks]]

## 2023-05-28 NOTE — DISCHARGE NOTE PROVIDER - HOSPITAL COURSE
Orthopedic Summary  H&P:  Pt is a 88y Female    PAST MEDICAL & SURGICAL HISTORY:  Syncope  7/2/2013      HTN (hypertension)      Hyperlipidemia      Afib      A-fib      Bronchitis      Cardiac abnormality  internal cardiac monitor placed 8/2013      H/O: hysterectomy      S/P cataract surgery  Left eye.            Now s/p Right Total Hip Arthroplasty for fracture. Pt is afebrile with stable vital signs. Pain is controlled. Exam reveals intact EHL FHL TA GS, +DP. Dressing is clean and dry.    Hospital Course:  Patient presented to [facility] ED after a fall, found to have a right hip fracture, and admitted to the Medical Service. Pt was medically/cardiac/pulmonary cleared prior to surgery. Prophylactic antibiotics were started before the procedure and continued for 24 hours. They were admitted after surgery to the orthopedic floor.  There were no orthopedic complications during the hospital stay. All home medications were continued.    Routine consults were obtained from the Anticoagulation Team for DVT/PE prophylaxis, from Physical Therapy, and followed by Medicine for Co-management. Patient was placed on  anticoagulation.  Pertinent home medications were continued.  Daily labs were followed.      On POD 0 there were no major issues. Pt received PT daily and was Discharged once cleared per Medicine.  The orthopedic Attending is aware and agrees. See addendum to DC summary per medical team below for any additional info or if any changes. Orthopedic Summary  87F, pmh of afib on xarelto, HTN, HLD, presented to the ED on 5/27/23 w/ right hip pain after a fall. Patient denies headstrike or LOC. Patient takes Xarelto last doses 1900 on 5/26. Of note, patient had hit her head on a door frame several days prior and has abrasion on forehead. Patient noticed immediate pain and inability to ambulate over the affected hip. Patient subsequently came to the ED for further evaluation and management. She was admitted with right hip fracture. Underwent right hip replacement 5/28. Hospital course notable for leukocytosis. On doxycycline for treatment of pna. Was followed by pulmonary while here. She does have some findings on CT chest which will need follow up, pt is aware. She is medically stable for dc to [facility]. she will be discharged on doxycycline to complete 7 days treatment per d/w pulmonary.     for physical exam please see progress note from 5/31    LABS:                        12.1   13.73 )-----------( 255      ( 31 May 2023 08:41 )             36.0     05-31    132<L>  |  100  |  8   ----------------------------<  111<H>  3.8   |  26  |  0.52    Ca    8.5      31 May 2023 08:41    TPro  6.6  /  Alb  2.8<L>  /  TBili  0.7  /  DBili  x   /  AST  30  /  ALT  18  /  AlkPhos  68  05-30    < from: CT Abdomen and Pelvis w/ IV Cont (05.27.23 @ 10:11) >    FINDINGS:  CHEST:  LUNGS AND LARGE AIRWAYS: Patent central airways.  Ill-defined 10 mm   posterior RUL focal airspace opacity or nodule (4/118). 4 mm subpleural   RUL (106) and JEFFREY nodules (105). Segmental anterior-inferior RUL   reticular changes, indistinct centrilobular nodularity and bronchiectasis   (4/130). Apical scarring and minimal interlobular septal thickening  PLEURA: No pleural effusion or pneumothorax.  VESSELS: No aortic dilatation/dissection.  HEART: Borderline cardiomegaly. No pericardial effusion.  MEDIASTINUM AND SHANNA: Multiple predominantly centimeter/subcentimeter   sized mediastinal lymph nodes. Largest: Precarinal 19 x 13 mm (4/95))  CHEST WALL AND LOWER NECK: Nonspecific punctate LEFT breast calcification     ABDOMEN AND PELVIS:  LIVER: Centimeter sized lateral LEFT hepatic hypodensity is statistically   of no significance  BILE DUCTS: Normal caliber.  GALLBLADDER: Within normal limits.  SPLEEN: Within normal limits.  PANCREAS: Within normal limits.  ADRENALS: Within normal limits.  KIDNEYS/URETERS: Symmetric renal enhancement without calculi/ obstructive   uropathy. Bilateral extrarenal pelvises    BLADDER: Distended urinary bladder  REPRODUCTIVE ORGANS: Hysterectomy. No adnexal mass.    BOWEL: Small hiatus hernia. No bowel obstruction. Nonvisualized appendix.   No appendicitis . Sigmoiddiverticulosis without diverticulitis .   Rectosigmoid fecal distention  PERITONEUM: No ascites.  VESSELS: Atherosclerotic changes.  RETROPERITONEUM/LYMPH NODES: No lymphadenopathy.  ABDOMINAL WALL: Tiny fat-containing umbilical hernia.  BONES: RIGHT transcervical femoral neck fracture in varus position. . No   other acute/displaced fracture of included axial skeleton. Thoracolumbar   spondylosis    IMPRESSION:  1.  RIGHT transcervical femoral neck fracture in varus position  2.  Anterior RUL reticular changes/centrilobular   nodularity/bronchiectasis suggests age-indeterminate inflammatory/   infectious disease including but not limited to non-tuberculous   mycobacterial infection.  3.  Indeterminant Ill-defined 10 mm posterior RUL focal airspace opacity   or  nodule (4/118). 4 mm subpleural RUL (106) and JEFFREY nodules (105).  4.  Borderline-sized mediastinal lymph nodes favor a reactive etiology.    FINAL DIAGNOSIS:  1. Right hip fracture-s/p right hip arthroplasty pod#3  2. Leukocytosis with RUL opacity/reticular changes/pna  3. Renal enhancement/Bladder distension  4. Chronic atrial fibrillation  5. Hyponatremia  6. Left breast calcification-->outpt f/u    time taken for dc 50 min  d/w pt  summary to be faxed to pcp

## 2023-05-28 NOTE — DISCHARGE NOTE PROVIDER - NSDCCPCAREPLAN_GEN_ALL_CORE_FT
PRINCIPAL DISCHARGE DIAGNOSIS  Diagnosis: Fracture of right hip  Assessment and Plan of Treatment:      PRINCIPAL DISCHARGE DIAGNOSIS  Diagnosis: Fracture of right hip  Assessment and Plan of Treatment: physical therapy at rehab.   follow  up with ortho as advised.   see post op instructions as outlined by orthopedics.      SECONDARY DISCHARGE DIAGNOSES  Diagnosis: Pneumonia  Assessment and Plan of Treatment: take doxycycline as prescribed  follow up with Dr. Bridges after discharge from rehab.   you will need to have a repeat CT scan of your chest.        Diagnosis: Calcification of breast  Assessment and Plan of Treatment: you were noted to have calcification of your left breast  follow up with your pcp regarding this

## 2023-05-28 NOTE — CONSULT NOTE ADULT - ASSESSMENT
* RIGHT transcervical femoral neck fracture * BLADDER: Distended urinary bladder per CT* Leukocytosis  predominantly neutrophiles  repeat in am  no localizing signs  abnormal CT chest tho Pulm consult  * Chronic AF  1. Anterior RUL reticular changes/centrilobular   nodularity/bronchiectasis suggests age-indeterminate inflammatory/   infectious disease including but not limited to non-tuberculous   mycobacterial infection.  pulm consult  2. Nonspecific punctate LEFT breast calcification  OP f/up    3. lung nodules OP f/up   RIGHT transcervical femoral neck fracture   remote tele  low risk for periop complications if AF remains controlled  prn analgesia    * BLADDER: Distended urinary bladder per CT  bladder scan q8H    * Leukocytosis  predominantly neutrophiles  repeat in am  no localizing signs  abnormal CT chest tho Pulm consult      * Chronic AF  remote tele  resume Cardizem and Coreg  hold AC for tonight    * Multiple incidental findings:    1. Anterior RUL reticular changes/centrilobular   nodularity/bronchiectasis suggests age-indeterminate inflammatory/   infectious disease including but not limited to non-tuberculous   mycobacterial infection.  pulm consult    2. Nonspecific punctate LEFT breast calcification  OP f/up    3. lung nodules OP f/up PROBLEMS:    RIGHT transcervical femoral neck fracture  Leukocytosis-predominantly neutrophiles-No localizing signs  Anterior RUL reticular changes/centrilobular nodularity/bronchiectasis suggests age-indeterminate inflammatory/   infectious disease including but not limited to non-tuberculous mycobacterial infection.  Nonspecific punctate LEFT breast calcification  Lung nodules   Chronic Afib    PLAN:    Pat condition optimized no absolute contra-indication  CT chest-indeterminate inflammatory/ infectious changes-will fu clinically-monitor during hospitalization-? ABx- started Iv doxy    Lung nodules OP f/up  DVT prophylasix

## 2023-05-28 NOTE — DISCHARGE NOTE PROVIDER - CARE PROVIDER_API CALL
GALA BAE  [address] [place], [place] [address]  Phone: [phone]  Established Patient  Follow Up Time:    GALA BAE  [address] [place] [address]  Phone: [phone]  Established Patient  Follow Up Time:     Carlos Eduardo Bridges  Pulmonary Disease  [address] [place] [address]  Phone: [phone]  Fax: [phone]  Follow Up Time: 2 weeks    Kalin Iniguez  [address] [address]  Phone: [phone]  Fax: [phone]  Follow Up Time: 2 weeks

## 2023-05-28 NOTE — DISCHARGE NOTE PROVIDER - DETAILS OF MALNUTRITION DIAGNOSIS/DIAGNOSES
This patient has been assessed with a concern for Malnutrition and was treated during this hospitalization for the following Nutrition diagnosis/diagnoses:     -  05/31/2023: Severe protein-calorie malnutrition

## 2023-05-28 NOTE — PROGRESS NOTE ADULT - SUBJECTIVE AND OBJECTIVE BOX
Patient seen and examined at bedside. Pain well controlled with medication. Patient denies any numbness, tingling, weakness, or any other orthopaedic complaint. Denies N/V/CP/SOB.       VITAL SIGNS:  T(C): 36.4 (05-28-23 @ 00:35), Max: 36.8 (05-27-23 @ 14:41)  HR: 83 (05-28-23 @ 00:35) (64 - 116)  BP: 143/86 (05-28-23 @ 00:35) (134/67 - 161/89)  RR: 18 (05-28-23 @ 00:35) (18 - 20)  SpO2: 94% (05-28-23 @ 00:35) (93% - 99%)      LABS:                        12.4   15.29 )-----------( 257      ( 28 May 2023 03:33 )             37.4     05-28    133<L>  |  98  |  15  ----------------------------<  112<H>  3.7   |  25  |  0.62    Ca    8.4<L>      28 May 2023 03:33    TPro  7.3  /  Alb  3.8  /  TBili  1.1  /  DBili  x   /  AST  26  /  ALT  26  /  AlkPhos  95  05-27    PT/INR - ( 28 May 2023 03:33 )   PT: 14.4 sec;   INR: 1.24 ratio         PTT - ( 28 May 2023 03:33 )  PTT:28.5 sec          Gen: Resting in bed, NAD  R LE:   Skin intact. No edema, erythema, or lesions throughout the extremity. Extremity is shortened and externally rotated.   TTP over the hip/groin. Pain with AROM/PROM of the hip. NTTP throughout the rest of the extremity.   SILT L2-S1  GSC/TA/EHL/FHL intact; Q/H unable to assess 2/2 pain.   DP pulse palpable.   No calf tenderness bilaterally.   Compartments soft and compressible.               Assessment and Plan  88y Female with R femoral neck hip fracture    Imaging findings reviewed and discussed with the patient. Need for operative intervention discussed with patient and patient's son Juwan Cabello at bedside.  Plan for OR for operative fixation of hip fracture TODAY first case- please document medical/other clearance as necessary  NWB, strict bedrest  NPO except meds with IVF  DVT ppx hold chemical anticoagulation, SCDs  Preoperative labs, imaging  Dispo: pending OR for fixation of hip fracture  Presentation and imaging discussed with Dr. Edwards who agrees with plan. Will advise if plan changes.

## 2023-05-28 NOTE — CONSULT NOTE ADULT - SUBJECTIVE AND OBJECTIVE BOX
HPI:  Pt is an 88 yo female with a pmh/o afib on xarelto, HLD, HTN, syncope, dehydration,  presents to the ED with right hip pain after mechanical fall today. Patient denies headstrike or LOC. Patient takes Xarelto last dose was 1900 on 5/26. Of note, patient had hit her head on a door frame several days prior and has abrasion on forehead. Patient noticed immediate pain and inability to ambulate over the affected hip. Patient subsequently came to the ED for further evaluation and management. In the ED, endorses pain over the hip/groin area and pain with range of motion.    CT: RIGHT transcervical femoral neck fracture in varus position (27 May 2023 14:17)      PAST MEDICAL & SURGICAL HISTORY:  Syncope  7/2/2013      HTN (hypertension)      Hyperlipidemia      Afib      A-fib      Bronchitis      Cardiac abnormality  internal cardiac monitor placed 8/2013      H/O: hysterectomy      S/P cataract surgery  Left eye.          Home Medications:  ascorbic acid 500 mg oral tablet: 1 tab(s) orally once a day (27 May 2023 13:17)  carvedilol 12.5 mg oral tablet: 1 tab(s) orally 2 times a day (27 May 2023 13:17)  dilTIAZem 180 mg/24 hours oral capsule, extended release: 1 cap(s) orally once a day (27 May 2023 13:09)  Multiple Vitamins oral tablet: 1 tab(s) orally once a day (27 May 2023 13:17)  simvastatin 20 mg oral tablet: 1 tab(s) orally once a day (at bedtime) (27 May 2023 13:09)  Tylenol 325 mg oral tablet: 2 tab(s) orally 2 times a day as needed for (27 May 2023 13:17)  Vitamin B-12 1000 mcg oral tablet: 2 tab(s) orally once a day (27 May 2023 13:09)  Vitamin D3 25 mcg (1000 intl units) oral tablet: 1 tab(s) orally 2 times a day (27 May 2023 13:09)      MEDICATIONS  (STANDING):  carvedilol 12.5 milliGRAM(s) Oral every 12 hours  cyanocobalamin 1000 MICROGram(s) Oral daily  diltiazem    milliGRAM(s) Oral daily  simvastatin 20 milliGRAM(s) Oral at bedtime  sodium chloride 0.9%. 1000 milliLiter(s) (75 mL/Hr) IV Continuous <Continuous>    MEDICATIONS  (PRN):  HYDROmorphone  Injectable 1 milliGRAM(s) IV Push every 3 hours PRN Severe Pain (7 - 10)  morphine  - Injectable 4 milliGRAM(s) IV Push every 3 hours PRN Moderate Pain (4 - 6)      Allergies    penicillin (Unknown)  erythromycin (Diarrhea)  shellfish (Unknown)    Intolerances        SOCIAL HISTORY: Denies tobacco, etoh abuse or illicit drug use    FAMILY HISTORY:  Family history of primary TB    Family history of CVA    Family history of diabetes mellitus        Vital Signs Last 24 Hrs  T(C): 36.4 (28 May 2023 00:35), Max: 36.8 (27 May 2023 14:41)  T(F): 97.5 (28 May 2023 00:35), Max: 98.3 (27 May 2023 14:41)  HR: 83 (28 May 2023 00:35) (64 - 116)  BP: 143/86 (28 May 2023 00:35) (134/67 - 161/89)  BP(mean): 95 (27 May 2023 11:58) (95 - 95)  RR: 18 (28 May 2023 00:35) (18 - 20)  SpO2: 94% (28 May 2023 00:35) (93% - 99%)    Parameters below as of 28 May 2023 00:35  Patient On (Oxygen Delivery Method): room air            REVIEW OF SYSTEMS:    CONSTITUTIONAL:  As per HPI.  HEENT:  Eyes:  No diplopia or blurred vision. ENT:  No earache, sore throat or runny nose.  CARDIOVASCULAR:  No pressure, squeezing, tightness, heaviness or aching about the chest, neck, axilla or epigastrium.  RESPIRATORY:  No cough, shortness of breath, PND or orthopnea.  GASTROINTESTINAL:  No nausea, vomiting or diarrhea.  GENITOURINARY:  No dysuria, frequency or urgency.  MUSCULOSKELETAL:  As per HPI.  SKIN:  No change in skin, hair or nails.  NEUROLOGIC:  No paresthesias, fasciculations, seizures or weakness.  PSYCHIATRIC:  No disorder of thought or mood.  ENDOCRINE:  No heat or cold intolerance, polyuria or polydipsia.  HEMATOLOGICAL:  No easy bruising or bleedings:  .     PHYSICAL EXAMINATION:    GENERAL APPEARANCE:  Pt. is not currently dyspneic, in no distress. Pt. is alert, oriented, and pleasant.  HEENT:  Pupils are normal and react normally. No icterus. Mucous membranes well colored.  NECK:  Supple. No lymphadenopathy. Jugular venous pressure not elevated. Carotids equal.   HEART:   The cardiac impulse has a normal quality. Regular. Normal S1 and S2. There are no murmurs, rubs or gallops noted  CHEST:  Chest is clear to auscultation. Normal respiratory effort.  ABDOMEN:  Soft and nontender.   EXTREMITIES:  There is no cyanosis, clubbing or edema.   SKIN:  No rash or significant lesions are noted.    LABS:                        12.4   15.29 )-----------( 257      ( 28 May 2023 03:33 )             37.4     05-28    133<L>  |  98  |  15  ----------------------------<  112<H>  3.7   |  25  |  0.62    Ca    8.4<L>      28 May 2023 03:33    TPro  7.3  /  Alb  3.8  /  TBili  1.1  /  DBili  x   /  AST  26  /  ALT  26  /  AlkPhos  95  05-27    LIVER FUNCTIONS - ( 27 May 2023 11:28 )  Alb: 3.8 g/dL / Pro: 7.3 gm/dL / ALK PHOS: 95 U/L / ALT: 26 U/L / AST: 26 U/L / GGT: x           PT/INR - ( 28 May 2023 03:33 )   PT: 14.4 sec;   INR: 1.24 ratio         PTT - ( 28 May 2023 03:33 )  PTT:28.5 sec  CARDIAC MARKERS ( 27 May 2023 11:28 )  x     / x     / 64 U/L / x     / x                  RADIOLOGY & ADDITIONAL STUDIES:        HPI:    86 yo female with a pmh/o afib on xarelto, HLD, HTN, syncope, dehydration,  presents to the ED with right hip pain after mechanical fall today. Patient denies headstrike or LOC. Patient takes Xarelto last dose was 1900 on 5/26. Of note, patient had hit her head on a door frame several days prior and has abrasion on forehead. Patient noticed immediate pain and inability to ambulate over the affected hip. Patient subsequently came to the ED for further evaluation and management. In the ED, endorses pain over the hip/groin area and pain with range of motion. CT: RIGHT transcervical femoral neck fracture in varus position, pat seen for pulmonary for RUL air space density, lying in bed comfortably.      PAST MEDICAL & SURGICAL HISTORY:  Syncope  7/2/2013      HTN (hypertension)      Hyperlipidemia      Afib      A-fib      Bronchitis      Cardiac abnormality  internal cardiac monitor placed 8/2013      H/O: hysterectomy      S/P cataract surgery  Left eye.          Home Medications:  ascorbic acid 500 mg oral tablet: 1 tab(s) orally once a day (27 May 2023 13:17)  carvedilol 12.5 mg oral tablet: 1 tab(s) orally 2 times a day (27 May 2023 13:17)  dilTIAZem 180 mg/24 hours oral capsule, extended release: 1 cap(s) orally once a day (27 May 2023 13:09)  Multiple Vitamins oral tablet: 1 tab(s) orally once a day (27 May 2023 13:17)  simvastatin 20 mg oral tablet: 1 tab(s) orally once a day (at bedtime) (27 May 2023 13:09)  Tylenol 325 mg oral tablet: 2 tab(s) orally 2 times a day as needed for (27 May 2023 13:17)  Vitamin B-12 1000 mcg oral tablet: 2 tab(s) orally once a day (27 May 2023 13:09)  Vitamin D3 25 mcg (1000 intl units) oral tablet: 1 tab(s) orally 2 times a day (27 May 2023 13:09)      MEDICATIONS  (STANDING):  carvedilol 12.5 milliGRAM(s) Oral every 12 hours  cyanocobalamin 1000 MICROGram(s) Oral daily  diltiazem    milliGRAM(s) Oral daily  simvastatin 20 milliGRAM(s) Oral at bedtime  sodium chloride 0.9%. 1000 milliLiter(s) (75 mL/Hr) IV Continuous <Continuous>    MEDICATIONS  (PRN):  HYDROmorphone  Injectable 1 milliGRAM(s) IV Push every 3 hours PRN Severe Pain (7 - 10)  morphine  - Injectable 4 milliGRAM(s) IV Push every 3 hours PRN Moderate Pain (4 - 6)      Allergies    penicillin (Unknown)  erythromycin (Diarrhea)  shellfish (Unknown)    Intolerances        SOCIAL HISTORY: Denies tobacco, etoh abuse or illicit drug use    FAMILY HISTORY:  Family history of primary TB    Family history of CVA    Family history of diabetes mellitus        Vital Signs Last 24 Hrs  T(C): 36.4 (28 May 2023 00:35), Max: 36.8 (27 May 2023 14:41)  T(F): 97.5 (28 May 2023 00:35), Max: 98.3 (27 May 2023 14:41)  HR: 83 (28 May 2023 00:35) (64 - 116)  BP: 143/86 (28 May 2023 00:35) (134/67 - 161/89)  BP(mean): 95 (27 May 2023 11:58) (95 - 95)  RR: 18 (28 May 2023 00:35) (18 - 20)  SpO2: 94% (28 May 2023 00:35) (93% - 99%)    Parameters below as of 28 May 2023 00:35  Patient On (Oxygen Delivery Method): room air            REVIEW OF SYSTEMS:    CONSTITUTIONAL:  As per HPI.  HEENT:  Eyes:  No diplopia or blurred vision. ENT:  No earache, sore throat or runny nose.  CARDIOVASCULAR:  No pressure, squeezing, tightness, heaviness or aching about the chest, neck, axilla or epigastrium.  RESPIRATORY:  No cough, shortness of breath, PND or orthopnea.  GASTROINTESTINAL:  No nausea, vomiting or diarrhea.  GENITOURINARY:  No dysuria, frequency or urgency.  MUSCULOSKELETAL:  As per HPI.  SKIN:  No change in skin, hair or nails.  NEUROLOGIC:  No paresthesias, fasciculations, seizures or weakness.  PSYCHIATRIC:  No disorder of thought or mood.  ENDOCRINE:  No heat or cold intolerance, polyuria or polydipsia.  HEMATOLOGICAL:  No easy bruising or bleedings:  .     PHYSICAL EXAMINATION:    GENERAL APPEARANCE:  Pt. is not currently dyspneic, in no distress. Pt. is alert, oriented, and pleasant.  HEENT:  Pupils are normal and react normally. No icterus. Mucous membranes well colored.  NECK:  Supple. No lymphadenopathy. Jugular venous pressure not elevated. Carotids equal.   HEART:   The cardiac impulse has a normal quality. Regular. Normal S1 and S2. There are no murmurs, rubs or gallops noted  CHEST:  Chest is clear to auscultation. Normal respiratory effort.  ABDOMEN:  Soft and nontender.   EXTREMITIES:  There is no cyanosis, clubbing or edema.   SKIN:  No rash or significant lesions are noted.    LABS:                        12.4   15.29 )-----------( 257      ( 28 May 2023 03:33 )             37.4     05-28    133<L>  |  98  |  15  ----------------------------<  112<H>  3.7   |  25  |  0.62    Ca    8.4<L>      28 May 2023 03:33    TPro  7.3  /  Alb  3.8  /  TBili  1.1  /  DBili  x   /  AST  26  /  ALT  26  /  AlkPhos  95  05-27    LIVER FUNCTIONS - ( 27 May 2023 11:28 )  Alb: 3.8 g/dL / Pro: 7.3 gm/dL / ALK PHOS: 95 U/L / ALT: 26 U/L / AST: 26 U/L / GGT: x           PT/INR - ( 28 May 2023 03:33 )   PT: 14.4 sec;   INR: 1.24 ratio         PTT - ( 28 May 2023 03:33 )  PTT:28.5 sec  CARDIAC MARKERS ( 27 May 2023 11:28 )  x     / x     / 64 U/L / x     / x              CT Chest w/ IV Cont (05.27.23 @ 10:11) >  IMPRESSION:  1.  RIGHT transcervical femoral neck fracture in varus position  2.  Anterior RUL reticular changes/centrilobular   nodularity/bronchiectasis suggests age-indeterminate inflammatory/   infectious disease including but not limited to non-tuberculous   mycobacterial infection.  3.  Indeterminant Ill-defined 10 mm posterior RUL focal airspace opacity   or  nodule (4/118). 4 mm subpleural RUL (106) and JEFFREY nodules (105).  4.  Borderline-sized mediastinal lymph nodes favor a reactive etiology.        RADIOLOGY & ADDITIONAL STUDIES:

## 2023-05-28 NOTE — PROGRESS NOTE ADULT - ASSESSMENT
86 yo female with a pmh/o afib on xarelto, HLD, HTN, syncope, dehydration,  presents to the ED on 5/27/23  with right hip pain after mechanical fall today. Patient denies headstrike or LOC. Patient takes Xarelto last dose was 1900 on 5/26. Of note, patient had hit her head on a door frame several days prior and has abrasion on forehead. Patient noticed immediate pain and inability to ambulate over the affected hip. Patient subsequently came to the ED for further evaluation and management. In the ED, endorses pain over the hip/groin area and pain with range of motion. CT: RIGHT transcervical femoral neck fracture in varus position       * RIGHT transcervical femoral neck fracture   - remote tele  - low risk for periop complications if AF remains controlled  - prn analgesia    * BLADDER: Distended urinary bladder per CT  bladder scan q8H    * Leukocytosis with RUL inflammatory changes probable  PNA   predominantly neutrophiles, no localizing signs  abnormal CT chest  -  Pulm consult   start doxycycline  - O2 supplementation as needed    * Chronic AF   remote tele  resume Cardizem and Coreg  hold AC for tonight  repeat ekg    * Multiple incidental findings:  1. Anterior RUL reticular changes/centrilobular  nodularity/bronchiectasis suggests age-indeterminate inflammatory/   infectious disease including but not limited to non-tuberculous  mycobacterial infection.  - pulm consult    2. Nonspecific punctate LEFT breast calcification  OP f/up    3. lung nodules OP f/up    DVt proph - restart AC once cleared by ortho team    Dispo - PT, pain, management , d/c planning , as per ortho post-op care

## 2023-05-28 NOTE — PROGRESS NOTE ADULT - ASSESSMENT
This is a 89 yo with h/o permanant AFIB , multiple attempts at cardioversion unsucessful on Xeralto , h/o hyperlipedemia , HTN , presents after getting up from bed and her leg gave out which lead to a fall and resultant hip Fx . Of not she took Ambien for the first time. She is usually very active and actually participates in [employer] classes at the pool. She has no Sxs of CP or SOB . She has never had syncope before.   5/15- Echo showed EF 55-60 with mild MR and aortic sclerosisi but no stenosis  Cardiac PET 11/21 - negative for ischemia   last dose of DOAC last night   given recent negative cardiac PET and echo and lack of SXs with good functional capacity she is optimized to proceed with ORIF from a cardiac standpoint,  elevated HR and BP from not taking meds and pain most likely   1) resume DOAC when OK with ortho   2) cont diltiazem er 180 daily , coreg 12.5 BID, simivastatin 20 qhs  will follow with you

## 2023-05-28 NOTE — PROVIDER CONTACT NOTE (OTHER) - SITUATION
patient has elevated heartrate, dr clemens instructed to give evening dose of carvidelol at this time.

## 2023-05-28 NOTE — PROGRESS NOTE ADULT - SUBJECTIVE AND OBJECTIVE BOX
Subjective:  Chief complain :  fall  and right hip pain    HPI:       88 yo female with a pmh/o afib on xarelto, HLD, HTN, syncope, dehydration,  presents to the ED on 5/27/23  with right hip pain after mechanical fall today. Patient denies headstrike or LOC. Patient takes Xarelto last dose was 1900 on 5/26. Of note, patient had hit her head on a door frame several days prior and has abrasion on forehead. Patient noticed immediate pain and inability to ambulate over the affected hip. Patient subsequently came to the ED for further evaluation and management. In the ED, endorses pain over the hip/groin area and pain with range of motion. CT: RIGHT transcervical femoral neck fracture in varus position     5/28 - s/p Right total hip arthroplasty     Patient seen and examined at bedside earlier today, denies pain, cp, dyspnea, has mild cough, denies abdominal pain, afebrile, plan discussed     Review of system- Rest of the review of system are negative except mentioned in HPI     Vital sings reviewed for last 24 h  T(C): 36.3 (05-28-23 @ 16:00), Max: 37.4 (05-28-23 @ 10:12)  HR: 91 (05-28-23 @ 16:00) (76 - 91)  BP: 126/62 (05-28-23 @ 16:00) (119/70 - 143/86)  RR: 18 (05-28-23 @ 16:00) (11 - 20)  SpO2: 94% (05-28-23 @ 16:00) (94% - 98%)  Wt(kg): --  Daily     Daily   CAPILLARY BLOOD GLUCOSE      POCT Blood Glucose.: 124 mg/dL (28 May 2023 10:21)      Physical exam:   General : NAD, appear to be of stated age , well groomed   NERVOUS SYSTEM:  Alert & Oriented X3, non- focal exam, Motor Strength 5/5 B/L upper and lower extremities; DTRs 2+ intact and symmetric  HEAD:  Atraumatic, Normocephalic  EYES: EOMI, PERRLA, conjunctiva and sclera clear  HEENT: Moist mucous membranes, Supple neck , No JVD  CHEST: Clear to auscultation bilaterally; No rales, no rhonchi, no wheezing  HEART: Regular rate and rhythm; No murmurs, no rubs or gallops  ABDOMEN: Soft, Non-tender, Non-distended; Bowel sounds present, no guarding , no peritoneal irritation   GENITOURINARY- Voiding, no suprapubic tenderness  EXTREMITIES:  2+ Peripheral Pulses, No clubbing, cyanosis,   edema  MUSCULOSKELETAL:- No muscle tenderness, Muscle tone normal, No joint tenderness, no Joint swelling,  Joint ROM –normal   SKIN-no rash, no lesion    Labs radiologic and other test : all reviewed and interpreted :                         12.2   18.81 )-----------( 233      ( 28 May 2023 10:38 )             36.5     05-28    133<L>  |  102  |  14  ----------------------------<  108<H>  4.1   |  23  |  0.66    Ca    8.4<L>      28 May 2023 10:38    TPro  7.3  /  Alb  3.8  /  TBili  1.1  /  DBili  x   /  AST  26  /  ALT  26  /  AlkPhos  95  05-27    PT/INR - ( 28 May 2023 03:33 )   PT: 14.4 sec;   INR: 1.24 ratio         PTT - ( 28 May 2023 03:33 )  PTT:28.5 sec    CARDIAC MARKERS ( 27 May 2023 11:28 )  x     / x     / 64 U/L / x     / x           CT Abdomen and Pelvis w/ IV Cont (05.27.23 @ 10:11) >  IMPRESSION:  1.  RIGHT transcervical femoral neck fracture in varus position  2.  Anterior RUL reticular changes/centrilobular   nodularity/bronchiectasis suggests age-indeterminate inflammatory/   infectious disease including but not limited to non-tuberculous   mycobacterial infection.  3.  Indeterminant Ill-defined 10 mm posterior RUL focal airspace opacity   or  nodule (4/118). 4 mm subpleural RUL (106) and JEFFREY nodules (105).  4.  Borderline-sized mediastinal lymph nodes favor a reactive etiology.    < from: CT Head No Cont (05.27.23 @ 10:10) >  IMPRESSION:    No acute intracranial abnormality.    No acute fracture or subluxation of the cervical spine.    < end of copied text >          RECENT CULTURES:      Cardiac testing : reviewed   EKG < from: 12 Lead ECG (05.27.23 @ 10:16) >  Ventricular Rate 137 BPM    Atrial Rate 326 BPM    QRS Duration 74 ms    Q-T Interval 326 ms    QTC Calculation(Bazett) 492 ms    R Axis 40 degrees    T Axis -56 degrees    Diagnosis Line Atrial fibrillation with rapid ventricular response with premature ventricular or aberrantly conducted complexes  Low voltage QRS  Nonspecific ST and T wave abnormality  Abnormal ECG  When compared with ECG of 18-MAY-2022 18:06,  No significant change was found    < end of copied text >      Procedures :     Devices:     Current medications:  acetaminophen     Tablet .. 975 milliGRAM(s) Oral every 8 hours  carvedilol 12.5 milliGRAM(s) Oral every 12 hours  ceFAZolin   IVPB 2000 milliGRAM(s) IV Intermittent every 8 hours  cyanocobalamin 1000 MICROGram(s) Oral daily  diltiazem    milliGRAM(s) Oral daily  doxycycline IVPB 100 milliGRAM(s) IV Intermittent every 12 hours  doxycycline IVPB      magnesium hydroxide Suspension 30 milliLiter(s) Oral daily PRN  multivitamin 1 Tablet(s) Oral daily  ondansetron Injectable 4 milliGRAM(s) IV Push every 6 hours PRN  oxyCODONE    IR 2.5 milliGRAM(s) Oral every 3 hours PRN  oxyCODONE    IR 5 milliGRAM(s) Oral every 3 hours PRN  polyethylene glycol 3350 17 Gram(s) Oral at bedtime  senna 2 Tablet(s) Oral at bedtime  simvastatin 20 milliGRAM(s) Oral at bedtime  sodium chloride 0.9%. 1000 milliLiter(s) IV Continuous <Continuous>

## 2023-05-29 LAB
24R-OH-CALCIDIOL SERPL-MCNC: 30.9 NG/ML — SIGNIFICANT CHANGE UP (ref 30–80)
ALBUMIN SERPL ELPH-MCNC: 2.8 G/DL — LOW (ref 3.3–5)
ALP SERPL-CCNC: 67 U/L — SIGNIFICANT CHANGE UP (ref 40–120)
ALT FLD-CCNC: 23 U/L — SIGNIFICANT CHANGE UP (ref 12–78)
ANION GAP SERPL CALC-SCNC: 5 MMOL/L — SIGNIFICANT CHANGE UP (ref 5–17)
AST SERPL-CCNC: 31 U/L — SIGNIFICANT CHANGE UP (ref 15–37)
BASOPHILS # BLD AUTO: 0.01 K/UL — SIGNIFICANT CHANGE UP (ref 0–0.2)
BASOPHILS NFR BLD AUTO: 0.1 % — SIGNIFICANT CHANGE UP (ref 0–2)
BILIRUB SERPL-MCNC: 0.5 MG/DL — SIGNIFICANT CHANGE UP (ref 0.2–1.2)
BUN SERPL-MCNC: 13 MG/DL — SIGNIFICANT CHANGE UP (ref 7–23)
CALCIUM SERPL-MCNC: 8.4 MG/DL — LOW (ref 8.5–10.1)
CHLORIDE SERPL-SCNC: 103 MMOL/L — SIGNIFICANT CHANGE UP (ref 96–108)
CO2 SERPL-SCNC: 26 MMOL/L — SIGNIFICANT CHANGE UP (ref 22–31)
CREAT SERPL-MCNC: 0.7 MG/DL — SIGNIFICANT CHANGE UP (ref 0.5–1.3)
CRP SERPL-MCNC: 100 MG/L — HIGH
EGFR: 84 ML/MIN/1.73M2 — SIGNIFICANT CHANGE UP
EOSINOPHIL # BLD AUTO: 0 K/UL — SIGNIFICANT CHANGE UP (ref 0–0.5)
EOSINOPHIL NFR BLD AUTO: 0 % — SIGNIFICANT CHANGE UP (ref 0–6)
FERRITIN SERPL-MCNC: 494 NG/ML — HIGH (ref 15–150)
FOLATE SERPL-MCNC: 13.2 NG/ML — SIGNIFICANT CHANGE UP
GLUCOSE SERPL-MCNC: 130 MG/DL — HIGH (ref 70–99)
HCT VFR BLD CALC: 35.7 % — SIGNIFICANT CHANGE UP (ref 34.5–45)
HGB BLD-MCNC: 12 G/DL — SIGNIFICANT CHANGE UP (ref 11.5–15.5)
IMM GRANULOCYTES NFR BLD AUTO: 0.6 % — SIGNIFICANT CHANGE UP (ref 0–0.9)
IRON SATN MFR SERPL: 20 UG/DL — LOW (ref 30–160)
IRON SATN MFR SERPL: 8 % — LOW (ref 14–50)
LYMPHOCYTES # BLD AUTO: 1.21 K/UL — SIGNIFICANT CHANGE UP (ref 1–3.3)
LYMPHOCYTES # BLD AUTO: 6.8 % — LOW (ref 13–44)
MAGNESIUM SERPL-MCNC: 1.9 MG/DL — SIGNIFICANT CHANGE UP (ref 1.6–2.6)
MCHC RBC-ENTMCNC: 30.2 PG — SIGNIFICANT CHANGE UP (ref 27–34)
MCHC RBC-ENTMCNC: 33.6 GM/DL — SIGNIFICANT CHANGE UP (ref 32–36)
MCV RBC AUTO: 89.9 FL — SIGNIFICANT CHANGE UP (ref 80–100)
MONOCYTES # BLD AUTO: 2.36 K/UL — HIGH (ref 0–0.9)
MONOCYTES NFR BLD AUTO: 13.2 % — SIGNIFICANT CHANGE UP (ref 2–14)
NEUTROPHILS # BLD AUTO: 14.21 K/UL — HIGH (ref 1.8–7.4)
NEUTROPHILS NFR BLD AUTO: 79.3 % — HIGH (ref 43–77)
NT-PROBNP SERPL-SCNC: 4553 PG/ML — HIGH (ref 0–450)
PHOSPHATE SERPL-MCNC: 2 MG/DL — LOW (ref 2.5–4.5)
PLATELET # BLD AUTO: 233 K/UL — SIGNIFICANT CHANGE UP (ref 150–400)
POTASSIUM SERPL-MCNC: 4.1 MMOL/L — SIGNIFICANT CHANGE UP (ref 3.5–5.3)
POTASSIUM SERPL-SCNC: 4.1 MMOL/L — SIGNIFICANT CHANGE UP (ref 3.5–5.3)
PROT SERPL-MCNC: 6.3 GM/DL — SIGNIFICANT CHANGE UP (ref 6–8.3)
RBC # BLD: 3.97 M/UL — SIGNIFICANT CHANGE UP (ref 3.8–5.2)
RBC # FLD: 12.9 % — SIGNIFICANT CHANGE UP (ref 10.3–14.5)
SODIUM SERPL-SCNC: 134 MMOL/L — LOW (ref 135–145)
TIBC SERPL-MCNC: 248 UG/DL — SIGNIFICANT CHANGE UP (ref 220–430)
TROPONIN I, HIGH SENSITIVITY RESULT: 8.76 NG/L — SIGNIFICANT CHANGE UP
TSH SERPL-MCNC: 1.94 UU/ML — SIGNIFICANT CHANGE UP (ref 0.34–4.82)
UIBC SERPL-MCNC: 228 UG/DL — SIGNIFICANT CHANGE UP (ref 110–370)
VIT B12 SERPL-MCNC: >2000 PG/ML — HIGH (ref 232–1245)
WBC # BLD: 17.89 K/UL — HIGH (ref 3.8–10.5)
WBC # FLD AUTO: 17.89 K/UL — HIGH (ref 3.8–10.5)

## 2023-05-29 PROCEDURE — 99233 SBSQ HOSP IP/OBS HIGH 50: CPT

## 2023-05-29 PROCEDURE — 93010 ELECTROCARDIOGRAM REPORT: CPT

## 2023-05-29 RX ORDER — HYDROMORPHONE HYDROCHLORIDE 2 MG/ML
4 INJECTION INTRAMUSCULAR; INTRAVENOUS; SUBCUTANEOUS EVERY 4 HOURS
Refills: 0 | Status: DISCONTINUED | OUTPATIENT
Start: 2023-05-29 | End: 2023-05-31

## 2023-05-29 RX ORDER — METOPROLOL TARTRATE 50 MG
5 TABLET ORAL EVERY 6 HOURS
Refills: 0 | Status: DISCONTINUED | OUTPATIENT
Start: 2023-05-29 | End: 2023-05-30

## 2023-05-29 RX ORDER — HYDROMORPHONE HYDROCHLORIDE 2 MG/ML
2 INJECTION INTRAMUSCULAR; INTRAVENOUS; SUBCUTANEOUS EVERY 4 HOURS
Refills: 0 | Status: DISCONTINUED | OUTPATIENT
Start: 2023-05-29 | End: 2023-05-31

## 2023-05-29 RX ORDER — DILTIAZEM HCL 120 MG
10 CAPSULE, EXT RELEASE 24 HR ORAL ONCE
Refills: 0 | Status: COMPLETED | OUTPATIENT
Start: 2023-05-29 | End: 2023-05-29

## 2023-05-29 RX ORDER — METOPROLOL TARTRATE 50 MG
5 TABLET ORAL EVERY 6 HOURS
Refills: 0 | Status: DISCONTINUED | OUTPATIENT
Start: 2023-05-29 | End: 2023-05-29

## 2023-05-29 RX ORDER — HYDROMORPHONE HYDROCHLORIDE 2 MG/ML
0.5 INJECTION INTRAMUSCULAR; INTRAVENOUS; SUBCUTANEOUS EVERY 4 HOURS
Refills: 0 | Status: DISCONTINUED | OUTPATIENT
Start: 2023-05-29 | End: 2023-05-31

## 2023-05-29 RX ORDER — CEFTRIAXONE 500 MG/1
1000 INJECTION, POWDER, FOR SOLUTION INTRAMUSCULAR; INTRAVENOUS EVERY 24 HOURS
Refills: 0 | Status: DISCONTINUED | OUTPATIENT
Start: 2023-05-29 | End: 2023-05-29

## 2023-05-29 RX ORDER — METOPROLOL TARTRATE 50 MG
50 TABLET ORAL
Refills: 0 | Status: DISCONTINUED | OUTPATIENT
Start: 2023-05-29 | End: 2023-05-30

## 2023-05-29 RX ADMIN — CEFTRIAXONE 1000 MILLIGRAM(S): 500 INJECTION, POWDER, FOR SOLUTION INTRAMUSCULAR; INTRAVENOUS at 09:55

## 2023-05-29 RX ADMIN — OXYCODONE HYDROCHLORIDE 5 MILLIGRAM(S): 5 TABLET ORAL at 10:53

## 2023-05-29 RX ADMIN — HYDROMORPHONE HYDROCHLORIDE 0.5 MILLIGRAM(S): 2 INJECTION INTRAMUSCULAR; INTRAVENOUS; SUBCUTANEOUS at 21:46

## 2023-05-29 RX ADMIN — Medication 975 MILLIGRAM(S): at 21:45

## 2023-05-29 RX ADMIN — PREGABALIN 1000 MICROGRAM(S): 225 CAPSULE ORAL at 09:55

## 2023-05-29 RX ADMIN — HYDROMORPHONE HYDROCHLORIDE 4 MILLIGRAM(S): 2 INJECTION INTRAMUSCULAR; INTRAVENOUS; SUBCUTANEOUS at 14:42

## 2023-05-29 RX ADMIN — SIMVASTATIN 20 MILLIGRAM(S): 20 TABLET, FILM COATED ORAL at 21:46

## 2023-05-29 RX ADMIN — HYDROMORPHONE HYDROCHLORIDE 4 MILLIGRAM(S): 2 INJECTION INTRAMUSCULAR; INTRAVENOUS; SUBCUTANEOUS at 19:52

## 2023-05-29 RX ADMIN — RIVAROXABAN 20 MILLIGRAM(S): KIT at 17:56

## 2023-05-29 RX ADMIN — HYDROMORPHONE HYDROCHLORIDE 4 MILLIGRAM(S): 2 INJECTION INTRAMUSCULAR; INTRAVENOUS; SUBCUTANEOUS at 15:42

## 2023-05-29 RX ADMIN — CARVEDILOL PHOSPHATE 12.5 MILLIGRAM(S): 80 CAPSULE, EXTENDED RELEASE ORAL at 09:54

## 2023-05-29 RX ADMIN — Medication 1 TABLET(S): at 09:54

## 2023-05-29 RX ADMIN — OXYCODONE HYDROCHLORIDE 5 MILLIGRAM(S): 5 TABLET ORAL at 05:46

## 2023-05-29 RX ADMIN — POLYETHYLENE GLYCOL 3350 17 GRAM(S): 17 POWDER, FOR SOLUTION ORAL at 21:44

## 2023-05-29 RX ADMIN — HYDROMORPHONE HYDROCHLORIDE 0.5 MILLIGRAM(S): 2 INJECTION INTRAMUSCULAR; INTRAVENOUS; SUBCUTANEOUS at 22:01

## 2023-05-29 RX ADMIN — Medication 110 MILLIGRAM(S): at 05:46

## 2023-05-29 RX ADMIN — Medication 10 MILLIGRAM(S): at 03:43

## 2023-05-29 RX ADMIN — Medication 110 MILLIGRAM(S): at 17:56

## 2023-05-29 RX ADMIN — HYDROMORPHONE HYDROCHLORIDE 4 MILLIGRAM(S): 2 INJECTION INTRAMUSCULAR; INTRAVENOUS; SUBCUTANEOUS at 20:22

## 2023-05-29 RX ADMIN — Medication 50 MILLIGRAM(S): at 21:46

## 2023-05-29 RX ADMIN — OXYCODONE HYDROCHLORIDE 5 MILLIGRAM(S): 5 TABLET ORAL at 06:45

## 2023-05-29 RX ADMIN — OXYCODONE HYDROCHLORIDE 5 MILLIGRAM(S): 5 TABLET ORAL at 09:58

## 2023-05-29 RX ADMIN — Medication 180 MILLIGRAM(S): at 09:55

## 2023-05-29 RX ADMIN — SENNA PLUS 2 TABLET(S): 8.6 TABLET ORAL at 21:45

## 2023-05-29 NOTE — OCCUPATIONAL THERAPY INITIAL EVALUATION ADULT - GENERAL OBSERVATIONS, REHAB EVAL
Pt rec'd semi-supine in bed, in NAD, +abduction pillow, +IV, lines intact, +tele HM, agreeable to OT IE.

## 2023-05-29 NOTE — PROGRESS NOTE ADULT - ASSESSMENT
{\rtf1\hdoovg39392\ansi\eniwftl8422\ftnbj\uc1\deff0  {\fonttbl{\f0 \fnil Segoe UI;}{\f1 \fnil \fcharset0 Segoe UI;}{\f2 \fnil Times New Juni;}}  {\colortbl ;\uua284\boazm130\zbjk032 ;\red0\green0\blue0 ;\red0\green0\blue0 ;}  {\stylesheet{\f0\fs20 Normal;}{\cs1 Default Paragraph Font;}{\cs2\f0\fs16 Line Number;}{\cs3\f2\fs24 Hyperlink;}}  {\*\revtbl{Unknown;}}  \tqpdfh13433\zdfeav77756\wygoa4406\kvbcy2263\akcui6497\xjadx9337\psjyqgq317\ecbtcjt436\nogrowautofit\frfjsa221\formshade\nofeaturethrottle1\dntblnsbdb\fet4\aendnotes\aftnnrlc\pgbrdrhead\pgbrdrfoot  \sectd\fttkok38445\tlftsg03864\guttersxn0\hyorqltu0950\vceypwkm6758\pdhfkcia8003\zkirekoc1666\tkspsuz441\qauohpy419\sbkpage\pgncont\pgndec  \plain\plain\f0\fs24\ql\plain\f0\fs24\plain\f1\fs24 PROBLEMS:\par  \par  RIGHT transcervical femoral neck fracture\par  Leukocytosis-predominantly neutrophiles-No localizing signs\par  Anterior RUL reticular changes/centrilobular nodularity/bronchiectasis suggests age-indeterminate inflammatory/ \par  infectious disease including but not limited to non-tuberculous mycobacterial infection.\par  Nonspecific punctate LEFT breast calcification\par  Lung nodules \par  Chronic Afib\par  \par  PLAN:\par  \par  pulmonary stable\par  CT chest-indeterminate inflammatory/ infectious changes-will fu clinically-monitor during hospitalization-? ABx- started Iv doxy  \par  Lung nodules OP f/up\par  DVT prophylasix\par  \plain\f1\fs16\mbmu7803\hich\f1\dbch\f1\loch\f1\cf2\fs16\par  \plain\f1\fs16\gahr8657\hich\f1\dbch\f1\loch\f1\cf2\fs16\strike\plain\f1\fs16\ompq1770\hich\f1\dbch\f1\loch\f1\cf2\fs16\plain\f0\fs20\wgex2567\hich\f0\dbch\f0\loch\f0\fs20\par  }

## 2023-05-29 NOTE — PHYSICAL THERAPY INITIAL EVALUATION ADULT - PERTINENT HX OF CURRENT PROBLEM, REHAB EVAL
Pt. is an 88 yo female with a PMH of afib on xarelto, HLD, HTN, syncope, dehydration, presents to the ED with right hip pain after mechanical fall today (5/27). Per H&P- patient denies headstrike or LOC. Of note, patient had hit her head on a door frame several days prior and has abrasion on forehead. Patient noticed immediate pain and inability to ambulate over the affected hip. Patient subsequently came to the ED for further evaluation and management. In the ED, endorses pain over the hip/groin area and pain with range of motion. CT: RIGHT transcervical femoral neck fracture in varus position. Pt is now s/p R PADDY. Pt. is an 87 yo female with a PMH of afib on xarelto, HLD, HTN, syncope, dehydration, presents to the ED with right hip pain after mechanical fall today (5/27). Per H&P- patient denies headstrike or LOC. Of note, patient had hit her head on a door frame several days prior and has abrasion on forehead. Patient noticed immediate pain and inability to ambulate over the affected hip. Patient subsequently came to the ED for further evaluation and management. In the ED, endorses pain over the hip/groin area and pain with range of motion. CT: RIGHT transcervical femoral neck fracture in varus position. Pt is now s/p R PADDY.

## 2023-05-29 NOTE — OCCUPATIONAL THERAPY INITIAL EVALUATION ADULT - ADL RETRAINING, OT EVAL
Pt will perform LE dressing and footwear management with moderate assistance using AE in 3-5 tx sessions.

## 2023-05-29 NOTE — PROGRESS NOTE ADULT - SUBJECTIVE AND OBJECTIVE BOX
Patient seen and examined at bedside. Pain is controlled. Patient denies any numbness, tingling RLE    Vital Signs Last 24 Hrs  T(C): 36.4 (29 May 2023 04:17), Max: 37.4 (28 May 2023 10:12)  T(F): 97.5 (29 May 2023 04:17), Max: 99.3 (28 May 2023 10:12)  HR: 86 (29 May 2023 04:17) (76 - 164)  BP: 115/75 (29 May 2023 04:17) (110/67 - 138/71)  BP(mean): --  RR: 18 (29 May 2023 04:17) (11 - 20)  SpO2: 97% (29 May 2023 04:17) (94% - 98%)    Parameters below as of 29 May 2023 04:17  Patient On (Oxygen Delivery Method): room air        Gen: Resting in bed, no acute distress  RLE  Dressing in place, clean/dry/intact.   [place] Abduction Pillow in Place.  SILT dp/sp/saph/sural/tibial  GSC/TA/EHL/FHL intact  DP pulse palpable.   No calf tenderness bilaterally.   Compartments soft and compressible.         Assessment and Plan:  88y Female with R femoral neck hip fx s/p POD1 R PADDY    Follow up postoperative labs  WBAT/PT/OT  Posterior hip/ abduction precautions  abduction pillow while laying  Pain management PRN  Continue prophylactic antibiotics  DVT PPx: hold until POD1, per primary team/AC team, xarelto  Incentive spirometry  Dispo: pending recommendations per PT  Will discuss with Dr. Edwards and advise of any changes to the plan.

## 2023-05-29 NOTE — OCCUPATIONAL THERAPY INITIAL EVALUATION ADULT - NSACTIVITYREC_GEN_A_OT
Pt presents with impaired balance, endurance and muscle strength that will benefit from skilled OT to improve independence in ADLs, reduce fall risk and chance for readmission. Pt educated on posterior hip precautions, impact on ADL engagement, and AE/DME recommendations.

## 2023-05-29 NOTE — PROGRESS NOTE ADULT - SUBJECTIVE AND OBJECTIVE BOX
Patient is a 86 y/o/f w/ pmhx of afib (on xarelto), HLD, HTN, syncope, dehydration presents to the ED on 5/27/23 w/ right hip pain after mechanical fall today. Patient denies headstrike or LOC. Patient takes Xarelto last dose was 1900 on 5/26. Of note, patient had hit her head on a door frame several days prior and has abrasion on forehead. Patient noticed immediate pain and inability to ambulate over the affected hip. Patient subsequently came to the ED for further evaluation and management. In the ED, endorses pain over the hip/groin area and pain with range of motion. CT: RIGHT transcervical femoral neck fracture in varus position     5/28 - s/p Right total hip arthroplasty     Patient seen and examined at bedside earlier today, denies pain, cp, dyspnea, has mild cough, denies abdominal pain, afebrile, plan discussed     Review of system- Rest of the review of system are negative except mentioned in HPI    Physical exam:   General : NAD, appear to be of stated age , well groomed   NERVOUS SYSTEM:  Alert & Oriented X3, non- focal exam, Motor Strength 5/5 B/L upper and lower extremities; DTRs 2+ intact and symmetric  HEAD:  Atraumatic, Normocephalic  EYES: EOMI, PERRLA, conjunctiva and sclera clear  HEENT: Moist mucous membranes, Supple neck , No JVD  CHEST: Clear to auscultation bilaterally; No rales, no rhonchi, no wheezing  HEART: Regular rate and rhythm; No murmurs, no rubs or gallops  ABDOMEN: Soft, Non-tender, Non-distended; Bowel sounds present, no guarding , no peritoneal irritation   GENITOURINARY- Voiding, no suprapubic tenderness  EXTREMITIES:  2+ Peripheral Pulses, No clubbing, cyanosis,   edema  MUSCULOSKELETAL:- No muscle tenderness, Muscle tone normal, No joint tenderness, no Joint swelling,  Joint ROM –normal   SKIN-no rash, no lesion    Labs:     CBC Full  -  ( 29 May 2023 07:46 )  WBC Count : 17.89 K/uL  RBC Count : 3.97 M/uL  Hemoglobin : 12.0 g/dL  Hematocrit : 35.7 %  Platelet Count - Automated : 233 K/uL  Mean Cell Volume : 89.9 fl  Mean Cell Hemoglobin : 30.2 pg  Mean Cell Hemoglobin Concentration : 33.6 gm/dL  Auto Neutrophil # : 14.21 K/uL  Auto Lymphocyte # : 1.21 K/uL  Auto Monocyte # : 2.36 K/uL  Auto Eosinophil # : 0.00 K/uL  Auto Basophil # : 0.01 K/uL  Auto Neutrophil % : 79.3 %  Auto Lymphocyte % : 6.8 %  Auto Monocyte % : 13.2 %  Auto Eosinophil % : 0.0 %  Auto Basophil % : 0.1 %    PT/INR - ( 28 May 2023 03:33 )   PT: 14.4 sec;   INR: 1.24 ratio         PTT - ( 28 May 2023 03:33 )  PTT:28.5 sec    05-29    134<L>  |  103  |  13  ----------------------------<  130<H>  4.1   |  26  |  0.70    Ca    8.4<L>      29 May 2023 07:46  Phos  2.0     05-29  Mg     1.9     05-29    TPro  6.3  /  Alb  2.8<L>  /  TBili  0.5  /  DBili  x   /  AST  31  /  ALT  23  /  AlkPhos  67  05-29             CT Abdomen and Pelvis w/ IV Cont (05.27.23 @ 10:11) >  IMPRESSION:  1.  RIGHT transcervical femoral neck fracture in varus position  2.  Anterior RUL reticular changes/centrilobular   nodularity/bronchiectasis suggests age-indeterminate inflammatory/   infectious disease including but not limited to non-tuberculous   mycobacterial infection.  3.  Indeterminant Ill-defined 10 mm posterior RUL focal airspace opacity   or  nodule (4/118). 4 mm subpleural RUL (106) and JEFFREY nodules (105).  4.  Borderline-sized mediastinal lymph nodes favor a reactive etiology.    < from: CT Head No Cont (05.27.23 @ 10:10) >  IMPRESSION:    No acute intracranial abnormality.    No acute fracture or subluxation of the cervical spine.    < end of copied text >    Patient is a 88 yo female with a pmh/o afib on xarelto, HLD, HTN, syncope, dehydration,  presents to the ED on 5/27/23  with right hip pain after mechanical fall today. Patient denies headstrike or LOC. Patient takes Xarelto last dose was 1900 on 5/26. Of note, patient had hit her head on a door frame several days prior and has abrasion on forehead. Patient noticed immediate pain and inability to ambulate over the affected hip. Patient subsequently came to the ED for further evaluation and management. In the ED, endorses pain over the hip/groin area and pain with range of motion. CT: RIGHT transcervical femoral neck fracture in varus position       * (R) femoral neck hip fx s/p POD1 R PADDY  - tele monitoring  - low risk for periop complications if AF remains controlled  - prn analgesia   - ortho following    * Leukocytosis with RUL inflammatory changes suspected CAP   * BLADDER: Distended urinary bladder per CT  - WBC consistently elevated could be secondary to infection vs inflammatory due to recent intervention  - care discussed with Dr. Bridges - will add IV Rocephin / IV doxy  - bladder scan q8H  - Pulmonary: Dr. Bridges    * Chronic AF   - remote tele  - continue with PO Xarelto  - resume Cardizem and Coreg  - Cardiology -  Dr. Cazares    * Multiple incidental findings:  - Anterior RUL reticular changes/centrilobular  nodularity/bronchiectasis suggests age-indeterminate inflammatory/   infectious disease including but not limited to non-tuberculous  mycobacterial infection.  - pulm consult    # Nonspecific punctate LEFT breast calcification  - OP f/up    # DVt proph   - restart AC once cleared by ortho team                        Patient is a 86 y/o/f w/ pmhx of afib (on xarelto), HLD, HTN, syncope, dehydration presents to the ED on 5/27/23 w/ right hip pain after mechanical fall today. Patient denies headstrike or LOC. Patient takes Xarelto last dose was 1900 on 5/26. Of note, patient had hit her head on a door frame several days prior and has abrasion on forehead. Patient noticed immediate pain and inability to ambulate over the affected hip. Patient subsequently came to the ED for further evaluation and management. In the ED, endorses pain over the hip/groin area and pain with range of motion. CT: RIGHT transcervical femoral neck fracture in varus position     5/28 - s/p Right total hip arthroplasty    Feels tired. Denies any HA, CP, SOB. No fevers, chills or shakes. Patient denies any coughing, no urinary urgency or frequency, no signs of diarrhea. Patient has no clinical manifestations of active infection.     Review of system:   - Rest of the review of system are negative except mentioned in HPI    Physical exam:   General : NAD, appear to be of stated age , well groomed   NERVOUS SYSTEM:  Alert & Oriented X3, non- focal exam, Motor Strength 5/5 B/L upper and lower extremities; DTRs 2+ intact and symmetric  HEAD:  Atraumatic, Normocephalic  EYES: EOMI, PERRLA, conjunctiva and sclera clear  HEENT: Moist mucous membranes, Supple neck , No JVD  CHEST: Clear to auscultation bilaterally; No rales, no rhonchi, no wheezing  HEART: Regular rate and rhythm; No murmurs, no rubs or gallops  ABDOMEN: Soft, Non-tender, Non-distended; Bowel sounds present, no guarding , no peritoneal irritation   GENITOURINARY- Voiding, no suprapubic tenderness  EXTREMITIES:  2+ Peripheral Pulses, No clubbing, cyanosis,   edema  MUSCULOSKELETAL:- No muscle tenderness, Muscle tone normal, No joint tenderness, no Joint swelling,  Joint ROM –normal   SKIN-no rash, no lesion    Labs:     CBC Full  -  ( 29 May 2023 07:46 )  WBC Count : 17.89 K/uL  RBC Count : 3.97 M/uL  Hemoglobin : 12.0 g/dL  Hematocrit : 35.7 %  Platelet Count - Automated : 233 K/uL    PT/INR - ( 28 May 2023 03:33 )   PT: 14.4 sec;   INR: 1.24 ratio         PTT - ( 28 May 2023 03:33 )  PTT:28.5 sec    05-29    134<L>  |  103  |  13  ----------------------------<  130<H>  4.1   |  26  |  0.70    Ca    8.4<L>      29 May 2023 07:46  Phos  2.0     05-29  Mg     1.9     05-29    TPro  6.3  /  Alb  2.8<L>  /  TBili  0.5  /  DBili  x   /  AST  31  /  ALT  23  /  AlkPhos  67  05-29             CT Abdomen and Pelvis w/ IV Cont (05.27.23 @ 10:11) >  IMPRESSION:  1.  RIGHT transcervical femoral neck fracture in varus position  2.  Anterior RUL reticular changes/centrilobular   nodularity/bronchiectasis suggests age-indeterminate inflammatory/   infectious disease including but not limited to non-tuberculous   mycobacterial infection.  3.  Indeterminant Ill-defined 10 mm posterior RUL focal airspace opacity   or  nodule (4/118). 4 mm subpleural RUL (106) and JEFFREY nodules (105).  4.  Borderline-sized mediastinal lymph nodes favor a reactive etiology.    < from: CT Head No Cont (05.27.23 @ 10:10) >  IMPRESSION:    No acute intracranial abnormality.    No acute fracture or subluxation of the cervical spine.    < end of copied text >    Patient is a 88 yo female with a pmh/o afib on xarelto, HLD, HTN, syncope, dehydration,  presents to the ED on 5/27/23  with right hip pain after mechanical fall today. Patient denies headstrike or LOC. Patient takes Xarelto last dose was 1900 on 5/26. Of note, patient had hit her head on a door frame several days prior and has abrasion on forehead. Patient noticed immediate pain and inability to ambulate over the affected hip. Patient subsequently came to the ED for further evaluation and management. In the ED, endorses pain over the hip/groin area and pain with range of motion. CT: RIGHT transcervical femoral neck fracture in varus position       * (R) femoral neck hip fx s/p POD1 R PADDY  - tele monitoring  - low risk for periop complications if AF remains controlled  - prn analgesia   - ortho following    * Leukocytosis with RUL inflammatory changes suspected CAP   * BLADDER: Distended urinary bladder per CT  - WBC consistently elevated could be secondary to infection vs reactive  - care discussed with Dr. Bridges -  IV doxy  - bladder scan q8H  - Pulmonary: Dr. Bridges    * Chronic AF   - remote tele  - continue with PO Xarelto  - resume Cardizem and Coreg  - Cardiology -  Dr. Cazares    * Multiple incidental findings:  - Anterior RUL reticular changes/centrilobular  nodularity/bronchiectasis suggests age-indeterminate inflammatory/   infectious disease including but not limited to non-tuberculous  mycobacterial infection.  - pulm consult    # Nonspecific punctate LEFT breast calcification  - OP f/up    # DVt proph   - restart AC once cleared by ortho team                        Patient is a 88 y/o/f w/ pmhx of afib (on xarelto), HLD, HTN, syncope, dehydration presents to the ED on 5/27/23 w/ right hip pain after mechanical fall today. Patient denies headstrike or LOC. Patient takes Xarelto last dose was 1900 on 5/26. Of note, patient had hit her head on a door frame several days prior and has abrasion on forehead. Patient noticed immediate pain and inability to ambulate over the affected hip. Patient subsequently came to the ED for further evaluation and management. In the ED, endorses pain over the hip/groin area and pain with range of motion. CT: RIGHT transcervical femoral neck fracture in varus position     5/28 - s/p Right total hip arthroplasty    Feels tired. Denies any HA, CP, SOB. No fevers, chills or shakes. Patient denies any coughing, no urinary urgency or frequency, no signs of diarrhea. Patient has no clinical manifestations of active infection.     Review of system:   - Rest of the review of system are negative except mentioned in HPI    Physical exam:   General : NAD, appear to be of stated age , well groomed   NERVOUS SYSTEM:  Alert & Oriented X3, non- focal exam, Motor Strength 5/5 B/L upper and lower extremities; DTRs 2+ intact and symmetric  HEAD:  Atraumatic, Normocephalic  EYES: EOMI, PERRLA, conjunctiva and sclera clear  HEENT: Moist mucous membranes, Supple neck , No JVD  CHEST: Clear to auscultation bilaterally; No rales, no rhonchi, no wheezing  HEART: Regular rate and rhythm; No murmurs, no rubs or gallops  ABDOMEN: Soft, Non-tender, Non-distended; Bowel sounds present, no guarding , no peritoneal irritation   GENITOURINARY- Voiding, no suprapubic tenderness  EXTREMITIES:  2+ Peripheral Pulses, No clubbing, cyanosis,   edema  MUSCULOSKELETAL:- No muscle tenderness, Muscle tone normal, No joint tenderness, no Joint swelling,  Joint ROM –normal   SKIN-no rash, no lesion    Labs:     CBC Full  -  ( 29 May 2023 07:46 )  WBC Count : 17.89 K/uL  RBC Count : 3.97 M/uL  Hemoglobin : 12.0 g/dL  Hematocrit : 35.7 %  Platelet Count - Automated : 233 K/uL    PT/INR - ( 28 May 2023 03:33 )   PT: 14.4 sec;   INR: 1.24 ratio         PTT - ( 28 May 2023 03:33 )  PTT:28.5 sec    05-29    134<L>  |  103  |  13  ----------------------------<  130<H>  4.1   |  26  |  0.70    Ca    8.4<L>      29 May 2023 07:46  Phos  2.0     05-29  Mg     1.9     05-29    TPro  6.3  /  Alb  2.8<L>  /  TBili  0.5  /  DBili  x   /  AST  31  /  ALT  23  /  AlkPhos  67  05-29             CT Abdomen and Pelvis w/ IV Cont (05.27.23 @ 10:11) >  IMPRESSION:  1.  RIGHT transcervical femoral neck fracture in varus position  2.  Anterior RUL reticular changes/centrilobular   nodularity/bronchiectasis suggests age-indeterminate inflammatory/   infectious disease including but not limited to non-tuberculous   mycobacterial infection.  3.  Indeterminant Ill-defined 10 mm posterior RUL focal airspace opacity   or  nodule (4/118). 4 mm subpleural RUL (106) and JEFFREY nodules (105).  4.  Borderline-sized mediastinal lymph nodes favor a reactive etiology.    < from: CT Head No Cont (05.27.23 @ 10:10) >  IMPRESSION:    No acute intracranial abnormality.    No acute fracture or subluxation of the cervical spine.    < end of copied text >    Patient is a 86 yo female with a pmh/o afib on xarelto, HLD, HTN, syncope, dehydration,  presents to the ED on 5/27/23  with right hip pain after mechanical fall today. Patient denies headstrike or LOC. Patient takes Xarelto last dose was 1900 on 5/26. Of note, patient had hit her head on a door frame several days prior and has abrasion on forehead. Patient noticed immediate pain and inability to ambulate over the affected hip. Patient subsequently came to the ED for further evaluation and management. In the ED, endorses pain over the hip/groin area and pain with range of motion. CT: RIGHT transcervical femoral neck fracture in varus position       * (R) femoral neck hip fx s/p POD1 R PADDY  - tele monitoring  - low risk for periop complications if AF remains controlled  - prn analgesia   - ortho following    * Leukocytosis with RUL inflammatory changes suspected CAP   * BLADDER: Distended urinary bladder per CT  - WBC consistently elevated could be secondary to infection vs reactive  - care discussed with Dr. Bridges -  IV doxy  - bladder scan q8H  - Pulmonary: Dr. Bridges    * Chronic AF   - continue with PO Xarelto  - resume Cardizem /  Coreg  - Cardiology -  Dr. Cazares    * Multiple incidental findings:  - Anterior RUL reticular changes/centrilobular  nodularity/bronchiectasis suggests age-indeterminate inflammatory/   infectious disease including but not limited to non-tuberculous  mycobacterial infection.  - pulm consult    # Nonspecific punctate LEFT breast calcification  - OP f/up    # DVt proph   - restart AC once cleared by ortho team

## 2023-05-29 NOTE — OCCUPATIONAL THERAPY INITIAL EVALUATION ADULT - ADDITIONAL COMMENTS
Pt resides alone in a  with 5 RINKU and + 2 more RINKU, FOS inside to bedroom, walk in shower stall (downstairs) and a tub/shower combo +GB's (upstairs), comfort height toilets, (I) with ADL/IADL tasks PTA, walked without AD, retired, LHD, .

## 2023-05-29 NOTE — PHYSICAL THERAPY INITIAL EVALUATION ADULT - GENERAL OBSERVATIONS, REHAB EVAL
Pt. received supine in bed abd wedge in place + IV + tele agreeable to PT. Hip precautions reviewed. Bed mob with Mod A, sit to stand and amb bed to hip chair with RW Mod + chair alarm. Pt. received supine in bed abd wedge in place + IV + tele agreeable to PT. Hip precautions reviewed. Bed mob with Mod A, sit to stand and amb bed to hip chair with RW Mod + chair alarm. CJR letter given

## 2023-05-29 NOTE — PROVIDER CONTACT NOTE (OTHER) - ASSESSMENT
Pt stated no chest pain or dizziness. Vitals stable besides high hr. Md made aware. She ordered IV push Cardizem.  MD Dr. Guzman came to unit and administered medication. Pt tolerated.

## 2023-05-29 NOTE — CHART NOTE - NSCHARTNOTEFT_GEN_A_CORE
notified by nurse for pt HR went up to 160s then came back down to 130s. Other VSS    evaluated pt at bedside, pt alert oriented x4, not in distress, denies any CP, SOB or other discomfort    pushed cardizem 10mg at bedside, with presence of nursing stafff    HR improving to 100s and later came down to 80s, other vitals remain stable     will make day team be aware

## 2023-05-29 NOTE — OCCUPATIONAL THERAPY INITIAL EVALUATION ADULT - PERTINENT HX OF CURRENT PROBLEM, REHAB EVAL
Pt is an 86 yo female with a PMH of afib on xarelto, HLD, HTN, syncope, dehydration, presents to the ED with right hip pain after mechanical fall today (5/27). Per H&P- patient denies headstrike or LOC. Of note, patient had hit her head on a door frame several days prior and has abrasion on forehead. Patient noticed immediate pain and inability to ambulate over the affected hip. Patient subsequently came to the ED for further evaluation and management. In the ED, endorses pain over the hip/groin area and pain with range of motion. CT: RIGHT transcervical femoral neck fracture in varus position. Pt is now s/p R PADDY.

## 2023-05-29 NOTE — OCCUPATIONAL THERAPY INITIAL EVALUATION ADULT - MD ORDER
Patient ID: Melba Echevarria is a 79 year old female.     Chief complaint: had concerns including Follow-up (Pt is here for a follow up. She would like to talk about sleeping issues.) and UTI (Patient left urine sample to test urine to make sure infection is gone. She states usually doesn't have symptoms when she has uti.).    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   Here CAC was zero 8/2021, last LDL was 38, dose of rosuva not 10 at that time.      Last time, mild secondary hyperpara due to CKD 3 diagnosed, but no treatment indicated other than hydration.  Vit D level was good    She also had mild subclinical hypothyroidism    Pt completed PFT's 1/2022 per pulm  Nl w/ nl walk test, no desaturation.    C/o recurrent UTI, but no UTI sx  Has been to outside urgent care  Last u/a pos for 1+ WBC, gp B strep/ low grade  Just completed Amox Rx  Last month had Klebsiella in urine - f/u u/a was clear     C/o difficulty sleeping for last 6 wks  Previously had difficulty sleeping due to BM, but that has resolved  Using CPAP, last saw sleep doc in the summer  Was previously on Elavil, tried OTC sleep aids.     DM-2 diagnosed 2021  Recent A1c under good control - 6.3 in Sept - has been called prediabetic recently, but A1c has been as high as 7.0 within the last year.   On Januvia, no other DM meds  Was tried on metformin, but had GI  upset w/ the med  Exercise....water aerobics, 2-3 x weekly for an hour   Diet...not really following, hasn't seen dietician  Eye...3/3022  foot care...yes  Meds...compliant  ASA...not indicated  FBS...doesn't check  PPG...doesn't check  Smoke...never  Mood...denies depression / anxiety           Review of Systems   Constitutional: Negative for activity change, appetite change, chills, diaphoresis, fatigue, fever and unexpected weight change.   HENT: Negative for dental problem, hearing loss, sinus pain, sore throat and trouble swallowing.    Eyes: Negative for pain and visual disturbance.   Respiratory: Positive for shortness of breath. Negative for cough, chest tightness and wheezing.    Cardiovascular: Positive for leg swelling. Negative for chest pain and palpitations.   Gastrointestinal: Negative for abdominal pain, blood in stool, constipation, diarrhea, nausea and vomiting.   Endocrine: Negative for cold intolerance and heat intolerance.   Genitourinary: Negative for dysuria, frequency and hematuria.   Musculoskeletal: Positive for arthralgias. Negative for back pain, gait problem, joint swelling and myalgias.   Skin: Negative for rash and wound.   Allergic/Immunologic: Negative for environmental allergies.   Neurological: Positive for weakness. Negative for dizziness, tremors, light-headedness, numbness and headaches.   Hematological: Negative for adenopathy. Bruises/bleeds easily.   Psychiatric/Behavioral: Positive for sleep disturbance. Negative for dysphoric mood. The patient is not nervous/anxious.        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)   • History of nephrolithiasis   • History of endometrial cancer   • YANNICK on CPAP   • [employer] filter in place   • MGUS (monoclonal gammopathy of unknown significance)    • Esophageal stricture   • Bile acid malabsorption syndrome   • Degenerative disc disease, lumbar   • History of pulmonary embolism   • Morbid obesity with BMI of 60.0-69.9, adult (CMS/[facility])   • Pure hypercholesterolemia   • Stage 3a chronic kidney disease (CMS/[facility])   • LVH (left ventricular hypertrophy)   • Dyspnea on exertion   • Chronic diastolic congestive heart failure (CMS/[facility])   • Bilateral leg edema   • Spinal stenosis   • Type 2 diabetes mellitus with stage 3a chronic kidney disease, without long-term current use of insulin (CMS/[facility])   • Type 2 diabetes mellitus with morbid obesity (CMS/[facility])   • History of adenomatous polyp of colon   • Ischial bursitis of right side   • Sedimentation rate elevation   • Elevated rheumatoid factor   • Personal history of urinary (tract) infection   • Other insomnia   • Hyperparathyroidism, secondary renal (CMS/[facility])   • Atherosclerosis of aorta (CMS/[facility])       Past Medical History:   Diagnosis Date   • Abnormal stress test    • Allergy years ago   • Anesthesia complication     dropped O2 sat with extubation - 1X    • Anxiety right now, trying to get my diarrhea under control   • Arthritis    • [place] Cruz esophagus    • Bleeds easily (CMS/[facility])     got 14 units of blood after hysterectomy   • Blood clot associated with vein wall inflammation     PE- multiple X2 - 2001 , 2007   • Blood transfusion without reported diagnosis    • Body mass index (BMI) 50.0-59.9, adult (CMS/[facility]) 8/19/2022   • Bradycardia    • C. difficile diarrhea    • Cataract had cataracts surgery in 2018 left eye and 2020 right eye   • Chronic diastolic congestive heart failure (CMS/[facility])    • Chronic kidney disease    • Chronic pain     back pain   • Clostridium difficile infection     after hyst - 2007   • Clotting disorder (CMS/[facility]) Leiden factor 5 heterozygous,  and undiagnosed bleeding disorder   • Diabetes (CMS/[facility])    • Diabetes mellitus (CMS/[facility])     type 2   • Diarrhea    • Factor V Leiden mutation  (CMS/HCC)    • GERD (gastroesophageal reflux disease) very occasionally   • High cholesterol    • History of pulmonary embolus (PE)     x2    • Inflammatory bowel disease    • Medicare annual wellness visit, subsequent 10/20/2021   • YANNICK (obstructive sleep apnea)     w/cpap   • Periumbilical hernia    • PVC (premature ventricular contraction)    • Shortness of breath    • Uterine cancer (CMS/HCC)    • Uterine cancer (CMS/HCC)        Past Surgical History:   Procedure Laterality Date   • Appendectomy     • Cholecystectomy     • D and c     • Extracorporeal shock wave lithotripsy     • Hernia repair  06/08/2022    ventral   • Remv 2nd cataract,corn-scler sectn Bilateral    • Tonsillectomy     • Total abdom hysterectomy  2007    for bleeding/ endometrial ca   • Total knee replacement Bilateral     bilat knee       Family History   Problem Relation Age of Onset   • Dementia/Alzheimers Mother    • Cancer Father         metastatic, ? if lung primary   • Arthritis Father    • Diabetes Father    • High blood pressure Father    • High cholesterol Father    • Urolithiasis Father    • Stroke Father    • Cancer, Breast Sister    • High cholesterol Sister    • Osteoarthritis Sister    • Cancer, Breast Sister    • Hyperlipidemia Sister    • Osteoarthritis Sister    • Cancer, Skin Non-Melanoma Sister    • Osteoporosis Sister    • Obesity Sister    • Urolithiasis Sister    • Atrial Fibrilliation Sister    • Cancer, Liver Maternal Grandmother    • Cancer Maternal Grandmother         3 sisters   • Cancer, Breast Paternal Grandmother        Social History     Tobacco Use   • Smoking status: Never   • Smokeless tobacco: Never   Vaping Use   • Vaping Use: never used   Substance Use Topics   • Alcohol use: Yes     Alcohol/week: 1.0 standard drink     Types: 1 Glasses of wine per week     Comment: rare   • Drug use: Never       Outpatient Medications Marked as Taking for the 1/26/23 encounter (Office Visit) with Shaina Agustin MD    Medication Sig Dispense Refill   • alosetron (LOTRONEX) 0.5 MG tablet Take 0.5 mg by mouth in the morning and 0.5 mg in the evening.     • SITagliptan (Januvia) 50 MG tablet Take 1 tablet by mouth daily. 90 tablet 1   • rosuvastatin (CRESTOR) 10 MG tablet Take 1 tablet by mouth daily. 90 tablet 3   • allopurinol (ZYLOPRIM) 100 MG tablet Take 2 tablets by mouth daily. 180 tablet 3   • Cholecalciferol (vitamin D) 50 mcg (2,000 units) capsule Take 200 mcg by mouth daily.     • loperamide (Imodium A-D) 2 MG tablet Take 1 tablet by mouth 4 times daily as needed for Diarrhea. (Patient taking differently: Take 2 mg by mouth 4 times daily as needed for Diarrhea. Takes 0-2 daily) 720 tablet 3   • Multiple Vitamins-Minerals (MULTIVITAMIN ADULTS 50+ PO) Take 1 tablet by mouth daily.     • Multiple Vitamins-Minerals (RA VISION-MILI PRESERVE PO) 1 tablet daily.      • sodium fluoride 1.1 % dental paste           Medications reconciled with pt med list     Vitals:  Blood pressure 120/60, pulse 63, temperature 98.6 °F (37 °C), temperature source Temporal, resp. rate 16, height 5' 4\" (1.626 m), weight (!) 162.5 kg (358 lb 3.2 oz), SpO2 94 %.    Physical Exam  Vitals and nursing note reviewed.   Constitutional:       General: She is not in acute distress.     Appearance: She is obese. She is not ill-appearing, toxic-appearing or diaphoretic.   HENT:      Head: Normocephalic and atraumatic.      Right Ear: External ear normal.      Left Ear: External ear normal.      Mouth/Throat:      Mouth: Mucous membranes are moist.   Eyes:      Extraocular Movements: Extraocular movements intact.      Conjunctiva/sclera: Conjunctivae normal.   Cardiovascular:      Rate and Rhythm: Normal rate and regular rhythm.      Pulses: Normal pulses.      Heart sounds: Normal heart sounds.   Pulmonary:      Effort: Pulmonary effort is normal.      Breath sounds: Normal breath sounds.   Abdominal:      Comments: obese   Musculoskeletal:      Right lower  "OT Evaluate and Treat"- MD orders received. Chart reviewed, contents noted, conferred with MELANIE Bush. leg: Edema present.      Left lower leg: Edema present.   Skin:     Comments: Calluses B feet   Neurological:      Mental Status: She is alert and oriented to person, place, and time. Mental status is at baseline.      Sensory: Sensory deficit present.      Motor: Weakness present.      Gait: Gait abnormal.      Comments: Decreased monofil over callus  Using rolator   Psychiatric:         Mood and Affect: Mood normal.         Behavior: Behavior normal.         Problem List Items Addressed This Visit        Cardiac and Vasculature    Pure hypercholesterolemia     Recheck lipids/ LFT's as now on rosuva 10  LDL goal          Relevant Orders    LIPID PANEL WITH REFLEX    THYROID STIMULATING HORMONE REFLEX    Chronic diastolic congestive heart failure (CMS/HCC)     Pt had G1DD on echo 6/2021  Has some edema on exam, which I suspect is more related to venous insufficiency rather than HFpEF  She uses lasix prn  BP is well controlled.          Atherosclerosis of aorta (CMS/HCC)     On CTA chest 2021  On statin  No ASA indicated.             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   Watch diet.  Daily aspirin not indicated  Daily foot care encouraged  Annual eye exam up to date.   Sugar checks not needed as long as the A1c is controlled and you are not on medication that can drop your sugar below normal.  Change in management:  Consider diabetic educator, dietician  Try to exercise daily  Lose wt.   Discussed at length.       Urine microalbumin checked/ neg 9/2022             Relevant Orders    COMPREHENSIVE METABOLIC PANEL    GLYCOHEMOGLOBIN    Type 2 diabetes  mellitus with morbid obesity (CMS/[facility])     DM has been under good control recently - reassess  Consider DM education, dietician as not yet implemented  Could not tolerate metformin due to GI intol  Is tolerating Januvia  An GLP-1 med might be more beneficial to her, but cost might be an issue.          Morbid obesity with BMI of 60.0-69.9, adult (CMS/[facility])     No wt loss recently  Complicated by YANNICK, DJD, DM, endometrial ca, LVH, HLD, CKD  Consider GLP1 med, but cost may be an issue.          Hyperparathyroidism, secondary renal (CMS/[facility])     Last PTH minimally elevated w/ nl calcium, nl vit D  Recheck  Keep well hydrated           Relevant Orders    VITAMIN D -25 HYDROXY    PARATHYROID HORMONE INTACT WITHOUT CALCIUM    PHOSPHORUS    MAGNESIUM       Genitourinary and Reproductive    Stage 3a chronic kidney disease (CMS/[facility])     CKD...  following labs  avoid anti-inflammatory medications  keep well-hydrated  avoid high-protein loads.    BP management is important.  discussed at length    CKD likely related to obesity  DM is a relatively new Dx and pt has no h/o HTN, NSAIA abuse.   Doubt MGUS would be a culprit.          Personal history of urinary (tract) infection     No symptoms now  I question as to whether or not the last urine sample was really representative of an infection  Gp B strep in urine can be associated w/ kidney stones.   The December urine did look like a Klebsiella infection  Please f/u w/ Dr Pollard.          Relevant Orders    POCT URINE DIP AUTO (Completed)    SERVICE TO UROLOGY       Hematology and Neoplasia    Sedimentation rate elevation     Recheck now that her bursitis has been at least partially addressed.          Relevant Orders    SEDIMENTATION RATE WESTERGREN    C REACTIVE PROTEIN    CBC WITH DIFFERENTIAL       Musculoskeletal and Injuries    Ischial bursitis of right side     S/p injection, but inadequate relief  Advise she contact ortho re: her progress or lack thereof.             Sleep     Other insomnia     Difficulty w/ sleep maintenance in the last 6 wks  ? Related to coming off elavil  Will try trazodone 5o mg  Can try 25, increase if needed, but dont' take more than 100 mg w/o contacting me.             Follow up: Return for fasting labs asap, fasting follow up with MD in 4 months. .    Discussed medication dosage, usage, goals of therapy, and side effects.    The patient indicates understanding of these issues and agrees with the plan.    I spent a total of 47 minutes on the day of the visit.  This includes pre-charting, chart review and documenting.      Shaina Agustin MD

## 2023-05-29 NOTE — PROGRESS NOTE ADULT - ASSESSMENT
This is a 87 yo with h/o permanant AFIB , multiple attempts at cardioversion unsucessful on Xeralto , h/o hyperlipedemia , HTN , presents after getting up from bed and her leg gave out which lead to a fall and resultant hip Fx . Of not she took Ambien for the first time. She is usually very active and actually participates in [employer] classes at the pool. She has no Sxs of CP or SOB . She has never had syncope before.   5/15- Echo showed EF 55-60 with mild MR and aortic sclerosisi but no stenosis  Cardiac PET 11/21 - negative for ischemia   last dose of DOAC last night   given recent negative cardiac PET and echo and lack of SXs with good functional capacity she is optimized to proceed with ORIF from a cardiac standpoint,  elevated HR and BP from not taking meds and pain most likely   1) cont Xeralto    2) cont diltiazem er 180 daily ,  simivastatin 20 qhs change Coreg to Metoprolol, can use PRN IV lopressor if HR >120  will follow with you

## 2023-05-29 NOTE — PROVIDER CONTACT NOTE (OTHER) - RECOMMENDATIONS
Will retake vitals 15 minutes post push. Education and Admitting at bedside with MD and RN as being performed.

## 2023-05-29 NOTE — PROGRESS NOTE ADULT - SUBJECTIVE AND OBJECTIVE BOX
Subjective:    pat better, s/p OTRIF, no respiratory complaint.    Home Medications:  ascorbic acid 500 mg oral tablet: 1 tab(s) orally once a day (27 May 2023 13:17)  carvedilol 12.5 mg oral tablet: 1 tab(s) orally 2 times a day (27 May 2023 13:17)  dilTIAZem 180 mg/24 hours oral capsule, extended release: 1 cap(s) orally once a day (27 May 2023 13:09)  Multiple Vitamins oral tablet: 1 tab(s) orally once a day (27 May 2023 13:17)  simvastatin 20 mg oral tablet: 1 tab(s) orally once a day (at bedtime) (27 May 2023 13:09)  Tylenol 325 mg oral tablet: 2 tab(s) orally 2 times a day as needed for (27 May 2023 13:17)  Vitamin B-12 1000 mcg oral tablet: 2 tab(s) orally once a day (27 May 2023 13:09)  Vitamin D3 25 mcg (1000 intl units) oral tablet: 1 tab(s) orally 2 times a day (27 May 2023 13:09)    MEDICATIONS  (STANDING):  acetaminophen     Tablet .. 975 milliGRAM(s) Oral every 8 hours  carvedilol 12.5 milliGRAM(s) Oral every 12 hours  cyanocobalamin 1000 MICROGram(s) Oral daily  diltiazem    milliGRAM(s) Oral daily  doxycycline IVPB      doxycycline IVPB 100 milliGRAM(s) IV Intermittent every 12 hours  multivitamin 1 Tablet(s) Oral daily  polyethylene glycol 3350 17 Gram(s) Oral at bedtime  rivaroxaban 20 milliGRAM(s) Oral with dinner  senna 2 Tablet(s) Oral at bedtime  simvastatin 20 milliGRAM(s) Oral at bedtime    MEDICATIONS  (PRN):  magnesium hydroxide Suspension 30 milliLiter(s) Oral daily PRN Constipation  ondansetron Injectable 4 milliGRAM(s) IV Push every 6 hours PRN Nausea and/or Vomiting  oxyCODONE    IR 2.5 milliGRAM(s) Oral every 3 hours PRN Moderate Pain (4 - 6)  oxyCODONE    IR 5 milliGRAM(s) Oral every 3 hours PRN Severe Pain (7 - 10)      Allergies    penicillin (Unknown)  erythromycin (Diarrhea)  shellfish (Unknown)    Intolerances        Vital Signs Last 24 Hrs  T(C): 36.6 (29 May 2023 08:00), Max: 37.4 (28 May 2023 10:12)  T(F): 97.9 (29 May 2023 08:00), Max: 99.3 (28 May 2023 10:12)  HR: 93 (29 May 2023 08:00) (76 - 164)  BP: 138/86 (29 May 2023 08:00) (110/67 - 138/86)  BP(mean): --  RR: 18 (29 May 2023 08:00) (11 - 20)  SpO2: 96% (29 May 2023 08:00) (94% - 98%)    Parameters below as of 29 May 2023 08:00  Patient On (Oxygen Delivery Method): room air          PHYSICAL EXAMINATION:    NECK:  Supple. No lymphadenopathy. Jugular venous pressure not elevated. Carotids equal.   HEART:   The cardiac impulse has a normal quality. Reg., Nl S1 and S2.  There are no murmurs, rubs or gallops noted  CHEST:  Chest rhonchi to auscultation. Normal respiratory effort.  ABDOMEN:  Soft and nontender.   EXTREMITIES:  There is no edema.       LABS:                        12.0   17.89 )-----------( 233      ( 29 May 2023 07:46 )             35.7     05-29    134<L>  |  103  |  13  ----------------------------<  130<H>  4.1   |  26  |  0.70    Ca    8.4<L>      29 May 2023 07:46  Phos  2.0     05-29  Mg     1.9     05-29    TPro  6.3  /  Alb  2.8<L>  /  TBili  0.5  /  DBili  x   /  AST  31  /  ALT  23  /  AlkPhos  67  05-29    PT/INR - ( 28 May 2023 03:33 )   PT: 14.4 sec;   INR: 1.24 ratio         PTT - ( 28 May 2023 03:33 )  PTT:28.5 sec

## 2023-05-29 NOTE — PROGRESS NOTE ADULT - SUBJECTIVE AND OBJECTIVE BOX
Patient is a 88y old  Female who presents with a chief complaint of fall fracture (29 May 2023 10:04)      5/29 still with hip pain but denies CP or SOB -130 on tele afib   MEDICATIONS  (STANDING):  acetaminophen     Tablet .. 975 milliGRAM(s) Oral every 8 hours  carvedilol 12.5 milliGRAM(s) Oral every 12 hours  cyanocobalamin 1000 MICROGram(s) Oral daily  diltiazem    milliGRAM(s) Oral daily  doxycycline IVPB 100 milliGRAM(s) IV Intermittent every 12 hours  doxycycline IVPB      multivitamin 1 Tablet(s) Oral daily  polyethylene glycol 3350 17 Gram(s) Oral at bedtime  rivaroxaban 20 milliGRAM(s) Oral with dinner  senna 2 Tablet(s) Oral at bedtime  simvastatin 20 milliGRAM(s) Oral at bedtime    MEDICATIONS  (PRN):  magnesium hydroxide Suspension 30 milliLiter(s) Oral daily PRN Constipation  metoprolol tartrate IVPB 5 milliGRAM(s) IV Intermittent every 6 hours PRN for HR >110, hold for sbp<100  ondansetron Injectable 4 milliGRAM(s) IV Push every 6 hours PRN Nausea and/or Vomiting  oxyCODONE    IR 5 milliGRAM(s) Oral every 3 hours PRN Severe Pain (7 - 10)  oxyCODONE    IR 2.5 milliGRAM(s) Oral every 3 hours PRN Moderate Pain (4 - 6)            Vital Signs Last 24 Hrs  T(C): 36.3 (29 May 2023 12:00), Max: 36.9 (28 May 2023 20:05)  T(F): 97.3 (29 May 2023 12:00), Max: 98.4 (28 May 2023 20:05)  HR: 95 (29 May 2023 12:00) (86 - 164)  BP: 118/78 (29 May 2023 12:00) (110/67 - 138/86)  BP(mean): --  RR: 18 (29 May 2023 12:00) (18 - 18)  SpO2: 99% (29 May 2023 12:00) (94% - 99%)    Parameters below as of 29 May 2023 12:00  Patient On (Oxygen Delivery Method): room air                INTERPRETATION OF TELEMETRY:    ECG:        LABS:                        12.0   17.89 )-----------( 233      ( 29 May 2023 07:46 )             35.7     05-29    134<L>  |  103  |  13  ----------------------------<  130<H>  4.1   |  26  |  0.70    Ca    8.4<L>      29 May 2023 07:46  Phos  2.0     05-29  Mg     1.9     05-29    TPro  6.3  /  Alb  2.8<L>  /  TBili  0.5  /  DBili  x   /  AST  31  /  ALT  23  /  AlkPhos  67  05-29        PT/INR - ( 28 May 2023 03:33 )   PT: 14.4 sec;   INR: 1.24 ratio         PTT - ( 28 May 2023 03:33 )  PTT:28.5 sec    I&O's Summary    28 May 2023 07:01  -  29 May 2023 07:00  --------------------------------------------------------  IN: 800 mL / OUT: 0 mL / NET: 800 mL      BNP  RADIOLOGY & ADDITIONAL STUDIES:

## 2023-05-29 NOTE — OCCUPATIONAL THERAPY INITIAL EVALUATION ADULT - PRECAUTIONS/LIMITATIONS, REHAB EVAL
diet: regular, elevate HOB, notify if systolic >160 <90 diastolic >100 HR >110 <60, 3L SpO2 <94%/aspiration precautions/cardiac precautions/fall precautions/right hip precautions/surgical precautions

## 2023-05-29 NOTE — PHYSICAL THERAPY INITIAL EVALUATION ADULT - PRECAUTIONS/LIMITATIONS, REHAB EVAL
posterior precautions, Abduction Pillow while in bed or while sitting in chair for the first 6 weeks after surgery./fall precautions/right hip precautions

## 2023-05-29 NOTE — OCCUPATIONAL THERAPY INITIAL EVALUATION ADULT - MODALITIES TREATMENT COMMENTS
Pt left seated in bedside hip chair, in NAD, lines/monitors intact, CB/TT/phone IR, MD present, needs met, chair alarmed.

## 2023-05-30 LAB
24R-OH-CALCIDIOL SERPL-MCNC: 36.7 NG/ML — SIGNIFICANT CHANGE UP (ref 30–80)
ALBUMIN SERPL ELPH-MCNC: 2.8 G/DL — LOW (ref 3.3–5)
ALP SERPL-CCNC: 68 U/L — SIGNIFICANT CHANGE UP (ref 40–120)
ALT FLD-CCNC: 18 U/L — SIGNIFICANT CHANGE UP (ref 12–78)
ANION GAP SERPL CALC-SCNC: 7 MMOL/L — SIGNIFICANT CHANGE UP (ref 5–17)
APPEARANCE UR: CLEAR — SIGNIFICANT CHANGE UP
AST SERPL-CCNC: 30 U/L — SIGNIFICANT CHANGE UP (ref 15–37)
BILIRUB SERPL-MCNC: 0.7 MG/DL — SIGNIFICANT CHANGE UP (ref 0.2–1.2)
BILIRUB UR-MCNC: NEGATIVE — SIGNIFICANT CHANGE UP
BUN SERPL-MCNC: 11 MG/DL — SIGNIFICANT CHANGE UP (ref 7–23)
CALCIUM SERPL-MCNC: 8.5 MG/DL — SIGNIFICANT CHANGE UP (ref 8.5–10.1)
CHLORIDE SERPL-SCNC: 99 MMOL/L — SIGNIFICANT CHANGE UP (ref 96–108)
CO2 SERPL-SCNC: 27 MMOL/L — SIGNIFICANT CHANGE UP (ref 22–31)
COLOR SPEC: YELLOW — SIGNIFICANT CHANGE UP
CREAT SERPL-MCNC: 0.56 MG/DL — SIGNIFICANT CHANGE UP (ref 0.5–1.3)
DIFF PNL FLD: NEGATIVE — SIGNIFICANT CHANGE UP
EGFR: 89 ML/MIN/1.73M2 — SIGNIFICANT CHANGE UP
GLUCOSE SERPL-MCNC: 127 MG/DL — HIGH (ref 70–99)
GLUCOSE UR QL: NEGATIVE — SIGNIFICANT CHANGE UP
HCT VFR BLD CALC: 35.8 % — SIGNIFICANT CHANGE UP (ref 34.5–45)
HGB BLD-MCNC: 11.7 G/DL — SIGNIFICANT CHANGE UP (ref 11.5–15.5)
KETONES UR-MCNC: NEGATIVE — SIGNIFICANT CHANGE UP
LEUKOCYTE ESTERASE UR-ACNC: NEGATIVE — SIGNIFICANT CHANGE UP
MCHC RBC-ENTMCNC: 29.8 PG — SIGNIFICANT CHANGE UP (ref 27–34)
MCHC RBC-ENTMCNC: 32.7 GM/DL — SIGNIFICANT CHANGE UP (ref 32–36)
MCV RBC AUTO: 91.1 FL — SIGNIFICANT CHANGE UP (ref 80–100)
NITRITE UR-MCNC: NEGATIVE — SIGNIFICANT CHANGE UP
PH UR: 6 — SIGNIFICANT CHANGE UP (ref 5–8)
PLATELET # BLD AUTO: 243 K/UL — SIGNIFICANT CHANGE UP (ref 150–400)
POTASSIUM SERPL-MCNC: 3.6 MMOL/L — SIGNIFICANT CHANGE UP (ref 3.5–5.3)
POTASSIUM SERPL-SCNC: 3.6 MMOL/L — SIGNIFICANT CHANGE UP (ref 3.5–5.3)
PROT SERPL-MCNC: 6.6 GM/DL — SIGNIFICANT CHANGE UP (ref 6–8.3)
PROT UR-MCNC: NEGATIVE — SIGNIFICANT CHANGE UP
RBC # BLD: 3.93 M/UL — SIGNIFICANT CHANGE UP (ref 3.8–5.2)
RBC # FLD: 12.9 % — SIGNIFICANT CHANGE UP (ref 10.3–14.5)
SODIUM SERPL-SCNC: 133 MMOL/L — LOW (ref 135–145)
SP GR SPEC: 1.01 — SIGNIFICANT CHANGE UP (ref 1.01–1.02)
UROBILINOGEN FLD QL: NEGATIVE — SIGNIFICANT CHANGE UP
WBC # BLD: 15.64 K/UL — HIGH (ref 3.8–10.5)
WBC # FLD AUTO: 15.64 K/UL — HIGH (ref 3.8–10.5)

## 2023-05-30 PROCEDURE — 99223 1ST HOSP IP/OBS HIGH 75: CPT

## 2023-05-30 PROCEDURE — 99233 SBSQ HOSP IP/OBS HIGH 50: CPT

## 2023-05-30 RX ORDER — METOPROLOL TARTRATE 50 MG
37.5 TABLET ORAL
Refills: 0 | Status: DISCONTINUED | OUTPATIENT
Start: 2023-05-30 | End: 2023-05-31

## 2023-05-30 RX ADMIN — Medication 1 TABLET(S): at 09:09

## 2023-05-30 RX ADMIN — HYDROMORPHONE HYDROCHLORIDE 4 MILLIGRAM(S): 2 INJECTION INTRAMUSCULAR; INTRAVENOUS; SUBCUTANEOUS at 17:03

## 2023-05-30 RX ADMIN — Medication 975 MILLIGRAM(S): at 21:48

## 2023-05-30 RX ADMIN — HYDROMORPHONE HYDROCHLORIDE 4 MILLIGRAM(S): 2 INJECTION INTRAMUSCULAR; INTRAVENOUS; SUBCUTANEOUS at 09:11

## 2023-05-30 RX ADMIN — Medication 975 MILLIGRAM(S): at 05:58

## 2023-05-30 RX ADMIN — Medication 180 MILLIGRAM(S): at 05:58

## 2023-05-30 RX ADMIN — Medication 975 MILLIGRAM(S): at 13:34

## 2023-05-30 RX ADMIN — HYDROMORPHONE HYDROCHLORIDE 4 MILLIGRAM(S): 2 INJECTION INTRAMUSCULAR; INTRAVENOUS; SUBCUTANEOUS at 10:11

## 2023-05-30 RX ADMIN — SIMVASTATIN 20 MILLIGRAM(S): 20 TABLET, FILM COATED ORAL at 21:51

## 2023-05-30 RX ADMIN — Medication 110 MILLIGRAM(S): at 18:35

## 2023-05-30 RX ADMIN — SENNA PLUS 2 TABLET(S): 8.6 TABLET ORAL at 21:50

## 2023-05-30 RX ADMIN — HYDROMORPHONE HYDROCHLORIDE 4 MILLIGRAM(S): 2 INJECTION INTRAMUSCULAR; INTRAVENOUS; SUBCUTANEOUS at 21:51

## 2023-05-30 RX ADMIN — Medication 110 MILLIGRAM(S): at 05:58

## 2023-05-30 RX ADMIN — RIVAROXABAN 20 MILLIGRAM(S): KIT at 18:35

## 2023-05-30 RX ADMIN — HYDROMORPHONE HYDROCHLORIDE 4 MILLIGRAM(S): 2 INJECTION INTRAMUSCULAR; INTRAVENOUS; SUBCUTANEOUS at 16:03

## 2023-05-30 RX ADMIN — Medication 37.5 MILLIGRAM(S): at 21:49

## 2023-05-30 RX ADMIN — POLYETHYLENE GLYCOL 3350 17 GRAM(S): 17 POWDER, FOR SOLUTION ORAL at 21:53

## 2023-05-30 RX ADMIN — Medication 50 MILLIGRAM(S): at 09:09

## 2023-05-30 RX ADMIN — HYDROMORPHONE HYDROCHLORIDE 4 MILLIGRAM(S): 2 INJECTION INTRAMUSCULAR; INTRAVENOUS; SUBCUTANEOUS at 22:21

## 2023-05-30 NOTE — PROGRESS NOTE ADULT - SUBJECTIVE AND OBJECTIVE BOX
Patient seen and examined at bedside. Pain well controlled with medication. Patient denies any numbness, tingling, weakness, or any other orthopaedic complaint. Denies N/V/CP/SOB.         VITAL SIGNS:  T(C): 36.7 (05-30-23 @ 04:09), Max: 37.4 (05-29-23 @ 20:06)  HR: 81 (05-30-23 @ 04:09) (79 - 112)  BP: 132/78 (05-30-23 @ 04:09) (118/78 - 139/75)  RR: 17 (05-30-23 @ 04:09) (17 - 18)  SpO2: 98% (05-30-23 @ 04:09) (96% - 99%)      LABS:                        12.0   17.89 )-----------( 233      ( 29 May 2023 07:46 )             35.7     05-29    134<L>  |  103  |  13  ----------------------------<  130<H>  4.1   |  26  |  0.70    Ca    8.4<L>      29 May 2023 07:46  Phos  2.0     05-29  Mg     1.9     05-29    TPro  6.3  /  Alb  2.8<L>  /  TBili  0.5  /  DBili  x   /  AST  31  /  ALT  23  /  AlkPhos  67  05-29        Gen: Resting in bed, no acute distress  RLE  Dressing in place, clean/dry/intact.   [place] Abduction Pillow in Place.  SILT dp/sp/saph/sural/tibial  GSC/TA/EHL/FHL intact  DP pulse palpable.   No calf tenderness bilaterally.   Compartments soft and compressible.         Assessment and Plan:  88y Female with R femoral neck hip fx s/p POD2 R PADDY    Follow up postoperative labs  WBAT/PT/OT  Posterior hip/ abduction precautions  abduction pillow while laying  Pain management PRN  DVT PPx: Xarelto then defer to primary team/AC team  Incentive spirometry  Dispo: [facility]  Will discuss with Dr. Edwards and advise of any changes to the plan.

## 2023-05-30 NOTE — PROGRESS NOTE ADULT - ASSESSMENT
PROBLEMS:    RIGHT transcervical femoral neck fracture  Leukocytosis-predominantly neutrophiles-No localizing signs  Anterior RUL reticular changes/centrilobular nodularity/bronchiectasis suggests age-indeterminate inflammatory/   infectious disease including but not limited to non-tuberculous mycobacterial infection.  Nonspecific punctate LEFT breast calcification  Lung nodules   Chronic Afib    PLAN:    pulmonary stable  CT chest-indeterminate inflammatory/ infectious changes-will fu clinically-monitor during hospitalization-Iv doxy-trend wbc    Lung nodules OP f/up  PT-rehab  DVT prophylasix

## 2023-05-30 NOTE — PROGRESS NOTE ADULT - SUBJECTIVE AND OBJECTIVE BOX
CHIEF COMPLAINT: Patient is a 88y old  Female who presents with a chief complaint of fall fracture (30 May 2023 05:57)    FROM H&P: Pt is an 88 yo female with a pmh/o afib on xarelto, HLD, HTN, syncope, dehydration,  presents to the ED with right hip pain after mechanical fall today. Patient denies headstrike or LOC. Patient takes Xarelto last dose was 1900 on 5/26. Of note, patient had hit her head on a door frame several days prior and has abrasion on forehead. Patient noticed immediate pain and inability to ambulate over the affected hip. Patient subsequently came to the ED for further evaluation and management. In the ED, endorses pain over the hip/groin area and pain with range of motion.  CT: RIGHT transcervical femoral neck fracture in varus position (27 May 2023 14:17)    5/28. S/p Right total hip arthroplasty  5/29. Still with hip pain but denies CP or SOB -130 on tele afib   5/30.    MEDICATIONS:  acetaminophen     Tablet .. 975 milliGRAM(s) Oral every 8 hours  diltiazem    milliGRAM(s) Oral daily  doxycycline IVPB 100 milliGRAM(s) IV Intermittent every 12 hours  doxycycline IVPB      metoprolol tartrate 50 milliGRAM(s) Oral two times a day  multivitamin 1 Tablet(s) Oral daily  polyethylene glycol 3350 17 Gram(s) Oral at bedtime  rivaroxaban 20 milliGRAM(s) Oral with dinner  senna 2 Tablet(s) Oral at bedtime  simvastatin 20 milliGRAM(s) Oral at bedtime    MEDICATIONS  (PRN):  HYDROmorphone   Tablet 2 milliGRAM(s) Oral every 4 hours PRN Mild Pain (1 - 3)  HYDROmorphone   Tablet 4 milliGRAM(s) Oral every 4 hours PRN Moderate Pain (4 - 6)  HYDROmorphone  Injectable 0.5 milliGRAM(s) SubCutaneous every 4 hours PRN Severe Pain (7 - 10)  magnesium hydroxide Suspension 30 milliLiter(s) Oral daily PRN Constipation  metoprolol tartrate IVPB 5 milliGRAM(s) IV Intermittent every 6 hours PRN for HR >110, hold for sbp<100  ondansetron Injectable 4 milliGRAM(s) IV Push every 6 hours PRN Nausea and/or Vomiting    HOME MEDICATIONS:  ascorbic acid 500 mg oral tablet: 1 tab(s) orally once a day (27 May 2023 13:17)  carvedilol 12.5 mg oral tablet: 1 tab(s) orally 2 times a day (27 May 2023 13:17)  dilTIAZem 180 mg/24 hours oral capsule, extended release: 1 cap(s) orally once a day (27 May 2023 13:09)  Multiple Vitamins oral tablet: 1 tab(s) orally once a day (27 May 2023 13:17)  simvastatin 20 mg oral tablet: 1 tab(s) orally once a day (at bedtime) (27 May 2023 13:09)  Tylenol 325 mg oral tablet: 2 tab(s) orally 2 times a day as needed for (27 May 2023 13:17)  Vitamin B-12 1000 mcg oral tablet: 2 tab(s) orally once a day (27 May 2023 13:09)  Vitamin D3 25 mcg (1000 intl units) oral tablet: 1 tab(s) orally 2 times a day (27 May 2023 13:09)    PHYSICAL EXAM:  T(C): 36.4 (30 May 2023 08:00), Max: 37.4 (29 May 2023 20:06)  T(F): 97.5 (30 May 2023 08:00), Max: 99.3 (29 May 2023 20:06)  HR: 64 (30 May 2023 08:00) (64 - 112)  BP: 130/58 (30 May 2023 08:00) (118/78 - 139/75)  RR: 17 (30 May 2023 08:00) (17 - 18)  SpO2: 98% (30 May 2023 08:00) (97% - 99%)    Parameters below as of 30 May 2023 08:00  Patient On (Oxygen Delivery Method): room air    Constitutional: NAD, awake and alert  HEENT: PERR, EOMI, Normal Hearing, MMM  Neck: Soft and supple, No LAD, No JVD  Respiratory: Breath sounds are clear bilaterally, No wheezing, rales or rhonchi  Cardiovascular: S1 and S2, regular rate and rhythm, no Murmurs, gallops or rubs  Gastrointestinal: Bowel Sounds present, soft, nontender, nondistended, no guarding, no rebound  Extremities: No peripheral edema  Vascular: 2+ peripheral pulses  Neurological: A/O x 3, no focal deficits  Musculoskeletal: 5/5 strength b/l upper and lower extremities  Skin: No rashes    =======================================    INTERPRETATION OF TELEMETRY:    ECG:    ========================================    LABS:                        12.0   17.89 )-----------( 233      ( 29 May 2023 07:46 )             35.7     05-29    134<L>  |  103  |  13  ----------------------------<  130<H>  4.1   |  26  |  0.70    Ca    8.4<L>      29 May 2023 07:46  Phos  2.0     05-29  Mg     1.9     05-29    TPro  6.3  /  Alb  2.8<L>  /  TBili  0.5  /  DBili  x   /  AST  31  /  ALT  23  /  AlkPhos  67  05-29    BNP 4553    RADIOLOGY & ADDITIONAL STUDIES:    < from: CT Abdomen and Pelvis w/ IV Cont (05.27.23 @ 10:11) >  IMPRESSION:  1.  RIGHT transcervical femoral neck fracture in varus position  2.  Anterior RUL reticular changes/centrilobular   nodularity/bronchiectasis suggests age-indeterminate inflammatory/   infectious disease including but not limited to non-tuberculous   mycobacterial infection.  3.  Indeterminant Ill-defined 10 mm posterior RUL focal airspace opacity   or  nodule (4/118). 4 mm subpleural RUL (106) and JEFFREY nodules (105).  4.  Borderline-sized mediastinal lymph nodes favor a reactive etiology.      < end of copied text >   CHIEF COMPLAINT: Patient is a 88y old  Female who presents with a chief complaint of fall fracture (30 May 2023 05:57)    FROM H&P: Pt is an 86 yo female with a pmh/o afib on xarelto, HLD, HTN, syncope, dehydration,  presents to the ED with right hip pain after mechanical fall today. Patient denies headstrike or LOC. Patient takes Xarelto last dose was 1900 on 5/26. Of note, patient had hit her head on a door frame several days prior and has abrasion on forehead. Patient noticed immediate pain and inability to ambulate over the affected hip. Patient subsequently came to the ED for further evaluation and management. In the ED, endorses pain over the hip/groin area and pain with range of motion.  CT: RIGHT transcervical femoral neck fracture in varus position (27 May 2023 14:17)    5/28. S/p Right total hip arthroplasty  5/29. Still with hip pain but denies CP or SOB -130 on tele afib   5/30. Improved mobility, pain controlled.    MEDICATIONS:  acetaminophen     Tablet .. 975 milliGRAM(s) Oral every 8 hours  diltiazem    milliGRAM(s) Oral daily  doxycycline IVPB 100 milliGRAM(s) IV Intermittent every 12 hours  doxycycline IVPB      metoprolol tartrate 50 milliGRAM(s) Oral two times a day  multivitamin 1 Tablet(s) Oral daily  polyethylene glycol 3350 17 Gram(s) Oral at bedtime  rivaroxaban 20 milliGRAM(s) Oral with dinner  senna 2 Tablet(s) Oral at bedtime  simvastatin 20 milliGRAM(s) Oral at bedtime    MEDICATIONS  (PRN):  HYDROmorphone   Tablet 2 milliGRAM(s) Oral every 4 hours PRN Mild Pain (1 - 3)  HYDROmorphone   Tablet 4 milliGRAM(s) Oral every 4 hours PRN Moderate Pain (4 - 6)  HYDROmorphone  Injectable 0.5 milliGRAM(s) SubCutaneous every 4 hours PRN Severe Pain (7 - 10)  magnesium hydroxide Suspension 30 milliLiter(s) Oral daily PRN Constipation  metoprolol tartrate IVPB 5 milliGRAM(s) IV Intermittent every 6 hours PRN for HR >110, hold for sbp<100  ondansetron Injectable 4 milliGRAM(s) IV Push every 6 hours PRN Nausea and/or Vomiting    HOME MEDICATIONS:  ascorbic acid 500 mg oral tablet: 1 tab(s) orally once a day (27 May 2023 13:17)  carvedilol 12.5 mg oral tablet: 1 tab(s) orally 2 times a day (27 May 2023 13:17)  dilTIAZem 180 mg/24 hours oral capsule, extended release: 1 cap(s) orally once a day (27 May 2023 13:09)  Multiple Vitamins oral tablet: 1 tab(s) orally once a day (27 May 2023 13:17)  simvastatin 20 mg oral tablet: 1 tab(s) orally once a day (at bedtime) (27 May 2023 13:09)  Tylenol 325 mg oral tablet: 2 tab(s) orally 2 times a day as needed for (27 May 2023 13:17)  Vitamin B-12 1000 mcg oral tablet: 2 tab(s) orally once a day (27 May 2023 13:09)  Vitamin D3 25 mcg (1000 intl units) oral tablet: 1 tab(s) orally 2 times a day (27 May 2023 13:09)    PHYSICAL EXAM:  T(C): 36.4 (30 May 2023 08:00), Max: 37.4 (29 May 2023 20:06)  T(F): 97.5 (30 May 2023 08:00), Max: 99.3 (29 May 2023 20:06)  HR: 64 (30 May 2023 08:00) (64 - 112)  BP: 130/58 (30 May 2023 08:00) (118/78 - 139/75)  RR: 17 (30 May 2023 08:00) (17 - 18)  SpO2: 98% (30 May 2023 08:00) (97% - 99%)    Parameters below as of 30 May 2023 08:00  Patient On (Oxygen Delivery Method): room air    Constitutional: NAD, awake and alert  HEENT: PERR, EOMI, Normal Hearing, MMM  Neck: Soft and supple, No LAD, No JVD  Respiratory: Breath sounds are clear bilaterally, No wheezing, rales or rhonchi  Cardiovascular: IR IR  Gastrointestinal: Bowel Sounds present, soft, nontender, nondistended, no guarding, no rebound  Extremities: No peripheral edema  Vascular: 2+ peripheral pulses  Neurological: A/O x 3, no focal deficits  Musculoskeletal: LT hip limited  Skin: No rashes    =======================================    INTERPRETATION OF TELEMETRY: afib, slow afib this AM to 40s    ========================================    LABS:               11.7   15.64 )-----------( 243      ( 30 May 2023 08:55 )             35.8     05-30    133<L>  |  99  |  11  ----------------------------<  127<H>  3.6   |  27  |  0.56    Ca    8.5      30 May 2023 08:55  Phos  2.0     05-29  Mg     1.9     05-29    TPro  6.6  /  Alb  2.8<L>  /  TBili  0.7  /  DBili  x   /  AST  30  /  ALT  18  /  AlkPhos  68  05-30    Troponin: 5.18 ng/L, Troponin: 8.76 ng/L      BNP 4553    RADIOLOGY & ADDITIONAL STUDIES:    < from: CT Abdomen and Pelvis w/ IV Cont (05.27.23 @ 10:11) >  IMPRESSION:  1.  RIGHT transcervical femoral neck fracture in varus position  2.  Anterior RUL reticular changes/centrilobular   nodularity/bronchiectasis suggests age-indeterminate inflammatory/   infectious disease including but not limited to non-tuberculous   mycobacterial infection.  3.  Indeterminant Ill-defined 10 mm posterior RUL focal airspace opacity   or  nodule (4/118). 4 mm subpleural RUL (106) and JEFFREY nodules (105).  4.  Borderline-sized mediastinal lymph nodes favor a reactive etiology.      < end of copied text >

## 2023-05-30 NOTE — CONSULT NOTE ADULT - ASSESSMENT
Pt is an 86 yo female with a pmh/o afib on xarelto, HLD, HTN, syncope, dehydration,  presents to the ED with right hip pain after mechanical fall found to have RIGHT transcervical femoral neck fracture in varus position now s/p Right Total Hip Replacement. Consulted by Dr. Marvel Edwards   for VTE prophylaxis, risk stratification, and anticoagulation management. Patient is high risk for VTE and moderate bleeding risk       Plan  Resume Xarelto 20mg mg po with dinner  Daily CBC, BMP  BLE venodynes  INC mobility as ervin    Thank you for the Consult, will follow  Dispo:rehab

## 2023-05-30 NOTE — PROGRESS NOTE ADULT - SUBJECTIVE AND OBJECTIVE BOX
C/c: right hip pain    HPI: 87F, pmh of  afib on xarelto,  HTN, HLD, presented to the ED on 23 w/ right hip pain after a fall. Patient denies headstrike or LOC. Patient takes Xarelto last doses 1900 on . Of note, patient had hit her head on a door frame several days prior and has abrasion on forehead. Patient noticed immediate pain and inability to ambulate over the affected hip. Patient subsequently came to the ED for further evaluation and management. She was admitted with right hip fracture. Underwent right hip replacement . Hospital course notable for leukocytosis. On doxycycline for treatment of pna.     pt seen and examined this am. Felt ok. Right hip pain controlled with meds. no sob/chest pain. Denies cough. states she was recently treated with abx for UTI. Thought she felt weak from abx which caused diarrhea and that is why she fell.    ROS: all 10 systems reviewed and is as above otherwise negative.     PHYSICAL EXAM:    GENERAL: Comfortable, no acute distress   HEAD:  Normocephalic, atraumatic  EYES: EOMI, PERRLA  HEENT: Moist mucous membranes  NECK: Supple, No JVD  NERVOUS SYSTEM:  Alert & Oriented X3, Motor Strength 5/5 B/L upper and lower extremities  CHEST/LUNG: Clear to auscultation bilaterally  HEART: Regular rate and rhythm  ABDOMEN: Soft, Nontender, Nondistended, Bowel sounds present  GENITOURINARY: Voiding, no palpable bladder  EXTREMITIES:   No clubbing, cyanosis, or edema  MUSCULOSKELTAL- No muscle tenderness, no joint tenderness  SKIN-no rash      LABS:                        11.7   15.64 )-----------( 243      ( 30 May 2023 08:55 )             35.8     05-30    133<L>  |  99  |  11  ----------------------------<  127<H>  3.6   |  27  |  0.56    Ca    8.5      30 May 2023 08:55  Phos  2.0     05-29  Mg     1.9     05-29    TPro  6.6  /  Alb  2.8<L>  /  TBili  0.7  /  DBili  x   /  AST  30  /  ALT  18  /  AlkPhos  68  05-30      Urinalysis Basic - ( 30 May 2023 10:40 )    Color: Yellow / Appearance: Clear / S.015 / pH: x  Gluc: x / Ketone: Negative  / Bili: Negative / Urobili: Negative   Blood: x / Protein: Negative / Nitrite: Negative   Leuk Esterase: Negative / RBC: x / WBC x   Sq Epi: x / Non Sq Epi: x / Bacteria: x       CT Abdomen and Pelvis w/ IV Cont (23 @ 10:11) >  IMPRESSION:  1.  RIGHT transcervical femoral neck fracture in varus position  2.  Anterior RUL reticular changes/centrilobular   nodularity/bronchiectasis suggests age-indeterminate inflammatory/   infectious disease including but not limited to non-tuberculous   mycobacterial infection.  3.  Indeterminant Ill-defined 10 mm posterior RUL focal airspace opacity   or  nodule (4/118). 4 mm subpleural RUL (106) and JEFFREY nodules (105).  4.  Borderline-sized mediastinal lymph nodes favor a reactive etiology.    CT Head No Cont (23 @ 10:10) >  IMPRESSION:    No acute intracranial abnormality.    No acute fracture or subluxation of the cervical spine.    MEDICATIONS  (STANDING):  acetaminophen     Tablet .. 975 milliGRAM(s) Oral every 8 hours  diltiazem    milliGRAM(s) Oral daily  doxycycline IVPB 100 milliGRAM(s) IV Intermittent every 12 hours  doxycycline IVPB      metoprolol tartrate 37.5 milliGRAM(s) Oral two times a day  multivitamin 1 Tablet(s) Oral daily  polyethylene glycol 3350 17 Gram(s) Oral at bedtime  rivaroxaban 20 milliGRAM(s) Oral with dinner  senna 2 Tablet(s) Oral at bedtime  simvastatin 20 milliGRAM(s) Oral at bedtime    MEDICATIONS  (PRN):  HYDROmorphone   Tablet 2 milliGRAM(s) Oral every 4 hours PRN Mild Pain (1 - 3)  HYDROmorphone   Tablet 4 milliGRAM(s) Oral every 4 hours PRN Moderate Pain (4 - 6)  HYDROmorphone  Injectable 0.5 milliGRAM(s) SubCutaneous every 4 hours PRN Severe Pain (7 - 10)  magnesium hydroxide Suspension 30 milliLiter(s) Oral daily PRN Constipation  ondansetron Injectable 4 milliGRAM(s) IV Push every 6 hours PRN Nausea and/or Vomiting      ASSESSMENT AND PLAN  87f PMH as above a/w    1. Right hip fracture:  -s/p right hip arthroplasty pod#2  -pain control  -physical therapy  -incentive spirometry  -bowel regimen.     2. Leukocytosis with RUL opacity/reticular changes:  -seen by pulmonary.   -on doxycycline.   -check sputum cx if able.   -will need f/u ct as outpt.     3. Renal enhancement/Bladder distension:  -u/a negative  -d/w cardiology, pt requesting urology eval.     4. Chronic atrial fibrillation:  -bb changed to metoprolol  -continue ccb  -xarelto.     6. Hyponatremia:  -mild and asymptomatic.   -could be related to siadh.   -monitor.     7. Left breast calcification:  -outpt f/u.     8. DVT px:  -on xarelto       C/c: right hip pain    HPI: 87F, pmh of  afib on xarelto,  HTN, HLD, presented to the ED on 23 w/ right hip pain after a fall. Patient denies headstrike or LOC. Patient takes Xarelto last doses 1900 on . Of note, patient had hit her head on a door frame several days prior and has abrasion on forehead. Patient noticed immediate pain and inability to ambulate over the affected hip. Patient subsequently came to the ED for further evaluation and management. She was admitted with right hip fracture. Underwent right hip replacement . Hospital course notable for leukocytosis. On doxycycline for treatment of pna.     pt seen and examined this am. Felt ok. Right hip pain controlled with meds. no sob/chest pain. Denies cough. states she was recently treated with abx for UTI. Thought she felt weak from abx which caused diarrhea and that is why she fell.    ROS: all 10 systems reviewed and is as above otherwise negative.     PHYSICAL EXAM:    GENERAL: Comfortable, no acute distress   HEAD:  Normocephalic, atraumatic  EYES: EOMI, PERRLA  HEENT: Moist mucous membranes  NECK: Supple, No JVD  NERVOUS SYSTEM:  Alert & Oriented X3, Motor Strength 5/5 B/L upper and lower extremities  CHEST/LUNG: Crackles right base  HEART: Regular rate and rhythm  ABDOMEN: Soft, Nontender, Nondistended, Bowel sounds present  GENITOURINARY: Voiding, no palpable bladder  EXTREMITIES:   No clubbing, cyanosis, or edema  MUSCULOSKELETAL-Right hip dressing c/d/i  SKIN-no rash      LABS:                        11.7   15.64 )-----------( 243      ( 30 May 2023 08:55 )             35.8     05-30    133<L>  |  99  |  11  ----------------------------<  127<H>  3.6   |  27  |  0.56    Ca    8.5      30 May 2023 08:55  Phos  2.0     05-29  Mg     1.9     -    TPro  6.6  /  Alb  2.8<L>  /  TBili  0.7  /  DBili  x   /  AST  30  /  ALT  18  /  AlkPhos  68  05-30      Urinalysis Basic - ( 30 May 2023 10:40 )    Color: Yellow / Appearance: Clear / S.015 / pH: x  Gluc: x / Ketone: Negative  / Bili: Negative / Urobili: Negative   Blood: x / Protein: Negative / Nitrite: Negative   Leuk Esterase: Negative / RBC: x / WBC x   Sq Epi: x / Non Sq Epi: x / Bacteria: x       CT Abdomen and Pelvis w/ IV Cont (23 @ 10:11) >  IMPRESSION:  1.  RIGHT transcervical femoral neck fracture in varus position  2.  Anterior RUL reticular changes/centrilobular   nodularity/bronchiectasis suggests age-indeterminate inflammatory/   infectious disease including but not limited to non-tuberculous   mycobacterial infection.  3.  Indeterminant Ill-defined 10 mm posterior RUL focal airspace opacity   or  nodule (4/118). 4 mm subpleural RUL (106) and JEFFREY nodules (105).  4.  Borderline-sized mediastinal lymph nodes favor a reactive etiology.    CT Head No Cont (23 @ 10:10) >  IMPRESSION:    No acute intracranial abnormality.    No acute fracture or subluxation of the cervical spine.    MEDICATIONS  (STANDING):  acetaminophen     Tablet .. 975 milliGRAM(s) Oral every 8 hours  diltiazem    milliGRAM(s) Oral daily  doxycycline IVPB 100 milliGRAM(s) IV Intermittent every 12 hours  doxycycline IVPB      metoprolol tartrate 37.5 milliGRAM(s) Oral two times a day  multivitamin 1 Tablet(s) Oral daily  polyethylene glycol 3350 17 Gram(s) Oral at bedtime  rivaroxaban 20 milliGRAM(s) Oral with dinner  senna 2 Tablet(s) Oral at bedtime  simvastatin 20 milliGRAM(s) Oral at bedtime    MEDICATIONS  (PRN):  HYDROmorphone   Tablet 2 milliGRAM(s) Oral every 4 hours PRN Mild Pain (1 - 3)  HYDROmorphone   Tablet 4 milliGRAM(s) Oral every 4 hours PRN Moderate Pain (4 - 6)  HYDROmorphone  Injectable 0.5 milliGRAM(s) SubCutaneous every 4 hours PRN Severe Pain (7 - 10)  magnesium hydroxide Suspension 30 milliLiter(s) Oral daily PRN Constipation  ondansetron Injectable 4 milliGRAM(s) IV Push every 6 hours PRN Nausea and/or Vomiting      ASSESSMENT AND PLAN  87f PMH as above a/w    1. Right hip fracture:  -s/p right hip arthroplasty pod#2  -pain control  -physical therapy  -incentive spirometry  -bowel regimen.     2. Leukocytosis with RUL opacity/reticular changes:  -seen by pulmonary.   -on doxycycline.   -check sputum cx if able.   -will need f/u ct as outpt.     3. Renal enhancement/Bladder distension:  -u/a negative  -d/w cardiology, pt requesting urology eval.     4. Chronic atrial fibrillation:  -bb changed to metoprolol  -continue ccb  -xarelto.     6. Hyponatremia:  -mild and asymptomatic.   -could be related to siadh.   -monitor.     7. Left breast calcification:  -outpt f/u.     8. DVT px:  -on xarelto

## 2023-05-30 NOTE — PROGRESS NOTE ADULT - SUBJECTIVE AND OBJECTIVE BOX
Subjective:    pat better, sitting in bed, feeling weak, no respiratory complaint.    Home Medications:  ascorbic acid 500 mg oral tablet: 1 tab(s) orally once a day (27 May 2023 13:17)  carvedilol 12.5 mg oral tablet: 1 tab(s) orally 2 times a day (27 May 2023 13:17)  dilTIAZem 180 mg/24 hours oral capsule, extended release: 1 cap(s) orally once a day (27 May 2023 13:09)  Multiple Vitamins oral tablet: 1 tab(s) orally once a day (27 May 2023 13:17)  simvastatin 20 mg oral tablet: 1 tab(s) orally once a day (at bedtime) (27 May 2023 13:09)  Tylenol 325 mg oral tablet: 2 tab(s) orally 2 times a day as needed for (27 May 2023 13:17)  Vitamin B-12 1000 mcg oral tablet: 2 tab(s) orally once a day (27 May 2023 13:09)  Vitamin D3 25 mcg (1000 intl units) oral tablet: 1 tab(s) orally 2 times a day (27 May 2023 13:09)    MEDICATIONS  (STANDING):  acetaminophen     Tablet .. 975 milliGRAM(s) Oral every 8 hours  diltiazem    milliGRAM(s) Oral daily  doxycycline IVPB      doxycycline IVPB 100 milliGRAM(s) IV Intermittent every 12 hours  metoprolol tartrate 50 milliGRAM(s) Oral two times a day  multivitamin 1 Tablet(s) Oral daily  polyethylene glycol 3350 17 Gram(s) Oral at bedtime  rivaroxaban 20 milliGRAM(s) Oral with dinner  senna 2 Tablet(s) Oral at bedtime  simvastatin 20 milliGRAM(s) Oral at bedtime    MEDICATIONS  (PRN):  HYDROmorphone   Tablet 2 milliGRAM(s) Oral every 4 hours PRN Mild Pain (1 - 3)  HYDROmorphone   Tablet 4 milliGRAM(s) Oral every 4 hours PRN Moderate Pain (4 - 6)  HYDROmorphone  Injectable 0.5 milliGRAM(s) SubCutaneous every 4 hours PRN Severe Pain (7 - 10)  magnesium hydroxide Suspension 30 milliLiter(s) Oral daily PRN Constipation  metoprolol tartrate IVPB 5 milliGRAM(s) IV Intermittent every 6 hours PRN for HR >110, hold for sbp<100  ondansetron Injectable 4 milliGRAM(s) IV Push every 6 hours PRN Nausea and/or Vomiting      Allergies    penicillin (Unknown)  erythromycin (Diarrhea)  shellfish (Unknown)    Intolerances        Vital Signs Last 24 Hrs  T(C): 36.4 (30 May 2023 08:00), Max: 37.4 (29 May 2023 20:06)  T(F): 97.5 (30 May 2023 08:00), Max: 99.3 (29 May 2023 20:06)  HR: 64 (30 May 2023 08:00) (64 - 112)  BP: 130/58 (30 May 2023 08:00) (118/78 - 139/75)  BP(mean): --  RR: 17 (30 May 2023 08:00) (17 - 18)  SpO2: 98% (30 May 2023 08:00) (97% - 99%)    Parameters below as of 30 May 2023 08:00  Patient On (Oxygen Delivery Method): room air          PHYSICAL EXAMINATION:    NECK:  Supple. No lymphadenopathy. Jugular venous pressure not elevated. Carotids equal.   HEART:   The cardiac impulse has a normal quality. Reg., Nl S1 and S2.  There are no murmurs, rubs or gallops noted  CHEST:  Chest is clear to auscultation. Normal respiratory effort.  ABDOMEN:  Soft and nontender.   EXTREMITIES:  There is no edema.       LABS:                        12.0   17.89 )-----------( 233      ( 29 May 2023 07:46 )             35.7     05-29    134<L>  |  103  |  13  ----------------------------<  130<H>  4.1   |  26  |  0.70    Ca    8.4<L>      29 May 2023 07:46  Phos  2.0     05-29  Mg     1.9     05-29    TPro  6.3  /  Alb  2.8<L>  /  TBili  0.5  /  DBili  x   /  AST  31  /  ALT  23  /  AlkPhos  67  05-29

## 2023-05-30 NOTE — CONSULT NOTE ADULT - SUBJECTIVE AND OBJECTIVE BOX
HPI:  Pt is an 88 yo female with a pmh/o afib on xarelto, HLD, HTN, syncope, dehydration,  presents to the ED with right hip pain after mechanical fall today. Patient denies headstrike or LOC. Patient takes Xarelto last dose was 1900 on . Of note, patient had hit her head on a door frame several days prior and has abrasion on forehead. Patient noticed immediate pain and inability to ambulate over the affected hip. Patient subsequently came to the ED for further evaluation and management. In the ED, endorses pain over the hip/groin area and pain with range of motion.    CT: RIGHT transcervical femoral neck fracture in varus position (27 May 2023 14:17)      Patient is a 88y old  Female who presents with a chief complaint of fall fracture (30 May 2023 09:19)      Consulted by Dr. Marvle Edwards   for VTE prophylaxis, risk stratification, and anticoagulation management.    PAST MEDICAL & SURGICAL HISTORY:  Syncope  2013      HTN (hypertension)      Hyperlipidemia      Afib      A-fib      Bronchitis      Cardiac abnormality  internal cardiac monitor placed 2013      H/O: hysterectomy      S/P cataract surgery  Left eye.    Interval History    2023:Patient seen at bedside sitting in the chair discussed the resumption of Xarelto denies any concerns, possible rehab today    CrCl:50.7  EBL:200ml    Caprini VTE Risk Score:  AGE RELATED RISK FACTORS                                                       MOBILITY RELATED FACTORS  [ ] Age 41-60 years                                            (1 Point)                  [ ] Bed rest /restricted mobility                             (1 Point)  [ ] Age: 61-74 years                                           (2 Points)                [ ] Plaster cast                                                   (2 Points)  [x ] Age= 75 years                                              (3 Points)                 [ ] Bed bound for more than 72 hours                   (2 Points)    DISEASE RELATED RISK FACTORS                                               GENDER SPECIFIC FACTORS  [ ] Edema in the lower extremities                       (1 Point)           [ ] Pregnancy                                                            (1 Point)  [ ] Varicose veins                                               (1 Point)                  [ ] Post-partum < 6 weeks                                      (1 Point)             [ ] BMI > 25 Kg/m2                                            (1 Point)                  [ ] Hormonal therapy or oral contraception       (1 Point)                 [ ] Sepsis (in the previous month)                        (1 Point)             [ ] History of pregnancy complications                (1Point)  [ ] Pneumonia or serious lung disease                                             [ ] Unexplained or recurrent  (=/>3), premature                                 (In the previous month)                               (1 Point)                birth with toxemia or growth-restricted infant (1 Point)  [ ] Abnormal pulmonary function test            (1 Point)                                   SURGERY RELATED RISK FACTORS  [ ] Acute myocardial infarction                       (1 Point)                  [ ]  Section                                         (1 Point)  [ ] Congestive heart failure (in the previous month) (1 Point)   [ ] Minor surgery   lasting <45 minutes       (1 Point)   [ ] Inflammatory bowel disease                             (1 Point)          [ ] Arthroscopic surgery                                  (2 Points)  [ ] Central venous access                                    (2 Points)            [ ] General surgery lasting >45 minutes      (2 Points)       [ ] Stroke (in the previous month)                  (5 Points)            [ ] Elective major lower extremity arthroplasty (5 Points)                                   [  ] Malignancy (present or past include skin melanoma                                          but exclude  basal skin cell)    (2 points)                                      TRAUMA RELATED RISK FACTORS                HEMATOLOGY RELATED FACTORS                                  [x ] Fracture of the hip, pelvis, or leg                       (5 Points)  [ ] Prior episodes of VTE                                     (3 Points)          [ ] Acute spinal cord injury (in the previous month)  (5 Points)  [ ] Positive family history for VTE                         (3 Points)       [ ] Paralysis (less than 1 month)                          (5 Points)  [ ] Prothrombin 66086 A                                      (3 Points)         [ ] Multiple Trauma (within 1month)                 (5Points)                                                                                                                                                                [ ] Factor V Leiden                                          (3 Points)                                OTHER RISK FACTORS                          [ ] Lupus anticoagulants                                     (3 Points)                       [ ] BMI > 40                          (1 Point)                                                         [ ] Anticardiolipin antibodies                                (3 Points)                   [ ] Smoking                              (1Point)                                                [ ] High homocysteine in the blood                      (3 Points)                [  ] Diabetes requiring insulin (1point)                         [ ] Other congenital or acquired thrombophilia       (3 Points)          [  ] Chemotherapy                   (1 Point)  [ ] Heparin induced thrombocytopenia                  (3 Points)             [  ] Blood Transfusion                (1 point)                                                                                                             Total Score [    8      ]                                                                                                                                                                                                                                                                                                                                                                                                                                         [id number] Score: 4    IMPROVE Bleeding Risk Score:3.5        Falls Risk:   High (x  )  Mod (  )  Low (  )      FAMILY HISTORY:  Family history of primary TB    Family history of CVA    Family history of diabetes mellitus      Denies any personal or familial history of clotting or bleeding disorders.    Allergies    penicillin (Unknown)  erythromycin (Diarrhea)  shellfish (Unknown)    Intolerances        REVIEW OF SYSTEMS    (  )Fever	     (  )Constipation	(  )SOB				(  )Headache	(  )Dysuria  (  )Chills	     (  )Melena	(  )Dyspnea present on exertion	                    (  )Dizziness                    (  )Polyuria  (  )Nausea	     (  )Hematochezia	(  )Cough			                    (  )Syncope   	(  )Hematuria  (  )Vomiting    (  )Chest Pain	(  )Wheezing			(  )Weakness  (  )Diarrhea     (  )Palpitations	(  )Anorexia			( x )joint pain    All  other review of systems negative: Yes    Vital Signs Last 24 Hrs  T(C): 36.4 (30 May 2023 08:00), Max: 37.4 (29 May 2023 20:06)  T(F): 97.5 (30 May 2023 08:00), Max: 99.3 (29 May 2023 20:06)  HR: 64 (30 May 2023 08:00) (64 - 112)  BP: 130/58 (30 May 2023 08:00) (120/71 - 139/75)  BP(mean): --  RR: 17 (30 May 2023 08:00) (17 - 18)  SpO2: 98% (30 May 2023 08:00) (97% - 99%)    PHYSICAL EXAM:    Constitutional: Appears Well    Neurological: A& O x 3    Skin: Warm    Respiratory and Thorax: normal effort; Breath sounds: normal; No rales/wheezing/rhonchi  	  Cardiovascular: S1, S2, regular, NMBR	    Gastrointestinal: BS + x 4Q, nontender	    Genitourinary:  Bladder nondistended, nontender    Musculoskeletal:   General Right:   + muscle/joint tenderness,   normal tone, no joint swelling,   ROM: limited	    General Left:   no muscle/joint tenderness,   normal tone, no joint swelling,   ROM: full    Hip:  Right: Dressing CDI;                              Lower extrems:   Right: no calf tenderness              negative gadiel's sign               + pedal pulses    Left:   no calf tenderness              negative gadiel's sign               + pedal pulses                          11.7   15.64 )-----------( 243      ( 30 May 2023 08:55 )             35.8       05-30    133<L>  |  99  |  11  ----------------------------<  127<H>  3.6   |  27  |  0.56    Ca    8.5      30 May 2023 08:55  Phos  2.0     05-29  Mg     1.9     05-29    TPro  6.6  /  Alb  2.8<L>  /  TBili  0.7  /  DBili  x   /  AST  30  /  ALT  18  /  AlkPhos  68  05-30      				    MEDICATIONS  (STANDING):  acetaminophen     Tablet .. 975 milliGRAM(s) Oral every 8 hours  diltiazem    milliGRAM(s) Oral daily  doxycycline IVPB 100 milliGRAM(s) IV Intermittent every 12 hours  doxycycline IVPB      metoprolol tartrate 37.5 milliGRAM(s) Oral two times a day  multivitamin 1 Tablet(s) Oral daily  polyethylene glycol 3350 17 Gram(s) Oral at bedtime  rivaroxaban 20 milliGRAM(s) Oral with dinner  senna 2 Tablet(s) Oral at bedtime  simvastatin 20 milliGRAM(s) Oral at bedtime          DVT Prophylaxis:  LMWH                   (  )  Heparin SQ           (  )  Coumadin             (  )  Xarelto                  (  x)  Eliquis                   (  )  Venodynes           (x  )  Ambulation          ( x )  UFH                       (  )  Contraindicated  (  )  EC ASPIRIN       (  )

## 2023-05-31 ENCOUNTER — TRANSCRIPTION ENCOUNTER (OUTPATIENT)
Age: 88
End: 2023-05-31

## 2023-05-31 VITALS — WEIGHT: 121.92 LBS

## 2023-05-31 LAB
ANION GAP SERPL CALC-SCNC: 6 MMOL/L — SIGNIFICANT CHANGE UP (ref 5–17)
BUN SERPL-MCNC: 8 MG/DL — SIGNIFICANT CHANGE UP (ref 7–23)
CALCIUM SERPL-MCNC: 8.5 MG/DL — SIGNIFICANT CHANGE UP (ref 8.5–10.1)
CHLORIDE SERPL-SCNC: 100 MMOL/L — SIGNIFICANT CHANGE UP (ref 96–108)
CO2 SERPL-SCNC: 26 MMOL/L — SIGNIFICANT CHANGE UP (ref 22–31)
CREAT SERPL-MCNC: 0.52 MG/DL — SIGNIFICANT CHANGE UP (ref 0.5–1.3)
EGFR: 90 ML/MIN/1.73M2 — SIGNIFICANT CHANGE UP
GLUCOSE SERPL-MCNC: 111 MG/DL — HIGH (ref 70–99)
HCT VFR BLD CALC: 36 % — SIGNIFICANT CHANGE UP (ref 34.5–45)
HGB BLD-MCNC: 12.1 G/DL — SIGNIFICANT CHANGE UP (ref 11.5–15.5)
MCHC RBC-ENTMCNC: 29.9 PG — SIGNIFICANT CHANGE UP (ref 27–34)
MCHC RBC-ENTMCNC: 33.6 GM/DL — SIGNIFICANT CHANGE UP (ref 32–36)
MCV RBC AUTO: 88.9 FL — SIGNIFICANT CHANGE UP (ref 80–100)
PLATELET # BLD AUTO: 255 K/UL — SIGNIFICANT CHANGE UP (ref 150–400)
POTASSIUM SERPL-MCNC: 3.8 MMOL/L — SIGNIFICANT CHANGE UP (ref 3.5–5.3)
POTASSIUM SERPL-SCNC: 3.8 MMOL/L — SIGNIFICANT CHANGE UP (ref 3.5–5.3)
RBC # BLD: 4.05 M/UL — SIGNIFICANT CHANGE UP (ref 3.8–5.2)
RBC # FLD: 12.9 % — SIGNIFICANT CHANGE UP (ref 10.3–14.5)
SARS-COV-2 RNA SPEC QL NAA+PROBE: SIGNIFICANT CHANGE UP
SODIUM SERPL-SCNC: 132 MMOL/L — LOW (ref 135–145)
WBC # BLD: 13.73 K/UL — HIGH (ref 3.8–10.5)
WBC # FLD AUTO: 13.73 K/UL — HIGH (ref 3.8–10.5)

## 2023-05-31 PROCEDURE — 99239 HOSP IP/OBS DSCHRG MGMT >30: CPT

## 2023-05-31 RX ORDER — ACETAMINOPHEN 500 MG
2 TABLET ORAL
Refills: 0 | DISCHARGE

## 2023-05-31 RX ORDER — CARVEDILOL PHOSPHATE 80 MG/1
1 CAPSULE, EXTENDED RELEASE ORAL
Refills: 0 | DISCHARGE

## 2023-05-31 RX ORDER — POLYETHYLENE GLYCOL 3350 17 G/17G
17 POWDER, FOR SOLUTION ORAL
Qty: 0 | Refills: 0 | DISCHARGE
Start: 2023-05-31

## 2023-05-31 RX ORDER — DILTIAZEM HCL 120 MG
1 CAPSULE, EXT RELEASE 24 HR ORAL
Qty: 0 | Refills: 0 | DISCHARGE
Start: 2023-05-31

## 2023-05-31 RX ORDER — HYDROMORPHONE HYDROCHLORIDE 2 MG/ML
1 INJECTION INTRAMUSCULAR; INTRAVENOUS; SUBCUTANEOUS
Qty: 0 | Refills: 0 | DISCHARGE
Start: 2023-05-31

## 2023-05-31 RX ORDER — RIVAROXABAN 15 MG-20MG
1 KIT ORAL
Refills: 0 | DISCHARGE
Start: 2023-05-31

## 2023-05-31 RX ORDER — SENNA PLUS 8.6 MG/1
2 TABLET ORAL
Qty: 0 | Refills: 0 | DISCHARGE
Start: 2023-05-31

## 2023-05-31 RX ORDER — DILTIAZEM HCL 120 MG
1 CAPSULE, EXT RELEASE 24 HR ORAL
Qty: 0 | Refills: 0 | DISCHARGE

## 2023-05-31 RX ORDER — METOPROLOL TARTRATE 50 MG
1 TABLET ORAL
Qty: 0 | Refills: 0 | DISCHARGE
Start: 2023-05-31

## 2023-05-31 RX ADMIN — Medication 1 TABLET(S): at 11:17

## 2023-05-31 RX ADMIN — Medication 180 MILLIGRAM(S): at 05:46

## 2023-05-31 RX ADMIN — Medication 975 MILLIGRAM(S): at 05:46

## 2023-05-31 RX ADMIN — Medication 110 MILLIGRAM(S): at 05:45

## 2023-05-31 RX ADMIN — HYDROMORPHONE HYDROCHLORIDE 2 MILLIGRAM(S): 2 INJECTION INTRAMUSCULAR; INTRAVENOUS; SUBCUTANEOUS at 12:00

## 2023-05-31 RX ADMIN — HYDROMORPHONE HYDROCHLORIDE 2 MILLIGRAM(S): 2 INJECTION INTRAMUSCULAR; INTRAVENOUS; SUBCUTANEOUS at 12:45

## 2023-05-31 RX ADMIN — Medication 37.5 MILLIGRAM(S): at 11:17

## 2023-05-31 NOTE — DIETITIAN NUTRITION RISK NOTIFICATION - ADDITIONAL COMMENTS/DIETITIAN RECOMMENDATIONS
1) C/w regular diet to maximize caloric and protein intake  2) Add Premier protein shake (provides 160 kcal, 30 g protein/ shake)   3) Encourage protein-rich foods, maximize food preferences  4) Monitor bowel movements, if no BM for >3 days, consider implementing bowel regimen.  5) MVI w/ minerals daily to ensure 100% RDA met   6) In v/o malnutrition, consideradding thiamine 100 mg daily  7) Obtain weekly wt to track/trend changes  8) consider checking B6, B12, thiamine, folate, carnitine, and copper levels as malnutrition in cause these to be deficient  9) Confirm goals of care regarding nutrition support   10) SW following - d/c to [facility]  RD will continue to monitor PO intake, labs, hydration, and wt prn.

## 2023-05-31 NOTE — DIETITIAN INITIAL EVALUATION ADULT - [id number]_GEN_A_CORE
I&O's Detail    30 May 2023 07:01  -  31 May 2023 07:00  --------------------------------------------------------  IN:  Total IN: 0 mL    OUT:    Voided (mL): 900 mL  Total OUT: 900 mL    Total NET: -900 mL

## 2023-05-31 NOTE — PROGRESS NOTE ADULT - ASSESSMENT
This is a 87 yo with h/o permanent AFIB , multiple attempts at cardioversion unsuccessful on Xarelto, h/o hyperlipidemia , HTN , presents after getting up from bed and her leg gave out which lead to a fall and resultant hip Fx . Of not she took Ambien for the first time. She is usually very active and actually participates in [employer] classes at the pool. She has no Sxs of CP or SOB . She has never had syncope before.   5/15- Echo showed EF 55-60 with mild MR and aortic sclerosis but no stenosis  Cardiac PET 11/21 - negative for ischemia     # R femoral neck hip fx s/p ORIF. management per ortho.  #AFIB RVR. Continue Xarelto, Cardizem 180 daily.  Coreg changed to Metoprolol (dose reduced to 37.5mg BID), patient off tele per primary team. 5/31/23: TTE: EF 60-65 %. mild MR, mod TR, mild pulmHTN   #HLD. Simvastatin   #HTN. Stable. Continue current medications.         Case d/w Dr. Aleman      This is a 89 yo with h/o permanent AFIB , multiple attempts at cardioversion unsuccessful on Xarelto, h/o hyperlipidemia , HTN , presents after getting up from bed and her leg gave out which lead to a fall and resultant hip Fx . Of not she took Ambien for the first time. She is usually very active and actually participates in [employer] classes at the pool. She has no Sxs of CP or SOB . She has never had syncope before.   5/15- Echo showed EF 55-60 with mild MR and aortic sclerosis but no stenosis  Cardiac PET 11/21 - negative for ischemia     # R femoral neck hip fx s/p ORIF. management per ortho.  #AFIB RVR. Continue Xarelto, Cardizem 180 daily.  Coreg changed to Metoprolol (dose reduced to 37.5mg BID), patient off tele per primary team. 5/31/23: TTE: EF 60-65 %. mild MR, mod TR, mild pulmHTN   #HLD. Simvastatin   #HTN. Stable. Continue current medications.         Case d/w Dr. Aleman   Will sign off, call with questions. outpatient follow up.

## 2023-05-31 NOTE — PROGRESS NOTE ADULT - SUBJECTIVE AND OBJECTIVE BOX
CHIEF COMPLAINT: Patient is a 88y old  Female who presents with a chief complaint of fall fracture (30 May 2023 05:57)    FROM H&P: Pt is an 86 yo female with a pmh/o afib on Xarelto, HLD, HTN, syncope, dehydration,  presents to the ED with right hip pain after mechanical fall today. Patient denies headstrike or LOC. Patient takes Xarelto last dose was 1900 on . Of note, patient had hit her head on a door frame several days prior and has abrasion on forehead. Patient noticed immediate pain and inability to ambulate over the affected hip. Patient subsequently came to the ED for further evaluation and management. In the ED, endorses pain over the hip/groin area and pain with range of motion.  CT: RIGHT transcervical femoral neck fracture in varus position (27 May 2023 14:17)    . S/p Right total hip arthroplasty  . Still with hip pain but denies CP or SOB -130 on tele afib   . Improved mobility, pain controlled.  .    MEDICATIONS:  acetaminophen     Tablet .. 975 milliGRAM(s) Oral every 8 hours  diltiazem    milliGRAM(s) Oral daily  doxycycline IVPB 100 milliGRAM(s) IV Intermittent every 12 hours  doxycycline IVPB      metoprolol tartrate 37.5 milliGRAM(s) Oral two times a day  multivitamin 1 Tablet(s) Oral daily  polyethylene glycol 3350 17 Gram(s) Oral at bedtime  rivaroxaban 20 milliGRAM(s) Oral with dinner  senna 2 Tablet(s) Oral at bedtime  simvastatin 20 milliGRAM(s) Oral at bedtime    MEDICATIONS  (PRN):  HYDROmorphone   Tablet 2 milliGRAM(s) Oral every 4 hours PRN Mild Pain (1 - 3)  HYDROmorphone   Tablet 4 milliGRAM(s) Oral every 4 hours PRN Moderate Pain (4 - 6)  HYDROmorphone  Injectable 0.5 milliGRAM(s) SubCutaneous every 4 hours PRN Severe Pain (7 - 10)  magnesium hydroxide Suspension 30 milliLiter(s) Oral daily PRN Constipation  ondansetron Injectable 4 milliGRAM(s) IV Push every 6 hours PRN Nausea and/or Vomiting      HOME MEDICATIONS:  ascorbic acid 500 mg oral tablet: 1 tab(s) orally once a day (27 May 2023 13:17)  carvedilol 12.5 mg oral tablet: 1 tab(s) orally 2 times a day (27 May 2023 13:17)  dilTIAZem 180 mg/24 hours oral capsule, extended release: 1 cap(s) orally once a day (27 May 2023 13:09)  Multiple Vitamins oral tablet: 1 tab(s) orally once a day (27 May 2023 13:17)  simvastatin 20 mg oral tablet: 1 tab(s) orally once a day (at bedtime) (27 May 2023 13:09)  Tylenol 325 mg oral tablet: 2 tab(s) orally 2 times a day as needed for (27 May 2023 13:17)  Vitamin B-12 1000 mcg oral tablet: 2 tab(s) orally once a day (27 May 2023 13:09)  Vitamin D3 25 mcg (1000 intl units) oral tablet: 1 tab(s) orally 2 times a day (27 May 2023 13:09)    PHYSICAL EXAM:  T(C): 36.7 (31 May 2023 07:50), Max: 36.7 (30 May 2023 16:37)  T(F): 98.1 (31 May 2023 07:50), Max: 98.1 (30 May 2023 16:37)  HR: 77 (31 May 2023 07:50) (77 - 85)  BP: 111/81 (31 May 2023 07:50) (110/69 - 134/81)  RR: 18 (31 May 2023 07:50) (17 - 18)  SpO2: 99% (31 May 2023 07:50) (99% - 99%)    Parameters below as of 31 May 2023 07:50  Patient On (Oxygen Delivery Method): room air    Constitutional: NAD, awake and alert  HEENT: PERR, EOMI, Normal Hearing, MMM  Neck: Soft and supple, No LAD, No JVD  Respiratory: Breath sounds are clear bilaterally, No wheezing, rales or rhonchi  Cardiovascular: IR IR  Gastrointestinal: Bowel Sounds present, soft, nontender, nondistended, no guarding, no rebound  Extremities: No peripheral edema  Vascular: 2+ peripheral pulses  Neurological: A/O x 3, no focal deficits  Musculoskeletal: LT hip limited  Skin: No rashes    =======================================    INTERPRETATION OF TELEMETRY:  . afib, slow afib this AM to 40s  .     ========================================    LABS:             11.7   15.64 )-----------( 243      ( 30 May 2023 08:55 )             35.8         133<L>  |  99  |  11  ----------------------------<  127<H>  3.6   |  27  |  0.56    Ca    8.5      30 May 2023 08:55    TPro  6.6  /  Alb  2.8<L>  /  TBili  0.7  /  DBili  x   /  AST  30  /  ALT  18  /  AlkPhos  68      Troponin: 5.18 ng/L, Troponin: 8.76 ng/L    BNP 4553    CARDIAC TESTIN/31/23: TTE :Estimated left ventricular ejection fraction is 60-65 %. The left ventricle is normal in size, wall thickness, wall motion and contractility as seen in limited views. The left atrium is mildly dilated. Normal appearing right ventricle structure and function. The mitral valve leaflets appear thin and normal. Mild mitral annular calcification is present. Mild (1+) mitral regurgitation is present. Normal appearing tricuspid valve structure. Moderate (2+) tricuspid valve regurgitation is present. Mild pulmonary hypertension. Pulmonic valve not well seen. Mild pulmonic valvular regurgitation (1+) is present. No evidence of pericardial effusion. Pleural effusion - small right pleural effusion.    RADIOLOGY & ADDITIONAL STUDIES:    < from: CT Abdomen and Pelvis w/ IV Cont (23 @ 10:11) >  IMPRESSION:  1.  RIGHT transcervical femoral neck fracture in varus position  2.  Anterior RUL reticular changes/centrilobular   nodularity/bronchiectasis suggests age-indeterminate inflammatory/   infectious disease including but not limited to non-tuberculous   mycobacterial infection.  3.  Indeterminant Ill-defined 10 mm posterior RUL focal airspace opacity   or  nodule (4/118). 4 mm subpleural RUL (106) and JEFFREY nodules (105).  4.  Borderline-sized mediastinal lymph nodes favor a reactive etiology.   CHIEF COMPLAINT: Patient is a 88y old  Female who presents with a chief complaint of fall fracture (30 May 2023 05:57)    FROM H&P: Pt is an 86 yo female with a pmh/o afib on Xarelto, HLD, HTN, syncope, dehydration,  presents to the ED with right hip pain after mechanical fall today. Patient denies headstrike or LOC. Patient takes Xarelto last dose was 1900 on . Of note, patient had hit her head on a door frame several days prior and has abrasion on forehead. Patient noticed immediate pain and inability to ambulate over the affected hip. Patient subsequently came to the ED for further evaluation and management. In the ED, endorses pain over the hip/groin area and pain with range of motion.  CT: RIGHT transcervical femoral neck fracture in varus position (27 May 2023 14:17)    . S/p Right total hip arthroplasty  . Still with hip pain but denies CP or SOB -130 on tele afib   . Improved mobility, pain controlled.  . No complaints, doing well. echo results d/w patient.    MEDICATIONS:  acetaminophen     Tablet .. 975 milliGRAM(s) Oral every 8 hours  diltiazem    milliGRAM(s) Oral daily  doxycycline IVPB 100 milliGRAM(s) IV Intermittent every 12 hours  doxycycline IVPB      metoprolol tartrate 37.5 milliGRAM(s) Oral two times a day  multivitamin 1 Tablet(s) Oral daily  polyethylene glycol 3350 17 Gram(s) Oral at bedtime  rivaroxaban 20 milliGRAM(s) Oral with dinner  senna 2 Tablet(s) Oral at bedtime  simvastatin 20 milliGRAM(s) Oral at bedtime    MEDICATIONS  (PRN):  HYDROmorphone   Tablet 2 milliGRAM(s) Oral every 4 hours PRN Mild Pain (1 - 3)  HYDROmorphone   Tablet 4 milliGRAM(s) Oral every 4 hours PRN Moderate Pain (4 - 6)  HYDROmorphone  Injectable 0.5 milliGRAM(s) SubCutaneous every 4 hours PRN Severe Pain (7 - 10)  magnesium hydroxide Suspension 30 milliLiter(s) Oral daily PRN Constipation  ondansetron Injectable 4 milliGRAM(s) IV Push every 6 hours PRN Nausea and/or Vomiting      HOME MEDICATIONS:  ascorbic acid 500 mg oral tablet: 1 tab(s) orally once a day (27 May 2023 13:17)  carvedilol 12.5 mg oral tablet: 1 tab(s) orally 2 times a day (27 May 2023 13:17)  dilTIAZem 180 mg/24 hours oral capsule, extended release: 1 cap(s) orally once a day (27 May 2023 13:09)  Multiple Vitamins oral tablet: 1 tab(s) orally once a day (27 May 2023 13:17)  simvastatin 20 mg oral tablet: 1 tab(s) orally once a day (at bedtime) (27 May 2023 13:09)  Tylenol 325 mg oral tablet: 2 tab(s) orally 2 times a day as needed for (27 May 2023 13:17)  Vitamin B-12 1000 mcg oral tablet: 2 tab(s) orally once a day (27 May 2023 13:09)  Vitamin D3 25 mcg (1000 intl units) oral tablet: 1 tab(s) orally 2 times a day (27 May 2023 13:09)    PHYSICAL EXAM:  T(C): 36.7 (31 May 2023 07:50), Max: 36.7 (30 May 2023 16:37)  T(F): 98.1 (31 May 2023 07:50), Max: 98.1 (30 May 2023 16:37)  HR: 77 (31 May 2023 07:50) (77 - 85)  BP: 111/81 (31 May 2023 07:50) (110/69 - 134/81)  RR: 18 (31 May 2023 07:50) (17 - 18)  SpO2: 99% (31 May 2023 07:50) (99% - 99%)    Parameters below as of 31 May 2023 07:50  Patient On (Oxygen Delivery Method): room air    Constitutional: NAD, awake and alert  HEENT: PERR, EOMI, Normal Hearing, MMM  Neck: Soft and supple, No LAD, No JVD  Respiratory: Breath sounds are clear bilaterally, No wheezing, rales or rhonchi  Cardiovascular: IR IR  Gastrointestinal: Bowel Sounds present, soft, nontender, nondistended, no guarding, no rebound  Extremities: No peripheral edema  Vascular: 2+ peripheral pulses  Neurological: A/O x 3, no focal deficits  Musculoskeletal: LT hip limited  Skin: No rashes    =======================================    INTERPRETATION OF TELEMETRY:  . afib, slow afib this AM to 40s  . Not on tele monitor    ========================================    LABS:             11.7   15.64 )-----------( 243      ( 30 May 2023 08:55 )             35.8         133<L>  |  99  |  11  ----------------------------<  127<H>  3.6   |  27  |  0.56    Ca    8.5      30 May 2023 08:55    TPro  6.6  /  Alb  2.8<L>  /  TBili  0.7  /  DBili  x   /  AST  30  /  ALT  18  /  AlkPhos  68      Troponin: 5.18 ng/L, Troponin: 8.76 ng/L    BNP 4553    CARDIAC TESTIN/31/23: TTE :Estimated left ventricular ejection fraction is 60-65 %. The left ventricle is normal in size, wall thickness, wall motion and contractility as seen in limited views. The left atrium is mildly dilated. Normal appearing right ventricle structure and function. The mitral valve leaflets appear thin and normal. Mild mitral annular calcification is present. Mild (1+) mitral regurgitation is present. Normal appearing tricuspid valve structure. Moderate (2+) tricuspid valve regurgitation is present. Mild pulmonary hypertension. Pulmonic valve not well seen. Mild pulmonic valvular regurgitation (1+) is present. No evidence of pericardial effusion. Pleural effusion - small right pleural effusion.    RADIOLOGY & ADDITIONAL STUDIES:    < from: CT Abdomen and Pelvis w/ IV Cont (23 @ 10:11) >  IMPRESSION:  1.  RIGHT transcervical femoral neck fracture in varus position  2.  Anterior RUL reticular changes/centrilobular   nodularity/bronchiectasis suggests age-indeterminate inflammatory/   infectious disease including but not limited to non-tuberculous   mycobacterial infection.  3.  Indeterminant Ill-defined 10 mm posterior RUL focal airspace opacity   or  nodule (4/118). 4 mm subpleural RUL (106) and JEFFREY nodules (105).  4.  Borderline-sized mediastinal lymph nodes favor a reactive etiology.

## 2023-05-31 NOTE — CONSULT NOTE ADULT - ASSESSMENT
A/P:  89 y/o female with renal enhancement    Symmetric renal enhancement is normal for a CT abd/pelvis with IV contrast.  No stones or obstructive uropathy seen    Encouraged pt to drink fluids to flush out her urinary system, so as not to get a UTI    Pt can go to rehab for physical therapy    Pt can follow up with Urology as an outpatient

## 2023-05-31 NOTE — DIETITIAN NUTRITION RISK NOTIFICATION - TREATMENT: THE FOLLOWING DIET HAS BEEN RECOMMENDED
Diet, Regular (05-28-23 @ 10:17) [Available for Activation]  Diet, Clear Liquid (05-28-23 @ 10:17) [Available for Activation]  Diet, Regular (05-27-23 @ 14:17) [Active]

## 2023-05-31 NOTE — DIETITIAN INITIAL EVALUATION ADULT - LAB (SPECIFY)
consider checking B6, B12, thiamine, folate, carnitine, and copper levels as malnutrition in cause these to be deficient

## 2023-05-31 NOTE — CONSULT NOTE ADULT - CONSULT REASON
R Hip Fx    consult called ~1100  consult seen ~1115
sob
preop
DVT/PE prophylaxis, risk stratification and Anticoagulation Management
Renal Enhancement

## 2023-05-31 NOTE — PROGRESS NOTE ADULT - REASON FOR ADMISSION
fall fracture

## 2023-05-31 NOTE — PROGRESS NOTE ADULT - ASSESSMENT
PROBLEMS:    RIGHT transcervical femoral neck fracture  Leukocytosis-predominantly neutrophiles-No localizing signs  Anterior RUL reticular changes/centrilobular nodularity/bronchiectasis suggests age-indeterminate inflammatory/   infectious disease including but not limited to non-tuberculous mycobacterial infection.  Nonspecific punctate LEFT breast calcification  Lung nodules   Chronic Afib    PLAN:    pulmonary stable -decd as per ortho  CT chest-indeterminate inflammatory/ infectious changes-will fu clinically-monitor during hospitalization-Iv doxy-trend wbc    Lung nodules OP f/up  PT-rehab  DVT prophylasix

## 2023-05-31 NOTE — CONSULT NOTE ADULT - SUBJECTIVE AND OBJECTIVE BOX
Pt is an 86 yo female with a pmh/o afib on xarelto, HLD, HTN, syncope, dehydration,  presents to the ED with right hip pain after mechanical fall today. Patient denies headstrike or LOC. Patient takes Xarelto last dose was 1900 on . Of note, patient had hit her head on a door frame several days prior and has abrasion on forehead. Patient noticed immediate pain and inability to ambulate over the affected hip. Patient subsequently came to the ED for further evaluation and management. In the ED, endorses pain over the hip/groin area and pain with range of motion.    CT: RIGHT transcervical femoral neck fracture in varus position (27 May 2023 14:17)      Called to see pt for renal enhancement and distended bladder on Abd CT. Pt voiding and not having any urological issues. Denies fever, chills, hematuria or burning on urination.      PAST MEDICAL & SURGICAL HISTORY:  Syncope  2013      HTN (hypertension)      Hyperlipidemia      Afib      A-fib      Bronchitis      Cardiac abnormality  internal cardiac monitor placed 2013      H/O: hysterectomy      S/P cataract surgery  Left eye.                                                                                                                                                                                                                                                                   FAMILY HISTORY:  Family history of primary TB    Family history of CVA    Family history of diabetes mellitus      Denies any personal or familial history of clotting or bleeding disorders.    Allergies    penicillin (Unknown)  erythromycin (Diarrhea)  shellfish (Unknown)    Intolerances    Home Medications:   * Patient Currently Takes Medications as of 27-May-2023 13:20 documented in Structured Notes  · 	rivaroxaban 20 mg oral tablet: Last Dose Taken:  , 1 tab(s) orally once a day (before a meal)  · 	Multiple Vitamins oral tablet: Last Dose Taken:  , 1 tab(s) orally once a day  · 	Vitamin D3 25 mcg (1000 intl units) oral tablet: Last Dose Taken:  , 1 tab(s) orally 2 times a day  · 	simvastatin 20 mg oral tablet: Last Dose Taken:  , 1 tab(s) orally once a day (at bedtime)  · 	ascorbic acid 500 mg oral tablet: Last Dose Taken:  , 1 tab(s) orally once a day  · 	Vitamin B-12 1000 mcg oral tablet: Last Dose Taken:  , 2 tab(s) orally once a day  · 	Tylenol 325 mg oral tablet: Last Dose Taken:  , 2 tab(s) orally 2 times a day as needed for  · 	carvedilol 12.5 mg oral tablet: Last Dose Taken:  , 1 tab(s) orally 2 times a day  · 	dilTIAZem 180 mg/24 hours oral capsule, extended release: Last Dose Taken:  , 1 cap(s) orally once a day          REVIEW OF SYSTEMS    (  )Fever	     (  )Constipation	(  )SOB				(  )Headache	(  )Dysuria  (  )Chills	     (  )Melena	(  )Dyspnea present on exertion	                    (  )Dizziness                    (  )Polyuria  (  )Nausea	     (  )Hematochezia	(  )Cough			                    (  )Syncope   	(  )Hematuria  (  )Vomiting    (  )Chest Pain	(  )Wheezing			(  )Weakness  (  )Diarrhea     (  )Palpitations	(  )Anorexia			( x )joint pain    All  other review of systems negative: Yes      PHYSICAL EXAM:    Vital Signs Last 24 Hrs  T(C): 36.7 (31 May 2023 07:50), Max: 36.7 (30 May 2023 16:37)  T(F): 98.1 (31 May 2023 07:50), Max: 98.1 (30 May 2023 16:37)  HR: 77 (31 May 2023 07:50) (77 - 85)  BP: 111/81 (31 May 2023 07:50) (110/69 - 134/81)  BP(mean): --  RR: 18 (31 May 2023 07:50) (17 - 18)  SpO2: 99% (31 May 2023 07:50) (99% - 99%)    Parameters below as of 31 May 2023 07:50  Patient On (Oxygen Delivery Method): room air        Constitutional: Appears Well    Neurological: A& O x 3    Head: NC/AT no lesions noted    Skin: Warm    Respiratory and Thorax: normal effort; Breath sounds: normal; No rales/wheezing/rhonchi  	  Cardiovascular: S1, S2, regular, RRR	    Gastrointestinal: BS + x 4Q, nontender/ non-distended	    Back: No CVA tenderness    Genitourinary:  Bladder nondistended, nontender                          Bladder scan - 206cc - Pt will try to void again  Lower Ext: no calf tenderness      LABS:                          12.1   13.73 )-----------( 255      ( 31 May 2023 08:41 )             36.0     05-    132<L>  |  100  |  8   ----------------------------<  111<H>  3.8   |  26  |  0.52    Ca    8.5      31 May 2023 08:41    TPro  6.6  /  Alb  2.8<L>  /  TBili  0.7  /  DBili  x   /  AST  30  /  ALT  18  /  AlkPhos  68  05-30    LIVER FUNCTIONS - ( 30 May 2023 08:55 )  Alb: 2.8 g/dL / Pro: 6.6 gm/dL / ALK PHOS: 68 U/L / ALT: 18 U/L / AST: 30 U/L / GGT: x             Urinalysis Basic - ( 30 May 2023 10:40 )    Color: Yellow / Appearance: Clear / S.015 / pH: x  Gluc: x / Ketone: Negative  / Bili: Negative / Urobili: Negative   Blood: x / Protein: Negative / Nitrite: Negative   Leuk Esterase: Negative / RBC: x / WBC x   Sq Epi: x / Non Sq Epi: x / Bacteria: x    ACC: [id number] EXAM:  CT ABDOMEN AND PELVIS IC   ORDERED BY: SAMSON KHAN     ACC: [id number] EXAM:  CT CHEST IC   ORDERED BY: SAMSON KHAN     PROCEDURE DATE:  2023          INTERPRETATION:  CLINICAL INFORMATION: Trauma    COMPARISON: None.    CONTRAST/COMPLICATIONS:  IV Contrast: Omnipaque 350   90 cc administered   10 cc discarded  Oral Contrast: NONE  Complications: None reported at time of study completion    PROCEDURE:  CT of the Chest, Abdomen and Pelvis was performed.  Imaging was performed through the chest in the arterial phase followed by   imaging of the abdomen and pelvis in the portal venous phase.  Sagittal and coronal reformats were performed.    FINDINGS:  CHEST:  LUNGS AND LARGE AIRWAYS: Patent central airways.  Ill-defined 10 mm   posterior RUL focal airspace opacity or nodule (4/118). 4 mm subpleural   RUL (106) and JEFFREY nodules (105). Segmental anterior-inferior RUL   reticular changes, indistinct centrilobular nodularity and bronchiectasis   (4/130). Apical scarring and minimal interlobular septal thickening  PLEURA: No pleural effusion or pneumothorax.  VESSELS: No aortic dilatation/dissection.  HEART: Borderline cardiomegaly. No pericardial effusion.  MEDIASTINUM AND SHANNA: Multiple predominantly centimeter/subcentimeter   sized mediastinal lymph nodes. Largest: Precarinal 19 x 13 mm (4/95))  CHEST WALL AND LOWER NECK: Nonspecific punctate LEFT breast calcification     ABDOMEN AND PELVIS:  LIVER: Centimeter sized lateral LEFT hepatic hypodensity is statistically   of no significance  BILE DUCTS: Normal caliber.  GALLBLADDER: Within normal limits.  SPLEEN: Within normal limits.  PANCREAS: Within normal limits.  ADRENALS: Within normal limits.  KIDNEYS/URETERS: Symmetric renal enhancement without calculi/ obstructive   uropathy. Bilateral extrarenal pelvises    BLADDER: Distended urinary bladder  REPRODUCTIVE ORGANS: Hysterectomy. No adnexal mass.    BOWEL: Small hiatus hernia. No bowel obstruction. Nonvisualized appendix.   No appendicitis . Sigmoiddiverticulosis without diverticulitis .   Rectosigmoid fecal distention  PERITONEUM: No ascites.  VESSELS: Atherosclerotic changes.  RETROPERITONEUM/LYMPH NODES: No lymphadenopathy.  ABDOMINAL WALL: Tiny fat-containing umbilical hernia.  BONES: RIGHTtranscervical femoral neck fracture in varus position. . No   other acute/displaced fracture of included axial skeleton. Thoracolumbar   spondylosis    IMPRESSION:  1.  RIGHT transcervical femoral neck fracture in varus position  2.  Anterior RUL reticular changes/centrilobular   nodularity/bronchiectasis suggests age-indeterminate inflammatory/   infectious disease including but not limited to non-tuberculous   mycobacterial infection.  3.  Indeterminant Ill-defined 10 mm posterior RUL focal airspace opacity   or  nodule (4/118). 4 mm subpleural RUL (106) and JEFFREY nodules (105).  4.  Borderline-sized mediastinal lymph nodes favor a reactive etiology.

## 2023-05-31 NOTE — PROGRESS NOTE ADULT - NUTRITIONAL ASSESSMENT
This patient has been assessed with a concern for Malnutrition and has been determined to have a diagnosis/diagnoses of Severe protein-calorie malnutrition.    This patient is being managed with:   Diet Regular-  Entered: May 27 2023  2:15PM

## 2023-05-31 NOTE — PROGRESS NOTE ADULT - SUBJECTIVE AND OBJECTIVE BOX
Subjective:    pat better, ambulating with PT, no respiratory complaint.    Home Medications:  ascorbic acid 500 mg oral tablet: 1 tab(s) orally once a day (27 May 2023 13:17)  carvedilol 12.5 mg oral tablet: 1 tab(s) orally 2 times a day (27 May 2023 13:17)  dilTIAZem 180 mg/24 hours oral capsule, extended release: 1 cap(s) orally once a day (27 May 2023 13:09)  Multiple Vitamins oral tablet: 1 tab(s) orally once a day (27 May 2023 13:17)  simvastatin 20 mg oral tablet: 1 tab(s) orally once a day (at bedtime) (27 May 2023 13:09)  Tylenol 325 mg oral tablet: 2 tab(s) orally 2 times a day as needed for (27 May 2023 13:17)  Vitamin B-12 1000 mcg oral tablet: 2 tab(s) orally once a day (27 May 2023 13:09)  Vitamin D3 25 mcg (1000 intl units) oral tablet: 1 tab(s) orally 2 times a day (27 May 2023 13:09)    MEDICATIONS  (STANDING):  acetaminophen     Tablet .. 975 milliGRAM(s) Oral every 8 hours  diltiazem    milliGRAM(s) Oral daily  doxycycline IVPB      doxycycline IVPB 100 milliGRAM(s) IV Intermittent every 12 hours  metoprolol tartrate 37.5 milliGRAM(s) Oral two times a day  multivitamin 1 Tablet(s) Oral daily  polyethylene glycol 3350 17 Gram(s) Oral at bedtime  rivaroxaban 20 milliGRAM(s) Oral with dinner  senna 2 Tablet(s) Oral at bedtime  simvastatin 20 milliGRAM(s) Oral at bedtime    MEDICATIONS  (PRN):  HYDROmorphone   Tablet 2 milliGRAM(s) Oral every 4 hours PRN Mild Pain (1 - 3)  HYDROmorphone   Tablet 4 milliGRAM(s) Oral every 4 hours PRN Moderate Pain (4 - 6)  HYDROmorphone  Injectable 0.5 milliGRAM(s) SubCutaneous every 4 hours PRN Severe Pain (7 - 10)  magnesium hydroxide Suspension 30 milliLiter(s) Oral daily PRN Constipation  ondansetron Injectable 4 milliGRAM(s) IV Push every 6 hours PRN Nausea and/or Vomiting      Allergies    penicillin (Unknown)  erythromycin (Diarrhea)  shellfish (Unknown)    Intolerances        Vital Signs Last 24 Hrs  T(C): 36.7 (31 May 2023 07:50), Max: 36.7 (30 May 2023 16:37)  T(F): 98.1 (31 May 2023 07:50), Max: 98.1 (30 May 2023 16:37)  HR: 77 (31 May 2023 07:50) (77 - 85)  BP: 111/81 (31 May 2023 07:50) (110/69 - 134/81)  BP(mean): --  RR: 18 (31 May 2023 07:50) (17 - 18)  SpO2: 99% (31 May 2023 07:50) (99% - 99%)    Parameters below as of 31 May 2023 07:50  Patient On (Oxygen Delivery Method): room air          PHYSICAL EXAMINATION:    NECK:  Supple. No lymphadenopathy. Jugular venous pressure not elevated. Carotids equal.   HEART:   The cardiac impulse has a normal quality. Reg., Nl S1 and S2.  There are no murmurs, rubs or gallops noted  CHEST:  Chest is clear to auscultation. Normal respiratory effort.  ABDOMEN:  Soft and nontender.   EXTREMITIES:  There is no edema.       LABS:                        12.1   13.73 )-----------( 255      ( 31 May 2023 08:41 )             36.0     05-31    132<L>  |  100  |  8   ----------------------------<  111<H>  3.8   |  26  |  0.52    Ca    8.5      31 May 2023 08:41    TPro  6.6  /  Alb  2.8<L>  /  TBili  0.7  /  DBili  x   /  AST  30  /  ALT  18  /  AlkPhos  68  05-30      Urinalysis Basic - ( 30 May 2023 10:40 )    Color: Yellow / Appearance: Clear / S.015 / pH: x  Gluc: x / Ketone: Negative  / Bili: Negative / Urobili: Negative   Blood: x / Protein: Negative / Nitrite: Negative   Leuk Esterase: Negative / RBC: x / WBC x   Sq Epi: x / Non Sq Epi: x / Bacteria: x

## 2023-05-31 NOTE — PROGRESS NOTE ADULT - SUBJECTIVE AND OBJECTIVE BOX
C/c: right hip pain    HPI: 87F, pmh of afib on xarelto, HTN, HLD, presented to the ED on 23 w/ right hip pain after a fall. Patient denies headstrike or LOC. Patient takes Xarelto last doses 1900 on . Of note, patient had hit her head on a door frame several days prior and has abrasion on forehead. Patient noticed immediate pain and inability to ambulate over the affected hip. Patient subsequently came to the ED for further evaluation and management. She was admitted with right hip fracture. Underwent right hip replacement . Hospital course notable for leukocytosis. On doxycycline for treatment of pna.     pt seen and examined this am. doing well. No sob/chest pain. tolerating po. no difficulty voiding. Right hip pain controlled.    ROS: all 10 systems reviewed and is as above otherwise negative.     PHYSICAL EXAM:    GENERAL: Comfortable, no acute distress   HEAD:  Normocephalic, atraumatic  EYES: EOMI, PERRLA  HEENT: Moist mucous membranes  NECK: Supple, No JVD  NERVOUS SYSTEM:  Alert & Oriented X3, Motor Strength 5/5 B/L upper and lower extremities  CHEST/LUNG: Crackles right base  HEART: Regular rate and rhythm  ABDOMEN: Soft, Nontender, Nondistended, Bowel sounds present  GENITOURINARY: Voiding, no palpable bladder  EXTREMITIES:   No clubbing, cyanosis, or edema  MUSCULOSKELETAL-Right hip dressing c/d/i  SKIN-no rash    LABS:                        12.1   13.73 )-----------( 255      ( 31 May 2023 08:41 )             36.0         132<L>  |  100  |  8   ----------------------------<  111<H>  3.8   |  26  |  0.52    Ca    8.5      31 May 2023 08:41    TPro  6.6  /  Alb  2.8<L>  /  TBili  0.7  /  DBili  x   /  AST  30  /  ALT  18  /  AlkPhos  68  05-30      Urinalysis Basic - ( 30 May 2023 10:40 )    Color: Yellow / Appearance: Clear / S.015 / pH: x  Gluc: x / Ketone: Negative  / Bili: Negative / Urobili: Negative   Blood: x / Protein: Negative / Nitrite: Negative   Leuk Esterase: Negative / RBC: x / WBC x   Sq Epi: x / Non Sq Epi: x / Bacteria: x       CT Abdomen and Pelvis w/ IV Cont (23 @ 10:11) >  IMPRESSION:  1.  RIGHT transcervical femoral neck fracture in varus position  2.  Anterior RUL reticular changes/centrilobular   nodularity/bronchiectasis suggests age-indeterminate inflammatory/   infectious disease including but not limited to non-tuberculous   mycobacterial infection.  3.  Indeterminant Ill-defined 10 mm posterior RUL focal airspace opacity   or  nodule (4/118). 4 mm subpleural RUL (106) and JEFFREY nodules (105).  4.  Borderline-sized mediastinal lymph nodes favor a reactive etiology.    CT Head No Cont (23 @ 10:10) >  IMPRESSION:    No acute intracranial abnormality.    No acute fracture or subluxation of the cervical spine.    MEDICATIONS  (STANDING):  acetaminophen     Tablet .. 975 milliGRAM(s) Oral every 8 hours  diltiazem    milliGRAM(s) Oral daily  doxycycline IVPB 100 milliGRAM(s) IV Intermittent every 12 hours  doxycycline IVPB      metoprolol tartrate 37.5 milliGRAM(s) Oral two times a day  multivitamin 1 Tablet(s) Oral daily  polyethylene glycol 3350 17 Gram(s) Oral at bedtime  rivaroxaban 20 milliGRAM(s) Oral with dinner  senna 2 Tablet(s) Oral at bedtime  simvastatin 20 milliGRAM(s) Oral at bedtime    MEDICATIONS  (PRN):  HYDROmorphone   Tablet 2 milliGRAM(s) Oral every 4 hours PRN Mild Pain (1 - 3)  HYDROmorphone   Tablet 4 milliGRAM(s) Oral every 4 hours PRN Moderate Pain (4 - 6)  HYDROmorphone  Injectable 0.5 milliGRAM(s) SubCutaneous every 4 hours PRN Severe Pain (7 - 10)  magnesium hydroxide Suspension 30 milliLiter(s) Oral daily PRN Constipation  ondansetron Injectable 4 milliGRAM(s) IV Push every 6 hours PRN Nausea and/or Vomiting      ASSESSMENT AND PLAN  87f PMH as above a/w    1. Right hip fracture:  -s/p right hip arthroplasty pod#3  -pain control  -physical therapy  -incentive spirometry  -bowel regimen.     2. Leukocytosis with RUL opacity/reticular changes:  -seen by pulmonary.   -on doxycycline, d/w pulmonary, should have total 7 days.    -improving.   -will need f/u ct as outpt.     3. Renal enhancement/Bladder distension:  -u/a negative  -urology eval appreciated.   -outpt f/u    4. Chronic atrial fibrillation:  -bb changed to metoprolol  -continue ccb  -xarelto.     6. Hyponatremia:  -mild and asymptomatic.   -could be related to siadh.   -monitor.     7. Left breast calcification:  -outpt f/u.     8. DVT px:  -on xarelto    dispo:[facility] today

## 2023-05-31 NOTE — PROGRESS NOTE ADULT - PROVIDER SPECIALTY LIST ADULT
Cardiology
Hospitalist
Hospitalist
Orthopedics
Pulmonology
Cardiology
Hospitalist
Hospitalist
Orthopedics
Pulmonology
Pulmonology
Cardiology
Cardiology

## 2023-05-31 NOTE — DISCHARGE NOTE NURSING/CASE MANAGEMENT/SOCIAL WORK - PATIENT PORTAL LINK FT
You can access the FollowMyHealth Patient Portal offered by [facility] by registering at the following website: [email or web address]. By joining [email or web address] FollowMyHealth portal, you will also be able to view your health information using other applications (apps) compatible with our system.

## 2023-05-31 NOTE — DIETITIAN INITIAL EVALUATION ADULT - PERTINENT LABORATORY DATA
05-31    132<L>  |  100  |  8   ----------------------------<  111<H>  3.8   |  26  |  0.52    Ca    8.5      31 May 2023 08:41    TPro  6.6  /  Alb  2.8<L>  /  TBili  0.7  /  DBili  x   /  AST  30  /  ALT  18  /  AlkPhos  68  05-30    Vitamin D, 25-Hydroxy: 36.7 ng/mL (05-30-23 @ 08:55)  Vitamin D, 25-Hydroxy: 30.9 ng/mL (05-29-23 @ 07:46)  Folate, Serum: 13.2 ng/mL (05-29-23 @ 07:46)  Iron Total, Serum: 20 ug/dL (05-29-23 @ 07:46)  Vitamin B12, Serum: >2000 pg/mL (05-29-23 @ 07:46)

## 2023-05-31 NOTE — DISCHARGE NOTE NURSING/CASE MANAGEMENT/SOCIAL WORK - NSDCPEFALRISK_GEN_ALL_CORE
For information on Fall & Injury Prevention, visit: [email or web address] OR  [email or web address] OR  https://www.cdc.gov/steadi/patient.html

## 2023-05-31 NOTE — DIETITIAN INITIAL EVALUATION ADULT - OTHER INFO
88 y/o female with a PMH afib on xarelto, HLD, HTN, syncope, dehydration, presents to the ED with right hip pain after mechanical fall. In the ED, endorses pain over the hip/groin area and pain with range of motion.    S/p right PADDY, reports she is getting better and better each day. Plan for d/c to [facility] ([facility] Endorses continued "good" appetite since admission, on regular diet. UBW stated at 140# however endorses wt loss to 118# since getting sick and gained wt, stating current wt at 123#. No wt hx to review. Bed scale wt of 122# taken by RD on 5/31 - no significant wt changes. Appeared very thin, frail, and bony upon assessment. NFPE reveals severe muscle/fat wasting. Pt requesting protein shake, will trial Premier protein shake before d/c (provides 160 kcal, 30 g protein/ shake). See recommendations below.

## 2023-05-31 NOTE — PROGRESS NOTE ADULT - SUBJECTIVE AND OBJECTIVE BOX
Patient seen and examined at bedside. Pain well controlled with medication.       Vital Signs Last 24 Hrs  T(C): 36.6 (31 May 2023 00:25), Max: 36.7 (30 May 2023 16:37)  T(F): 97.9 (31 May 2023 00:25), Max: 98.1 (30 May 2023 16:37)  HR: 81 (31 May 2023 00:25) (64 - 85)  BP: 134/81 (31 May 2023 00:25) (110/69 - 134/81)  BP(mean): --  RR: 18 (31 May 2023 00:25) (17 - 18)  SpO2: 99% (31 May 2023 00:25) (98% - 99%)    Parameters below as of 31 May 2023 00:25  Patient On (Oxygen Delivery Method): room air        Gen: Resting in bed, no acute distress  RLE  Dressing in place, clean/dry/intact.   [place]ro Abduction Pillow in Place.  SILT dp/sp/saph/sural/tibial  GSC/TA/EHL/FHL intact  DP pulse palpable.   No calf tenderness bilaterally.   Compartments soft and compressible.         Assessment and Plan:  88y Female with R femoral neck hip fx s/p POD3 R PADDY    Follow up postoperative labs  WBAT/PT/OT  Posterior hip/ abduction precautions  abduction pillow while laying  Pain management PRN  DVT PPx: Xarelto per AC team  Incentive spirometry  Dispo: [facility]  Will discuss with Dr. Edwards and advise of any changes to the plan.

## 2023-05-31 NOTE — DIETITIAN INITIAL EVALUATION ADULT - ORAL INTAKE PTA/DIET HISTORY
Pt lives at home by herself, is able to cook and shop for herself however will need assistance 2/2 s/p PADDY. Reports of "good" appetite PTA. Typically consumed oatmeal or eggs with velasco for breakfast, a "big" salad with tenderloins or roast beef for lunch and chicken salad for dinner. Shellfish allergy noted in chart.

## 2023-05-31 NOTE — DIETITIAN INITIAL EVALUATION ADULT - ADD RECOMMEND
1) C/w regular diet to maximize caloric and protein intake  2) Add Premier protein shake (provides 160 kcal, 30 g protein/ shake)   3) Encourage protein-rich foods, maximize food preferences  4) Monitor bowel movements, if no BM for >3 days, consider implementing bowel regimen.  5) MVI w/ minerals daily to ensure 100% RDA met   6) In v/o malnutrition, consideradding thiamine 100 mg daily  7) Obtain weekly wt to track/trend changes  8) consider checking B6, B12, thiamine, folate, carnitine, and copper levels as malnutrition in cause these to be deficient  9) Confirm goals of care regarding nutrition support   10) SW following - d/c to [facility]  RD will continue to monitor PO intake, labs, hydration, and wt prn.

## 2023-05-31 NOTE — DIETITIAN INITIAL EVALUATION ADULT - PERTINENT MEDS FT
MEDICATIONS  (STANDING):  acetaminophen     Tablet .. 975 milliGRAM(s) Oral every 8 hours  diltiazem    milliGRAM(s) Oral daily  doxycycline IVPB      doxycycline IVPB 100 milliGRAM(s) IV Intermittent every 12 hours  metoprolol tartrate 37.5 milliGRAM(s) Oral two times a day  multivitamin 1 Tablet(s) Oral daily  polyethylene glycol 3350 17 Gram(s) Oral at bedtime  rivaroxaban 20 milliGRAM(s) Oral with dinner  senna 2 Tablet(s) Oral at bedtime  simvastatin 20 milliGRAM(s) Oral at bedtime    MEDICATIONS  (PRN):  HYDROmorphone   Tablet 2 milliGRAM(s) Oral every 4 hours PRN Mild Pain (1 - 3)  HYDROmorphone   Tablet 4 milliGRAM(s) Oral every 4 hours PRN Moderate Pain (4 - 6)  HYDROmorphone  Injectable 0.5 milliGRAM(s) SubCutaneous every 4 hours PRN Severe Pain (7 - 10)  magnesium hydroxide Suspension 30 milliLiter(s) Oral daily PRN Constipation  ondansetron Injectable 4 milliGRAM(s) IV Push every 6 hours PRN Nausea and/or Vomiting

## 2023-06-07 DIAGNOSIS — J18.9 PNEUMONIA, UNSPECIFIED ORGANISM: ICD-10-CM

## 2023-06-07 DIAGNOSIS — I50.9 HEART FAILURE, UNSPECIFIED: ICD-10-CM

## 2023-06-07 DIAGNOSIS — Z91.013 ALLERGY TO SEAFOOD: ICD-10-CM

## 2023-06-07 DIAGNOSIS — Z90.710 ACQUIRED ABSENCE OF BOTH CERVIX AND UTERUS: ICD-10-CM
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
## 2023-06-07 DIAGNOSIS — E87.1 HYPO-OSMOLALITY AND HYPONATREMIA: ICD-10-CM

## 2023-06-07 DIAGNOSIS — I11.0 HYPERTENSIVE HEART DISEASE WITH HEART FAILURE: ICD-10-CM
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
## 2023-06-07 DIAGNOSIS — E43 UNSPECIFIED SEVERE PROTEIN-CALORIE MALNUTRITION: ICD-10-CM

## 2023-06-07 DIAGNOSIS — Z79.01 LONG TERM (CURRENT) USE OF ANTICOAGULANTS: ICD-10-CM

## 2023-06-07 DIAGNOSIS — Z88.0 ALLERGY STATUS TO PENICILLIN: ICD-10-CM

## 2023-06-07 DIAGNOSIS — R92.1 MAMMOGRAPHIC CALCIFICATION FOUND ON DIAGNOSTIC IMAGING OF BREAST: ICD-10-CM

## 2023-06-07 DIAGNOSIS — S72.031A DISPLACED MIDCERVICAL FRACTURE OF RIGHT FEMUR, INITIAL ENCOUNTER FOR CLOSED FRACTURE: ICD-10-CM

## 2023-06-07 DIAGNOSIS — E78.5 HYPERLIPIDEMIA, UNSPECIFIED: ICD-10-CM

## 2023-06-07 DIAGNOSIS — Z88.1 ALLERGY STATUS TO OTHER ANTIBIOTIC AGENTS STATUS: ICD-10-CM

## 2023-06-07 DIAGNOSIS — I48.20 CHRONIC ATRIAL FIBRILLATION, UNSPECIFIED: ICD-10-CM

## 2023-06-07 DIAGNOSIS — D72.829 ELEVATED WHITE BLOOD CELL COUNT, UNSPECIFIED: ICD-10-CM

## 2023-06-07 LAB — SURGICAL PATHOLOGY STUDY: SIGNIFICANT CHANGE UP

## 2023-06-08 ENCOUNTER — INPATIENT (INPATIENT)
Facility: HOSPITAL | Age: 88
LOS: 3 days | Discharge: NURSING, REHAB OR LONG-TERM CARE | DRG: 640 | End: 2023-06-12
Attending: HOSPITALIST | Admitting: HOSPITALIST
Payer: MEDICARE

## 2023-06-08 VITALS
SYSTOLIC BLOOD PRESSURE: 144 MMHG | DIASTOLIC BLOOD PRESSURE: 91 MMHG | OXYGEN SATURATION: 100 % | RESPIRATION RATE: 20 BRPM | TEMPERATURE: 98 F | WEIGHT: 176.37 LBS | HEIGHT: 65 IN

## 2023-06-08 DIAGNOSIS — E87.1 HYPO-OSMOLALITY AND HYPONATREMIA: ICD-10-CM

## 2023-06-08 DIAGNOSIS — Z98.49 CATARACT EXTRACTION STATUS, UNSPECIFIED EYE: Chronic | ICD-10-CM

## 2023-06-08 DIAGNOSIS — Z90.710 ACQUIRED ABSENCE OF BOTH CERVIX AND UTERUS: Chronic | ICD-10-CM

## 2023-06-08 LAB
ALBUMIN SERPL ELPH-MCNC: 2.8 G/DL — LOW (ref 3.3–5)
ALP SERPL-CCNC: 106 U/L — SIGNIFICANT CHANGE UP (ref 40–120)
ALT FLD-CCNC: 36 U/L — SIGNIFICANT CHANGE UP (ref 12–78)
ANION GAP SERPL CALC-SCNC: 6 MMOL/L — SIGNIFICANT CHANGE UP (ref 5–17)
ANION GAP SERPL CALC-SCNC: 7 MMOL/L — SIGNIFICANT CHANGE UP (ref 5–17)
ANION GAP SERPL CALC-SCNC: 7 MMOL/L — SIGNIFICANT CHANGE UP (ref 5–17)
APPEARANCE UR: CLEAR — SIGNIFICANT CHANGE UP
APTT BLD: 32.2 SEC — SIGNIFICANT CHANGE UP (ref 27.5–35.5)
AST SERPL-CCNC: 35 U/L — SIGNIFICANT CHANGE UP (ref 15–37)
BACTERIA # UR AUTO: ABNORMAL
BASOPHILS # BLD AUTO: 0.04 K/UL — SIGNIFICANT CHANGE UP (ref 0–0.2)
BASOPHILS NFR BLD AUTO: 0.2 % — SIGNIFICANT CHANGE UP (ref 0–2)
BILIRUB SERPL-MCNC: 0.9 MG/DL — SIGNIFICANT CHANGE UP (ref 0.2–1.2)
BILIRUB UR-MCNC: NEGATIVE — SIGNIFICANT CHANGE UP
BLD GP AB SCN SERPL QL: SIGNIFICANT CHANGE UP
BUN SERPL-MCNC: 18 MG/DL — SIGNIFICANT CHANGE UP (ref 7–23)
BUN SERPL-MCNC: 19 MG/DL — SIGNIFICANT CHANGE UP (ref 7–23)
BUN SERPL-MCNC: 19 MG/DL — SIGNIFICANT CHANGE UP (ref 7–23)
CALCIUM SERPL-MCNC: 8.1 MG/DL — LOW (ref 8.5–10.1)
CALCIUM SERPL-MCNC: 8.7 MG/DL — SIGNIFICANT CHANGE UP (ref 8.5–10.1)
CALCIUM SERPL-MCNC: 8.7 MG/DL — SIGNIFICANT CHANGE UP (ref 8.5–10.1)
CHLORIDE SERPL-SCNC: 81 MMOL/L — LOW (ref 96–108)
CHLORIDE SERPL-SCNC: 81 MMOL/L — LOW (ref 96–108)
CHLORIDE SERPL-SCNC: 85 MMOL/L — LOW (ref 96–108)
CO2 SERPL-SCNC: 26 MMOL/L — SIGNIFICANT CHANGE UP (ref 22–31)
COLOR SPEC: YELLOW — SIGNIFICANT CHANGE UP
CREAT SERPL-MCNC: 0.63 MG/DL — SIGNIFICANT CHANGE UP (ref 0.5–1.3)
CREAT SERPL-MCNC: 0.64 MG/DL — SIGNIFICANT CHANGE UP (ref 0.5–1.3)
CREAT SERPL-MCNC: 0.83 MG/DL — SIGNIFICANT CHANGE UP (ref 0.5–1.3)
CRP SERPL-MCNC: 32 MG/L — HIGH
DIFF PNL FLD: NEGATIVE — SIGNIFICANT CHANGE UP
EGFR: 69 ML/MIN/1.73M2 — SIGNIFICANT CHANGE UP
EGFR: 86 ML/MIN/1.73M2 — SIGNIFICANT CHANGE UP
EGFR: 86 ML/MIN/1.73M2 — SIGNIFICANT CHANGE UP
EOSINOPHIL # BLD AUTO: 0 K/UL — SIGNIFICANT CHANGE UP (ref 0–0.5)
EOSINOPHIL NFR BLD AUTO: 0 % — SIGNIFICANT CHANGE UP (ref 0–6)
EPI CELLS # UR: ABNORMAL
ERYTHROCYTE [SEDIMENTATION RATE] IN BLOOD: 40 MM/HR — HIGH (ref 0–20)
GLUCOSE SERPL-MCNC: 106 MG/DL — HIGH (ref 70–99)
GLUCOSE SERPL-MCNC: 116 MG/DL — HIGH (ref 70–99)
GLUCOSE SERPL-MCNC: 138 MG/DL — HIGH (ref 70–99)
GLUCOSE UR QL: NEGATIVE — SIGNIFICANT CHANGE UP
HCT VFR BLD CALC: 35 % — SIGNIFICANT CHANGE UP (ref 34.5–45)
HGB BLD-MCNC: 12.5 G/DL — SIGNIFICANT CHANGE UP (ref 11.5–15.5)
IMM GRANULOCYTES NFR BLD AUTO: 1.1 % — HIGH (ref 0–0.9)
INR BLD: 2.05 RATIO — HIGH (ref 0.88–1.16)
KETONES UR-MCNC: NEGATIVE — SIGNIFICANT CHANGE UP
LACTATE SERPL-SCNC: 1.4 MMOL/L — SIGNIFICANT CHANGE UP (ref 0.7–2)
LEUKOCYTE ESTERASE UR-ACNC: ABNORMAL
LYMPHOCYTES # BLD AUTO: 1.05 K/UL — SIGNIFICANT CHANGE UP (ref 1–3.3)
LYMPHOCYTES # BLD AUTO: 5 % — LOW (ref 13–44)
MCHC RBC-ENTMCNC: 29.6 PG — SIGNIFICANT CHANGE UP (ref 27–34)
MCHC RBC-ENTMCNC: 35.7 GM/DL — SIGNIFICANT CHANGE UP (ref 32–36)
MCV RBC AUTO: 82.9 FL — SIGNIFICANT CHANGE UP (ref 80–100)
MONOCYTES # BLD AUTO: 1 K/UL — HIGH (ref 0–0.9)
MONOCYTES NFR BLD AUTO: 4.8 % — SIGNIFICANT CHANGE UP (ref 2–14)
NEUTROPHILS # BLD AUTO: 18.55 K/UL — HIGH (ref 1.8–7.4)
NEUTROPHILS NFR BLD AUTO: 88.9 % — HIGH (ref 43–77)
NITRITE UR-MCNC: NEGATIVE — SIGNIFICANT CHANGE UP
NT-PROBNP SERPL-SCNC: 2181 PG/ML — HIGH (ref 0–450)
PH UR: 6 — SIGNIFICANT CHANGE UP (ref 5–8)
PLATELET # BLD AUTO: 544 K/UL — HIGH (ref 150–400)
POTASSIUM SERPL-MCNC: 4.4 MMOL/L — SIGNIFICANT CHANGE UP (ref 3.5–5.3)
POTASSIUM SERPL-MCNC: 5.1 MMOL/L — SIGNIFICANT CHANGE UP (ref 3.5–5.3)
POTASSIUM SERPL-MCNC: 5.1 MMOL/L — SIGNIFICANT CHANGE UP (ref 3.5–5.3)
POTASSIUM SERPL-SCNC: 4.4 MMOL/L — SIGNIFICANT CHANGE UP (ref 3.5–5.3)
POTASSIUM SERPL-SCNC: 5.1 MMOL/L — SIGNIFICANT CHANGE UP (ref 3.5–5.3)
POTASSIUM SERPL-SCNC: 5.1 MMOL/L — SIGNIFICANT CHANGE UP (ref 3.5–5.3)
PROT SERPL-MCNC: 6.8 GM/DL — SIGNIFICANT CHANGE UP (ref 6–8.3)
PROT UR-MCNC: NEGATIVE — SIGNIFICANT CHANGE UP
PROTHROM AB SERPL-ACNC: 23.9 SEC — HIGH (ref 10.5–13.4)
RBC # BLD: 4.22 M/UL — SIGNIFICANT CHANGE UP (ref 3.8–5.2)
RBC # FLD: 12.8 % — SIGNIFICANT CHANGE UP (ref 10.3–14.5)
RBC CASTS # UR COMP ASSIST: SIGNIFICANT CHANGE UP /HPF (ref 0–4)
SODIUM SERPL-SCNC: 113 MMOL/L — CRITICAL LOW (ref 135–145)
SODIUM SERPL-SCNC: 114 MMOL/L — CRITICAL LOW (ref 135–145)
SODIUM SERPL-SCNC: 118 MMOL/L — CRITICAL LOW (ref 135–145)
SP GR SPEC: 1.01 — SIGNIFICANT CHANGE UP (ref 1.01–1.02)
TROPONIN I, HIGH SENSITIVITY RESULT: 6.24 NG/L — SIGNIFICANT CHANGE UP
UROBILINOGEN FLD QL: NEGATIVE — SIGNIFICANT CHANGE UP
WBC # BLD: 20.86 K/UL — HIGH (ref 3.8–10.5)
WBC # FLD AUTO: 20.86 K/UL — HIGH (ref 3.8–10.5)
WBC UR QL: ABNORMAL /HPF (ref 0–5)

## 2023-06-08 PROCEDURE — 87635 SARS-COV-2 COVID-19 AMP PRB: CPT

## 2023-06-08 PROCEDURE — 36415 COLL VENOUS BLD VENIPUNCTURE: CPT

## 2023-06-08 PROCEDURE — C9113: CPT

## 2023-06-08 PROCEDURE — 74177 CT ABD & PELVIS W/CONTRAST: CPT | Mod: 26

## 2023-06-08 PROCEDURE — 92610 EVALUATE SWALLOWING FUNCTION: CPT | Mod: GN

## 2023-06-08 PROCEDURE — 99285 EMERGENCY DEPT VISIT HI MDM: CPT

## 2023-06-08 PROCEDURE — 84300 ASSAY OF URINE SODIUM: CPT

## 2023-06-08 PROCEDURE — 74177 CT ABD & PELVIS W/CONTRAST: CPT

## 2023-06-08 PROCEDURE — 83935 ASSAY OF URINE OSMOLALITY: CPT

## 2023-06-08 PROCEDURE — 97116 GAIT TRAINING THERAPY: CPT | Mod: GP

## 2023-06-08 PROCEDURE — 83930 ASSAY OF BLOOD OSMOLALITY: CPT

## 2023-06-08 PROCEDURE — 81001 URINALYSIS AUTO W/SCOPE: CPT

## 2023-06-08 PROCEDURE — 99222 1ST HOSP IP/OBS MODERATE 55: CPT

## 2023-06-08 PROCEDURE — 74178 CT ABD&PLV WO CNTR FLWD CNTR: CPT

## 2023-06-08 PROCEDURE — 71045 X-RAY EXAM CHEST 1 VIEW: CPT | Mod: 26

## 2023-06-08 PROCEDURE — 76705 ECHO EXAM OF ABDOMEN: CPT | Mod: 26

## 2023-06-08 PROCEDURE — 80053 COMPREHEN METABOLIC PANEL: CPT

## 2023-06-08 PROCEDURE — 85027 COMPLETE CBC AUTOMATED: CPT

## 2023-06-08 PROCEDURE — 92523 SPEECH SOUND LANG COMPREHEN: CPT | Mod: GN

## 2023-06-08 PROCEDURE — 93010 ELECTROCARDIOGRAM REPORT: CPT | Mod: 76

## 2023-06-08 PROCEDURE — 99223 1ST HOSP IP/OBS HIGH 75: CPT

## 2023-06-08 PROCEDURE — 97110 THERAPEUTIC EXERCISES: CPT | Mod: GP

## 2023-06-08 PROCEDURE — 87086 URINE CULTURE/COLONY COUNT: CPT

## 2023-06-08 PROCEDURE — 80048 BASIC METABOLIC PNL TOTAL CA: CPT

## 2023-06-08 PROCEDURE — 97530 THERAPEUTIC ACTIVITIES: CPT | Mod: GP

## 2023-06-08 PROCEDURE — 73502 X-RAY EXAM HIP UNI 2-3 VIEWS: CPT | Mod: 26,RT

## 2023-06-08 RX ORDER — METOPROLOL TARTRATE 50 MG
37.5 TABLET ORAL
Refills: 0 | Status: DISCONTINUED | OUTPATIENT
Start: 2023-06-08 | End: 2023-06-12

## 2023-06-08 RX ORDER — ONDANSETRON 8 MG/1
4 TABLET, FILM COATED ORAL EVERY 8 HOURS
Refills: 0 | Status: DISCONTINUED | OUTPATIENT
Start: 2023-06-08 | End: 2023-06-12

## 2023-06-08 RX ORDER — TRAMADOL HYDROCHLORIDE 50 MG/1
50 TABLET ORAL EVERY 6 HOURS
Refills: 0 | Status: DISCONTINUED | OUTPATIENT
Start: 2023-06-08 | End: 2023-06-12

## 2023-06-08 RX ORDER — SODIUM CHLORIDE 9 MG/ML
1000 INJECTION INTRAMUSCULAR; INTRAVENOUS; SUBCUTANEOUS
Refills: 0 | Status: COMPLETED | OUTPATIENT
Start: 2023-06-08 | End: 2023-06-08

## 2023-06-08 RX ORDER — SODIUM CHLORIDE 9 MG/ML
3 INJECTION INTRAMUSCULAR; INTRAVENOUS; SUBCUTANEOUS ONCE
Refills: 0 | Status: COMPLETED | OUTPATIENT
Start: 2023-06-08 | End: 2023-06-08

## 2023-06-08 RX ORDER — ACETAMINOPHEN 500 MG
650 TABLET ORAL EVERY 6 HOURS
Refills: 0 | Status: DISCONTINUED | OUTPATIENT
Start: 2023-06-08 | End: 2023-06-12

## 2023-06-08 RX ORDER — HYDROMORPHONE HYDROCHLORIDE 2 MG/ML
2 INJECTION INTRAMUSCULAR; INTRAVENOUS; SUBCUTANEOUS EVERY 4 HOURS
Refills: 0 | Status: DISCONTINUED | OUTPATIENT
Start: 2023-06-08 | End: 2023-06-12

## 2023-06-08 RX ORDER — SIMVASTATIN 20 MG/1
20 TABLET, FILM COATED ORAL AT BEDTIME
Refills: 0 | Status: DISCONTINUED | OUTPATIENT
Start: 2023-06-08 | End: 2023-06-12

## 2023-06-08 RX ORDER — POLYETHYLENE GLYCOL 3350 17 G/17G
17 POWDER, FOR SOLUTION ORAL AT BEDTIME
Refills: 0 | Status: DISCONTINUED | OUTPATIENT
Start: 2023-06-08 | End: 2023-06-12

## 2023-06-08 RX ORDER — LANOLIN ALCOHOL/MO/W.PET/CERES
3 CREAM (GRAM) TOPICAL AT BEDTIME
Refills: 0 | Status: DISCONTINUED | OUTPATIENT
Start: 2023-06-08 | End: 2023-06-12

## 2023-06-08 RX ORDER — PANTOPRAZOLE SODIUM 20 MG/1
40 TABLET, DELAYED RELEASE ORAL
Refills: 0 | Status: DISCONTINUED | OUTPATIENT
Start: 2023-06-08 | End: 2023-06-09

## 2023-06-08 RX ORDER — RIVAROXABAN 15 MG-20MG
20 KIT ORAL
Refills: 0 | Status: DISCONTINUED | OUTPATIENT
Start: 2023-06-08 | End: 2023-06-12

## 2023-06-08 RX ORDER — DILTIAZEM HCL 120 MG
10 CAPSULE, EXT RELEASE 24 HR ORAL ONCE
Refills: 0 | Status: DISCONTINUED | OUTPATIENT
Start: 2023-06-08 | End: 2023-06-08

## 2023-06-08 RX ORDER — DILTIAZEM HCL 120 MG
180 CAPSULE, EXT RELEASE 24 HR ORAL DAILY
Refills: 0 | Status: DISCONTINUED | OUTPATIENT
Start: 2023-06-08 | End: 2023-06-09

## 2023-06-08 RX ORDER — KETOROLAC TROMETHAMINE 30 MG/ML
30 SYRINGE (ML) INJECTION EVERY 8 HOURS
Refills: 0 | Status: DISCONTINUED | OUTPATIENT
Start: 2023-06-08 | End: 2023-06-09

## 2023-06-08 RX ADMIN — RIVAROXABAN 20 MILLIGRAM(S): KIT at 18:46

## 2023-06-08 RX ADMIN — Medication 30 MILLIGRAM(S): at 21:31

## 2023-06-08 RX ADMIN — SODIUM CHLORIDE 100 MILLILITER(S): 9 INJECTION INTRAMUSCULAR; INTRAVENOUS; SUBCUTANEOUS at 14:19

## 2023-06-08 RX ADMIN — Medication 30 MILLIGRAM(S): at 16:26

## 2023-06-08 RX ADMIN — SIMVASTATIN 20 MILLIGRAM(S): 20 TABLET, FILM COATED ORAL at 21:32

## 2023-06-08 RX ADMIN — SODIUM CHLORIDE 3 MILLILITER(S): 9 INJECTION INTRAMUSCULAR; INTRAVENOUS; SUBCUTANEOUS at 10:51

## 2023-06-08 RX ADMIN — PANTOPRAZOLE SODIUM 40 MILLIGRAM(S): 20 TABLET, DELAYED RELEASE ORAL at 21:32

## 2023-06-08 RX ADMIN — POLYETHYLENE GLYCOL 3350 17 GRAM(S): 17 POWDER, FOR SOLUTION ORAL at 21:33

## 2023-06-08 RX ADMIN — Medication 37.5 MILLIGRAM(S): at 21:33

## 2023-06-08 RX ADMIN — Medication 30 MILLIGRAM(S): at 21:46

## 2023-06-08 NOTE — PATIENT PROFILE ADULT - NSPROPOAPRESSUREINJURY_GEN_A_NUR
----- Message from Rosaline Hector MD sent at 2/7/2019  6:19 PM CST -----  Please inform patient that diabetes has been in controlled range but his A1c has increased from last check   thanks  
Called patient to inform patient of the information below.  
no

## 2023-06-08 NOTE — ED CLERICAL - NS ED CLERK NOTE PRE-ARRIVAL INFORMATION; ADDITIONAL PRE-ARRIVAL INFORMATION
This patient is enrolled in the comprehensive joint replacement (CJR) program and has active care navigation.  This patient can be followed up by the care navigation team within 24 hours. To arrange close follow-up or to obtain additional clinical information about this patient, please call the contact number above. Please call the hospitalist for medical consultation ([phone]) PRIOR to admission or observation.  The hospitalist is part of the care team and can assist in acute medical management. Please call the orthopedic team () for ALL patients who require admission.

## 2023-06-08 NOTE — ED ADULT NURSE NOTE - NSFALLRISKFACTORS_ED_ALL_ED
Coagulation: Bleeding disorder either through use of anticoagulants or underlying clinical condition(s)
no

## 2023-06-08 NOTE — ED PROVIDER NOTE - SKIN, MLM
Skin normal color for race, warm, dry and intact. No evidence of rash. Skin normal color for race, warm, dry and intact. No evidence of rash.  No tactile warmth.  See MSK for R hip surgical wound site description.

## 2023-06-08 NOTE — SWALLOW BEDSIDE ASSESSMENT ADULT - COMMENTS
The pt was admitted to  with tachycardia. Pt also c/o nausea as well as periodic emesis episodes within the past few days. Hospital course is notable for rapid atrial fibrillation, hyponatremia and periodic epigastric discomfort with imaging revealing distension of urinary bladder/right hydro. This profile is superimposed upon a selected history of recent right total hip hemiarthroplasty, HTN, HLD, atrial fibrillation, prior bronchitis, prior left cataract extraction, past syncope, and previous hysterectomy.

## 2023-06-08 NOTE — H&P ADULT - REASON FOR ADMISSION
----- Message from Reema Arnold sent at 10/20/2017 10:09 AM CDT -----  Regarding: lab order  Please order a [facility] for her lab appointment on Monday.    Thanks!  Reema    
afibrvr, hyponatremia

## 2023-06-08 NOTE — SWALLOW BEDSIDE ASSESSMENT ADULT - PHARYNGEAL PHASE
Swallow triggered in an acceptable time frame for age. Laryngeal lift on palpation during swallowing trials was very mildly decreased but felt to be functional for age. NO behavioral aspiration signs exhibited. Odynophagia denied.

## 2023-06-08 NOTE — SWALLOW BEDSIDE ASSESSMENT ADULT - SWALLOW EVAL: PROGNOSIS
2) Pt is alert and interactive. She was able to verbalize during communicative probes without evidence of a primary motor speech or primary linguistic pathology. Pt was able to verbalize her needs and feels that she is at speech-language baseline.

## 2023-06-08 NOTE — ED PROVIDER NOTE - PROGRESS NOTE DETAILS
Derek Wing for attending Dr. Poole: Received call from ortho resident: Pt is cleared from their standpoint. No acute ortho issue. No evidence of hip infection. ICU consult by Dr. Clemente.  appreciated. Not for ICU admission. Advises RUQ US and NS at 100 ml per hour. Derek Wing for attending Dr. Poole: Received call from Ortho resident: Pt is cleared from their standpoint. No acute ortho issue. No evidence of hip infection. ICU consult by Dr. Clemente appreciated. Not for ICU admission. Advises RUQ US and NS at 100 ml per hour.

## 2023-06-08 NOTE — SWALLOW BEDSIDE ASSESSMENT ADULT - ADDITIONAL RECOMMENDATIONS
Hospitalist and Nutrition f/u. Pt exhibited mild functional Oropharyngeal Presbyphagia with sufficient oropharyngeal swallowing preservation for age(88) nonetheless. Oropharyngeal Swallow integrity appeared to be stable for age & NO behavioral aspiration signs were exhibited. No change in O2 sats noted. Odynophagia was denied. This profile is superimposed upon periodic post prandial c/o nausea without overt emesis. Do not feel that intermittent post prandial nausea is related to oropharyngeal deglutition. GI consult is pending. Note that pt is also dehydrated which can also contribute to nausea.

## 2023-06-08 NOTE — ED ADULT NURSE NOTE - NSICDXFAMILYHX_GEN_ALL_CORE_FT
Anesthesia Pre Eval Note    OB/Gyn Evaluation    OB/Gyn:  ROS Comment:  laborPatient is pregnant now.      Pregnancy Associated Diseases:           Anesthesia ROS/Med Hx        Anesthetic Complication History:  Patient does not have a history of anesthetic complications      Pulmonary Review:  History of: asthma -     Neuro/Psych Review:  Patient does not have a neuro/psych history       Cardiovascular Review:  Patient does not have a cardiovascular history   Exercise tolerance: good (>4 METS)    GI/HEPATIC/RENAL Review:  Patient does not have a GI/hepatic/renalhistory       End/Other Review:  Patient does not have an endo/other history    Additional Results:     ALLERGIES:   -- Augmentin (Amoxicillin-Pot Clavulanate) -- Other (See Comments)    --  Going to check on reaction   -- Cefzil -- Other (See Comments)    --  Patient is going to check on reaction   -- Sulfa Antibiotics -- Other (See Comments)       Lab Results       Component                Value               Date                       WBC                      14.8 (H)            2020                 WBC                      6.8                 2019                 RBC                      4.63                2020                 RBC                      4.69                2019                 HCT                      41.7                2020                 HCT                      42.5                2019                 MCHC                     33.6                2020                 MCHC                     33.2                2019                 SODIUM                   138                 05/10/2019                 POTASSIUM                4.8                 05/10/2019                 CHLORIDE                 105                 05/10/2019                 CO2                      28                  05/10/2019                 GLUCOSE                  100 (H)             05/10/2019 BUN                      9                   05/10/2019                 CALCIUM                  9.2                 05/10/2019                 PLT                      143                 03/09/2020                 PLT                      183                 09/24/2019                 PLT                      254                 11/03/2011               Past Medical History:  No date: Asthma      Comment:  cats    History reviewed. No pertinent surgical history. Prior to Admission medications :  Medication nitrofurantoin, macrocrystal-monohydrate, (MACROBID) 100 MG capsule, Sig Take 1 capsule by mouth 2 times daily for 5 days. , Start Date 3/3/20, End Date 3/8/20, Taking? Yes, Authorizing Provider Wally Schaefer MD    Medication sucralfate (CARAFATE) 1 g tablet, Sig TAKE 1 TABLET BY MOUTH THREE TIMES DAILY AS NEEDED FOR HEARTBURN, Start Date 2/26/20, End Date , Taking? Yes, Authorizing Provider Wally Schaefer MD    Medication cimetidine (TAGAMET) 200 MG tablet, Sig Take 1 tablet by mouth 2 times daily as needed (heartburn). , Start Date 2/25/20, End Date , Taking? Yes, Authorizing Provider Wally Schaefer MD    Medication Prenatal Vit-Fe Fumarate-FA (VOL-PLUS) 27-1 MG tablet, Sig Take 1 tablet by mouth daily. , Start Date 9/24/19, End Date , Taking? Yes, Authorizing Provider Wally Schaefer MD    Medication Doxylamine Succinate, Sleep, (UNISOM) 25 MG Tab tablet, Sig Take 0.5 tablets by mouth at bedtime. , Start Date 9/24/19, End Date , Taking? , Authorizing Provider Wally Schaefer MD            Relevant Problems   No relevant active problems       Physical Exam     Airway   Mallampati: I  TM Distance: >3 FB  Neck ROM: Full  TMJ Mobility: Good    Cardiovascular  Cardiovascular exam normal    Head Assessment  Head assessment: Normocephalic and Atraumatic    General Assessment  General Assessment: Alert and oriented and No acute distress    Dental Exam  Dental exam normal    Pulmonary Exam  Pulmonary exam normal    Abdominal Exam    Patient Demonstrates:  Gravid      Anesthesia Plan  No phone call attempted due to:  ASA Status: 2    Anesthesia Type: L&D Epidural        Risks Discussed: Nausea, Vomiting, Headache, Allergic Reaction, Hypotension, Nerve Injury and Aspiration    Post-op Pain Management: Epidural      Checklist  Reviewed: Lab Results, Consultations, Patient Summary, Nursing Notes, Problem list, Medications, Allergies, NPO Status and Past Med History    Informed Consent  The proposed anesthetic plan, including its risks and benefits, have been discussed with the Patient  - along with the risks and benefits of alternatives. Questions were encouraged and answered and the patient and/or representative understands and agrees to proceed. Comments  Plan Comments: I explained the risks of epidurals to the patient including but not limited to PDPH, infection, bleeding, chronic and/or permanent nerve damage, local anesthetic toxicity, cardiac arrest, hypotension, and aspiration. The patient understands these risks and has signed a consent form. All questions answered. FAMILY HISTORY:  Family history of CVA  Family history of diabetes mellitus  Family history of primary TB

## 2023-06-08 NOTE — ED PROVIDER NOTE - CONSTITUTIONAL, MLM
normal... Elderly white female, mildly ill appearing, awake, alert, oriented to person, place, time/situation and in no respiratory distress. Elderly white female, mildly ill-appearing, awake, alert, oriented to person, place, time/situation and in no respiratory distress.

## 2023-06-08 NOTE — SWALLOW BEDSIDE ASSESSMENT ADULT - ORAL PHASE
Bolus formation/transfer were mildly prolonged but mechanically functional for age without evidence of an oral motor focality. The pt was able to clear oral debris on her own after age acceptable piecemeal deglutition.

## 2023-06-08 NOTE — SWALLOW BEDSIDE ASSESSMENT ADULT - DIET PRIOR TO ADMI
The pt was on a regular texture diet with thin liquids at [facility]. Son states that PO intake has recently been reduced due to intermittent c/o nausea.
DC instructions

## 2023-06-08 NOTE — ED PROVIDER NOTE - NEUROLOGICAL, MLM
Alert and oriented, no focal deficits, no motor or sensory deficits. Alert and oriented, no focal deficits, no motor or sensory deficits.  CNs intact, + slow to answer Qs but answers appropriately

## 2023-06-08 NOTE — ED ADULT NURSE NOTE - NSFALLHARMRISKINTERV_ED_ALL_ED

## 2023-06-08 NOTE — ED PROVIDER NOTE - CLINICAL SUMMARY MEDICAL DECISION MAKING FREE TEXT BOX
89 y/o female with a PMHx of Afib on Xarelto/Cardizem, bronchitis, HTN on Metoprolol, HLD, syncope, s/p right total hip arthroplasty 5/28 and d/c to [facility] for [facility] presents BIBA c/o persistent right hip pain. Pt noted to be in rapid Afib to 150 this morning, not resolved after AM meds. Plan: EKG, chest XR, right hip XR, labs, including ESR, CRP, troponin, BNP, coags, IV Cardizem, ortho consult, discuss with cardio, monitor, observe, reassess 89 y/o female with a PMHx of Afib on Xarelto/Cardizem, bronchitis, HTN on Metoprolol, HLD, syncope, s/p right total hip arthroplasty 5/28 and d/c to [facility] for [facility] presents BIBA c/o persistent right hip pain. Pt noted to be in rapid Afib to 150 this morning, not resolved after AM meds.   Plan: EKG, chest XR, right hip XR, labs, including ESR, CRP, troponin, BNP, coags, IV Cardizem, ortho consult, discuss with cardio, monitor, observe, reassess.    12:30, YENNI Poole MD:  XRs negative acute pathology.  Labs notable for elevated WBC 21K (lactate & BCs drawn, result pending), very low na of 114, INR 2 (+ Xarelto).  Rapid AF slowed down spontaneously ~ 10:47 into 80s & has remained rate controlled.  Ortho resident aware of ED consult for r/o R hip infxn. (+ withholding Abx pending that eval).  ICU PA aware of ED consult for severe hypoNa.  Pt will require re-admission vs Observation. 89 y/o female with a PMHx of Afib on Xarelto/Cardizem, bronchitis, HTN on Metoprolol, HLD, syncope, s/p right total hip arthroplasty 5/28 and d/c to [facility] for [facility] presents BIBA c/o persistent right hip pain. Pt noted to be in rapid Afib to 150 this morning, not resolved after AM meds.   Plan: EKG, chest XR, right hip XR, labs, including ESR, CRP, troponin, BNP, coags, IV Cardizem, ortho consult, discuss with cardio, monitor, observe, reassess.    12:30, YENNI Poole MD:  XRs negative acute pathology.  Labs notable for elevated WBC 21K (lactate & BCs drawn, result pending), very low na of 114, INR 2 (+ Xarelto).  Rapid AF slowed down spontaneously ~ 10:47 into 80s & has remained rate controlled.  Ortho resident aware of ED consult for r/o R hip infxn. (+ withholding Abx pending that eval).  ICU PA aware of ED consult for severe hypoNa.  Pt will require re-admission vs Observation.    13:13, Derek Wing for attending Dr. Poole: Received call from ortho resident: Pt is cleared from their standpoint. No acute ortho issue. No evidence of hip infection. ICU consult by Dr. Clemente.  appreciated. Not for ICU admission. Advises RUQ US and NS at 100 ml per hour.    13:45, YENNI Poole MD:  Dr. Walter aware of med admission: as per her pt for full re-admission to hospital.  Informed by u/s tech pt in + acute urine retention with R hydro.  ED RN aware to insert Vance catheter & obtain urine when pt returns to ED. 89 y/o female with a PMHx of AFib on Xarelto/Cardizem, bronchitis, HTN on Metoprolol, HLD, syncope, s/p right total hip arthroplasty 5/28 and d/c to [facility] for [facility], BIBA c/o persistent right hip pain. Pt noted to be in rapid AFib to 150 this morning, not resolved after AM meds.   Plan: EKG, chest XR, right hip XR, labs, including ESR, CRP, troponin, BNP, coags, IV Cardizem, ortho consult, discuss with cardio, monitor, observe, reassess.    12:30, YENNI Poole MD:  XRs negative acute pathology.  Labs notable for elevated WBC 21K (lactate & BCs drawn, result pending), +very low Na of 114, INR 2 (+ Xarelto).  Rapid AF slowed down spontaneously ~ 10:47 into 80s & has remained rate-controlled.  Ortho resident aware of ED consult for r/o R hip infxn. (+ withholding Abx pending that eval).  ICU PA aware of ED consult for severe hypoNa.  Pt will require re-admission vs Observation.    13:13, Derek Wing for attending Dr. Poole: Received call from Ortho resident: Pt is cleared from their standpoint. No acute ortho issue. No evidence of hip infection. ICU consult by Dr. Clemente appreciated. Not for ICU admission. Advises RUQ US and NS at 100 ml per hour.    13:45, YENNI Poole MD:  Dr. Walter aware of Med admission: as per her pt is for full re-admission to hospital.  Informed by u/s tech pt in + acute urine retention with R hydro.  ED RN aware to insert Vance catheter & obtain urine when pt returns to ED.

## 2023-06-08 NOTE — ED ADULT TRIAGE NOTE - CHIEF COMPLAINT QUOTE
Pt BIBEMS from [facility], c/o rapid heart rate.  PMH a fib.  Denies N/V, dizziness, CP, SOB.  As per EMTs, rapid HR resolved in ambulance without any interventions.  Pt endorses R hip pain.  Unable to obtain HR in EMS, but ECG in progress at this time.

## 2023-06-08 NOTE — H&P ADULT - ASSESSMENT
88F w/PMH afib on xarelto, HTN, HLD, recent admit 5/27-31 for Rt hip fracture after fall s/p R PADDY on 5/28, d/c'd to [facility], from which she presents now when she was found in Afibrvr.    dec PO intake, n/v, Urinary retention- desai placed in ED, UA sent  In ED, Na 114, started on NS, wbc 20, platelets 544, afibrvr- now cvr, cxr neg, hip xray neg, eval by ortho, ICU (notes reviewed)  RUQ US Distended urinary bladder and right-sided hydronephrosis.      #profound hyponatremia likely 2/2 dec po intake d/t n/v  - ns @100cc/h  - check BMP PM and AM  - avoid fast correction   - check ur/ser osm  - if no improvement, may need nephro cs    #nausea/vomiting likely 2/2 dilaudid  #Epigastric pain on exam  - taper off diluadid,   - tramadol/toradol for pain  - CT a/p   - PPI BID IV  - GI cs  - swallow eval  - antiemetics  - diet as tolerated    #afib rvr- improved to CVR now, likely 2/2 dehydration  - c/w AC, ccb    #leukocytosis- no e/o infection at this time, UA pending, possibly d/t dehydration  #thrombocytosis- new, likely reactive  - check am labs  - ivf    #Urinary retention w/ hydronephrosis, s/p desai placed- likely d/t narcotics  - taper off diluadid  - if no improvement, consider  cs    #Recent PADDY Rt  - appreciate orhto cs  - PT tx     #PPX- on AC

## 2023-06-08 NOTE — ED PROVIDER NOTE - CARDIAC, MLM
Tachycardic, irregularly irregular Heart sounds S1, S2.  No murmurs, rubs or gallops. Tachycardic, irregularly irregular Heart sounds S1, S2.  No murmurs, rubs or gallops.  Normal radial pulse.

## 2023-06-08 NOTE — PHARMACOTHERAPY INTERVENTION NOTE - COMMENTS
Medication history complete reviewed medications with patient provided med list from [facility] and confirmed with doctor first med hx.

## 2023-06-08 NOTE — SWALLOW BEDSIDE ASSESSMENT ADULT - SWALLOW EVAL: DIAGNOSIS
1) Pt exhibits mild functional Oropharyngeal Presbyphagia with sufficient oropharyngeal swallowing preservation for age(88) nonetheless. Oropharyngeal Swallow integrity appeared to be stable for age & NO behavioral aspiration signs were exhibited. No change in O2 sats noted. Odynophagia was denied. Oropharyngeal swallow integrity is felt to be at usual state. This profile is superimposed upon periodic post prandial c/o nausea without overt emesis. Do not feel that intermittent post prandial nausea is related to oropharyngeal deglutition.

## 2023-06-08 NOTE — ED PROVIDER NOTE - MUSCULOSKELETAL, MLM
Spine appears normal. MAEx4. Pt unable to right SLR. Decreased flexion right hip. Surgical site grossly clean dry and intact. No dehiscence. No signs of infection. Nontender. Mild associated swelling. Right hip nontender. b/l LE distal motor sensory intact Spine appears normal. MAEx4. Pt unable to right SLR. Decreased flexion right hip. Surgical site grossly clean dry and intact, no dehiscence, no signs of infection, nontender, +mild associated swelling. Right hip nontender. B/L LE distal motor/sensory intact

## 2023-06-08 NOTE — ED PROVIDER NOTE - OBJECTIVE STATEMENT
87 y/o female with a PMHx of Afib on Xarelto, bronchitis, HTN, HLD, syncope, s/p right total hip arthroplasty 5/28 and d/c to [facility] for ANGELO presents to the ED SHANNANA from [facility] for rapid Afib. Per son, he received a call that pt's heart rate was in the 150s and pt sent to the ED for evaluation. Denies CP, SOB, palpitations, burning with urination. MOLST +DNR/DNI. Allergies: Penicillin. No other complaints at this time. Cardio: Dr. Covington. 89 y/o female with a PMHx of Afib on Xarelto/Cardizem, bronchitis, HTN on Metoprolol, HLD, syncope, s/p right total hip arthroplasty 5/28 and d/c to [facility] for ANGELO presents to the ED SHANNANA from [facility] for rapid Afib. Per son, he received a call that pt's heart rate was in the 150s and pt sent to the ED for evaluation. Denies CP, SOB, palpitations, burning with urination. MOLST +DNR/DNI. Allergies: Penicillin. No other complaints at this time. Cardio: Dr. Covington. 89 y/o female with a PMHx of AFib on Xarelto/Cardizem, bronchitis, HTN on Metoprolol, HLD, syncope, s/p right total hip arthroplasty 5/28 and d/c to [facility] for ANGELO, BIBA from [facility] for rapid AFib, pt c/o'ing R hip pain. Per son, he received a call that pt's heart rate was in the 150s and pt sent to the ED for evaluation. Pt c/o not receiving her oral pain meds frequently enough for her post-op R hip pain.  Denies CP, SOB, palpitations, dysuria, F/C. + N, no V.  MOLST +DNR/DNI.  Son reports pt w/ poor solid food intake recently, pt reports good fluids intake. No other complaints at this time.  Allergies: Penicillin.  Cardio: Dr. Covington.

## 2023-06-08 NOTE — SWALLOW BEDSIDE ASSESSMENT ADULT - SWALLOW EVAL: CRITERIA FOR SKILLED INTERVENTION MET
DO NOT FEEL THAT ACUTE SPEECH PATHOLOGY FOLLOW UP WOULD CHANGE CLINICAL MANAGEMENT/OUTCOME IN HOSPITAL SETTING. PT'S FUNCTIONAL OROPHARYNGEAL PRESBYPHAGIA IS FELT TO BE STABLE FOR AGE AND NO PRIMARY SPEECH-LANGUAGE PATHOLOGY WAS EVIDENT ON EXAM. PT IS FELT TO BE AT OROPHARYNGEAL SWALLOW/SPEECH-LANGUAGE BASELINE. GIVEN ABOVE, THIS SERVICE WILL NOT ACTIVELY FOLLOW. RECONSULT PRN SHOOULD STATUS CHANGE AND CONDITION WARRANT.

## 2023-06-08 NOTE — SWALLOW BEDSIDE ASSESSMENT ADULT - SLP GENERAL OBSERVATIONS
Pt is alert and interactive. She was able to verbalize during communicative probes without evidence of a primary motor speech or primary linguistic pathology. Pt was able to verbalize her needs and feels that she is at speech-language baseline.

## 2023-06-08 NOTE — ED ADULT NURSE NOTE - OBJECTIVE STATEMENT
Pt BIBEMS from [facility], c/o rapid heart rate.  PMH a fib.  Denies N/V, dizziness, CP, SOB.  As per EMTs, rapid HR resolved in ambulance without any interventions.  Pt endorses R hip pain.  Unable to obtain HR in EMS, but ECG in progress at this time.  Pt in treatment room at this time, pt in afib rate controlled. At [facility] BP and HR was obtained, pt sent to ER d/t rapid heart rate. No c/o chest pain or SOB. B/L lower legs noted to be edematous and red. Pt with R hip surgery on 5/28, no c/o pain at this time. Son at bedside. Pt is awake, alert and answering questions appropriately. Pt reports that "I lost power this morning", pt states that she feels weaker than usual.

## 2023-06-08 NOTE — SWALLOW BEDSIDE ASSESSMENT ADULT - SWALLOW EVAL: RECOMMENDED DIET
NO OVERT OROPHARYNGEAL SWALLOWING CONTRAINDICATIONS WERE EVIDENT FOR PATIENT TO BE ON A REGULAR CONSISTENCY DIET WITH THIN LIQUIDS AS SHE TOLERATED THESE FOOD CONSISTENCIES FROM AN OROPHARYNGEAL SWALLOWING PERSPECTIVE ON CLINICAL EXAM.

## 2023-06-08 NOTE — PATIENT PROFILE ADULT - FALL HARM RISK - HARM RISK INTERVENTIONS
Assistance with ambulation/Assistance OOB with selected safe patient handling equipment/Communicate Risk of Fall with Harm to all staff/Discuss with provider need for PT consult/Monitor gait and stability/Reinforce activity limits and safety measures with patient and family/Tailored Fall Risk Interventions/Visual Cue: Yellow wristband and red socks/Bed in lowest position, wheels locked, appropriate side rails in place/Call bell, personal items and telephone in reach/Instruct patient to call for assistance before getting out of bed or chair/Non-slip footwear when patient is out of bed/[place] to call system/Physically safe environment - no spills, clutter or unnecessary equipment/Purposeful Proactive Rounding/Room/bathroom lighting operational, light cord in reach

## 2023-06-08 NOTE — SWALLOW BEDSIDE ASSESSMENT ADULT - NS SPL SWALLOW CLINIC TRIAL FT
Pt exhibited mild functional Oropharyngeal Presbyphagia with sufficient oropharyngeal swallowing preservation for age(88) nonetheless. Oropharyngeal Swallow integrity appeared to be stable for age & NO behavioral aspiration signs were exhibited. No change in O2 sats noted. At suspected oropharyngeal swallowing baseline. Odynophagia was denied. This profile is superimposed upon periodic post prandial c/o nausea without overt emesis. Do not feel that intermittent post prandial nausea is related to oropharyngeal deglutition. GI consult is pending. Note that pt is also dehydrated which can also contribute to nausea. repeat

## 2023-06-09 LAB
ALBUMIN SERPL ELPH-MCNC: 2.1 G/DL — LOW (ref 3.3–5)
ALP SERPL-CCNC: 83 U/L — SIGNIFICANT CHANGE UP (ref 40–120)
ALT FLD-CCNC: 33 U/L — SIGNIFICANT CHANGE UP (ref 12–78)
ANION GAP SERPL CALC-SCNC: 4 MMOL/L — LOW (ref 5–17)
ANION GAP SERPL CALC-SCNC: 6 MMOL/L — SIGNIFICANT CHANGE UP (ref 5–17)
AST SERPL-CCNC: 34 U/L — SIGNIFICANT CHANGE UP (ref 15–37)
BILIRUB SERPL-MCNC: 0.7 MG/DL — SIGNIFICANT CHANGE UP (ref 0.2–1.2)
BUN SERPL-MCNC: 14 MG/DL — SIGNIFICANT CHANGE UP (ref 7–23)
BUN SERPL-MCNC: 16 MG/DL — SIGNIFICANT CHANGE UP (ref 7–23)
CALCIUM SERPL-MCNC: 7.8 MG/DL — LOW (ref 8.5–10.1)
CALCIUM SERPL-MCNC: 7.9 MG/DL — LOW (ref 8.5–10.1)
CHLORIDE SERPL-SCNC: 91 MMOL/L — LOW (ref 96–108)
CHLORIDE SERPL-SCNC: 92 MMOL/L — LOW (ref 96–108)
CO2 SERPL-SCNC: 28 MMOL/L — SIGNIFICANT CHANGE UP (ref 22–31)
CO2 SERPL-SCNC: 29 MMOL/L — SIGNIFICANT CHANGE UP (ref 22–31)
CREAT SERPL-MCNC: 0.62 MG/DL — SIGNIFICANT CHANGE UP (ref 0.5–1.3)
CREAT SERPL-MCNC: 0.68 MG/DL — SIGNIFICANT CHANGE UP (ref 0.5–1.3)
EGFR: 85 ML/MIN/1.73M2 — SIGNIFICANT CHANGE UP
EGFR: 87 ML/MIN/1.73M2 — SIGNIFICANT CHANGE UP
GLUCOSE SERPL-MCNC: 82 MG/DL — SIGNIFICANT CHANGE UP (ref 70–99)
GLUCOSE SERPL-MCNC: 87 MG/DL — SIGNIFICANT CHANGE UP (ref 70–99)
HCT VFR BLD CALC: 27.7 % — LOW (ref 34.5–45)
HCT VFR BLD CALC: 29.6 % — LOW (ref 34.5–45)
HGB BLD-MCNC: 10.3 G/DL — LOW (ref 11.5–15.5)
HGB BLD-MCNC: 9.7 G/DL — LOW (ref 11.5–15.5)
MCHC RBC-ENTMCNC: 29.8 PG — SIGNIFICANT CHANGE UP (ref 27–34)
MCHC RBC-ENTMCNC: 30 PG — SIGNIFICANT CHANGE UP (ref 27–34)
MCHC RBC-ENTMCNC: 34.8 GM/DL — SIGNIFICANT CHANGE UP (ref 32–36)
MCHC RBC-ENTMCNC: 35 GM/DL — SIGNIFICANT CHANGE UP (ref 32–36)
MCV RBC AUTO: 85 FL — SIGNIFICANT CHANGE UP (ref 80–100)
MCV RBC AUTO: 86.3 FL — SIGNIFICANT CHANGE UP (ref 80–100)
OSMOLALITY SERPL: 245 MOSMOL/KG — LOW (ref 280–301)
OSMOLALITY UR: 252 MOSM/KG — SIGNIFICANT CHANGE UP (ref 50–1200)
PLATELET # BLD AUTO: 392 K/UL — SIGNIFICANT CHANGE UP (ref 150–400)
PLATELET # BLD AUTO: 396 K/UL — SIGNIFICANT CHANGE UP (ref 150–400)
POTASSIUM SERPL-MCNC: 4.1 MMOL/L — SIGNIFICANT CHANGE UP (ref 3.5–5.3)
POTASSIUM SERPL-MCNC: 4.5 MMOL/L — SIGNIFICANT CHANGE UP (ref 3.5–5.3)
POTASSIUM SERPL-SCNC: 4.1 MMOL/L — SIGNIFICANT CHANGE UP (ref 3.5–5.3)
POTASSIUM SERPL-SCNC: 4.5 MMOL/L — SIGNIFICANT CHANGE UP (ref 3.5–5.3)
PROT SERPL-MCNC: 4.9 GM/DL — LOW (ref 6–8.3)
RBC # BLD: 3.26 M/UL — LOW (ref 3.8–5.2)
RBC # BLD: 3.43 M/UL — LOW (ref 3.8–5.2)
RBC # FLD: 13.1 % — SIGNIFICANT CHANGE UP (ref 10.3–14.5)
RBC # FLD: 13.2 % — SIGNIFICANT CHANGE UP (ref 10.3–14.5)
SODIUM SERPL-SCNC: 125 MMOL/L — LOW (ref 135–145)
SODIUM SERPL-SCNC: 125 MMOL/L — LOW (ref 135–145)
SODIUM UR-SCNC: <20 MMOL/L — SIGNIFICANT CHANGE UP
WBC # BLD: 11.59 K/UL — HIGH (ref 3.8–10.5)
WBC # BLD: 12.04 K/UL — HIGH (ref 3.8–10.5)
WBC # FLD AUTO: 11.59 K/UL — HIGH (ref 3.8–10.5)
WBC # FLD AUTO: 12.04 K/UL — HIGH (ref 3.8–10.5)

## 2023-06-09 PROCEDURE — 99233 SBSQ HOSP IP/OBS HIGH 50: CPT

## 2023-06-09 PROCEDURE — 74178 CT ABD&PLV WO CNTR FLWD CNTR: CPT | Mod: 26

## 2023-06-09 RX ORDER — SODIUM CHLORIDE 9 MG/ML
1000 INJECTION INTRAMUSCULAR; INTRAVENOUS; SUBCUTANEOUS
Refills: 0 | Status: COMPLETED | OUTPATIENT
Start: 2023-06-09 | End: 2023-06-09

## 2023-06-09 RX ORDER — DILTIAZEM HCL 120 MG
120 CAPSULE, EXT RELEASE 24 HR ORAL DAILY
Refills: 0 | Status: DISCONTINUED | OUTPATIENT
Start: 2023-06-10 | End: 2023-06-12

## 2023-06-09 RX ORDER — MULTIVIT-MIN/FERROUS GLUCONATE 9 MG/15 ML
1 LIQUID (ML) ORAL DAILY
Refills: 0 | Status: DISCONTINUED | OUTPATIENT
Start: 2023-06-09 | End: 2023-06-12

## 2023-06-09 RX ORDER — THIAMINE MONONITRATE (VIT B1) 100 MG
100 TABLET ORAL DAILY
Refills: 0 | Status: DISCONTINUED | OUTPATIENT
Start: 2023-06-09 | End: 2023-06-12

## 2023-06-09 RX ORDER — PANTOPRAZOLE SODIUM 20 MG/1
40 TABLET, DELAYED RELEASE ORAL DAILY
Refills: 0 | Status: DISCONTINUED | OUTPATIENT
Start: 2023-06-09 | End: 2023-06-12

## 2023-06-09 RX ADMIN — RIVAROXABAN 20 MILLIGRAM(S): KIT at 17:22

## 2023-06-09 RX ADMIN — Medication 37.5 MILLIGRAM(S): at 09:57

## 2023-06-09 RX ADMIN — POLYETHYLENE GLYCOL 3350 17 GRAM(S): 17 POWDER, FOR SOLUTION ORAL at 20:33

## 2023-06-09 RX ADMIN — SODIUM CHLORIDE 100 MILLILITER(S): 9 INJECTION INTRAMUSCULAR; INTRAVENOUS; SUBCUTANEOUS at 08:56

## 2023-06-09 RX ADMIN — SIMVASTATIN 20 MILLIGRAM(S): 20 TABLET, FILM COATED ORAL at 20:34

## 2023-06-09 RX ADMIN — Medication 30 MILLIGRAM(S): at 05:19

## 2023-06-09 RX ADMIN — Medication 3 MILLIGRAM(S): at 20:34

## 2023-06-09 RX ADMIN — PANTOPRAZOLE SODIUM 40 MILLIGRAM(S): 20 TABLET, DELAYED RELEASE ORAL at 09:57

## 2023-06-09 NOTE — DIETITIAN INITIAL EVALUATION ADULT - OTHER INFO
89 y/o F with a PMHx of afib on xarelto, HTN, HLD, recent admit 5/27-31 for Rt hip fracture after fall s/p R PADDY on 5/28, d/c'd to [facility], from which she presented now when she was found in Afibrvr.  Pt is awake but slowed speech and at times seems mildly confused. Pt's son, Juwan, at bedside and states she's been like this since surgery. Son endorses that pt has not been eating much and pt agrees that she either has nausea or vomits PO intake and has had dec PO intake and c/o epigastric pain. Pt had been continued on po dilaudid at [facility]. Admitted for profound hyponatremia likely 2/2 dec po intake d/t n/v. DNR/ DNI/ No Tube Feeding. SLP Gina (6/8): Regular, thin liquids.    CLD tray noted at bedside, ~75% of tray consumed. Pt reports tolerating the CLD with no pain, N/V however is asking for her diet to be advanced. States her UBW was 140#, however endorses wt loss to 118# since getting sick and gained wt, stating current wt at 123#. RD unable to obtain bedscale wt at time of visit 2/2 bedscale not working. No scale weight taken since admission. Weight hx reviewed: 122# (taken by RD on 5/31/23; met criteria for severe malnutrition). Pt thin, frail, and pj appearing. NFPE reveals moderate to severe muscle/ fat wasting, pt continues to meet criteria for PCM at this time. Will trial Ensure Plus High Protein BID in effort to optimize PO intake. Recommend to advance diet to Regular as soon as medically feasible. Please see additional recommendations below.

## 2023-06-09 NOTE — PHYSICAL THERAPY INITIAL EVALUATION ADULT - PERTINENT HX OF CURRENT PROBLEM, REHAB EVAL
recent admit 5/27-31 for Rt hip fracture after fall s/p R PADDY on 5/28, d/c'd to [facility], from which she presents now when she was found in Afibrvr.  Son endorses that pt has not been eating much and pt agrees that she either has nausea or vomits PO intake and has had dec PO intake and c/o epigastric pain. Pt had been continued on po dilaudid at [facility].   In ED, Na 114, started on NS, wbc 20, platelets 544, afibrvr- now cvr, cxr neg, hip xray neg, eval by ortho, ICU, Urinary retention- desai placed in ED, UA sent. adm to 5S.

## 2023-06-09 NOTE — DIETITIAN INITIAL EVALUATION ADULT - NUTRITIONGOAL OUTCOME1
Warfarin 2.5 mg tablets refilled per protocol for a 6 month supply, co-signature sent to Leighann Pacheco/[facility].    
Pt will consume >/= 80% of all meals and supplements

## 2023-06-09 NOTE — PROGRESS NOTE ADULT - NUTRITIONAL ASSESSMENT
This patient has been assessed with a concern for Malnutrition and has been determined to have a diagnosis/diagnoses of Severe protein-calorie malnutrition.    This patient is being managed with:   Diet Regular-  Entered: Jun 9 2023  8:32AM

## 2023-06-09 NOTE — DIETITIAN INITIAL EVALUATION ADULT - NSFNSGIIOFT_GEN_A_CORE
I&O's Detail    08 Jun 2023 07:01  -  09 Jun 2023 07:00  --------------------------------------------------------  IN:  Total IN: 0 mL    OUT:    Indwelling Catheter - Urethral (mL): 3500 mL    Voided (mL): 250 mL  Total OUT: 3750 mL    Total NET: -3750 mL

## 2023-06-09 NOTE — DIETITIAN INITIAL EVALUATION ADULT - ADD RECOMMEND
1) Advance diet to Regular as soon as medically feasible  2) Add ensure plus high protein BID to optimize PO intake (provides 350 kcal, 20g protein/ shake)   3) Obtain vitamin D 25OH level to assess nutriture  4) Please obtain weekly weights  5) Consider adding thiamine 100 mg daily 2/2 poor PO intake/ malnutrition  6) MVI w/ minerals daily to ensure 100% RDA met  7) consider checking B6, B12, thiamine, folate, carnitine, and copper levels as malnutrition in cause these to be deficient  8) Encourage protein-rich foods, maximize food preferences  9) Monitor bowel movements, if no BM for >3 days, consider implementing bowel regimen.  10) Consider adding appetite stimulant such as Remeron or Marinol 2/2 chronically poor appetite/ PO intake  RD will continue to monitor PO intake, labs, hydration, and wt prn.

## 2023-06-09 NOTE — PHYSICAL THERAPY INITIAL EVALUATION ADULT - MODALITIES TREATMENT COMMENTS
pt left supine in bed, THP R maintained (pt seems unaware re: recent R PADDY), pillow placed b/n LEs, bed alarm, CBIR, no c/o pain

## 2023-06-09 NOTE — DIETITIAN INITIAL EVALUATION ADULT - PERTINENT MEDS FT
MEDICATIONS  (STANDING):  diltiazem    milliGRAM(s) Oral daily  ketorolac   Injectable 30 milliGRAM(s) IV Push every 8 hours  metoprolol tartrate 37.5 milliGRAM(s) Oral two times a day  pantoprazole    Tablet 40 milliGRAM(s) Oral daily  polyethylene glycol 3350 17 Gram(s) Oral at bedtime  rivaroxaban 20 milliGRAM(s) Oral with dinner  simvastatin 20 milliGRAM(s) Oral at bedtime    MEDICATIONS  (PRN):  acetaminophen     Tablet .. 650 milliGRAM(s) Oral every 6 hours PRN Temp greater or equal to 38C (100.4F), Mild Pain (1 - 3)  aluminum hydroxide/magnesium hydroxide/simethicone Suspension 30 milliLiter(s) Oral every 4 hours PRN Dyspepsia  HYDROmorphone   Tablet 2 milliGRAM(s) Oral every 4 hours PRN Severe Pain (7 - 10)  melatonin 3 milliGRAM(s) Oral at bedtime PRN Insomnia  ondansetron Injectable 4 milliGRAM(s) IV Push every 8 hours PRN Nausea and/or Vomiting  traMADol 50 milliGRAM(s) Oral every 6 hours PRN Moderate Pain (4 - 6)    Home Medications:  acetaminophen 500 mg oral tablet: 2 orally every 8 hours as needed for  mild pain (08 Jun 2023 14:59)  ascorbic acid 500 mg oral tablet: 1 tab(s) orally once a day (08 Jun 2023 14:59)  dilTIAZem 180 mg/24 hours oral capsule, extended release: 1 cap(s) orally once a day (08 Jun 2023 14:59)  HYDROmorphone 2 mg oral tablet: 1 tab(s) orally every 4 hours as needed for  moderate pain (08 Jun 2023 14:59)  HYDROmorphone 4 mg oral tablet: 1 tab(s) orally every 4 hours as needed for  severe pain (08 Jun 2023 14:59)  metoprolol tartrate 37.5 mg oral tablet: 1 tab(s) orally 2 times a day (08 Jun 2023 14:59)  Multiple Vitamins oral tablet: 1 tab(s) orally once a day (08 Jun 2023 14:59)  polyethylene glycol 3350 oral powder for reconstitution: 17 gram(s) orally once a day (at bedtime) (08 Jun 2023 14:59)  rivaroxaban 20 mg oral tablet: 1 tab(s) orally once a day (before a meal) (08 Jun 2023 14:59)  senna leaf extract oral tablet: 2 tab(s) orally once a day (at bedtime) (08 Jun 2023 14:59)  simvastatin 20 mg oral tablet: 1 tab(s) orally once a day (at bedtime) (08 Jun 2023 14:59)  Vitamin B-12 1000 mcg oral tablet: 2 tab(s) orally once a day (08 Jun 2023 14:59)  Vitamin D3 25 mcg (1000 intl units) oral tablet: 1 tab(s) orally 2 times a day (08 Jun 2023 14:59)

## 2023-06-09 NOTE — PHYSICAL THERAPY INITIAL EVALUATION ADULT - ACTIVE RANGE OF MOTION EXAMINATION, REHAB EVAL
ltd B hip /knee flex (min R hip/knee flex), B KE ~0 deg from TKE position, DF/PF WFL/bilateral upper extremity Active ROM was WFL (within functional limits)

## 2023-06-09 NOTE — DIETITIAN INITIAL EVALUATION ADULT - PERTINENT LABORATORY DATA
06-09    125<L>  |  91<L>  |  16  ----------------------------<  87  4.5   |  28  |  0.62    Ca    7.8<L>      09 Jun 2023 06:42    TPro  4.9<L>  /  Alb  2.1<L>  /  TBili  0.7  /  DBili  x   /  AST  34  /  ALT  33  /  AlkPhos  83  06-09    Vitamin D, 25-Hydroxy: 36.7 ng/mL (05-30-23 @ 08:55)  Vitamin D, 25-Hydroxy: 30.9 ng/mL (05-29-23 @ 07:46)  Folate, Serum: 13.2 ng/mL (05-29-23 @ 07:46)  Iron Total, Serum: 20 ug/dL (05-29-23 @ 07:46)  Vitamin B12, Serum: >2000 pg/mL (05-29-23 @ 07:46)

## 2023-06-09 NOTE — PHYSICAL THERAPY INITIAL EVALUATION ADULT - DIAGNOSIS, PT EVAL
profound hyponatremia likely 2/2 dec po intake d/t n/v, nausea/vomiting likely 2/2 dilaudid, afib rvr- improved to CVR now, likely 2/2 dehydration; recent R PADDY

## 2023-06-09 NOTE — PHYSICAL THERAPY INITIAL EVALUATION ADULT - GENERAL OBSERVATIONS, REHAB EVAL
pt rec'd supine in bed on 5S, slow to mobilize, sppech slow, flat affect, dec effort to self mobilize, confused

## 2023-06-09 NOTE — PHYSICAL THERAPY INITIAL EVALUATION ADULT - ADDITIONAL COMMENTS
pt adm from [facility]. pt reports lives alone, 2 level house w/ stair lift, descends 5 steps and ascends 3 w/ rail to enter home per pt

## 2023-06-09 NOTE — DIETITIAN INITIAL EVALUATION ADULT - ORAL INTAKE PTA/DIET HISTORY
Reports okay appetite/ intake pta, however pt noted to have poor po intake pta per H&P. Normally consumes 3 meals/ day and does not follow any diet restrictions. States that she was living off cheerio's and rice krispies but has become tired of them. Pt does not drink any ONS but has drank them in the past and liked them. Pt from [facility], [place], operated by [facility] - no diet hx in shadow chart.

## 2023-06-10 LAB
ANION GAP SERPL CALC-SCNC: 4 MMOL/L — LOW (ref 5–17)
BUN SERPL-MCNC: 12 MG/DL — SIGNIFICANT CHANGE UP (ref 7–23)
CALCIUM SERPL-MCNC: 7.8 MG/DL — LOW (ref 8.5–10.1)
CHLORIDE SERPL-SCNC: 100 MMOL/L — SIGNIFICANT CHANGE UP (ref 96–108)
CO2 SERPL-SCNC: 26 MMOL/L — SIGNIFICANT CHANGE UP (ref 22–31)
CREAT SERPL-MCNC: 0.49 MG/DL — LOW (ref 0.5–1.3)
CULTURE RESULTS: SIGNIFICANT CHANGE UP
EGFR: 92 ML/MIN/1.73M2 — SIGNIFICANT CHANGE UP
GLUCOSE SERPL-MCNC: 88 MG/DL — SIGNIFICANT CHANGE UP (ref 70–99)
POTASSIUM SERPL-MCNC: 4 MMOL/L — SIGNIFICANT CHANGE UP (ref 3.5–5.3)
POTASSIUM SERPL-SCNC: 4 MMOL/L — SIGNIFICANT CHANGE UP (ref 3.5–5.3)
SARS-COV-2 RNA SPEC QL NAA+PROBE: SIGNIFICANT CHANGE UP
SODIUM SERPL-SCNC: 130 MMOL/L — LOW (ref 135–145)
SPECIMEN SOURCE: SIGNIFICANT CHANGE UP

## 2023-06-10 PROCEDURE — 99232 SBSQ HOSP IP/OBS MODERATE 35: CPT

## 2023-06-10 PROCEDURE — 99222 1ST HOSP IP/OBS MODERATE 55: CPT

## 2023-06-10 RX ADMIN — Medication 100 MILLIGRAM(S): at 10:07

## 2023-06-10 RX ADMIN — POLYETHYLENE GLYCOL 3350 17 GRAM(S): 17 POWDER, FOR SOLUTION ORAL at 21:49

## 2023-06-10 RX ADMIN — Medication 37.5 MILLIGRAM(S): at 21:50

## 2023-06-10 RX ADMIN — Medication 1 TABLET(S): at 10:07

## 2023-06-10 RX ADMIN — Medication 120 MILLIGRAM(S): at 06:32

## 2023-06-10 RX ADMIN — RIVAROXABAN 20 MILLIGRAM(S): KIT at 17:39

## 2023-06-10 RX ADMIN — SIMVASTATIN 20 MILLIGRAM(S): 20 TABLET, FILM COATED ORAL at 21:49

## 2023-06-10 RX ADMIN — PANTOPRAZOLE SODIUM 40 MILLIGRAM(S): 20 TABLET, DELAYED RELEASE ORAL at 10:07

## 2023-06-10 NOTE — CONSULT NOTE ADULT - ASSESSMENT
1. Decreased PO intake and nausea. May be secondary to narcotic use. Recommend limiting use if possible and following up CT    2. Constipation. Likely from decreased mobility and narcotics.     Recommendation  1. Follow up CT official read  2. Limit narcotics if possible  3. Recommend Zofran as needed for nausea  4. Recommend Miralax 17 g daily to normalize bowel habits  5. Diet as tolerated
89 yo F with AUR following hip replacement. Can attempt TOV when pt is mobile and requiring minimal narcotic pain medication. Either before DC or once in rehab.  For filling defect, suspect this is artifact. Discussed with patient we can consider elective ureteroscopy or consider repeating CTU at later date. Pt will think about it and follow up outpatient.

## 2023-06-10 NOTE — CONSULT NOTE ADULT - CONSULT REASON
ureteral filling defect, urinary retention
S/p R PADDY 5/28  Consulted: 1130  Seen: 1140
abdominal discomfort

## 2023-06-10 NOTE — CONSULT NOTE ADULT - SUBJECTIVE AND OBJECTIVE BOX
HPI:    87 F with hx of afib on Xarelto, HTN, HLD, recent hip fracture warranting hospitalization at  in 5/2023 , s/p right total hip arthroplasty complicated by pneumonia who presents from [facility] for rapid Afib. GI consulted for decreased PO intake. Patient states that since she's been at [facility] she has required standing narcotics for back pain which make her nauseous. This has led to nausea and decreased PO intake with intermittent NBNB emesis. She also has constipation with hard stools requiring straining.     In the ED, hemodynamically stable and afebrile. Labs notable for WBC 20, ESR 40. US abdomen showing right hydronephrosis and distended bladder. CT official read pending.     PAST MEDICAL & SURGICAL HISTORY:  Syncope  7/2/2013      HTN (hypertension)      Hyperlipidemia      Afib      A-fib      Bronchitis      Cardiac abnormality  internal cardiac monitor placed 8/2013      H/O: hysterectomy      S/P cataract surgery  Left eye.          Home Medications:  acetaminophen 500 mg oral tablet: 2 orally every 8 hours as needed for  mild pain (08 Jun 2023 14:59)  ascorbic acid 500 mg oral tablet: 1 tab(s) orally once a day (08 Jun 2023 14:59)  dilTIAZem 180 mg/24 hours oral capsule, extended release: 1 cap(s) orally once a day (08 Jun 2023 14:59)  HYDROmorphone 2 mg oral tablet: 1 tab(s) orally every 4 hours as needed for  moderate pain (08 Jun 2023 14:59)  HYDROmorphone 4 mg oral tablet: 1 tab(s) orally every 4 hours as needed for  severe pain (08 Jun 2023 14:59)  metoprolol tartrate 37.5 mg oral tablet: 1 tab(s) orally 2 times a day (08 Jun 2023 14:59)  Multiple Vitamins oral tablet: 1 tab(s) orally once a day (08 Jun 2023 14:59)  polyethylene glycol 3350 oral powder for reconstitution: 17 gram(s) orally once a day (at bedtime) (08 Jun 2023 14:59)  rivaroxaban 20 mg oral tablet: 1 tab(s) orally once a day (before a meal) (08 Jun 2023 14:59)  senna leaf extract oral tablet: 2 tab(s) orally once a day (at bedtime) (08 Jun 2023 14:59)  simvastatin 20 mg oral tablet: 1 tab(s) orally once a day (at bedtime) (08 Jun 2023 14:59)  Vitamin B-12 1000 mcg oral tablet: 2 tab(s) orally once a day (08 Jun 2023 14:59)  Vitamin D3 25 mcg (1000 intl units) oral tablet: 1 tab(s) orally 2 times a day (08 Jun 2023 14:59)      MEDICATIONS  (STANDING):  diltiazem    milliGRAM(s) Oral daily  ketorolac   Injectable 30 milliGRAM(s) IV Push every 8 hours  metoprolol tartrate 37.5 milliGRAM(s) Oral two times a day  pantoprazole  Injectable 40 milliGRAM(s) IV Push two times a day  polyethylene glycol 3350 17 Gram(s) Oral at bedtime  rivaroxaban 20 milliGRAM(s) Oral with dinner  simvastatin 20 milliGRAM(s) Oral at bedtime    MEDICATIONS  (PRN):  acetaminophen     Tablet .. 650 milliGRAM(s) Oral every 6 hours PRN Temp greater or equal to 38C (100.4F), Mild Pain (1 - 3)  aluminum hydroxide/magnesium hydroxide/simethicone Suspension 30 milliLiter(s) Oral every 4 hours PRN Dyspepsia  HYDROmorphone   Tablet 2 milliGRAM(s) Oral every 4 hours PRN Severe Pain (7 - 10)  melatonin 3 milliGRAM(s) Oral at bedtime PRN Insomnia  ondansetron Injectable 4 milliGRAM(s) IV Push every 8 hours PRN Nausea and/or Vomiting  traMADol 50 milliGRAM(s) Oral every 6 hours PRN Moderate Pain (4 - 6)      Allergies    penicillin (Unknown)  shellfish (Unknown)  erythromycin (Diarrhea)    Intolerances        SOCIAL HISTORY:    FAMILY HISTORY:  Family history of primary TB    Family history of CVA    Family history of diabetes mellitus        ROS  As above  Otherwise unremarkable    Vital Signs Last 24 Hrs  T(C): 36.5 (08 Jun 2023 15:09), Max: 36.6 (08 Jun 2023 09:57)  T(F): 97.7 (08 Jun 2023 15:09), Max: 97.9 (08 Jun 2023 09:57)  HR: 85 (08 Jun 2023 15:09) (71 - 107)  BP: 117/74 (08 Jun 2023 15:09) (113/60 - 144/91)  BP(mean): 88 (08 Jun 2023 15:09) (88 - 88)  RR: 17 (08 Jun 2023 15:09) (17 - 20)  SpO2: 99% (08 Jun 2023 15:09) (99% - 100%)    Parameters below as of 08 Jun 2023 15:09  Patient On (Oxygen Delivery Method): room air        Constitutional: NAD  Respiratory: CTAB  Cardiovascular: S1 and S2, RRR  Gastrointestinal: BS+, soft, NT/ND  Extremities: No peripheral edema  Psychiatric: Normal mood, normal affect  Skin: No rashes    LABS:                        12.5   20.86 )-----------( 544      ( 08 Jun 2023 10:12 )             35.0     06-08    113<LL>  |  81<L>  |  19  ----------------------------<  106<H>  5.1   |  26  |  0.63    Ca    8.7      08 Jun 2023 14:36    TPro  6.8  /  Alb  2.8<L>  /  TBili  0.9  /  DBili  x   /  AST  35  /  ALT  36  /  AlkPhos  106  06-08    PT/INR - ( 08 Jun 2023 10:12 )   PT: 23.9 sec;   INR: 2.05 ratio         PTT - ( 08 Jun 2023 10:12 )  PTT:32.2 sec  LIVER FUNCTIONS - ( 08 Jun 2023 10:12 )  Alb: 2.8 g/dL / Pro: 6.8 gm/dL / ALK PHOS: 106 U/L / ALT: 36 U/L / AST: 35 U/L / GGT: x             RADIOLOGY & ADDITIONAL STUDIES:    ACC: [id number] EXAM:  US ABDOMEN RT UPR QUADRANT   ORDERED BY: SULEIMAN HUERTA     PROCEDURE DATE:  06/08/2023          INTERPRETATION:  CLINICAL INFORMATION: Right upper quadrant tenderness.    COMPARISON: CT dated 05/27/2023    TECHNIQUE: Sonography of the right upper quadrant.    FINDINGS:  Liver: 1 cm sized septated cyst left lobe.  Bile ducts: Normal caliber. Common bile duct measures 5 mm.  Gallbladder: Within normal limits.  Pancreas: Obscured by overlying bowel gas.  Right kidney: 9.4 cm. No hydronephrosis. Mild to moderate hydronephrosis.  Urinary bladder: Significantly distended urinary bladder measuring 1823   cc in volume.  Ascites: None.  IVC: Visualized portions are within normal limits.    IMPRESSION:  Distended urinary bladder and right-sided hydronephrosis.        --- End of Report ---            ISAURO HATFIELD MD; Attending Radiologist  This document has been electronically signed. Jun 8 2023  2:14PM  
Patient is a 88yFemale with a recent R PADDY on 5/28/23 for hip fracture who presents to [facility] ED from her rehab due to elevated heart rate. History obtained from patient and son at bedside. Denies any recent falls or injuries States she still occasionally has R hip pain, but no acute worsening of pain. Endorses the ability to ambulate well. Denies any numbness or tingling. Denies having any other pain elsewhere. No other orthopedic concerns at this time.    Syncope    HTN (hypertension)    Hyperlipidemia    Afib    A-fib    Bronchitis            penicillin (Unknown)  shellfish (Unknown)  erythromycin (Diarrhea)      PHYSICAL EXAM:  T(C): 36.6 (06-08-23 @ 09:57), Max: 36.6 (06-08-23 @ 09:57)  HR: 76 (06-08-23 @ 11:40) (71 - 107)  BP: 113/60 (06-08-23 @ 11:40) (113/60 - 144/91)  RR: 20 (06-08-23 @ 09:57) (20 - 20)  SpO2: 100% (06-08-23 @ 09:57) (100% - 100%)    Gen: NAD, Resting comfortably    RLE:  Dressings c/d/i; no drainage noted at this time  No bony tenderness to palpation throughout RLE  +EHL/FHL/TA/GSC  +SILT: SPN/DPN/Edwin/Saph/Tib  + DP  Compartments soft and compressible  No calf tenderness    Imaging:  Xray R hip: No obvious fractures, dislocations, loosening of PADDY       A/P: 88F with recent R PADDY for hip fracture on 5/28 returns to ED due to elevated heart rate from Afib RVR    Heart rate has been better controlled after Cardizem in ED  No new orthopedic issues noted at this time. Unlikely to have infection of the hip at this point in time given the lack of acute pain, fevers, increasing difficulty ambulating with an incision that appears well healing without surrounding erythema or drainage.  Analgesia as needed  WBAT  Continue with posterior hip precautions and abduction pillow while in bed to prevent dislocation   DVT ppx: continue current regimen   PT/OT daily while in rehab  Ice and elevate as tolerated  No acute orthopedic surgical intervention indicated at this time  Orthopedically stable for DC if otherwise medically stable  Follow up with Dr. Edwards within 1-2 weeks in office, call if an appointment has not already been made  Will discuss with Dr. Edwards, who is in agreement with above plan  
88F w/PMH afib on xarelto, HTN, HLD, recent admit 5/27-31 for Rt hip fracture after fall s/p R PADDY on 5/28, d/c'd to [facility], from which she presents now when she was found in Afibrvr.  Pt is awake but slowed speech and at times seems mildly confused.  Pt's son, Juwan, at bedside and states she's been like this since surgery.  Son endorses that pt has not been eating much and pt agrees that she either has nausea or vomits PO intake and has had dec PO intake and c/o epigastric pain.  Pt had been continued on po dilaudid at [facility].     Urology was consulted for urinary retention and filling defect on CTU. Pt denies flank pain. She c/o fatigue but otherwise doing well.     PAST MEDICAL & SURGICAL HISTORY:  Syncope 7/2/2013  HTN (hypertension)  Hyperlipidemia  Afib  Bronchitis  Cardiac abnormality internal cardiac monitor placed 8/2013  H/O: hysterectomy  S/P cataract surgery Left eye.  s/p RT PADDY 5/28/23 for hip fracture d/t fall    Social History:   denies smoking/etoh,   ind ADLs, now requires assist    FAMILY HISTORY:  Family history of primary TB,  CVA,  diabetes mellitus      MEDICATIONS  (PRN):  acetaminophen     Tablet .. 650 milliGRAM(s) Oral every 6 hours PRN Temp greater or equal to 38C (100.4F), Mild Pain (1 - 3)  aluminum hydroxide/magnesium hydroxide/simethicone Suspension 30 milliLiter(s) Oral every 4 hours PRN Dyspepsia  HYDROmorphone   Tablet 2 milliGRAM(s) Oral every 4 hours PRN Severe Pain (7 - 10)  melatonin 3 milliGRAM(s) Oral at bedtime PRN Insomnia  ondansetron Injectable 4 milliGRAM(s) IV Push every 8 hours PRN Nausea and/or Vomiting  traMADol 50 milliGRAM(s) Oral every 6 hours PRN Moderate Pain (4 - 6)      I&O's Summary    08 Jun 2023 07:01  -  08 Jun 2023 15:16  --------------------------------------------------------  IN: 0 mL / OUT: 1800 mL / NET: -1800 mL        Vital Signs Last 24 Hrs  T(C): 36.4 (10 Yandel 2023 08:27), Max: 36.7 (09 Jun 2023 20:30)  T(F): 97.5 (10 Yandel 2023 08:27), Max: 98.1 (09 Jun 2023 20:30)  HR: 80 (10 Yandel 2023 08:27) (64 - 94)  BP: 104/58 (10 Yandel 2023 08:27) (91/47 - 104/61)  BP(mean): --  RR: 18 (10 Yandle 2023 08:27) (17 - 19)  SpO2: 99% (10 Yandel 2023 08:27) (98% - 99%)    Parameters below as of 10 Yandel 2023 08:27  Patient On (Oxygen Delivery Method): room air    NAD, A+Ox3  MMM  EOMI  RRR  no increased WOB  ABD S NT ND  desai draining clear yellow urine                          10.3   11.59 )-----------( 396      ( 09 Jun 2023 12:31 )             29.6   06-10    130<L>  |  100  |  12  ----------------------------<  88  4.0   |  26  |  0.49<L>    Ca    7.8<L>      10 Yandel 2023 06:06    TPro  4.9<L>  /  Alb  2.1<L>  /  TBili  0.7  /  DBili  x   /  AST  34  /  ALT  33  /  AlkPhos  83  06-09    < from: CT Abdomen and Pelvis Urogram w/wo IV Cont (06.09.23 @ 10:44) >  KIDNEYS/URETERS: Unremarkable kidneys.  Focal linear density within the   mid left ureter (602; 42, 607; 43).Mild fullness of the left renal   collecting system and ureter down to the ureterovesical junction.  BLADDER: Desai catheter balloon within the bladder lumen. No mass, stone   or wall thickening.    < end of copied text >

## 2023-06-11 LAB
ANION GAP SERPL CALC-SCNC: 6 MMOL/L — SIGNIFICANT CHANGE UP (ref 5–17)
BUN SERPL-MCNC: 8 MG/DL — SIGNIFICANT CHANGE UP (ref 7–23)
CALCIUM SERPL-MCNC: 8.4 MG/DL — LOW (ref 8.5–10.1)
CHLORIDE SERPL-SCNC: 98 MMOL/L — SIGNIFICANT CHANGE UP (ref 96–108)
CO2 SERPL-SCNC: 27 MMOL/L — SIGNIFICANT CHANGE UP (ref 22–31)
CREAT SERPL-MCNC: 0.68 MG/DL — SIGNIFICANT CHANGE UP (ref 0.5–1.3)
EGFR: 85 ML/MIN/1.73M2 — SIGNIFICANT CHANGE UP
GLUCOSE SERPL-MCNC: 109 MG/DL — HIGH (ref 70–99)
POTASSIUM SERPL-MCNC: 4.3 MMOL/L — SIGNIFICANT CHANGE UP (ref 3.5–5.3)
POTASSIUM SERPL-SCNC: 4.3 MMOL/L — SIGNIFICANT CHANGE UP (ref 3.5–5.3)
SODIUM SERPL-SCNC: 131 MMOL/L — LOW (ref 135–145)

## 2023-06-11 PROCEDURE — 99232 SBSQ HOSP IP/OBS MODERATE 35: CPT

## 2023-06-11 RX ADMIN — Medication 1 TABLET(S): at 10:21

## 2023-06-11 RX ADMIN — RIVAROXABAN 20 MILLIGRAM(S): KIT at 17:40

## 2023-06-11 RX ADMIN — PANTOPRAZOLE SODIUM 40 MILLIGRAM(S): 20 TABLET, DELAYED RELEASE ORAL at 10:20

## 2023-06-11 RX ADMIN — SIMVASTATIN 20 MILLIGRAM(S): 20 TABLET, FILM COATED ORAL at 21:23

## 2023-06-11 RX ADMIN — Medication 37.5 MILLIGRAM(S): at 21:23

## 2023-06-11 RX ADMIN — Medication 120 MILLIGRAM(S): at 05:33

## 2023-06-11 RX ADMIN — Medication 37.5 MILLIGRAM(S): at 10:20

## 2023-06-11 RX ADMIN — POLYETHYLENE GLYCOL 3350 17 GRAM(S): 17 POWDER, FOR SOLUTION ORAL at 21:24

## 2023-06-11 RX ADMIN — Medication 100 MILLIGRAM(S): at 10:21

## 2023-06-11 RX ADMIN — Medication 3 MILLIGRAM(S): at 21:23

## 2023-06-11 NOTE — PROGRESS NOTE ADULT - REASON FOR ADMISSION
Hyponatremia  Afib RVR  Acute urinary retention
Hyponatremia  Afib RVR  Acute urinary retention
afibrvr, hyponatremia
weakness, rapid afib, hyponatremia
Hyponatremia  Afib RVR

## 2023-06-11 NOTE — PROGRESS NOTE ADULT - SUBJECTIVE AND OBJECTIVE BOX
Chief Complaint: Nausea, decreased Po intake, rapid heart rate    Interval Hx: Patient reports feeling improved, more energy, no further nausea, improved PO, HR better controlled. Stable for DC to [facility]. Outpatient urology follow up. Trial of void at [facility].     ROS: Multi system review is comprehensively negative x 10 systems except as above    Vitals:  T(F): 97.5 (10 Yandel 2023 08:27), Max: 98.1 (2023 20:30)  HR: 80 (10 Yandel 2023 08:27) (64 - 94)  BP: 104/58 (10 Yandel 2023 08:27) (91/47 - 104/61)  RR: 18 (10 Yandel 2023 08:27) (17 - 19)  SpO2: 99% (10 Yandel 2023 08:27) (98% - 99%) on room air    Exam:  Gen: Comfortable  HEENT: NCAT PERRL EOMI MMM  Neck: Supple, no JVD, no LAD  Chest: Normal resp effort at rest, lungs CTA B/L  CVS: s1 s32 normal, irregular, rate WNL  Abd: +BS, soft, NT ND   : Indwelling Vance, urine in drain bag clear gonzález  Ext: Mild LE edema, no tenderness, DP pulses intact B/L, normal cap refill  Neuro: Awake and alert, answers questions appropriately, follows commands  Mood: Calm, pleasant    Labs:                                10.3   11.59 )---------( 396             29.6       130  |  100  |  12  ----------------------<  88  4.0   |   26   |  0.49    Ca 7.8    TPro  4.9  /  Alb  2.1  /  TBili  0.7  /  DBili  x   /  AST  34  /  ALT  33  /  AlkPhos  83      PT: 23.9 sec;   INR: 2.05 ratio  PTT: 32.2 sec    Urinalysis   Color: Yellow / Appearance: Clear / S.010 / pH: x  Gluc: x / Ketone: Negative  / Bili: Negative / Urobili: Negative   Blood: x / Protein: Negative / Nitrite: Negative   Leuk Esterase: Trace / RBC: 0-2 /HPF / WBC 6-10 /HPF   Sq Epi: x / Non Sq Epi: x / Bacteria: Occasional    Imaging:  CT urogram : Small left mid ureteral filling defect which could be on the basis of benign or malignant etiology.    CT A/P : Interval development of thickening of the visualized portion of the esophagus compatible with esophagitis. Interval development of 4 mm high attenuation area within the proximal to mid left ureter unclear if this represents focal asymmetric urothelial thickening or a filling defect/lesion within the ureter.     US abd : Distended urinary bladder and right-sided hydronephrosis.    R hip XR : Post right total hip arthroplasty, prosthesis in satisfactory   position with no fracture or evidence of loosening. Mild degenerative change in the remaining, visualized osseous structures.    CXR :  The heart size, mediastinum, hilum and aorta are within normal limits for projection. Calcification of aortic knob. Midline trachea. There is no focal infiltrate, congestion or effusion. There is no fracture.    Cardiac Testing:  EKG : afib rate 70    EKG : afib rate 121    Meds:  MEDICATIONS  (STANDING):  diltiazem    milliGRAM(s) Oral daily  metoprolol tartrate 37.5 milliGRAM(s) Oral two times a day  multivitamin/minerals 1 Tablet(s) Oral daily  pantoprazole    Tablet 40 milliGRAM(s) Oral daily  polyethylene glycol 3350 17 Gram(s) Oral at bedtime  rivaroxaban 20 milliGRAM(s) Oral with dinner  simvastatin 20 milliGRAM(s) Oral at bedtime  thiamine 100 milliGRAM(s) Oral daily    MEDICATIONS  (PRN):  acetaminophen     Tablet .. 650 milliGRAM(s) Oral every 6 hours PRN Temp greater or equal to 38C (100.4F), Mild Pain (1 - 3)  aluminum hydroxide/magnesium hydroxide/simethicone Suspension 30 milliLiter(s) Oral every 4 hours PRN Dyspepsia  HYDROmorphone   Tablet 2 milliGRAM(s) Oral every 4 hours PRN Severe Pain (7 - 10)  melatonin 3 milliGRAM(s) Oral at bedtime PRN Insomnia  ondansetron Injectable 4 milliGRAM(s) IV Push every 8 hours PRN Nausea and/or Vomiting  traMADol 50 milliGRAM(s) Oral every 6 hours PRN Moderate Pain (4 - 6)                                  
Patient is a 88y old  Female who presents with a chief complaint of Hyponatremia, hypo-osmolarity, or hypo-osmolar hyponatremia     (09 Jun 2023 10:42)      Subjtive: Patient seen and examined at bedside. No overnight events. Nausea has improved and had a bowel movement yesterday night. No issues with swallowing or vomiting.       PAST MEDICAL & SURGICAL HISTORY:  Syncope  7/2/2013      HTN (hypertension)      Hyperlipidemia      Afib      A-fib      Bronchitis      Cardiac abnormality  internal cardiac monitor placed 8/2013      H/O: hysterectomy      S/P cataract surgery  Left eye.          MEDICATIONS  (STANDING):  diltiazem    milliGRAM(s) Oral daily  ketorolac   Injectable 30 milliGRAM(s) IV Push every 8 hours  metoprolol tartrate 37.5 milliGRAM(s) Oral two times a day  pantoprazole    Tablet 40 milliGRAM(s) Oral daily  polyethylene glycol 3350 17 Gram(s) Oral at bedtime  rivaroxaban 20 milliGRAM(s) Oral with dinner  simvastatin 20 milliGRAM(s) Oral at bedtime    MEDICATIONS  (PRN):  acetaminophen     Tablet .. 650 milliGRAM(s) Oral every 6 hours PRN Temp greater or equal to 38C (100.4F), Mild Pain (1 - 3)  aluminum hydroxide/magnesium hydroxide/simethicone Suspension 30 milliLiter(s) Oral every 4 hours PRN Dyspepsia  HYDROmorphone   Tablet 2 milliGRAM(s) Oral every 4 hours PRN Severe Pain (7 - 10)  melatonin 3 milliGRAM(s) Oral at bedtime PRN Insomnia  ondansetron Injectable 4 milliGRAM(s) IV Push every 8 hours PRN Nausea and/or Vomiting  traMADol 50 milliGRAM(s) Oral every 6 hours PRN Moderate Pain (4 - 6)      REVIEW OF SYSTEMS:    RESPIRATORY: No shortness of breath  CARDIOVASCULAR: No chest pain  All other review of systems is negative unless indicated above.    Vital Signs Last 24 Hrs  T(C): 36.5 (09 Jun 2023 08:14), Max: 36.6 (08 Jun 2023 17:14)  T(F): 97.7 (09 Jun 2023 08:14), Max: 97.8 (08 Jun 2023 17:14)  HR: 95 (09 Jun 2023 09:38) (85 - 100)  BP: 94/53 (09 Jun 2023 09:38) (94/53 - 117/74)  BP(mean): 88 (08 Jun 2023 15:09) (88 - 88)  RR: 18 (09 Jun 2023 08:14) (17 - 18)  SpO2: 95% (09 Jun 2023 08:14) (95% - 99%)    Parameters below as of 09 Jun 2023 08:14  Patient On (Oxygen Delivery Method): room air        PHYSICAL EXAM:    Constitutional: NAD  Respiratory: CTAB  Cardiovascular: S1 and S2, RRR  Gastrointestinal: BS+, soft, NT/ND  Extremities: No peripheral edema  Psychiatric: Normal mood, normal affect    LABS:                        10.3   11.59 )-----------( 396      ( 09 Jun 2023 12:31 )             29.6     06-09    125<L>  |  92<L>  |  14  ----------------------------<  82  4.1   |  29  |  0.68    Ca    7.9<L>      09 Jun 2023 12:31    TPro  4.9<L>  /  Alb  2.1<L>  /  TBili  0.7  /  DBili  x   /  AST  34  /  ALT  33  /  AlkPhos  83  06-09    PT/INR - ( 08 Jun 2023 10:12 )   PT: 23.9 sec;   INR: 2.05 ratio         PTT - ( 08 Jun 2023 10:12 )  PTT:32.2 sec  LIVER FUNCTIONS - ( 09 Jun 2023 06:42 )  Alb: 2.1 g/dL / Pro: 4.9 gm/dL / ALK PHOS: 83 U/L / ALT: 33 U/L / AST: 34 U/L / GGT: x             RADIOLOGY & ADDITIONAL STUDIES:    ACC: [id number] EXAM:  CT ABDOMEN AND PELVIS IC   ORDERED BY: TRELL MORRELL     PROCEDURE DATE:  06/08/2023          INTERPRETATION:  CLINICAL INFORMATION: Nausea vomiting and abdominal pain.    COMPARISON: Right upper quadrant ultrasound 6/8/2023 X-ray right hip with   pelvis 5/28/2023  CT abdomen and pelvis 5/27/2023    CONTRAST/COMPLICATIONS:  IV Contrast: Omnipaque 350  90 cc administered   10 cc discarded  Oral Contrast: NONE  Complications: None reported at time of study completion    PROCEDURE:  CT of the Abdomen and Pelvis was performed.  Sagittal and coronal reformats were performed.    FINDINGS:  LOWER CHEST: Minimal subsegmental atelectasis at the lung bases.   Thickening of the distal esophagus compatible with esophagitis.    LIVER: Subcentimeter left hepatic lobe hypodensity (2:20), unchanged.  BILE DUCTS: Normal caliber.  GALLBLADDER: Within normal limits.  SPLEEN: Within normal limits.  PANCREAS: Within normal limits.  ADRENALS: Within normal limits.  KIDNEYS/URETERS: Mild right hydronephrosis with dilatation of the   extrarenal pelvis bilaterally and mild to moderate dilatation of the   proximal and mid portions of both ureters to the level of the bladder. On   series 2 image 44 there is a focal high attenuation fillingdefect within   the left ureter or nodular wall enhancement.    BLADDER: Vance catheter.  REPRODUCTIVE ORGANS: Hysterectomy.    BOWEL: No bowel obstruction. Appendix is not visualized. No evidence of   inflammation in the pericecal region. Sigmoid diverticulosis without   evidence of acute diverticulitis. Rectum distended with stool to   approximately 7 cm.  PERITONEUM: No ascites.  VESSELS: Atherosclerotic changes. Normal caliber aorta.  RETROPERITONEUM/LYMPH NODES: No lymphadenopathy.  ABDOMINAL WALL: Bilateral abdominal wall and lower extremity subcutaneous   edema.  BONES: Interval total right hip replacement. Multilevel degenerative   changes.    IMPRESSION:  Interval development of thickening of the visualized portion of the   esophaguscompatible with esophagitis.    Interval development of 4 mm high attenuation area within the proximal to   mid left ureter unclear if this represents focal asymmetric urothelial   thickening or a filling defect/lesion within the ureter. Recommend   further evaluation with dedicated CT urogram when clinically feasible.    --- End of Report ---          TUSHAR FISH MD; Resident Radiologist  This document has been electronically signed.  DEBORAH FERREIRA MD; Attending Radiologist  This document has been electronically signed. Jun 8 2023  5:05PM  
Chief Complaint: Nausea, decreased Po intake, rapid heart rate    Interval Hx: Patient reports feeling improved, more energy, no further nausea, improved PO, HR better controlled    ROS: Multi system review is comprehensively negative x 10 systems except as above    Vitals:  T(F): 97.3 (2023 15:14), Max: 97.7 (2023 21:30)  HR: 64 (2023 15:14) (64 - 100)  BP: 96/61 (2023 15:14) (94/53 - 104/52)  RR: 19 (2023 15:14) (18 - 19)  SpO2: 99% (2023 15:14) (95% - 99%) on room air    Exam:  Gen: Comfortable  HEENT: NCAT PERRL EOMI MMM  Neck: Supple, no JVD, no LAD  Chest: Normal resp effort at rest, lungs CTA B/L  CVS: s1 s32 normal, irregular, rate WNL  Abd: +BS, soft, NT ND   Ext: Mild LE edema, no tenderness, DP pulses intact B/L, normal cap refill  Neuro: Awake and alert, answers questions appropriately, follows commands  Mood: Calm, pleasant    Labs:                        10.3   11.59 )--------( 396                29.6       125  |  92  |  14  ---------------------<  82  4.1   |  29  |  0.68    Ca 7.9    TPro  4.9  /  Alb  2.1  /  TBili  0.7  /  DBili  x   /  AST  34  /  ALT  33  /  AlkPhos  83      PT: 23.9 sec;   INR: 2.05 ratio  PTT: 32.2 sec    Urinalysis   Color: Yellow / Appearance: Clear / S.010 / pH: x  Gluc: x / Ketone: Negative  / Bili: Negative / Urobili: Negative   Blood: x / Protein: Negative / Nitrite: Negative   Leuk Esterase: Trace / RBC: 0-2 /HPF / WBC 6-10 /HPF   Sq Epi: x / Non Sq Epi: x / Bacteria: Occasional    Imaging:  CT urogram : Small left mid ureteral filling defect which could be on the basis of   benign or malignant etiology.    CT A/P : Interval development of thickening of the visualized portion of the   esophagus compatible with esophagitis. Interval development of 4 mm high attenuation area within the proximal to mid left ureter unclear if this represents focal asymmetric urothelial thickening or a filling defect/lesion within the ureter.     US abd : Distended urinary bladder and right-sided hydronephrosis.    R hip XR : Post right total hip arthroplasty, prosthesis in satisfactory   position with no fracture or evidence of loosening. Mild degenerative change in the remaining, visualized osseous structures.    CXR :  The heart size, mediastinum, hilum and aorta are within normal limits for projection. Calcification of aortic knob. Midline trachea. There is no focal infiltrate, congestion or effusion. There is no fracture.    Cardiac Testing:  EKG : afib rate 70    EKG : afib rate 121    Meds:  MEDICATIONS  (STANDING):  metoprolol tartrate 37.5 milliGRAM(s) Oral two times a day  pantoprazole    Tablet 40 milliGRAM(s) Oral daily  polyethylene glycol 3350 17 Gram(s) Oral at bedtime  rivaroxaban 20 milliGRAM(s) Oral with dinner  simvastatin 20 milliGRAM(s) Oral at bedtime    MEDICATIONS  (PRN):  acetaminophen     Tablet .. 650 milliGRAM(s) Oral every 6 hours PRN Temp greater or equal to 38C (100.4F), Mild Pain (1 - 3)  aluminum hydroxide/magnesium hydroxide/simethicone Suspension 30 milliLiter(s) Oral every 4 hours PRN Dyspepsia  HYDROmorphone   Tablet 2 milliGRAM(s) Oral every 4 hours PRN Severe Pain (7 - 10)  melatonin 3 milliGRAM(s) Oral at bedtime PRN Insomnia  ondansetron Injectable 4 milliGRAM(s) IV Push every 8 hours PRN Nausea and/or Vomiting  traMADol 50 milliGRAM(s) Oral every 6 hours PRN Moderate Pain (4 - 6)                                        
Chief Complaint: Nausea, decreased Po intake, rapid heart rate    Interval Hx: Patient reports feeling improved, more energy, no further nausea, improved PO, HR better controlled. Stable for DC to [facility]. Outpatient Urology follow up. Trial of void at [facility]. Anticipate DC tomorrow to [facility].     ROS: Multi system review is comprehensively negative x 10 systems except as above    Vitals:  T(F): 97.5 (10 Yandel 2023 08:27), Max: 98.1 (2023 20:30)  HR: 80 (10 Yandel 2023 08:27) (64 - 94)  BP: 104/58 (10 Yandel 2023 08:27) (91/47 - 104/61)  RR: 18 (10 Yandel 2023 08:27) (17 - 19)  SpO2: 99% (10 Yandel 2023 08:27) (98% - 99%) on room air    Exam:  Gen: Comfortable  HEENT: NCAT PERRL EOMI MMM  Neck: Supple, no JVD, no LAD  Chest: Normal resp effort at rest, lungs CTA B/L  CVS: s1 s32 normal, irregular, rate WNL  Abd: +BS, soft, NT ND   : Indwelling Vance, urine in drain bag clear gonzález  Ext: Mild LE edema, no tenderness, DP pulses intact B/L, normal cap refill  Neuro: Awake and alert, answers questions appropriately, follows commands  Mood: Calm, pleasant    Labs:                                10.3   11.59 )---------( 396             29.6       131  |  98  |  8   ----------------------<  109  4.3   |  27  |  0.68    Ca  8.4    TPro  4.9  /  Alb  2.1  /  TBili  0.7  /  DBili  x   /  AST  34  /  ALT  33  /  AlkPhos  83      PT: 23.9 sec;   INR: 2.05 ratio  PTT: 32.2 sec    Urinalysis   Color: Yellow / Appearance: Clear / S.010 / pH: x  Gluc: x / Ketone: Negative  / Bili: Negative / Urobili: Negative   Blood: x / Protein: Negative / Nitrite: Negative   Leuk Esterase: Trace / RBC: 0-2 /HPF / WBC 6-10 /HPF   Sq Epi: x / Non Sq Epi: x / Bacteria: Occasional    Imaging:  CT urogram : Small left mid ureteral filling defect which could be on the basis of benign or malignant etiology.    CT A/P : Interval development of thickening of the visualized portion of the esophagus compatible with esophagitis. Interval development of 4 mm high attenuation area within the proximal to mid left ureter unclear if this represents focal asymmetric urothelial thickening or a filling defect/lesion within the ureter.     US abd : Distended urinary bladder and right-sided hydronephrosis.    R hip XR : Post right total hip arthroplasty, prosthesis in satisfactory   position with no fracture or evidence of loosening. Mild degenerative change in the remaining, visualized osseous structures.    CXR :  The heart size, mediastinum, hilum and aorta are within normal limits for projection. Calcification of aortic knob. Midline trachea. There is no focal infiltrate, congestion or effusion. There is no fracture.    Cardiac Testing:  EKG : afib rate 70    EKG : afib rate 121    Meds:  MEDICATIONS  (STANDING):  diltiazem    milliGRAM(s) Oral daily  metoprolol tartrate 37.5 milliGRAM(s) Oral two times a day  multivitamin/minerals 1 Tablet(s) Oral daily  pantoprazole    Tablet 40 milliGRAM(s) Oral daily  polyethylene glycol 3350 17 Gram(s) Oral at bedtime  rivaroxaban 20 milliGRAM(s) Oral with dinner  simvastatin 20 milliGRAM(s) Oral at bedtime  thiamine 100 milliGRAM(s) Oral daily    MEDICATIONS  (PRN):  acetaminophen     Tablet .. 650 milliGRAM(s) Oral every 6 hours PRN Temp greater or equal to 38C (100.4F), Mild Pain (1 - 3)  aluminum hydroxide/magnesium hydroxide/simethicone Suspension 30 milliLiter(s) Oral every 4 hours PRN Dyspepsia  HYDROmorphone   Tablet 2 milliGRAM(s) Oral every 4 hours PRN Severe Pain (7 - 10)  melatonin 3 milliGRAM(s) Oral at bedtime PRN Insomnia  ondansetron Injectable 4 milliGRAM(s) IV Push every 8 hours PRN Nausea and/or Vomiting  traMADol 50 milliGRAM(s) Oral every 6 hours PRN Moderate Pain (4 - 6)                                  
Critical care consult     asked by Dr. Poole to see Patient     HPI:       This patient is an 88 year old female with PMh of Atrial fibrillation,  Hip fracture 2 weeks , who 2 weeks ago fell and sustained Hip fracture s/p  ORIF at end of May./ She went to rehab. Her Na was 132 when she left  for rehab over,  past few days she has felt nauseated, drinking but not eating alot, This morning she  was found to have rapid atrial fibrillation with rate to 120, rate better in ed but found to have wbc of 20. Sodium of 114. wbc was 13 when she left the hospital.  there were 2 small nodules on chest ct in May.   called to see paitent for hyponatremia  not taking meds that are likely to cause hyponatremia.    ROS  no fevers, states she drinks alot of water, but does not seem excessive, has been taking pain meds, and has felt nauseated  in fact she is holding on to emesis bag. ? slight abdominal pain, had negative abdomnal ct in may.  no hx. hyponatremia, , some diarrhea lasf few days    PAST MEDICAL & SURGICAL HISTORY:  Syncope  7/2/2013  HTN (hypertension)  Hyperlipidemia  Afib  A-fib  bronchiectasis  S/P cataract surgery  Left eye.      FAMILY HISTORY:  Family history of primary TB    Family history of CVA    Family history of diabetes mellitus    Social hx. non smoker, non drinker, brought in from rehab    Vital Signs Last 24 Hrs  T(C): 36.6 (08 Jun 2023 09:57), Max: 36.6 (08 Jun 2023 09:57)  T(F): 97.9 (08 Jun 2023 09:57), Max: 97.9 (08 Jun 2023 09:57)  HR: 76 (08 Jun 2023 11:40) (71 - 107)  BP: 113/60 (08 Jun 2023 11:40) (113/60 - 144/91)  BP(mean): --  RR: 20 (08 Jun 2023 09:57) (20 - 20)  SpO2: 100% (08 Jun 2023 09:57) (100% - 100%)    Parameters below as of 08 Jun 2023 09:57  Patient On (Oxygen Delivery Method): room air      Daily Height in cm: 165.1 (08 Jun 2023 09:57)    Daily     PHYSICAL EXAM:    General: Alert, well-developed ,No acute distress.    Neuro: Alert and oriented to person, place, and time. Able to communicate  well.  . 5/5 strength in all  HEENT: nc/at  Cardiovascular: irreg,irreg   Lungs:Lungs clear. no rales, no wheezing, .  Abdomen: slight ruq discomfort  Skin:  Warm, dry, well-perfused. No rashes or other lesions.   Extremities:  2+ pulses in upper and lower extremities. No lower extremity pain or  edema; legs are symmetric in appearance.  Hip wound clean    LABS:                        12.5   20.86 )-----------( 544      ( 08 Jun 2023 10:12 )             35.0     06-08    114<LL>  |  81<L>  |  18  ----------------------------<  116<H>  5.1   |  26  |  0.64    Ca    8.7      08 Jun 2023 10:12    TPro  6.8  /  Alb  2.8<L>  /  TBili  0.9  /  DBili  x   /  AST  35  /  ALT  36  /  AlkPhos  106  06-08    PT/INR - ( 08 Jun 2023 10:12 )   PT: 23.9 sec;   INR: 2.05 ratio         PTT - ( 08 Jun 2023 10:12 )  PTT:32.2 sec      Lactate, Blood: 1.4 mmol/L (06-08 @ 12:13)

## 2023-06-11 NOTE — PROGRESS NOTE ADULT - ASSESSMENT
87 year-old woman with HTN, HLD, afib on Xarelto, hip fracture s/p R PADDY 5/2023 c/b pneumonia, sent from [facility] with rapid afib and hypovolemic hyponatremia in setting of volume contraction, decreased PO intake, nausea, constipation. Also found to have acute urinary retention requiring indwelling Desai placement. Patient given IV fluid hydration, medications for rate control, now improved. Stable for discharge to [facility].      Hyponatremia  Suspected hypovolemic hyponatremia in etiology. Improved with IVFs.     Rapid afib  Rate now WNL. Continue metoprolol, Cardizem. Stroke risk reduction with Xarelto    Acute urinary retention  In setting of recent hip fracture surgery, constipation, hospital admission. Indwelling desai placed upon this admission. No sign of UTI. Urine clear, gonzález, voiding well. Cr WNL. Advised TOV when patient is more mobile at [facility].     Small left mid ureteral filling defect  As noted on CT, CT urogram. Etiology unclear. Consulted with Urology Dr Mistry who suspects that this may be artifact. Discussed with patient. Can consider elective ureteroscopy or consider repeating CT urogram at later date. Patient will think about it and follow up outpatient.    HTN  BP controlled. Continue current regimen, added hold parameters    Severe protein calorie malnutrition  Liberalized diet to regular. Added MVI with minerals, thiamine daily.    Deconditioning and debility  PT eval appreciated. Planned for [facility] upon discharge.    DVT px: On DOAC for afib  Code status: DNR DNI  Dispo: Medically cleared for DC to [facility], expected tomorrow to [facility]  
Patient with hx. afib, recent hip surgery, now with hyponatremia, neurologically asymptomatic  etiology no meds to explain, ? related fluid dehydration from nausea, some diarrhea and pain meds  would hydrate and trend sodium levels, be carefull not to correct to rapidly  urine sodium osmolality, sent to deliniate etiology.  no neurologic defeicits can be admitted to floor for serial bmp  leukocytosis, check urine culture, wbc was elevated when she left her, hip wound clean  no specific complaints except ruq discomfort(vague) check ruq sono  no clear source infection would not recommend abx
86 yo woman with HTN, HLD, afib on Xarelto, hip fracture s/p R PADDY 5/2023 c/b pneumonia, sent from [facility] with rapid afib and hypovolemic hyponatremia in setting of volume contraction, decreased PO intake, nausea, constipation. Patient given Iv fluid hydration, medications for rate control, now improving.     Hyponatremia  Improving with IVFs  - Continue IV hydration with NS  - Trend Na    Rapid afib  Rate now WNL   - Continue metoprolol, Cardizem   - Stroke risk reduction with Xarelto  - ILR interrogation    HTN  BP controlled, or even bit soft  - Continue current regimen, added hold parameters    Severe protein zoie malnutrition  - Liberalized diet to regular  - Add MVI with minerals, thiamine daily    Small left mid ureteral filling defect  As noted on CT. CT urogram. Etiology unclear.   - Will consult with Urology    Deconditioning and debility  PT eval requested  - F/u PT eval      DVT px: On DOAC for afib  Code status: DNR DNI  Dispo: [facility] upon discharge, patient is requesting referral to be sent to facilities other than [facility]
87 year-old woman with HTN, HLD, afib on Xarelto, hip fracture s/p R PADDY 5/2023 c/b pneumonia, sent from [facility] with rapid afib and hypovolemic hyponatremia in setting of volume contraction, decreased PO intake, nausea, constipation. Also found to have acute urinary retention requiring indwelling Desai placement. Patient given IV fluid hydration, medications for rate control, now improved. Stable for discharge to [facility].      Hyponatremia  Suspected hypovolemic hyponatremia in etiology. Improved with IVFs.     Rapid afib  Rate now WNL. Continue metoprolol, Cardizem. Stroke risk reduction with Xarelto    Acute urinary retention  In setting of recent hip fracture surgery, constipation, hospital admission. Indwelling desai placed upon this admission. No sign of UTI. Urine clear, gonzález, voiding well. Cr WNL. Advised TOV when patient is more mobile. Either before DC or once in rehab. Will consider TOV on Monday.     Small left mid ureteral filling defect  As noted on CT, CT urogram. Etiology unclear. Consulted with Urology Dr Mistry who suspects that this may be artifact. Discussed with patient. Can consider elective ureteroscopy or consider repeating CT urogram at later date. Patient will think about it and follow up outpatient.    HTN  BP controlled. Continue current regimen, added hold parameters    Severe protein calorie malnutrition  Liberalized diet to regular. Add MVI with minerals, thiamine daily.    Deconditioning and debility  PT eval requested. Planned for [facility] upon discharge.    DVT px: On DOAC for afib  Code status: DNR DNI  Dispo: [facility] upon discharge, patient is requesting referral to be sent to facilities other than [facility], planned for [facility]. Medically cleared. Awaiting placement. 
1. Decreased PO intake and nausea. May be secondary to narcotic use. CT showing esophagitis. Patient symptoms have since improved and no difficulty with swallowing.     2. Constipation. Likely from decreased mobility and narcotics.     Recommendation  1. Recommend PPI 40 mg PO daily before breakfast  2. Maintain anti-reflux lifestyle: no supine postioning for 2 hours after meals, avoidance of exacerbation foods (spicy, acidic, citrus, caffeine)  3. Limit narcotics if possible  4. Zofran as needed for nausea  5. Miralax 17 g daily to normalize bowel habits  6. Diet as tolerated  7. No interventions planned at this time.

## 2023-06-12 ENCOUNTER — TRANSCRIPTION ENCOUNTER (OUTPATIENT)
Age: 88
End: 2023-06-12

## 2023-06-12 VITALS — SYSTOLIC BLOOD PRESSURE: 114 MMHG | DIASTOLIC BLOOD PRESSURE: 71 MMHG | HEART RATE: 86 BPM

## 2023-06-12 PROCEDURE — 99239 HOSP IP/OBS DSCHRG MGMT >30: CPT

## 2023-06-12 RX ORDER — THIAMINE MONONITRATE (VIT B1) 100 MG
1 TABLET ORAL
Qty: 0 | Refills: 0 | DISCHARGE
Start: 2023-06-12

## 2023-06-12 RX ORDER — MULTIVIT-MIN/FERROUS GLUCONATE 9 MG/15 ML
1 LIQUID (ML) ORAL
Qty: 0 | Refills: 0 | DISCHARGE
Start: 2023-06-12

## 2023-06-12 RX ORDER — ACETAMINOPHEN 500 MG
2 TABLET ORAL
Qty: 0 | Refills: 0 | DISCHARGE
Start: 2023-06-12

## 2023-06-12 RX ORDER — POLYETHYLENE GLYCOL 3350 17 G/17G
17 POWDER, FOR SOLUTION ORAL
Qty: 0 | Refills: 0 | DISCHARGE
Start: 2023-06-12

## 2023-06-12 RX ORDER — DILTIAZEM HCL 120 MG
1 CAPSULE, EXT RELEASE 24 HR ORAL
Qty: 0 | Refills: 0 | DISCHARGE
Start: 2023-06-12

## 2023-06-12 RX ORDER — ACETAMINOPHEN 500 MG
2 TABLET ORAL
Qty: 0 | Refills: 0 | DISCHARGE

## 2023-06-12 RX ORDER — TRAMADOL HYDROCHLORIDE 50 MG/1
1 TABLET ORAL
Qty: 0 | Refills: 0 | DISCHARGE
Start: 2023-06-12

## 2023-06-12 RX ADMIN — PANTOPRAZOLE SODIUM 40 MILLIGRAM(S): 20 TABLET, DELAYED RELEASE ORAL at 09:19

## 2023-06-12 RX ADMIN — Medication 100 MILLIGRAM(S): at 09:19

## 2023-06-12 RX ADMIN — Medication 1 TABLET(S): at 09:21

## 2023-06-12 RX ADMIN — Medication 37.5 MILLIGRAM(S): at 09:19

## 2023-06-12 RX ADMIN — Medication 120 MILLIGRAM(S): at 05:47

## 2023-06-12 NOTE — DISCHARGE NOTE NURSING/CASE MANAGEMENT/SOCIAL WORK - PATIENT PORTAL LINK FT
You can access the FollowMyHealth Patient Portal offered by [facility] by registering at the following website: [email or web address]. By joining [employer]’s FollowMyHealth portal, you will also be able to view your health information using other applications (apps) compatible with our system.

## 2023-06-12 NOTE — DISCHARGE NOTE PROVIDER - PROVIDER TOKENS
PROVIDER:[TOKEN:[[id number]],SCHEDULEDAPPT:[06/28/2023]],PROVIDER:[TOKEN:[[id number]],FOLLOWUP:[2 weeks]],PROVIDER:[TOKEN:[[id number]],FOLLOWUP:[2 weeks]]

## 2023-06-12 NOTE — DISCHARGE NOTE NURSING/CASE MANAGEMENT/SOCIAL WORK - NSDCPEFALRISK_GEN_ALL_CORE
For information on Fall & Injury Prevention, visit: [email or web address] OR  [email or web address] OR  https://www.cdc.gov/steadi/patient.html

## 2023-06-12 NOTE — DISCHARGE NOTE PROVIDER - HOSPITAL COURSE
87 year-old woman with HTN, HLD, afib on Xarelto, hip fracture s/p R PADDY 2023 c/b pneumonia, sent from [facility] with rapid afib and hypovolemic hyponatremia in setting of volume contraction, decreased PO intake, nausea, constipation. Also found to have acute urinary retention requiring indwelling Desai placement. Patient given IV fluid hydration, medications for rate control, now improved. Stable for discharge to [facility].      Hyponatremia  Suspected hypovolemic hyponatremia in etiology. Improved with IVFs. Continue to monitor as outpatient. Encourage PO intake.    Rapid afib  Rate now WNL. Continue metoprolol, Cardizem. Stroke risk reduction with Xarelto    Acute urinary retention  In setting of recent hip fracture surgery, constipation, hospital admission. Indwelling desai placed upon this admission. No sign of UTI. Urine clear, gonzález, voiding well. Cr WNL. Advised TOV when patient is more mobile at [facility], likely can pursue ~48 hrs / later this week.    Small left mid ureteral filling defect  As noted on CT, CT urogram. Etiology unclear. Consulted with Urology Dr Mistry who suspects that this may be artifact. Discussed with patient. Can consider elective ureteroscopy or consider repeating CT urogram at later date. Patient will think about it and follow up outpatient.    HTN  BP controlled. Continue current regimen, added hold parameters    Severe protein calorie malnutrition  Liberalized diet to regular. Added MVI with minerals, thiamine daily.    Deconditioning and debility  PT eval appreciated. Planned for [facility] upon discharge.      Discharge Exam:  T(F): 97.9 (2023 07:55), Max: 97.9 (2023 15:18)  HR: 86 (2023 09:00) (80 - 93)  BP: 114/71 (2023 09:00) (114/71 - 133/81)  RR: 18 (2023 07:55) (18 - 19)  SpO2: 97% (2023 07:55) (94% - 99%) on room air  Gen: Comfortable  HEENT: NCAT PERRL EOMI MMM  Neck: Supple, no JVD, no LAD  Chest: Normal resp effort at rest, lungs CTA B/L  CVS: s1 s32 normal, irregular, rate WNL  Abd: +BS, soft, NT ND   : Indwelling Desai, urine in drain bag clear gonzález  Ext: Mild LE edema, no tenderness, DP pulses intact B/L, normal cap refill  Neuro: Awake and alert, answers questions appropriately, follows commands  Mood: Calm, pleasant    Labs:                                10.3   11.59 )---------( 396             29.6       131  |  98  |  8   ----------------------<  109  4.3   |  27  |  0.68    Ca  8.4    TPro  4.9  /  Alb  2.1  /  TBili  0.7  /  DBili  x   /  AST  34  /  ALT  33  /  AlkPhos  83      PT: 23.9 sec;   INR: 2.05 ratio  PTT: 32.2 sec    Urinalysis   Color: Yellow / Appearance: Clear / S.010 / pH: x  Gluc: x / Ketone: Negative  / Bili: Negative / Urobili: Negative   Blood: x / Protein: Negative / Nitrite: Negative   Leuk Esterase: Trace / RBC: 0-2 /HPF / WBC 6-10 /HPF   Sq Epi: x / Non Sq Epi: x / Bacteria: Occasional    Micro:  COVID19 PCR 6/10: negative  Urine culture : negative  Blood culture x 2 : negative    Imaging:  CT urogram : Small left mid ureteral filling defect which could be on the basis of benign or malignant etiology.    CT A/P : Interval development of thickening of the visualized portion of the esophagus compatible with esophagitis. Interval development of 4 mm high attenuation area within the proximal to mid left ureter unclear if this represents focal asymmetric urothelial thickening or a filling defect/lesion within the ureter.     US abd : Distended urinary bladder and right-sided hydronephrosis.    R hip XR : Post right total hip arthroplasty, prosthesis in satisfactory position with no fracture or evidence of loosening. Mild degenerative change in the remaining, visualized osseous structures.    CXR :  The heart size, mediastinum, hilum and aorta are within normal limits for projection. Calcification of aortic knob. Midline trachea. There is no focal infiltrate, congestion or effusion. There is no fracture.    Cardiac Testing:  EKG : afib rate 70    EKG : afib rate 121   87 year-old woman with HTN, HLD, afib on Xarelto, hip fracture s/p R PADDY 2023 c/b pneumonia, sent from [facility] with rapid afib and hypovolemic hyponatremia in setting of volume contraction, decreased PO intake, nausea, constipation. Also found to have acute urinary retention requiring indwelling Desai placement. Patient given IV fluid hydration, medications for rate control, now improved. Stable for discharge to [facility].      Hyponatremia  Suspected hypovolemic hyponatremia in etiology. Improved with IVFs. Continue to monitor as outpatient. Encourage PO intake.    Chronic atrial fibrillation, rapid ventricular response upon presentation  Rate now WNL. Continue metoprolol, Cardizem. Stroke risk reduction with Xarelto    Acute urinary retention  In setting of recent hip fracture surgery, constipation, hospital admission. Indwelling desai placed upon this admission. No sign of UTI. Urine clear, gonzález, voiding well. Cr WNL. Advised TOV when patient is more mobile at [facility], likely can pursue ~48 hrs / later this week.    Small left mid ureteral filling defect  As noted on CT, CT urogram. Etiology unclear. Consulted with Urology Dr Mistry who suspects that this may be artifact. Discussed with patient. Can consider elective ureteroscopy or consider repeating CT urogram at later date. Patient will think about it and follow up outpatient.    HTN  BP controlled. Continue current regimen, added hold parameters    Severe protein calorie malnutrition  Liberalized diet to regular. Added MVI with minerals, thiamine daily.    Deconditioning and debility  PT eval appreciated. Planned for [facility] upon discharge.      Discharge Exam:  T(F): 97.9 (2023 07:55), Max: 97.9 (2023 15:18)  HR: 86 (2023 09:00) (80 - 93)  BP: 114/71 (2023 09:00) (114/71 - 133/81)  RR: 18 (2023 07:55) (18 - 19)  SpO2: 97% (2023 07:55) (94% - 99%) on room air  Gen: Comfortable  HEENT: NCAT PERRL EOMI MMM  Neck: Supple, no JVD, no LAD  Chest: Normal resp effort at rest, lungs CTA B/L  CVS: s1 s32 normal, irregular, rate WNL  Abd: +BS, soft, NT ND   : Indwelling Desai, urine in drain bag clear gonzález  Ext: Mild LE edema, no tenderness, DP pulses intact B/L, normal cap refill  Neuro: Awake and alert, answers questions appropriately, follows commands  Mood: Calm, pleasant    Labs:                                10.3   11.59 )---------( 396             29.6       131  |  98  |  8   ----------------------<  109  4.3   |  27  |  0.68    Ca  8.4    TPro  4.9  /  Alb  2.1  /  TBili  0.7  /  DBili  x   /  AST  34  /  ALT  33  /  AlkPhos  83      PT: 23.9 sec;   INR: 2.05 ratio  PTT: 32.2 sec    Urinalysis   Color: Yellow / Appearance: Clear / S.010 / pH: x  Gluc: x / Ketone: Negative  / Bili: Negative / Urobili: Negative   Blood: x / Protein: Negative / Nitrite: Negative   Leuk Esterase: Trace / RBC: 0-2 /HPF / WBC 6-10 /HPF   Sq Epi: x / Non Sq Epi: x / Bacteria: Occasional    Micro:  COVID19 PCR 6/10: negative  Urine culture : negative  Blood culture x 2 : negative    Imaging:  CT urogram : Small left mid ureteral filling defect which could be on the basis of benign or malignant etiology.    CT A/P : Interval development of thickening of the visualized portion of the esophagus compatible with esophagitis. Interval development of 4 mm high attenuation area within the proximal to mid left ureter unclear if this represents focal asymmetric urothelial thickening or a filling defect/lesion within the ureter.     US abd : Distended urinary bladder and right-sided hydronephrosis.    R hip XR : Post right total hip arthroplasty, prosthesis in satisfactory position with no fracture or evidence of loosening. Mild degenerative change in the remaining, visualized osseous structures.    CXR :  The heart size, mediastinum, hilum and aorta are within normal limits for projection. Calcification of aortic knob. Midline trachea. There is no focal infiltrate, congestion or effusion. There is no fracture.    Cardiac Testing:  EKG : afib rate 70    EKG : afib rate 121

## 2023-06-12 NOTE — DISCHARGE NOTE PROVIDER - NSDCCAREPROVSEEN_GEN_ALL_CORE_FT
Andi Everett (Gastroenterology)  Adonis Mistry (Urology)  Raymond Clemente (Critical Care)  Porsha Walter (Hospital Medicine)  Alden Gates (Hospital Medicine)  Phuc Pal ([facility])

## 2023-06-12 NOTE — DISCHARGE NOTE PROVIDER - CARE PROVIDER_API CALL
Marvle Edwards  Orthopaedic Surgery  [address], [place] [address]  Phone: [phone]  Fax: [phone]  Scheduled Appointment: 06/28/2023    Adonis Mistry  Urology  [address]  [place] [address]  Phone: [phone]  Fax: [phone]  Follow Up Time: 2 weeks    Kalin Iniguez  Family Medicine  [address] [place] [address]  Phone: [phone]  Fax: [phone]  Follow Up Time: 2 weeks

## 2023-06-12 NOTE — DISCHARGE NOTE PROVIDER - NSDCMRMEDTOKEN_GEN_ALL_CORE_FT
acetaminophen 325 mg oral tablet: 2 tab(s) orally every 6 hours As needed Temp greater or equal to 38C (100.4F), Mild Pain (1 - 3)  ascorbic acid 500 mg oral tablet: 1 tab(s) orally once a day  dilTIAZem 120 mg/24 hours oral capsule, extended release: 1 cap(s) orally once a day  metoprolol tartrate 37.5 mg oral tablet: 1 tab(s) orally 2 times a day  Multiple Vitamins with Minerals oral tablet: 1 tab(s) orally once a day  polyethylene glycol 3350 oral powder for reconstitution: 17 gram(s) orally once a day (at bedtime)  rivaroxaban 20 mg oral tablet: 1 tab(s) orally once a day (before a meal)  senna leaf extract oral tablet: 2 tab(s) orally once a day (at bedtime)  simvastatin 20 mg oral tablet: 1 tab(s) orally once a day (at bedtime)  thiamine 100 mg oral tablet: 1 tab(s) orally once a day  traMADol 50 mg oral tablet: 1 tab(s) orally every 6 hours As needed Moderate Pain (4 - 6)  Vitamin B-12 1000 mcg oral tablet: 2 tab(s) orally once a day  Vitamin D3 25 mcg (1000 intl units) oral tablet: 1 tab(s) orally 2 times a day

## 2023-06-12 NOTE — DISCHARGE NOTE PROVIDER - NSDCFUSCHEDAPPT_GEN_ALL_CORE_FT
[facility]  [address]  Scheduled Appointment: 06/28/2023

## 2023-06-12 NOTE — CDI QUERY NOTE - NSCDIOTHERTXTBX_GEN_ALL_CORE_HH
Clinical documentation indicates that this patient has atrial fibrillation.      SUPPORTING DOCUMENTATION AND/OR CLINICAL EVIDENCE:    12 Lead ECG (06.08.23 @ 11:17) Atrial fibrillation    12 Lead ECG (06.08.23 @ 09:57) Atrial flutter with variable A-V block    12 Lead ECG (05.29.23 @ 07:30) Atrial fibrillation with rapid ventricular response    12 Lead ECG (05.15.22 @ 16:22) Atrial fibrillation with rapid ventricular response    12 Lead ECG (08.03.20 @ 10:47) Atrial fibrillation    Discharge provider note 6/12/2023   Rapid afib  Rate now WNL. Continue metoprolol, Cardizem. Stroke risk reduction with Xarelto    Per Amadou ERNST 1/1/2000  Chronic atrial fibrillation     Please further specify:    - Chronic atrial fibrillation  - Paroxysmal atrial fibrillation  - Permanent atrial fibrillation   - Persistent atrial fibrillation   - Other (please specify) _________.  - Unable to determine

## 2023-06-12 NOTE — DISCHARGE NOTE PROVIDER - NSDCCPCAREPLAN_GEN_ALL_CORE_FT
PRINCIPAL DISCHARGE DIAGNOSIS  Diagnosis: Hyponatremia  Assessment and Plan of Treatment: You were admitted to the hospital for very low blood sodium concentration, suspected low oral input / dehydration related. Had initial nausea and constipation, general weakness, urinary retention. Now improved with IVFs. Sodium up to 130s. Continue to monitor as outpatient. Encourage PO intake.      SECONDARY DISCHARGE DIAGNOSES  Diagnosis: Rapid atrial fibrillation  Assessment and Plan of Treatment: In setting of hypovolemia, dehydration, also acute urinary retention, see below. Heart rate now normal having improved volume status and relieved urine with Desai catheter placement. Continue metoprolol, Cardizem for heart rate control. Note that you are now now actually on lower dose of Cardizem CD than prior to admisison but doing well on that. Stroke risk reduction with Xarelto    Diagnosis: Acute urinary retention  Assessment and Plan of Treatment: Noted upon presentation, in setting of recent hip fracture surgery, constipation, diminished mobility. Indwelling Desai placed. No sign of UTI. Urine clear, gonzález, voiding well. Normal kidney function blood work. Aprpeciate Urology Carlos Mistry input. Recommend Desai removal for trial of void when you are more mobile at rehab facility. Likely can try desai removal in 2-3 more days.    Diagnosis: Ureteral filling defect  Assessment and Plan of Treatment: Small left mid ureteral filling defect was noted on CT, CT urogram here. Etiology unclear. Consulted with Urology Dr Mistry who suspects that this may be artifact. Discussed with you. Can consider elective ureteroscopy or consider repeating CT urogram at later date. You would like to look into these and follow up with Dr Mistry outpatient. Please do.    Diagnosis: Hypertension  Assessment and Plan of Treatment: BP controlled. Continue current regimen. Note that you are now actually on lower dose of Cardizem CD than prior to admisison.      Diagnosis: Protein calorie malnutrition  Assessment and Plan of Treatment: Liberalized diet to regular. Added MVI with minerals, thiamine daily. Continue your other vitamins.    Diagnosis: Physical deconditioning  Assessment and Plan of Treatment: Continue restorative therapy sessions. Out of bed daily.

## 2023-06-13 LAB
CULTURE RESULTS: SIGNIFICANT CHANGE UP
CULTURE RESULTS: SIGNIFICANT CHANGE UP
SPECIMEN SOURCE: SIGNIFICANT CHANGE UP
SPECIMEN SOURCE: SIGNIFICANT CHANGE UP

## 2023-06-20 DIAGNOSIS — Z88.0 ALLERGY STATUS TO PENICILLIN: ICD-10-CM

## 2023-06-20 DIAGNOSIS — R53.81 OTHER MALAISE: ICD-10-CM

## 2023-06-20 DIAGNOSIS — E43 UNSPECIFIED SEVERE PROTEIN-CALORIE MALNUTRITION: ICD-10-CM

## 2023-06-20 DIAGNOSIS — Z66 DO NOT RESUSCITATE: ICD-10-CM

## 2023-06-20 DIAGNOSIS — D75.838 OTHER THROMBOCYTOSIS: ICD-10-CM

## 2023-06-20 DIAGNOSIS — R33.9 RETENTION OF URINE, UNSPECIFIED: ICD-10-CM

## 2023-06-20 DIAGNOSIS — K20.90 ESOPHAGITIS, UNSPECIFIED WITHOUT BLEEDING: ICD-10-CM

## 2023-06-20 DIAGNOSIS — I10 ESSENTIAL (PRIMARY) HYPERTENSION: ICD-10-CM

## 2023-06-20 DIAGNOSIS — E86.1 HYPOVOLEMIA: ICD-10-CM

## 2023-06-20 DIAGNOSIS — E86.0 DEHYDRATION: ICD-10-CM

## 2023-06-20 DIAGNOSIS — Z79.01 LONG TERM (CURRENT) USE OF ANTICOAGULANTS: ICD-10-CM

## 2023-06-20 DIAGNOSIS — Z96.641 PRESENCE OF RIGHT ARTIFICIAL HIP JOINT: ICD-10-CM

## 2023-06-20 DIAGNOSIS — Z20.822 CONTACT WITH AND (SUSPECTED) EXPOSURE TO COVID-19: ICD-10-CM

## 2023-06-20 DIAGNOSIS — K59.00 CONSTIPATION, UNSPECIFIED: ICD-10-CM

## 2023-06-20 DIAGNOSIS — Z95.818 PRESENCE OF OTHER CARDIAC IMPLANTS AND GRAFTS: ICD-10-CM

## 2023-06-20 DIAGNOSIS — N13.30 UNSPECIFIED HYDRONEPHROSIS: ICD-10-CM

## 2023-06-20 DIAGNOSIS — I48.20 CHRONIC ATRIAL FIBRILLATION, UNSPECIFIED: ICD-10-CM

## 2023-06-20 DIAGNOSIS — Z88.1 ALLERGY STATUS TO OTHER ANTIBIOTIC AGENTS STATUS: ICD-10-CM

## 2023-06-20 DIAGNOSIS — Z90.710 ACQUIRED ABSENCE OF BOTH CERVIX AND UTERUS: ICD-10-CM

## 2023-06-20 DIAGNOSIS — E87.1 HYPO-OSMOLALITY AND HYPONATREMIA: ICD-10-CM

## 2023-06-20 DIAGNOSIS — E78.5 HYPERLIPIDEMIA, UNSPECIFIED: ICD-10-CM

## 2023-06-20 DIAGNOSIS — Z91.013 ALLERGY TO SEAFOOD: ICD-10-CM

## 2023-06-28 ENCOUNTER — APPOINTMENT (OUTPATIENT)
Dept: ORTHOPEDIC SURGERY | Facility: CLINIC | Age: 88
End: 2023-06-28

## 2023-07-17 ENCOUNTER — APPOINTMENT (OUTPATIENT)
Dept: ORTHOPEDIC SURGERY | Facility: CLINIC | Age: 88
End: 2023-07-17
Payer: MEDICARE

## 2023-07-17 VITALS — BODY MASS INDEX: 22.63 KG/M2 | WEIGHT: 123 LBS | HEIGHT: 62 IN

## 2023-07-17 PROCEDURE — 99024 POSTOP FOLLOW-UP VISIT: CPT

## 2023-07-17 PROCEDURE — 73502 X-RAY EXAM HIP UNI 2-3 VIEWS: CPT

## 2023-07-18 NOTE — ASSESSMENT
[FreeTextEntry1] : The patient is s/p 6 weeks from her right PADDY and glute medius repair from a femoral neck fracture.. She denies new trauma. The patient denies fever, chills, CP, SOB. The patient denies drainage or discharge from their incision. The patient is doing well postoperatively. She has used oral pain medications as needed. She is using a walker full time and has just left her rehab. The patient will begin outpatient PT. She has intermittent lateral hip pain with ambulation. She is slowly increasing ADLs. She feels she is making progress. \par \par Right hip exam: The patient presents with no apparent distress. Neurovascularly intact. Sensation intact to right lower extremity. Operative incision is clean, dry, and intact. No active drainage or discharge. + straight leg raise. Tender to palpation to lateral hip. Patient is ambulating without pain.  Leg length is equal.\par \par X-rays done in the office today the AP pelvis 1 view of the right hip 2 views of the right total hip prosthesis in normal alignment in good position with no signs of loosening or wear.  The left hip shows no evidence of arthritis.  There are no obvious tumors masses or calcifications seen.\par \par Plan is continue physical therapy for the right hip.  She will maintain her right total hip replacement precautions and I will see her back in the office in 6 to 8 weeks.\par \par \par \par

## 2023-07-18 NOTE — HISTORY OF PRESENT ILLNESS
[4] : 4 [0] : 0 [Dull/Aching] : dull/aching [Intermittent] : intermittent [Rest] : rest [Meds] : meds [Walking] : walking [] : no [FreeTextEntry1] : Rt. Hip [FreeTextEntry5] : 89 y/o F presents for PO #1 eval. of Rt. Hip. s/p Rt. THR with gluteus medius tendon repair on DOS noted above. Pt reports recovery is going well with minimal pain persisting. [de-identified] : 5/28/23 [de-identified] : Dr. Edwards [de-identified] : 5/28/23 [de-identified] : 5/28/23 [de-identified] : Rt. THR

## 2023-07-19 ENCOUNTER — EMERGENCY (EMERGENCY)
Facility: HOSPITAL | Age: 88
LOS: 0 days | Discharge: HOME | End: 2023-07-20
Attending: EMERGENCY MEDICINE
Payer: MEDICARE

## 2023-07-19 VITALS
OXYGEN SATURATION: 96 % | DIASTOLIC BLOOD PRESSURE: 86 MMHG | HEIGHT: 62 IN | HEART RATE: 72 BPM | WEIGHT: 123.02 LBS | RESPIRATION RATE: 16 BRPM | SYSTOLIC BLOOD PRESSURE: 120 MMHG | TEMPERATURE: 98 F

## 2023-07-19 VITALS
RESPIRATION RATE: 16 BRPM | OXYGEN SATURATION: 99 % | DIASTOLIC BLOOD PRESSURE: 70 MMHG | SYSTOLIC BLOOD PRESSURE: 116 MMHG | TEMPERATURE: 98 F | HEART RATE: 75 BPM

## 2023-07-19 DIAGNOSIS — Z90.710 ACQUIRED ABSENCE OF BOTH CERVIX AND UTERUS: Chronic | ICD-10-CM

## 2023-07-19 DIAGNOSIS — Z90.710 ACQUIRED ABSENCE OF BOTH CERVIX AND UTERUS: ICD-10-CM

## 2023-07-19 DIAGNOSIS — I10 ESSENTIAL (PRIMARY) HYPERTENSION: ICD-10-CM

## 2023-07-19 DIAGNOSIS — Z79.01 LONG TERM (CURRENT) USE OF ANTICOAGULANTS: ICD-10-CM

## 2023-07-19 DIAGNOSIS — Z98.49 CATARACT EXTRACTION STATUS, UNSPECIFIED EYE: Chronic | ICD-10-CM

## 2023-07-19 DIAGNOSIS — E78.5 HYPERLIPIDEMIA, UNSPECIFIED: ICD-10-CM

## 2023-07-19 DIAGNOSIS — Z91.013 ALLERGY TO SEAFOOD: ICD-10-CM

## 2023-07-19 DIAGNOSIS — R56.9 UNSPECIFIED CONVULSIONS: ICD-10-CM

## 2023-07-19 DIAGNOSIS — R41.89 OTHER SYMPTOMS AND SIGNS INVOLVING COGNITIVE FUNCTIONS AND AWARENESS: ICD-10-CM

## 2023-07-19 DIAGNOSIS — I48.91 UNSPECIFIED ATRIAL FIBRILLATION: ICD-10-CM

## 2023-07-19 DIAGNOSIS — Z88.1 ALLERGY STATUS TO OTHER ANTIBIOTIC AGENTS STATUS: ICD-10-CM

## 2023-07-19 DIAGNOSIS — Z88.0 ALLERGY STATUS TO PENICILLIN: ICD-10-CM

## 2023-07-19 DIAGNOSIS — Z96.641 PRESENCE OF RIGHT ARTIFICIAL HIP JOINT: ICD-10-CM

## 2023-07-19 LAB
ALBUMIN SERPL ELPH-MCNC: 3.5 G/DL — SIGNIFICANT CHANGE UP (ref 3.3–5)
ALP SERPL-CCNC: 101 U/L — SIGNIFICANT CHANGE UP (ref 40–120)
ALT FLD-CCNC: 18 U/L — SIGNIFICANT CHANGE UP (ref 12–78)
ANION GAP SERPL CALC-SCNC: 4 MMOL/L — LOW (ref 5–17)
APTT BLD: 29.2 SEC — SIGNIFICANT CHANGE UP (ref 27.5–35.5)
AST SERPL-CCNC: 18 U/L — SIGNIFICANT CHANGE UP (ref 15–37)
BASOPHILS # BLD AUTO: 0.05 K/UL — SIGNIFICANT CHANGE UP (ref 0–0.2)
BASOPHILS NFR BLD AUTO: 0.7 % — SIGNIFICANT CHANGE UP (ref 0–2)
BILIRUB SERPL-MCNC: 0.3 MG/DL — SIGNIFICANT CHANGE UP (ref 0.2–1.2)
BUN SERPL-MCNC: 9 MG/DL — SIGNIFICANT CHANGE UP (ref 7–23)
CALCIUM SERPL-MCNC: 8.9 MG/DL — SIGNIFICANT CHANGE UP (ref 8.5–10.1)
CHLORIDE SERPL-SCNC: 103 MMOL/L — SIGNIFICANT CHANGE UP (ref 96–108)
CO2 SERPL-SCNC: 24 MMOL/L — SIGNIFICANT CHANGE UP (ref 22–31)
CREAT SERPL-MCNC: 0.89 MG/DL — SIGNIFICANT CHANGE UP (ref 0.5–1.3)
EGFR: 63 ML/MIN/1.73M2 — SIGNIFICANT CHANGE UP
EOSINOPHIL # BLD AUTO: 0.09 K/UL — SIGNIFICANT CHANGE UP (ref 0–0.5)
EOSINOPHIL NFR BLD AUTO: 1.2 % — SIGNIFICANT CHANGE UP (ref 0–6)
GLUCOSE SERPL-MCNC: 137 MG/DL — HIGH (ref 70–99)
HCT VFR BLD CALC: 37.2 % — SIGNIFICANT CHANGE UP (ref 34.5–45)
HGB BLD-MCNC: 12.5 G/DL — SIGNIFICANT CHANGE UP (ref 11.5–15.5)
IMM GRANULOCYTES NFR BLD AUTO: 1 % — HIGH (ref 0–0.9)
INR BLD: 1.36 RATIO — HIGH (ref 0.88–1.16)
LYMPHOCYTES # BLD AUTO: 0.77 K/UL — LOW (ref 1–3.3)
LYMPHOCYTES # BLD AUTO: 10.1 % — LOW (ref 13–44)
MCHC RBC-ENTMCNC: 29 PG — SIGNIFICANT CHANGE UP (ref 27–34)
MCHC RBC-ENTMCNC: 33.6 GM/DL — SIGNIFICANT CHANGE UP (ref 32–36)
MCV RBC AUTO: 86.3 FL — SIGNIFICANT CHANGE UP (ref 80–100)
MONOCYTES # BLD AUTO: 1.09 K/UL — HIGH (ref 0–0.9)
MONOCYTES NFR BLD AUTO: 14.2 % — HIGH (ref 2–14)
NEUTROPHILS # BLD AUTO: 5.58 K/UL — SIGNIFICANT CHANGE UP (ref 1.8–7.4)
NEUTROPHILS NFR BLD AUTO: 72.8 % — SIGNIFICANT CHANGE UP (ref 43–77)
PLATELET # BLD AUTO: 320 K/UL — SIGNIFICANT CHANGE UP (ref 150–400)
POTASSIUM SERPL-MCNC: 3.9 MMOL/L — SIGNIFICANT CHANGE UP (ref 3.5–5.3)
POTASSIUM SERPL-SCNC: 3.9 MMOL/L — SIGNIFICANT CHANGE UP (ref 3.5–5.3)
PROT SERPL-MCNC: 7.1 GM/DL — SIGNIFICANT CHANGE UP (ref 6–8.3)
PROTHROM AB SERPL-ACNC: 15.8 SEC — HIGH (ref 10.5–13.4)
RBC # BLD: 4.31 M/UL — SIGNIFICANT CHANGE UP (ref 3.8–5.2)
RBC # FLD: 13.6 % — SIGNIFICANT CHANGE UP (ref 10.3–14.5)
SODIUM SERPL-SCNC: 131 MMOL/L — LOW (ref 135–145)
TROPONIN I, HIGH SENSITIVITY RESULT: 6.62 NG/L — SIGNIFICANT CHANGE UP
WBC # BLD: 7.66 K/UL — SIGNIFICANT CHANGE UP (ref 3.8–10.5)
WBC # FLD AUTO: 7.66 K/UL — SIGNIFICANT CHANGE UP (ref 3.8–10.5)

## 2023-07-19 PROCEDURE — 99285 EMERGENCY DEPT VISIT HI MDM: CPT

## 2023-07-19 PROCEDURE — 84484 ASSAY OF TROPONIN QUANT: CPT

## 2023-07-19 PROCEDURE — 36415 COLL VENOUS BLD VENIPUNCTURE: CPT

## 2023-07-19 PROCEDURE — 93010 ELECTROCARDIOGRAM REPORT: CPT

## 2023-07-19 PROCEDURE — 85610 PROTHROMBIN TIME: CPT

## 2023-07-19 PROCEDURE — 99285 EMERGENCY DEPT VISIT HI MDM: CPT | Mod: 25

## 2023-07-19 PROCEDURE — 85730 THROMBOPLASTIN TIME PARTIAL: CPT

## 2023-07-19 PROCEDURE — 85025 COMPLETE CBC W/AUTO DIFF WBC: CPT

## 2023-07-19 PROCEDURE — 80053 COMPREHEN METABOLIC PANEL: CPT

## 2023-07-19 PROCEDURE — 71045 X-RAY EXAM CHEST 1 VIEW: CPT | Mod: 26

## 2023-07-19 PROCEDURE — 70450 CT HEAD/BRAIN W/O DYE: CPT | Mod: 26,MA

## 2023-07-19 PROCEDURE — 70450 CT HEAD/BRAIN W/O DYE: CPT | Mod: MA

## 2023-07-19 PROCEDURE — 71045 X-RAY EXAM CHEST 1 VIEW: CPT

## 2023-07-19 RX ORDER — SODIUM CHLORIDE 9 MG/ML
500 INJECTION INTRAMUSCULAR; INTRAVENOUS; SUBCUTANEOUS ONCE
Refills: 0 | Status: COMPLETED | OUTPATIENT
Start: 2023-07-19 | End: 2023-07-19

## 2023-07-19 RX ADMIN — SODIUM CHLORIDE 500 MILLILITER(S): 9 INJECTION INTRAMUSCULAR; INTRAVENOUS; SUBCUTANEOUS at 18:07

## 2023-07-19 RX ADMIN — SODIUM CHLORIDE 500 MILLILITER(S): 9 INJECTION INTRAMUSCULAR; INTRAVENOUS; SUBCUTANEOUS at 21:30

## 2023-07-19 NOTE — ED PROVIDER NOTE - PATIENT PORTAL LINK FT
You can access the FollowMyHealth Patient Portal offered by [facility] by registering at the following website: [email or web address]. By joining [employer]’s FollowMyHealth portal, you will also be able to view your health information using other applications (apps) compatible with our system.

## 2023-07-19 NOTE — ED PROVIDER NOTE - NSFOLLOWUPINSTRUCTIONS_ED_ALL_ED_FT
Continue regular medications as per routine.  Seizure precautions as per below.  It is unclear whether this was a seizure or not.  Follow up with your own primary doctor.  Follow up with neurologist as listed below.  Follow up with own urologist as pre-scheduled.      Seizure, Adult  A seizure is a sudden burst of abnormal electrical and chemical activity in the brain. Seizures usually last from 30 seconds to 2 minutes. The abnormal activity temporarily interrupts normal brain function.    Many types of seizures can affect adults. A seizure can cause many different symptoms depending on where in the brain it starts.    What are the causes?  Common causes of this condition include:  Fever or infection.  Brain injury, head trauma, bleeding in the brain, or a brain tumor.  Low levels of blood sugar or salt (sodium).  Kidney problems or liver problems.  Metabolic disorders or other conditions that are passed from parent to child (are inherited).  Reaction to a substance, such as a drug or a medicine, or suddenly stopping the use of a substance (withdrawal).  A stroke.  Developmental disorders such as autism spectrum disorder or cerebral palsy.  In some cases, the cause of a seizure may not be known. Some people who have a seizure never have another one. A person who has repeated seizures over time without a clear cause has a condition called epilepsy.    What increases the risk?  You are more likely to develop this condition if:  You have a family history of epilepsy.  You have had a tonic–clonic seizure before. This type of seizure causes tightening (contraction) of the muscles of the whole body and loss of consciousness.  You have a history of head trauma, lack of oxygen at birth, or strokes.  What are the signs or symptoms?  There are many different types of seizures. The symptoms vary depending on the type of seizure you have. Symptoms occur during the seizure. They may also occur before a seizure (aura) and after a seizure (postictal). Symptoms may include the following:    Symptoms during a seizure    Uncontrollable shaking (convulsions) with fast, jerky movements of muscles.  Stiffening of the body.  Breathing problems.  Confusion, staring, or unresponsiveness.  Head nodding, eye blinking or fluttering, or rapid eye movements.  Drooling, grunting, or making clicking sounds with your mouth.  Loss of bladder control and bowel control.  Symptoms before a seizure    Fear or anxiety.  Nausea.  Vertigo. This is a feeling like:  You are moving when you are not.  Your surroundings are moving when they are not.  Déjà vu. This is a feeling of having seen or heard something before.  Odd tastes or smells.  Changes in vision, such as seeing flashing lights or spots.  Symptoms after a seizure    Confusion.  Sleepiness.  Headache.  Sore muscles.  How is this diagnosed?  This condition may be diagnosed based on:  A description of your symptoms. Video of your seizures can be helpful.  Your medical history.  A physical exam.  You may also have tests, including:  Blood tests.  CT scan.  MRI.  Electroencephalogram (EEG). This test measures electrical activity in the brain. An EEG can predict whether seizures will return.  A spinal tap, also called a lumbar puncture. This is the removal and testing of fluid that surrounds the brain and spinal cord.  How is this treated?  Most seizures will stop on their own in less than 5 minutes, and no treatment is needed. Seizures that last longer than 5 minutes will usually need treatment.    Seizures may be treated with:  Medicines given through an IV.  Avoiding known triggers, such as medicines that you take for another condition.  Medicines to control seizures or prevent future seizures (antiepileptics), if epilepsy caused your seizures.  Medical devices to prevent and control seizures.  Surgery to stop seizures or to reduce how often seizures happen, if you have epilepsy that does not respond to medicines.  A diet low in carbohydrates and high in fat (ketogenic diet).  Follow these instructions at home:  Medicines    Take over-the-counter and prescription medicines only as told by your health care provider.  Avoid any substances that may prevent your medicine from working properly, such as alcohol.  Activity    Follow instructions about activities, such as driving or swimming, that would be dangerous if you had another seizure. Wait until your health care provider says it is safe to do them.  If you live in the [place] check with your local department of motor vehicles (DMV) to find out about local driving laws. Each state has specific rules about when you can legally drive again.  Get enough rest. Lack of sleep can make seizures more likely to occur.  Educating others      Teach friends and family what to do if you have a seizure. They should:  Help you get down to the ground, to prevent a fall.  Cushion your head and move items away from your body.  Loosen any tight clothing around your neck.  Turn you on your side. If you vomit, this helps keep your airway clear.  Know whether or not you need emergency care.  Stay with you until you recover.  Also, tell them what not to do if you have a seizure. Tell them:  They should not hold you down. Holding you down will not stop the seizure.  They should not put anything in your mouth.  General instructions    Avoid anything that has ever triggered a seizure for you.  Keep a seizure diary. Record what you remember about each seizure, especially anything that might have triggered it.  Keep all follow-up visits. This is important.  Contact a health care provider if:  You have another seizure or seizures. Call each time you have a seizure.  Your seizure pattern changes.  You continue to have seizures with treatment.  You have symptoms of an infection or illness. Either of these might increase your risk of having a seizure.  You are unable to take your medicine.  Get help right away if:  You have:  A seizure that does not stop after 5 minutes.  Several seizures in a row without a complete recovery between seizures.  A seizure that makes it harder to breathe.  A seizure that leaves you unable to speak or use a part of your body.  You do not wake up right away after a seizure.  You injure yourself during a seizure.  You have confusion or pain right after a seizure.  These symptoms may represent a serious problem that is an emergency. Do not wait to see if the symptoms will go away. Get medical help right away. Call your local emergency services (911 in the [place] Do not drive yourself to the hospital.    Summary  Seizures are caused by abnormal electrical and chemical activity in the brain. The activity disrupts normal brain function and can cause various symptoms.  Seizures have many causes, including illness, head injuries, low levels of blood sugar or salt, and certain conditions.  Most seizures will stop on their own in less than 5 minutes. Seizures that last longer than 5 minutes are a medical emergency and need treatment right away.  Many medicines are used to treat seizures. Take over-the-counter and prescription medicines only as told by your health care provider.  This information is not intended to replace advice given to you by your health care provider. Make sure you discuss any questions you have with your health care provider.

## 2023-07-19 NOTE — ED PROVIDER NOTE - MUSCULOSKELETAL, MLM
MAEx4, no focal swelling, deformity, or tenderness. poor R SLR, not acute from prior hip surgery (currently outpatient PT) L SLR 25 degrees MAEx4, no focal swelling, deformity, or tenderness. poor R SLR, not acute from prior hip surgery (currently receiving outpatient PT), L SLR 25 degrees

## 2023-07-19 NOTE — ED ADULT TRIAGE NOTE - CHIEF COMPLAINT QUOTE
BIBEMS from home s/p unwitnessed seizure activity lasting approx. 2-3 minutes. pt does not have a hx of epilepsy, has never had a seizure before per EMS. significant pmhx atrial fibrillation. upon EMS arrival, pt post ictal.  en route with EMS. pt awake and responding to questions appropriately in triage, anox3 at this time.

## 2023-07-19 NOTE — ED PROVIDER NOTE - CARDIAC, MLM
irregularly irregular  Heart sounds S1, S2.  No murmurs, rubs or gallops. irregularly irregular.  Heart sounds S1, S2.  No murmurs, rubs or gallops.  Normal radial pulse.

## 2023-07-19 NOTE — ED PROVIDER NOTE - ENMT, MLM
Airway patent, Nasal mucosa clear. oropharynx clear, MMM. Throat has no vesicles, no oropharyngeal exudates and uvula is midline. NC/AT

## 2023-07-19 NOTE — ED ADULT NURSE NOTE - NSFALLHARMRISKINTERV_ED_ALL_ED

## 2023-07-19 NOTE — ED ADULT NURSE NOTE - OBJECTIVE STATEMENT
patient brought in by EMS from home s/p possible seizure.  patient reports was sitting at table eating lunch, daughters boyfriend witnessed episode where patient stopped responding, was shaking and slid down in chair lasting approx 1-1.5 min .  + LOC.  patient did not fall.  on xarelto for a fib, finished course of antibiotics yesterday for UTI.  has desai catheter in place upon arrival,  placed a few weeks ago for urinary retention.  patient reports similar episode 5-6 years ago.

## 2023-07-19 NOTE — ED ADULT NURSE REASSESSMENT NOTE - NS ED NURSE REASSESS COMMENT FT1
pt arrived with desai in place. desai removed as per MD Contino. daughter and pt instructed on need for urine sample post removal. pt and daughter verbalize understanding.

## 2023-07-19 NOTE — ED PROVIDER NOTE - CARE PROVIDER_API CALL
Leif John  Neurology  [address] [place] [address], [place] [address]  Phone: [phone]  Fax: [phone]  Follow Up Time:

## 2023-07-19 NOTE — ED PROVIDER NOTE - OBJECTIVE STATEMENT
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 pt was eating lunch in [place] at 4:13 pm when the pt had a sudden episode of hands clenched in fists, eyes were opened and staring, and slid down chair but did not fall off. The episode lasted less than a minute with residual unresponsiveness for 2-3 minutes. It was witnessed by the pt's friend and daughter at bedside saw the unresponsiveness. Finished a course of BID bactrim last night rx by urologist Dr. Harvey a week ago for UTI with post void residual. Has had decreased appetite for the past week, but tolerating PO. Had hip replaced in May and finished 8 week outpatient PT at [facility]. Had prior similar episode witness by  6 years ago that was a result of dehydration. PCP: Dr. Bowen. 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 pt was eating lunch in [place] at 4:13 pm when the pt had a sudden episode of hands clenched in fists, eyes were opened and staring, and slid down chair but did not fall off. No trauma incurred.  The episode lasted less than a minute with residual unresponsiveness for 2-3 minutes. It was witnessed by the pt's friend and daughter at bedside saw the unresponsiveness. Finished a course of Bactrim DS bid last night, rx by urologist Dr. Harvey a week ago for UTI with + Vance catheter for + post-void residual. Has had decreased appetite for the past week, but tolerating PO. Had hip replaced in May and finished 8 week outpatient PT at [facility]. Had prior similar episode witness by  6 years ago that was a result of dehydration.  Pt presently asymptomatic, no active c/os.  PCP: Dr. Bowen. 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

## 2023-07-19 NOTE — ED PROVIDER NOTE - CARE PLAN
1 Principal Discharge DX:	Unresponsive episode   Principal Discharge DX:	Unresponsive episode  Secondary Diagnosis:	Seizure

## 2023-07-19 NOTE — ED PROVIDER NOTE - NEUROLOGICAL, MLM
A+Ox 3, CN intact, normal speech, no focal deficits, no motor or sensory deficits. A+Ox 3, CNs intact, normal speech, no focal deficits, no motor or sensory deficits.

## 2023-07-19 NOTE — ED CLERICAL - NS ED CLERK NOTE PRE-ARRIVAL INFORMATION; ADDITIONAL PRE-ARRIVAL INFORMATION
This patient is enrolled in the comprehensive joint replacement (CJR) program and has active care navigation.  This patient can be followed up by the care navigation team within 24 hours. To arrange close follow-up or to obtain additional clinical information about this patient, please call the contact number above.   Please call the hospitalist for medical consultation ([phone]) PRIOR to admission or observation.  The hospitalist is part of the care team and can assist in acute medical management.   Please call the orthopedic team () for ALL patients who require admission.

## 2023-07-19 NOTE — ED PROVIDER NOTE - CLINICAL SUMMARY MEDICAL DECISION MAKING FREE TEXT BOX
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 pt was [place] 4:13 pm when the pt had a sudden episode of hands clenched in fists, eyes were opened and staring, and slid down chair but did not fall off. The episode lasted less than a minute with residual unresponsiveness for 2-3 minutes. Finished a course of DS BID bactrim last night rx by urologist Dr. Harvey a week ago for UTI with post void residual. In ED, exam unremarkable including neuro exam. Plan: CT head, CXR, EKG, labs inc urine, trop, cautious IV fluids, seizure precautions, ambulation trial, monitor, observe, and reassess. 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 pt was [place] 4:13 pm when the pt had a sudden episode of hands clenched in fists, eyes were opened and staring, and slid down chair but did not fall off. The episode lasted less than a minute with residual unresponsiveness for 2-3 minutes. Finished a course of DS BID bactrim last night rx by urologist Dr. Harvey a week ago for UTI with post void residual. In ED, exam unremarkable including neuro exam.     Plan: CT head, CXR, EKG, labs inc urine, trop, cautious IV fluids, seizure precautions, ambulation trial, monitor, observe, and reassess.    23:10, C MD Virgilio:  Labs unremarkable.  CT head no acute abnl.  EKG + NSR, low voltage (+obese).  Pt + self-voided s/p Vance removal.  Normal neuro exam, no episodes of unresponsive/seizure while in ED.  Pt stable for DC, outpt f.o PCP, Neuro. 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 Sudden episode of hands clenched in fists, eyes were opened and staring, and slid down chair but did not fall off, no trauma/injury. Duration less than a minute with residual unresponsiveness for 2-3 minutes. Finished a course of Bactrim DS bid last night rx by urologist Dr. Harvey a week ago for UTI with + Vance catheter placement then for + post-void residual.   In ED, exam unremarkable including neuro exam.     Plan: CT head, CXR, EKG, labs inc urine, trop, cautious IV fluids, seizure precautions, ambulation trial, monitor, observe, and reassess.    23:10, C MD Virgilio:  Labs unremarkable.  CT head no acute abnl.  EKG by my read + NSR, low voltage (+obese).  CXR wet read JOAO.  Pt + self-voided s/p Vance removal.  Normal neuro exam, no episodes of unresponsive/seizure while in ED.  Pt stable for DC, outpt f/u PCP, Neuro.

## 2023-07-24 ENCOUNTER — APPOINTMENT (OUTPATIENT)
Dept: FAMILY MEDICINE | Facility: CLINIC | Age: 88
End: 2023-07-24
Payer: MEDICARE

## 2023-07-24 VITALS
SYSTOLIC BLOOD PRESSURE: 126 MMHG | BODY MASS INDEX: 22.63 KG/M2 | HEIGHT: 62 IN | HEART RATE: 76 BPM | DIASTOLIC BLOOD PRESSURE: 72 MMHG | OXYGEN SATURATION: 96 % | TEMPERATURE: 98.4 F | WEIGHT: 123 LBS

## 2023-07-24 DIAGNOSIS — E87.1 HYPO-OSMOLALITY AND HYPONATREMIA: ICD-10-CM

## 2023-07-24 DIAGNOSIS — Z87.898 PERSONAL HISTORY OF OTHER SPECIFIED CONDITIONS: ICD-10-CM

## 2023-07-24 DIAGNOSIS — Z60.2 PROBLEMS RELATED TO LIVING ALONE: ICD-10-CM

## 2023-07-24 DIAGNOSIS — Q64.70 UNSPECIFIED CONGENITAL MALFORMATION OF BLADDER AND URETHRA: ICD-10-CM

## 2023-07-24 DIAGNOSIS — R41.89 OTHER SYMPTOMS AND SIGNS INVOLVING COGNITIVE FUNCTIONS AND AWARENESS: ICD-10-CM

## 2023-07-24 PROCEDURE — 36415 COLL VENOUS BLD VENIPUNCTURE: CPT

## 2023-07-24 PROCEDURE — 99204 OFFICE O/P NEW MOD 45 MIN: CPT | Mod: 25

## 2023-07-24 RX ORDER — SODIUM HYALURONATE INTRA-ARTICULAR SOLN PREF SYR 25 MG/2.5ML 25/2.5 MG/ML
25 SOLUTION PREFILLED SYRINGE INTRAARTICULAR
Qty: 5 | Refills: 0 | Status: DISCONTINUED | OUTPATIENT
Start: 2019-05-23 | End: 2023-07-24

## 2023-07-24 RX ORDER — CARVEDILOL 12.5 MG/1
12.5 TABLET, FILM COATED ORAL TWICE DAILY
Refills: 0 | Status: DISCONTINUED | COMMUNITY
Start: 2022-05-25 | End: 2023-07-24

## 2023-07-24 RX ORDER — ASCORBIC ACID 500 MG
TABLET ORAL
Refills: 0 | Status: DISCONTINUED | COMMUNITY

## 2023-07-24 RX ORDER — ACETAMINOPHEN AND CODEINE 300; 30 MG/1; MG/1
300-30 TABLET ORAL
Qty: 30 | Refills: 0 | Status: DISCONTINUED | COMMUNITY
Start: 2021-12-17 | End: 2023-07-24

## 2023-07-24 RX ORDER — METHYLCELLULOSE 500 MG/1
TABLET ORAL
Refills: 0 | Status: ACTIVE | COMMUNITY

## 2023-07-24 SDOH — SOCIAL STABILITY - SOCIAL INSECURITY: PROBLEMS RELATED TO LIVING ALONE: Z60.2

## 2023-07-24 NOTE — PHYSICAL EXAM
[Normal] : no acute distress, well nourished, well developed and well-appearing [de-identified] : bruising on lower legs and arms

## 2023-07-24 NOTE — ASSESSMENT
[FreeTextEntry1] : I Colleen Thompson am scribing for the presence of Dr. Sexton the following sections HISTORY OF PRESENT ILLNESS, PAST MEDICAL/FAMILY/SOCIAL HISTORY; REVIEW OF SYSTEMS; VITAL SIGNS; PHYSICAL EXAM. \par \par  I personally performed the services described in the documentation, reviewed the documentation recorded by the scribe in my presence and it accurately and completely records my words and actions.\par

## 2023-07-24 NOTE — HISTORY OF PRESENT ILLNESS
[Family Member] : family member [FreeTextEntry8] : New patient/Hospital follow up\par \par 88yoF PMH Afib, CHF, HTN, OA.\par Presents with daughter. \par \par She was hospitalized from 6/8-6/12 due to rapid Afib, hyponatremia, and urinary retention.  Her cardiac meds were adjusted and she has had cardiac follow up with Dr Covington since then. She is taking Cardizem, Metoprolol and Xarelto. Urinary retention was treated with an Indwelling desai catheter-now removed.  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  She was discharged to [facility] at [facility].\par \par On 7/19, while at home, she had witnessed seizure like/unresponsive episode.  She was looking up at kitchen ceiling fan and skylight and felt she was having a psychedelic trance and passed out.  She states she woke up in ER.  The witness, daughter's boyfriend, states her hands clenched, her arms shook and her eyes were glared over for a few seconds.  She was evaluated in ER and diagnosed with unresponsive episode.  Daughter states this may have happened a few years ago due to dehydration. She has scheduled apt with Neuro for followup, Dr Steniberg. \par She was found to have hyponatremia in the ER on 7/19. Na 131\par \par Her weight is stable, she has good appetite.  She is staying hydrated, drinks 2 Pedialyte daily. 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  She will be retiring in December and then plans to move in with her mother to help her.  The meantime she will have a plan on food delivery and friends/neighbors to visit mom\par \par To note, she had a hip replacement in May.\par She is still undergoing home PT- has 10 sessions left.\par \par \par

## 2023-07-24 NOTE — HEALTH RISK ASSESSMENT
[0] : 2) Feeling down, depressed, or hopeless: Not at all (0) [PHQ-2 Negative - No further assessment needed] : PHQ-2 Negative - No further assessment needed [Never] : Never [[id number]] : 0

## 2023-07-25 ENCOUNTER — NON-APPOINTMENT (OUTPATIENT)
Age: 88
End: 2023-07-25

## 2023-07-25 LAB
ANION GAP SERPL CALC-SCNC: 13 MMOL/L
BUN SERPL-MCNC: 8 MG/DL
CALCIUM SERPL-MCNC: 9.5 MG/DL
CHLORIDE SERPL-SCNC: 97 MMOL/L
CO2 SERPL-SCNC: 24 MMOL/L
CREAT SERPL-MCNC: 0.51 MG/DL
EGFR: 91 ML/MIN/1.73M2
GLUCOSE SERPL-MCNC: 92 MG/DL
POTASSIUM SERPL-SCNC: 4.5 MMOL/L
SODIUM SERPL-SCNC: 134 MMOL/L

## 2023-08-01 ENCOUNTER — APPOINTMENT (OUTPATIENT)
Dept: NEUROLOGY | Facility: CLINIC | Age: 88
End: 2023-08-01
Payer: MEDICARE

## 2023-08-01 VITALS
BODY MASS INDEX: 21.35 KG/M2 | WEIGHT: 116 LBS | HEART RATE: 90 BPM | DIASTOLIC BLOOD PRESSURE: 83 MMHG | TEMPERATURE: 97.8 F | SYSTOLIC BLOOD PRESSURE: 118 MMHG | HEIGHT: 62 IN

## 2023-08-01 PROCEDURE — 99214 OFFICE O/P EST MOD 30 MIN: CPT

## 2023-08-01 RX ORDER — DILTIAZEM HYDROCHLORIDE 180 MG/1
180 CAPSULE, EXTENDED RELEASE ORAL DAILY
Refills: 0 | Status: DISCONTINUED | COMMUNITY
Start: 2022-05-25 | End: 2023-08-01

## 2023-08-01 NOTE — DISCUSSION/SUMMARY
[FreeTextEntry1] : 88-year-old F with PMHx of HTN, HLD, chronic A-fib on Xarelto, s/p right hip replacement in May 2023 ,  was referred from [facility] ED for evaluation of an episode of possible seizure. Patient reports  she was home eating a sandwich, daughter's boyfriend was home, all of a sudden she almost froze, felt like she lost control then blacked out, came around in ER, per ED note, had unwitnessed seizure activity ~ 2-3 minutes (unable to verify information), in ED, was alert with normal sensorium, sodium level 131, CT scan of the head was unremarkable.  Patient has had prior episode of syncope 2 years ago  #Seizure-like activity/new onset seizure/convulsive syncope; difficult to determine as firsthand information is not available, possible etiology could be hyponatremia/dehydration  #Unstable gait status post right THR  -I have recommended 24-hour EEG monitoring, if any abnormality we will start AED -For now we will continue to observe, patient educated regarding aura and seizures, also advised regarding metabolic imbalance -Patient advised to resume physical therapy for balance and gait training and right leg strengthening

## 2023-08-01 NOTE — HISTORY OF PRESENT ILLNESS
[FreeTextEntry1] : 88-year-old right-handed female with PMHx of HTN, HLD, chronic A-fib on Xarelto, status post right hip replacement in May 2023 was discharged to rehab, was referred from [facility] ED for evaluation of an episode of possible seizure.  Patient reports that after discharge from rehab home, she has been attending home PT twice weekly, on 7/19/2023, she was home eating a sandwich, her daughter's boyfriend was home, all of a sudden she reports she almost froze, felt like she lost control then blacked out, she came around finding herself in [facility] ER, as per ED report, patient had an unwitnessed seizure activity that lasted about 2-3 minutes, in ED, she was alert and attentive with normal sensorium, sodium level checked 131, CT scan of the head was unremarkable, she was discharged home.  Patient comes in accompanied by her son today, she has complaints of fatigue, apparently this has been ongoing since hip replacement, she has gained good strength, is able to walk unassisted.  Patient reports she has not had recurrence of passing out or seizure-like activity, she denies having had seizures in the past, but recalls that she passed out 2-1/2 years ago while she was seated in a chair, she was taken to [facility] at that time.  Patient is fully cognizant, she lives alone, manages her affairs, currently her daughter from [place] is staying with her.  Patient denies complaints of headaches, nausea, vomiting, blurring of vision, tinnitus, focal motor weakness or paresthesias of upper/lower extremities

## 2023-08-01 NOTE — PHYSICAL EXAM
[General Appearance - Alert] : alert [General Appearance - In No Acute Distress] : in no acute distress [Oriented To Time, Place, And Person] : oriented to person, place, and time [Impaired Insight] : insight and judgment were intact [Affect] : the affect was normal [Person] : oriented to person [Place] : oriented to place [Time] : oriented to time [Registration Intact] : recent registration memory intact [Concentration Intact] : normal concentrating ability [Naming Objects] : no difficulty naming common objects [Repeating Phrases] : no difficulty repeating a phrase [Fluency] : fluency intact [Comprehension] : comprehension intact [Past History] : adequate knowledge of personal past history [Cranial Nerves Optic (II)] : visual acuity intact bilaterally,  visual fields full to confrontation, pupils equal round and reactive to light [Cranial Nerves Oculomotor (III)] : extraocular motion intact [Cranial Nerves Trigeminal (V)] : facial sensation intact symmetrically [Cranial Nerves Facial (VII)] : face symmetrical [Cranial Nerves Vestibulocochlear (VIII)] : hearing was intact bilaterally [Cranial Nerves Glossopharyngeal (IX)] : tongue and palate midline [Cranial Nerves Accessory (XI - Cranial And Spinal)] : head turning and shoulder shrug symmetric [Cranial Nerves Hypoglossal (XII)] : there was no tongue deviation with protrusion [Motor Tone] : muscle tone was normal in all four extremities [Motor Strength] : muscle strength was normal in all four extremities [No Muscle Atrophy] : normal bulk in all four extremities [Sensation Tactile Decrease] : light touch was intact [2+] : Brachioradialis left 2+ [1+] : Patella left 1+ [0] : Ankle jerk left 0 [PERRL With Normal Accommodation] : pupils were equal in size, round, reactive to light, with normal accommodation [Extraocular Movements] : extraocular movements were intact [Full Visual Field] : full visual field [Outer Ear] : the ears and nose were normal in appearance [Hearing Threshold Finger Rub Not [place]] : hearing was normal [Neck Cervical Mass (___cm)] : no neck mass was observed [Auscultation Breath Sounds / Voice Sounds] : lungs were clear to auscultation bilaterally [Heart Rate And Rhythm] : heart rate was normal and rhythm regular [Heart Sounds] : normal S1 and S2 [Arterial Pulses Carotid] : carotid pulses were normal with no bruits [Edema] : there was no peripheral edema [Involuntary Movements] : no involuntary movements were seen [] : no rash [Past-pointing] : there was no past-pointing [Tremor] : no tremor present [Coordination - Dysmetria Impaired Finger-to-Nose Bilateral] : not present [Plantar Reflex Right Only] : normal on the right [Plantar Reflex Left Only] : normal on the left [FreeTextEntry8] : Gait - antalgic, favors left leg

## 2023-08-01 NOTE — REVIEW OF SYSTEMS
[Seizures] : convulsions [Negative] : Heme/Lymph [Poor Coordination] : poor coordination [Fainting] : fainting [Difficulty Walking] : difficulty walking [As Noted in HPI] : as noted in HPI [Joint Pain] : joint pain

## 2023-08-10 ENCOUNTER — APPOINTMENT (OUTPATIENT)
Dept: NEUROLOGY | Facility: CLINIC | Age: 88
End: 2023-08-10
Payer: MEDICARE

## 2023-08-10 PROCEDURE — 95816 EEG AWAKE AND DROWSY: CPT

## 2023-08-11 ENCOUNTER — APPOINTMENT (OUTPATIENT)
Dept: NEUROLOGY | Facility: CLINIC | Age: 88
End: 2023-08-11
Payer: MEDICARE

## 2023-08-11 PROCEDURE — 95719 EEG PHYS/QHP EA INCR W/O VID: CPT

## 2023-08-11 PROCEDURE — 95708 EEG WO VID EA 12-26HR UNMNTR: CPT

## 2023-08-11 PROCEDURE — 95700 EEG CONT REC W/VID EEG TECH: CPT

## 2023-08-15 ENCOUNTER — NON-APPOINTMENT (OUTPATIENT)
Age: 88
End: 2023-08-15

## 2023-08-29 ENCOUNTER — APPOINTMENT (OUTPATIENT)
Dept: UROLOGY | Facility: CLINIC | Age: 88
End: 2023-08-29
Payer: MEDICARE

## 2023-08-29 VITALS
DIASTOLIC BLOOD PRESSURE: 74 MMHG | BODY MASS INDEX: 22.63 KG/M2 | HEART RATE: 86 BPM | WEIGHT: 123 LBS | OXYGEN SATURATION: 96 % | SYSTOLIC BLOOD PRESSURE: 136 MMHG | HEIGHT: 62 IN

## 2023-08-29 DIAGNOSIS — Z63.4 DISAPPEARANCE AND DEATH OF FAMILY MEMBER: ICD-10-CM

## 2023-08-29 DIAGNOSIS — N99.89 OTHER POSTPROCEDURAL COMPLICATIONS AND DISORDERS OF GENITOURINARY SYSTEM: ICD-10-CM

## 2023-08-29 DIAGNOSIS — N39.0 URINARY TRACT INFECTION, SITE NOT SPECIFIED: ICD-10-CM

## 2023-08-29 DIAGNOSIS — R33.8 OTHER POSTPROCEDURAL COMPLICATIONS AND DISORDERS OF GENITOURINARY SYSTEM: ICD-10-CM

## 2023-08-29 PROCEDURE — 51798 US URINE CAPACITY MEASURE: CPT

## 2023-08-29 PROCEDURE — 99213 OFFICE O/P EST LOW 20 MIN: CPT

## 2023-08-29 SDOH — SOCIAL STABILITY - SOCIAL INSECURITY: DISSAPEARANCE AND DEATH OF FAMILY MEMBER: Z63.4

## 2023-08-29 NOTE — REVIEW OF SYSTEMS
[Negative] : Psychiatric [Feeling Tired] : feeling tired [Dry Eyes] : dryness of the eyes [Wheezing] : wheezing [Genital bacterial infection] : genital bacterial infection [Urine retention] : urine retention [Wake up at night to urinate  How many times?  ___] : wakes up to urinate [unfilled] times during the night [Joint Pain] : joint pain [Dizziness] : dizziness [Limb Weakness] : limb weakness [Difficulty Walking] : difficulty walking [Feelings Of Weakness] : feelings of weakness [see HPI] : see HPI [Easy Bleeding] : a tendency for easy bleeding [Easy Bruising] : a tendency for easy bruising

## 2023-08-29 NOTE — HISTORY OF PRESENT ILLNESS
[FreeTextEntry1] : 88 year old woman seen 08/29/2023 with complaint of urinary retention. She had fall 5/2023 with RIGHT hip fracture, had RIGHT THR  SHe was dc to rehab, with readmission back to  for UTI, found to be in urinary retention. She was dc to different Rehab, where eventually desai was removed and she was able to void. No LUTS today, feels she has good urine control, even better than before she had all this.  No hematuria, no dysuria, no frequency, no urgency, no hesitancy, no straining. No incontinence.  No fevers, no chills, no nausea, no vomiting, no flank pain.  PVR 5 mL

## 2023-08-29 NOTE — ASSESSMENT
[FreeTextEntry1] : 87 yo F with acute urinary retentoin in setting of hip fx and repair. Now voiding well. Minimal PVR. For now will observe. RTO in 3 months for PVR and sx check.

## 2023-08-30 LAB
APPEARANCE: CLEAR
BACTERIA: ABNORMAL /HPF
BILIRUBIN URINE: NEGATIVE
BLOOD URINE: NEGATIVE
CAST: 0 /LPF
COLOR: YELLOW
EPITHELIAL CELLS: 13 /HPF
GLUCOSE QUALITATIVE U: NEGATIVE MG/DL
KETONES URINE: NEGATIVE MG/DL
LEUKOCYTE ESTERASE URINE: NEGATIVE
MICROSCOPIC-UA: NORMAL
NITRITE URINE: NEGATIVE
PH URINE: 5.5
PROTEIN URINE: NORMAL MG/DL
RED BLOOD CELLS URINE: 1 /HPF
SPECIFIC GRAVITY URINE: 1.02
UROBILINOGEN URINE: 0.2 MG/DL
WHITE BLOOD CELLS URINE: 2 /HPF

## 2023-08-31 LAB — BACTERIA UR CULT: NORMAL

## 2023-09-07 ENCOUNTER — APPOINTMENT (OUTPATIENT)
Dept: FAMILY MEDICINE | Facility: CLINIC | Age: 88
End: 2023-09-07
Payer: MEDICARE

## 2023-09-07 VITALS
BODY MASS INDEX: 23 KG/M2 | OXYGEN SATURATION: 98 % | DIASTOLIC BLOOD PRESSURE: 68 MMHG | SYSTOLIC BLOOD PRESSURE: 104 MMHG | HEIGHT: 62 IN | HEART RATE: 78 BPM | WEIGHT: 125 LBS | TEMPERATURE: 97 F

## 2023-09-07 DIAGNOSIS — Z00.00 ENCOUNTER FOR GENERAL ADULT MEDICAL EXAMINATION W/OUT ABNORMAL FINDINGS: ICD-10-CM

## 2023-09-07 DIAGNOSIS — Z23 ENCOUNTER FOR IMMUNIZATION: ICD-10-CM

## 2023-09-07 DIAGNOSIS — I48.91 UNSPECIFIED ATRIAL FIBRILLATION: ICD-10-CM

## 2023-09-07 DIAGNOSIS — E78.5 HYPERLIPIDEMIA, UNSPECIFIED: ICD-10-CM

## 2023-09-07 DIAGNOSIS — I10 ESSENTIAL (PRIMARY) HYPERTENSION: ICD-10-CM

## 2023-09-07 DIAGNOSIS — R56.9 UNSPECIFIED CONVULSIONS: ICD-10-CM

## 2023-09-07 DIAGNOSIS — E87.1 HYPO-OSMOLALITY AND HYPONATREMIA: ICD-10-CM

## 2023-09-07 DIAGNOSIS — R33.8 OTHER RETENTION OF URINE: ICD-10-CM

## 2023-09-07 PROCEDURE — 36415 COLL VENOUS BLD VENIPUNCTURE: CPT

## 2023-09-07 PROCEDURE — 90686 IIV4 VACC NO PRSV 0.5 ML IM: CPT

## 2023-09-07 PROCEDURE — G0008: CPT

## 2023-09-07 PROCEDURE — G0438: CPT

## 2023-09-07 RX ORDER — METOPROLOL SUCCINATE 25 MG/1
25 TABLET, EXTENDED RELEASE ORAL
Refills: 0 | Status: ACTIVE | COMMUNITY

## 2023-09-07 RX ORDER — DILTIAZEM HYDROCHLORIDE 120 MG/1
120 TABLET ORAL
Qty: 90 | Refills: 0 | Status: COMPLETED | COMMUNITY
End: 2023-09-07

## 2023-09-07 RX ORDER — METOPROLOL TARTRATE 25 MG/1
25 TABLET, FILM COATED ORAL TWICE DAILY
Qty: 180 | Refills: 3 | Status: COMPLETED | COMMUNITY
End: 2023-09-07

## 2023-09-07 NOTE — HISTORY OF PRESENT ILLNESS
[FreeTextEntry1] : CLOVIS GONCALVES is a 88 year old female here for a physical exam. [de-identified] : Her last physical exam was last year, Dr. Iniguez  Vaccines: Tetanus is up to date per patient Pneumococcal vaccination is up to date per patient Shingrix is NOT up to date, patient declined  Her last dentist visit was less than one year ago Her last eye doctor appointment was less than one year ago, Dr. Cat Her last dermatologist visit was less than one year ago, Dr. Serrano  GYN visit is no longer indicated at her age Mammogram is no longer indicated at her age Colon cancer screening is no longer indicated at her age DEXA is NOT up to date, she reports the last one was normal  Her diet is healthy overall Exercise: rarely, was doing physical therapy before

## 2023-09-07 NOTE — ASSESSMENT
[FreeTextEntry1] : CLOVIS GONCALVES is a 88 year old female here for a physical exam.  She is a new patient to our office. She had one prior visit with Dr. Sexton in July. This was a hospital follow up after the patient had an episode of unresponsiveness, apparently triggered by hyponatremia. Dr. John diagnosed her with a seizure but her 24 hour EEG was negative so no medication was prescribed.  She has a history of atrial fibrillation, hypertension, hyperlipidemia, and CHF. She had acute urinary retention in May 2023 after a hip replacement. She saw Dr. Mistry for follow up last week and her urinary retention had resolved.  She sees a cardiologist (Dr. Covington) regularly and does not need an EKG today. Dr. Covington recommended she stop Cardizem and start Amiodarone for atrial fibrillation. She is not sure she wants to do this however so she is looking to see another doctor for a second opinion.

## 2023-09-07 NOTE — HEALTH RISK ASSESSMENT
[No falls in past year] : Patient reported no falls in the past year [0] : 2) Feeling down, depressed, or hopeless: Not at all (0) [PHQ-2 Negative - No further assessment needed] : PHQ-2 Negative - No further assessment needed [Never] : Never [[id number]] : 0

## 2023-09-07 NOTE — PLAN
[FreeTextEntry1] : Continue all medications as prescribed. Check labs as above. Will adjust any medications based upon lab results.  Preventive testing is no longer indicated based upon her age. She should have a DEXA however in light of her recent hip fracture.  Discussed clean eating (eg Mediterranean style eating plan) and regular exercise/staying as physically active as possible.  Include balance exercises and strength training and core strengthening exercises for bone health and to decrease risk for falls.  Reviewed importance of good self care (e.g. meditation, yoga, adequate rest, regular exercise, magnesium, clean eating, etc.).  Follow up for next physical in one year.

## 2023-09-08 ENCOUNTER — TRANSCRIPTION ENCOUNTER (OUTPATIENT)
Age: 88
End: 2023-09-08

## 2023-09-08 LAB
25(OH)D3 SERPL-MCNC: 39.4 NG/ML
ALBUMIN SERPL ELPH-MCNC: 4.5 G/DL
ALP BLD-CCNC: 100 U/L
ALT SERPL-CCNC: 21 U/L
ANION GAP SERPL CALC-SCNC: 15 MMOL/L
AST SERPL-CCNC: 27 U/L
BASOPHILS # BLD AUTO: 0.05 K/UL
BASOPHILS NFR BLD AUTO: 0.5 %
BILIRUB SERPL-MCNC: 0.6 MG/DL
BUN SERPL-MCNC: 18 MG/DL
CALCIUM SERPL-MCNC: 9.8 MG/DL
CHLORIDE SERPL-SCNC: 94 MMOL/L
CHOLEST SERPL-MCNC: 146 MG/DL
CO2 SERPL-SCNC: 23 MMOL/L
CREAT SERPL-MCNC: 0.56 MG/DL
EGFR: 89 ML/MIN/1.73M2
EOSINOPHIL # BLD AUTO: 0.05 K/UL
EOSINOPHIL NFR BLD AUTO: 0.5 %
FERRITIN SERPL-MCNC: 104 NG/ML
FOLATE SERPL-MCNC: 16.6 NG/ML
GLUCOSE SERPL-MCNC: 95 MG/DL
HCT VFR BLD CALC: 44.6 %
HDLC SERPL-MCNC: 74 MG/DL
HGB BLD-MCNC: 13.9 G/DL
IMM GRANULOCYTES NFR BLD AUTO: 0.4 %
IRON SATN MFR SERPL: 19 %
IRON SERPL-MCNC: 65 UG/DL
LDLC SERPL CALC-MCNC: 61 MG/DL
LYMPHOCYTES # BLD AUTO: 1.74 K/UL
LYMPHOCYTES NFR BLD AUTO: 16.7 %
MAGNESIUM SERPL-MCNC: 2 MG/DL
MAN DIFF?: NORMAL
MCHC RBC-ENTMCNC: 28.2 PG
MCHC RBC-ENTMCNC: 31.2 GM/DL
MCV RBC AUTO: 90.5 FL
MONOCYTES # BLD AUTO: 0.9 K/UL
MONOCYTES NFR BLD AUTO: 8.6 %
NEUTROPHILS # BLD AUTO: 7.67 K/UL
NEUTROPHILS NFR BLD AUTO: 73.3 %
NONHDLC SERPL-MCNC: 72 MG/DL
PLATELET # BLD AUTO: 390 K/UL
POTASSIUM SERPL-SCNC: 4.7 MMOL/L
PROT SERPL-MCNC: 7.2 G/DL
RBC # BLD: 4.93 M/UL
RBC # FLD: 14.7 %
SODIUM SERPL-SCNC: 131 MMOL/L
TIBC SERPL-MCNC: 342 UG/DL
TRIGL SERPL-MCNC: 46 MG/DL
TSH SERPL-ACNC: 1.95 UIU/ML
UIBC SERPL-MCNC: 277 UG/DL
VIT B12 SERPL-MCNC: 688 PG/ML
WBC # FLD AUTO: 10.45 K/UL

## 2023-09-12 ENCOUNTER — APPOINTMENT (OUTPATIENT)
Dept: RADIOLOGY | Facility: CLINIC | Age: 88
End: 2023-09-12
Payer: MEDICARE

## 2023-09-12 ENCOUNTER — OUTPATIENT (OUTPATIENT)
Dept: OUTPATIENT SERVICES | Facility: HOSPITAL | Age: 88
LOS: 1 days | End: 2023-09-12
Payer: MEDICARE

## 2023-09-12 DIAGNOSIS — Z90.710 ACQUIRED ABSENCE OF BOTH CERVIX AND UTERUS: Chronic | ICD-10-CM

## 2023-09-12 DIAGNOSIS — Z00.00 ENCOUNTER FOR GENERAL ADULT MEDICAL EXAMINATION WITHOUT ABNORMAL FINDINGS: ICD-10-CM

## 2023-09-12 DIAGNOSIS — Z98.49 CATARACT EXTRACTION STATUS, UNSPECIFIED EYE: Chronic | ICD-10-CM

## 2023-09-12 PROCEDURE — 77080 DXA BONE DENSITY AXIAL: CPT

## 2023-09-12 PROCEDURE — 77080 DXA BONE DENSITY AXIAL: CPT | Mod: 26

## 2023-09-18 ENCOUNTER — APPOINTMENT (OUTPATIENT)
Dept: ORTHOPEDIC SURGERY | Facility: CLINIC | Age: 88
End: 2023-09-18
Payer: MEDICARE

## 2023-09-18 VITALS — WEIGHT: 123 LBS | HEIGHT: 62 IN | BODY MASS INDEX: 22.63 KG/M2

## 2023-09-18 DIAGNOSIS — S72.031D DISPLACED MIDCERVICAL FRACTURE OF RIGHT FEMUR, SUBSEQUENT ENCOUNTER FOR CLOSED FRACTURE WITH ROUTINE HEALING: ICD-10-CM

## 2023-09-18 DIAGNOSIS — M80.00XD AGE-RELATED OSTEOPOROSIS WITH CURRENT PATHOLOGICAL FRACTURE, UNSPECIFIED SITE, SUBSEQUENT ENCOUNTER FOR FRACTURE WITH ROUTINE HEALING: ICD-10-CM

## 2023-09-18 DIAGNOSIS — M81.0 AGE-RELATED OSTEOPOROSIS W/OUT CURRENT PATHOLOGICAL FRACTURE: ICD-10-CM

## 2023-09-18 PROCEDURE — 99213 OFFICE O/P EST LOW 20 MIN: CPT

## 2023-09-21 ENCOUNTER — TRANSCRIPTION ENCOUNTER (OUTPATIENT)
Age: 88
End: 2023-09-21

## 2023-09-25 ENCOUNTER — APPOINTMENT (OUTPATIENT)
Dept: NEUROLOGY | Facility: CLINIC | Age: 88
End: 2023-09-25
Payer: SELF-PAY

## 2023-09-25 PROCEDURE — SNS01: CPT

## 2023-10-17 RX ORDER — ASCORBIC ACID 60 MG
1 TABLET,CHEWABLE ORAL
Refills: 0 | DISCHARGE

## 2023-10-18 ENCOUNTER — OUTPATIENT (OUTPATIENT)
Dept: OUTPATIENT SERVICES | Facility: HOSPITAL | Age: 88
LOS: 1 days | Discharge: HOME | End: 2023-10-18
Payer: MEDICARE

## 2023-10-18 VITALS
OXYGEN SATURATION: 97 % | WEIGHT: 128.09 LBS | HEART RATE: 94 BPM | HEIGHT: 62 IN | TEMPERATURE: 97 F | DIASTOLIC BLOOD PRESSURE: 92 MMHG | RESPIRATION RATE: 16 BRPM | SYSTOLIC BLOOD PRESSURE: 143 MMHG

## 2023-10-18 VITALS — RESPIRATION RATE: 16 BRPM | SYSTOLIC BLOOD PRESSURE: 136 MMHG | OXYGEN SATURATION: 98 % | HEART RATE: 68 BPM

## 2023-10-18 DIAGNOSIS — I48.91 UNSPECIFIED ATRIAL FIBRILLATION: ICD-10-CM

## 2023-10-18 DIAGNOSIS — Z98.49 CATARACT EXTRACTION STATUS, UNSPECIFIED EYE: Chronic | ICD-10-CM

## 2023-10-18 DIAGNOSIS — Z90.710 ACQUIRED ABSENCE OF BOTH CERVIX AND UTERUS: Chronic | ICD-10-CM

## 2023-10-18 LAB
ANION GAP SERPL CALC-SCNC: 6 MMOL/L — SIGNIFICANT CHANGE UP (ref 5–17)
ANION GAP SERPL CALC-SCNC: 6 MMOL/L — SIGNIFICANT CHANGE UP (ref 5–17)
BUN SERPL-MCNC: 15 MG/DL — SIGNIFICANT CHANGE UP (ref 7–23)
BUN SERPL-MCNC: 15 MG/DL — SIGNIFICANT CHANGE UP (ref 7–23)
CALCIUM SERPL-MCNC: 8.9 MG/DL — SIGNIFICANT CHANGE UP (ref 8.5–10.1)
CALCIUM SERPL-MCNC: 8.9 MG/DL — SIGNIFICANT CHANGE UP (ref 8.5–10.1)
CHLORIDE SERPL-SCNC: 103 MMOL/L — SIGNIFICANT CHANGE UP (ref 96–108)
CHLORIDE SERPL-SCNC: 103 MMOL/L — SIGNIFICANT CHANGE UP (ref 96–108)
CO2 SERPL-SCNC: 28 MMOL/L — SIGNIFICANT CHANGE UP (ref 22–31)
CO2 SERPL-SCNC: 28 MMOL/L — SIGNIFICANT CHANGE UP (ref 22–31)
CREAT SERPL-MCNC: 0.73 MG/DL — SIGNIFICANT CHANGE UP (ref 0.5–1.3)
CREAT SERPL-MCNC: 0.73 MG/DL — SIGNIFICANT CHANGE UP (ref 0.5–1.3)
EGFR: 80 ML/MIN/1.73M2 — SIGNIFICANT CHANGE UP
EGFR: 80 ML/MIN/1.73M2 — SIGNIFICANT CHANGE UP
GLUCOSE SERPL-MCNC: 120 MG/DL — HIGH (ref 70–99)
GLUCOSE SERPL-MCNC: 120 MG/DL — HIGH (ref 70–99)
HCT VFR BLD CALC: 43.5 % — SIGNIFICANT CHANGE UP (ref 34.5–45)
HCT VFR BLD CALC: 43.5 % — SIGNIFICANT CHANGE UP (ref 34.5–45)
HGB BLD-MCNC: 14.2 G/DL — SIGNIFICANT CHANGE UP (ref 11.5–15.5)
HGB BLD-MCNC: 14.2 G/DL — SIGNIFICANT CHANGE UP (ref 11.5–15.5)
MCHC RBC-ENTMCNC: 27.6 PG — SIGNIFICANT CHANGE UP (ref 27–34)
MCHC RBC-ENTMCNC: 27.6 PG — SIGNIFICANT CHANGE UP (ref 27–34)
MCHC RBC-ENTMCNC: 32.6 GM/DL — SIGNIFICANT CHANGE UP (ref 32–36)
MCHC RBC-ENTMCNC: 32.6 GM/DL — SIGNIFICANT CHANGE UP (ref 32–36)
MCV RBC AUTO: 84.5 FL — SIGNIFICANT CHANGE UP (ref 80–100)
MCV RBC AUTO: 84.5 FL — SIGNIFICANT CHANGE UP (ref 80–100)
PLATELET # BLD AUTO: 354 K/UL — SIGNIFICANT CHANGE UP (ref 150–400)
PLATELET # BLD AUTO: 354 K/UL — SIGNIFICANT CHANGE UP (ref 150–400)
POTASSIUM SERPL-MCNC: 4.1 MMOL/L — SIGNIFICANT CHANGE UP (ref 3.5–5.3)
POTASSIUM SERPL-MCNC: 4.1 MMOL/L — SIGNIFICANT CHANGE UP (ref 3.5–5.3)
POTASSIUM SERPL-SCNC: 4.1 MMOL/L — SIGNIFICANT CHANGE UP (ref 3.5–5.3)
POTASSIUM SERPL-SCNC: 4.1 MMOL/L — SIGNIFICANT CHANGE UP (ref 3.5–5.3)
RBC # BLD: 5.15 M/UL — SIGNIFICANT CHANGE UP (ref 3.8–5.2)
RBC # BLD: 5.15 M/UL — SIGNIFICANT CHANGE UP (ref 3.8–5.2)
RBC # FLD: 13.2 % — SIGNIFICANT CHANGE UP (ref 10.3–14.5)
RBC # FLD: 13.2 % — SIGNIFICANT CHANGE UP (ref 10.3–14.5)
SODIUM SERPL-SCNC: 137 MMOL/L — SIGNIFICANT CHANGE UP (ref 135–145)
SODIUM SERPL-SCNC: 137 MMOL/L — SIGNIFICANT CHANGE UP (ref 135–145)
WBC # BLD: 10.51 K/UL — HIGH (ref 3.8–10.5)
WBC # BLD: 10.51 K/UL — HIGH (ref 3.8–10.5)
WBC # FLD AUTO: 10.51 K/UL — HIGH (ref 3.8–10.5)
WBC # FLD AUTO: 10.51 K/UL — HIGH (ref 3.8–10.5)

## 2023-10-18 PROCEDURE — 93325 DOPPLER ECHO COLOR FLOW MAPG: CPT

## 2023-10-18 PROCEDURE — 36415 COLL VENOUS BLD VENIPUNCTURE: CPT

## 2023-10-18 PROCEDURE — 92960 CARDIOVERSION ELECTRIC EXT: CPT

## 2023-10-18 PROCEDURE — 80048 BASIC METABOLIC PNL TOTAL CA: CPT

## 2023-10-18 PROCEDURE — 93320 DOPPLER ECHO COMPLETE: CPT

## 2023-10-18 PROCEDURE — 93010 ELECTROCARDIOGRAM REPORT: CPT | Mod: 76

## 2023-10-18 PROCEDURE — 93005 ELECTROCARDIOGRAM TRACING: CPT | Mod: XU

## 2023-10-18 PROCEDURE — 85027 COMPLETE CBC AUTOMATED: CPT

## 2023-10-18 PROCEDURE — 93312 ECHO TRANSESOPHAGEAL: CPT

## 2023-10-18 NOTE — PROCEDURE NOTE - NSPROCSEDATIONNOT_GEN_ALL_CORE
[facility]  [address]  [place] [address]  [phone]                                                                                               Date: 1/10/2017    Hermilo Velasquez                                                                               [address]  [facility] [address]              Dear Hermilo,    I have had the opportunity to review your recent labs and they are as follows:  Your complete blood counts returned within normal limits as did your iron levels and your kidney function    Congratulaions on your excellent results    Enclosed is a copy of your results.      It was a pleasure to see you at your last appointment. If you have any questions, please feel free to call myself or my nurse at [phone].          Sincerely,    Bucky Boone MD/ Agnes EVERETT CMA  Results for orders placed or performed in visit on 01/04/17   CBC with platelets   Result Value Ref Range    WBC 9.0 4.0 - 11.0 10e9/L    RBC Count 4.77 4.4 - 5.9 10e12/L    Hemoglobin 13.4 13.3 - 17.7 g/dL    Hematocrit 42.0 40.0 - 53.0 %    MCV 88 78 - 100 fl    MCH 28.1 26.5 - 33.0 pg    MCHC 31.9 31.5 - 36.5 g/dL    RDW 14.5 10.0 - 15.0 %    Platelet Count 205 150 - 450 10e9/L   Ferritin   Result Value Ref Range    Ferritin 59 26 - 388 ng/mL   Basic metabolic panel   Result Value Ref Range    Sodium 144 133 - 144 mmol/L    Potassium 4.5 3.4 - 5.3 mmol/L    Chloride 105 94 - 109 mmol/L    Carbon Dioxide 31 20 - 32 mmol/L    Anion Gap 8 3 - 14 mmol/L    Glucose 95 70 - 99 mg/dL    Urea Nitrogen 14 7 - 30 mg/dL    Creatinine 0.81 0.66 - 1.25 mg/dL    GFR Estimate >90  Non  GFR Calc   >60 mL/min/1.7m2    GFR Estimate If Black >90   GFR Calc   >60 mL/min/1.7m2    Calcium 8.9 8.5 - 10.1 mg/dL       
Yes
Term [place] Vaginal Delivery (>/= 37 weeks)

## 2023-10-19 NOTE — POST DISCHARGE NOTE - DETAILS:
Post procedure phone call completed; patient understood all discharge paperwork. No questions regarding medications or pain management. MD follow up appointment made. Patient was able to rest when they were discharged. Patient will recommend [facility], no complaints of hospital stay, satisfied with care. Instructed patient to contact provider with any further questions or concerns.

## 2023-10-20 DIAGNOSIS — I48.91 UNSPECIFIED ATRIAL FIBRILLATION: ICD-10-CM

## 2023-10-31 ENCOUNTER — INPATIENT (INPATIENT)
Facility: HOSPITAL | Age: 88
LOS: 2 days | Discharge: NURSING, REHAB OR LONG-TERM CARE | DRG: 556 | End: 2023-11-03
Attending: INTERNAL MEDICINE | Admitting: INTERNAL MEDICINE
Payer: MEDICARE

## 2023-10-31 VITALS
TEMPERATURE: 98 F | WEIGHT: 123.02 LBS | HEIGHT: 62 IN | SYSTOLIC BLOOD PRESSURE: 159 MMHG | RESPIRATION RATE: 16 BRPM | OXYGEN SATURATION: 99 % | HEART RATE: 67 BPM | DIASTOLIC BLOOD PRESSURE: 75 MMHG

## 2023-10-31 DIAGNOSIS — R26.2 DIFFICULTY IN WALKING, NOT ELSEWHERE CLASSIFIED: ICD-10-CM

## 2023-10-31 DIAGNOSIS — Z98.49 CATARACT EXTRACTION STATUS, UNSPECIFIED EYE: Chronic | ICD-10-CM

## 2023-10-31 DIAGNOSIS — Z90.710 ACQUIRED ABSENCE OF BOTH CERVIX AND UTERUS: Chronic | ICD-10-CM

## 2023-10-31 LAB
ALBUMIN SERPL ELPH-MCNC: 3.1 G/DL — LOW (ref 3.3–5)
ALBUMIN SERPL ELPH-MCNC: 3.1 G/DL — LOW (ref 3.3–5)
ALP SERPL-CCNC: 80 U/L — SIGNIFICANT CHANGE UP (ref 40–120)
ALP SERPL-CCNC: 80 U/L — SIGNIFICANT CHANGE UP (ref 40–120)
ALT FLD-CCNC: 31 U/L — SIGNIFICANT CHANGE UP (ref 12–78)
ALT FLD-CCNC: 31 U/L — SIGNIFICANT CHANGE UP (ref 12–78)
ANION GAP SERPL CALC-SCNC: 5 MMOL/L — SIGNIFICANT CHANGE UP (ref 5–17)
ANION GAP SERPL CALC-SCNC: 5 MMOL/L — SIGNIFICANT CHANGE UP (ref 5–17)
APPEARANCE UR: CLEAR — SIGNIFICANT CHANGE UP
APPEARANCE UR: CLEAR — SIGNIFICANT CHANGE UP
AST SERPL-CCNC: 21 U/L — SIGNIFICANT CHANGE UP (ref 15–37)
AST SERPL-CCNC: 21 U/L — SIGNIFICANT CHANGE UP (ref 15–37)
BACTERIA # UR AUTO: NEGATIVE /HPF — SIGNIFICANT CHANGE UP
BACTERIA # UR AUTO: NEGATIVE /HPF — SIGNIFICANT CHANGE UP
BASOPHILS # BLD AUTO: 0.06 K/UL — SIGNIFICANT CHANGE UP (ref 0–0.2)
BASOPHILS # BLD AUTO: 0.06 K/UL — SIGNIFICANT CHANGE UP (ref 0–0.2)
BASOPHILS NFR BLD AUTO: 0.6 % — SIGNIFICANT CHANGE UP (ref 0–2)
BASOPHILS NFR BLD AUTO: 0.6 % — SIGNIFICANT CHANGE UP (ref 0–2)
BILIRUB SERPL-MCNC: 0.4 MG/DL — SIGNIFICANT CHANGE UP (ref 0.2–1.2)
BILIRUB SERPL-MCNC: 0.4 MG/DL — SIGNIFICANT CHANGE UP (ref 0.2–1.2)
BILIRUB UR-MCNC: NEGATIVE — SIGNIFICANT CHANGE UP
BILIRUB UR-MCNC: NEGATIVE — SIGNIFICANT CHANGE UP
BUN SERPL-MCNC: 17 MG/DL — SIGNIFICANT CHANGE UP (ref 7–23)
BUN SERPL-MCNC: 17 MG/DL — SIGNIFICANT CHANGE UP (ref 7–23)
CALCIUM SERPL-MCNC: 8.4 MG/DL — LOW (ref 8.5–10.1)
CALCIUM SERPL-MCNC: 8.4 MG/DL — LOW (ref 8.5–10.1)
CHLORIDE SERPL-SCNC: 100 MMOL/L — SIGNIFICANT CHANGE UP (ref 96–108)
CHLORIDE SERPL-SCNC: 100 MMOL/L — SIGNIFICANT CHANGE UP (ref 96–108)
CO2 SERPL-SCNC: 28 MMOL/L — SIGNIFICANT CHANGE UP (ref 22–31)
CO2 SERPL-SCNC: 28 MMOL/L — SIGNIFICANT CHANGE UP (ref 22–31)
COLOR SPEC: YELLOW — SIGNIFICANT CHANGE UP
COLOR SPEC: YELLOW — SIGNIFICANT CHANGE UP
CREAT SERPL-MCNC: 0.74 MG/DL — SIGNIFICANT CHANGE UP (ref 0.5–1.3)
CREAT SERPL-MCNC: 0.74 MG/DL — SIGNIFICANT CHANGE UP (ref 0.5–1.3)
DIFF PNL FLD: ABNORMAL
DIFF PNL FLD: ABNORMAL
EGFR: 79 ML/MIN/1.73M2 — SIGNIFICANT CHANGE UP
EGFR: 79 ML/MIN/1.73M2 — SIGNIFICANT CHANGE UP
EOSINOPHIL # BLD AUTO: 0.1 K/UL — SIGNIFICANT CHANGE UP (ref 0–0.5)
EOSINOPHIL # BLD AUTO: 0.1 K/UL — SIGNIFICANT CHANGE UP (ref 0–0.5)
EOSINOPHIL NFR BLD AUTO: 1 % — SIGNIFICANT CHANGE UP (ref 0–6)
EOSINOPHIL NFR BLD AUTO: 1 % — SIGNIFICANT CHANGE UP (ref 0–6)
ERYTHROCYTE [SEDIMENTATION RATE] IN BLOOD: 8 MM/HR — SIGNIFICANT CHANGE UP (ref 0–20)
ERYTHROCYTE [SEDIMENTATION RATE] IN BLOOD: 8 MM/HR — SIGNIFICANT CHANGE UP (ref 0–20)
GLUCOSE SERPL-MCNC: 123 MG/DL — HIGH (ref 70–99)
GLUCOSE SERPL-MCNC: 123 MG/DL — HIGH (ref 70–99)
GLUCOSE UR QL: NEGATIVE MG/DL — SIGNIFICANT CHANGE UP
GLUCOSE UR QL: NEGATIVE MG/DL — SIGNIFICANT CHANGE UP
HCT VFR BLD CALC: 40.3 % — SIGNIFICANT CHANGE UP (ref 34.5–45)
HCT VFR BLD CALC: 40.3 % — SIGNIFICANT CHANGE UP (ref 34.5–45)
HGB BLD-MCNC: 13.5 G/DL — SIGNIFICANT CHANGE UP (ref 11.5–15.5)
HGB BLD-MCNC: 13.5 G/DL — SIGNIFICANT CHANGE UP (ref 11.5–15.5)
IMM GRANULOCYTES NFR BLD AUTO: 0.5 % — SIGNIFICANT CHANGE UP (ref 0–0.9)
IMM GRANULOCYTES NFR BLD AUTO: 0.5 % — SIGNIFICANT CHANGE UP (ref 0–0.9)
KETONES UR-MCNC: NEGATIVE MG/DL — SIGNIFICANT CHANGE UP
KETONES UR-MCNC: NEGATIVE MG/DL — SIGNIFICANT CHANGE UP
LEUKOCYTE ESTERASE UR-ACNC: NEGATIVE — SIGNIFICANT CHANGE UP
LEUKOCYTE ESTERASE UR-ACNC: NEGATIVE — SIGNIFICANT CHANGE UP
LYMPHOCYTES # BLD AUTO: 1.76 K/UL — SIGNIFICANT CHANGE UP (ref 1–3.3)
LYMPHOCYTES # BLD AUTO: 1.76 K/UL — SIGNIFICANT CHANGE UP (ref 1–3.3)
LYMPHOCYTES # BLD AUTO: 18.2 % — SIGNIFICANT CHANGE UP (ref 13–44)
LYMPHOCYTES # BLD AUTO: 18.2 % — SIGNIFICANT CHANGE UP (ref 13–44)
MCHC RBC-ENTMCNC: 28.5 PG — SIGNIFICANT CHANGE UP (ref 27–34)
MCHC RBC-ENTMCNC: 28.5 PG — SIGNIFICANT CHANGE UP (ref 27–34)
MCHC RBC-ENTMCNC: 33.5 GM/DL — SIGNIFICANT CHANGE UP (ref 32–36)
MCHC RBC-ENTMCNC: 33.5 GM/DL — SIGNIFICANT CHANGE UP (ref 32–36)
MCV RBC AUTO: 85 FL — SIGNIFICANT CHANGE UP (ref 80–100)
MCV RBC AUTO: 85 FL — SIGNIFICANT CHANGE UP (ref 80–100)
MONOCYTES # BLD AUTO: 1.05 K/UL — HIGH (ref 0–0.9)
MONOCYTES # BLD AUTO: 1.05 K/UL — HIGH (ref 0–0.9)
MONOCYTES NFR BLD AUTO: 10.9 % — SIGNIFICANT CHANGE UP (ref 2–14)
MONOCYTES NFR BLD AUTO: 10.9 % — SIGNIFICANT CHANGE UP (ref 2–14)
NEUTROPHILS # BLD AUTO: 6.64 K/UL — SIGNIFICANT CHANGE UP (ref 1.8–7.4)
NEUTROPHILS # BLD AUTO: 6.64 K/UL — SIGNIFICANT CHANGE UP (ref 1.8–7.4)
NEUTROPHILS NFR BLD AUTO: 68.8 % — SIGNIFICANT CHANGE UP (ref 43–77)
NEUTROPHILS NFR BLD AUTO: 68.8 % — SIGNIFICANT CHANGE UP (ref 43–77)
NITRITE UR-MCNC: NEGATIVE — SIGNIFICANT CHANGE UP
NITRITE UR-MCNC: NEGATIVE — SIGNIFICANT CHANGE UP
PH UR: 6 — SIGNIFICANT CHANGE UP (ref 5–8)
PH UR: 6 — SIGNIFICANT CHANGE UP (ref 5–8)
PLATELET # BLD AUTO: 349 K/UL — SIGNIFICANT CHANGE UP (ref 150–400)
PLATELET # BLD AUTO: 349 K/UL — SIGNIFICANT CHANGE UP (ref 150–400)
POTASSIUM SERPL-MCNC: 4 MMOL/L — SIGNIFICANT CHANGE UP (ref 3.5–5.3)
POTASSIUM SERPL-MCNC: 4 MMOL/L — SIGNIFICANT CHANGE UP (ref 3.5–5.3)
POTASSIUM SERPL-SCNC: 4 MMOL/L — SIGNIFICANT CHANGE UP (ref 3.5–5.3)
POTASSIUM SERPL-SCNC: 4 MMOL/L — SIGNIFICANT CHANGE UP (ref 3.5–5.3)
PROT SERPL-MCNC: 6.6 GM/DL — SIGNIFICANT CHANGE UP (ref 6–8.3)
PROT SERPL-MCNC: 6.6 GM/DL — SIGNIFICANT CHANGE UP (ref 6–8.3)
PROT UR-MCNC: NEGATIVE MG/DL — SIGNIFICANT CHANGE UP
PROT UR-MCNC: NEGATIVE MG/DL — SIGNIFICANT CHANGE UP
RBC # BLD: 4.74 M/UL — SIGNIFICANT CHANGE UP (ref 3.8–5.2)
RBC # BLD: 4.74 M/UL — SIGNIFICANT CHANGE UP (ref 3.8–5.2)
RBC # FLD: 13.4 % — SIGNIFICANT CHANGE UP (ref 10.3–14.5)
RBC # FLD: 13.4 % — SIGNIFICANT CHANGE UP (ref 10.3–14.5)
RBC CASTS # UR COMP ASSIST: 1 /HPF — SIGNIFICANT CHANGE UP (ref 0–4)
RBC CASTS # UR COMP ASSIST: 1 /HPF — SIGNIFICANT CHANGE UP (ref 0–4)
SODIUM SERPL-SCNC: 133 MMOL/L — LOW (ref 135–145)
SODIUM SERPL-SCNC: 133 MMOL/L — LOW (ref 135–145)
SP GR SPEC: 1.01 — SIGNIFICANT CHANGE UP (ref 1–1.03)
SP GR SPEC: 1.01 — SIGNIFICANT CHANGE UP (ref 1–1.03)
SQUAMOUS # UR AUTO: 1 /HPF — SIGNIFICANT CHANGE UP (ref 0–5)
SQUAMOUS # UR AUTO: 1 /HPF — SIGNIFICANT CHANGE UP (ref 0–5)
UROBILINOGEN FLD QL: 0.2 MG/DL — SIGNIFICANT CHANGE UP (ref 0.2–1)
UROBILINOGEN FLD QL: 0.2 MG/DL — SIGNIFICANT CHANGE UP (ref 0.2–1)
WBC # BLD: 9.66 K/UL — SIGNIFICANT CHANGE UP (ref 3.8–10.5)
WBC # BLD: 9.66 K/UL — SIGNIFICANT CHANGE UP (ref 3.8–10.5)
WBC # FLD AUTO: 9.66 K/UL — SIGNIFICANT CHANGE UP (ref 3.8–10.5)
WBC # FLD AUTO: 9.66 K/UL — SIGNIFICANT CHANGE UP (ref 3.8–10.5)
WBC UR QL: 0 /HPF — SIGNIFICANT CHANGE UP (ref 0–5)
WBC UR QL: 0 /HPF — SIGNIFICANT CHANGE UP (ref 0–5)

## 2023-10-31 PROCEDURE — 76376 3D RENDER W/INTRP POSTPROCES: CPT | Mod: 26

## 2023-10-31 PROCEDURE — 76376 3D RENDER W/INTRP POSTPROCES: CPT

## 2023-10-31 PROCEDURE — 73503 X-RAY EXAM HIP UNI 4/> VIEWS: CPT | Mod: 26,RT

## 2023-10-31 PROCEDURE — 73552 X-RAY EXAM OF FEMUR 2/>: CPT | Mod: 26,RT

## 2023-10-31 PROCEDURE — G1004: CPT

## 2023-10-31 PROCEDURE — 99285 EMERGENCY DEPT VISIT HI MDM: CPT | Mod: FS

## 2023-10-31 PROCEDURE — 93010 ELECTROCARDIOGRAM REPORT: CPT

## 2023-10-31 PROCEDURE — 93005 ELECTROCARDIOGRAM TRACING: CPT

## 2023-10-31 PROCEDURE — 71045 X-RAY EXAM CHEST 1 VIEW: CPT

## 2023-10-31 PROCEDURE — 97116 GAIT TRAINING THERAPY: CPT | Mod: GP

## 2023-10-31 PROCEDURE — 87086 URINE CULTURE/COLONY COUNT: CPT

## 2023-10-31 PROCEDURE — 81001 URINALYSIS AUTO W/SCOPE: CPT

## 2023-10-31 PROCEDURE — 72192 CT PELVIS W/O DYE: CPT | Mod: 26

## 2023-10-31 PROCEDURE — 97530 THERAPEUTIC ACTIVITIES: CPT | Mod: GP

## 2023-10-31 PROCEDURE — 97163 PT EVAL HIGH COMPLEX 45 MIN: CPT | Mod: GP

## 2023-10-31 PROCEDURE — 71045 X-RAY EXAM CHEST 1 VIEW: CPT | Mod: 26

## 2023-10-31 PROCEDURE — 72192 CT PELVIS W/O DYE: CPT | Mod: MG

## 2023-10-31 RX ORDER — PREGABALIN 225 MG/1
2 CAPSULE ORAL
Qty: 0 | Refills: 0 | DISCHARGE

## 2023-10-31 RX ORDER — HYDRALAZINE HCL 50 MG
5 TABLET ORAL ONCE
Refills: 0 | Status: COMPLETED | OUTPATIENT
Start: 2023-10-31 | End: 2023-10-31

## 2023-10-31 RX ORDER — METOPROLOL TARTRATE 50 MG
50 TABLET ORAL ONCE
Refills: 0 | Status: COMPLETED | OUTPATIENT
Start: 2023-10-31 | End: 2023-10-31

## 2023-10-31 RX ORDER — ACETAMINOPHEN 500 MG
650 TABLET ORAL ONCE
Refills: 0 | Status: COMPLETED | OUTPATIENT
Start: 2023-10-31 | End: 2023-11-01

## 2023-10-31 RX ORDER — CHOLECALCIFEROL (VITAMIN D3) 125 MCG
1 CAPSULE ORAL
Qty: 0 | Refills: 0 | DISCHARGE

## 2023-10-31 RX ORDER — TRAMADOL HYDROCHLORIDE 50 MG/1
50 TABLET ORAL ONCE
Refills: 0 | Status: DISCONTINUED | OUTPATIENT
Start: 2023-10-31 | End: 2023-11-03

## 2023-10-31 RX ORDER — ACETAMINOPHEN 500 MG
1000 TABLET ORAL ONCE
Refills: 0 | Status: COMPLETED | OUTPATIENT
Start: 2023-10-31 | End: 2023-10-31

## 2023-10-31 RX ADMIN — Medication 50 MILLIGRAM(S): at 21:23

## 2023-10-31 RX ADMIN — Medication 400 MILLIGRAM(S): at 18:26

## 2023-10-31 NOTE — ED PROVIDER NOTE - PROGRESS NOTE DETAILS
Derek Shah for Dr. Poole:    Pt is a 88y female with a PMHx of Afib on Xarelto w/ recent ?successful cardioversion, bronchitis, HTN, HLD, syncope presents to the ED [facility] for an evaluation of her R hip 2/2 pain and difficulty ambulating. R hip replacement at  w/ Jerry in May, saw him a month ago and was told she was w/ good progress. Pt was at the store today w/ friends, took a few steps into the doorway and couldn't put weight on her leg. "I felt like the Leaning [place] of [place], it felt my leg was gonna give." Felt like she couldn't lift her R leg and felt the need to elevate it to alleviate pain. Denies any fall or injury, friend endorses a similar aggravation shopping 2 weeks ago. Pt was subsequently helped out of the store and brought to the ED via EMS. Endorses back pain, tiredness. Denies numbness, tingling, weakness, neurological/urinary symptoms. Allergic to erythromycin, penicillin, and shellfish.    Physical Exam:  GENERAL: A&Ox3, well-appearing and in NAD  HEENT: NC/AT, PERRL EOMI, MM mildly dry, normal conjunctiva  RESP: CTAB, no respiratory distress, no wheezes/rhonchi/rales  CV: RRR, no murmurs/rubs/gallops, normal RP  ABDOMEN: soft, non-tender, non-distended, no guarding, no CVA tenderness, BS+  MSK: no visible deformities, neck non-tender supple w/o pain. +Unable to R SLR, mild TTP lateral aspect R hip w/o obvious deformity. RLE DP pulse normal, motor-sensory intact  NEURO: MAEx4, no focal sensory or motor deficits, CN 2-12 grossly intact  SKIN: Warm, normal color, well perfused, intact and w/o rash Case discussed with Ortho resident for Consultation.  Pt of Dr. Edwards s/p Right hip replacement in 5/23.  No new trauma, falls.  Had last f/u with Dr. Edwards approx 1 month ago that was uneventful.  Today, pt reports she was having difficulty ambulating.  Took few steps into store, leaning on shopping cart and felt that she couldn't lift her Right foot due to discomfort in R hip / thigh.  Had to be transferred to car with help and brought to the ED.  Similar incident 2 weeks ago.  No obvious deformities to LE on exam.  Mild tender lateral humeral head on palp.  Mild decreased AROM R hip with flexion, internal rotation.  NT knee, ankle.  NVI LE bilat.  Jessica Ritter PA-C Derek Shah for Dr. Poole:    Pt is a 88y female with a PMHx of AFib on Xarelto w/ recent ?successful cardioversion, bronchitis, HTN, HLD, syncope BIBEMS for an evaluation of her R hip 2/2 pain and difficulty ambulating. +R hip replacement at  w/ Dr. Edwards 5/2023, office f/u a month ago and was told + good progress. Pt was at the store today w/ friends, took a few steps into the doorway and suddenly couldn't put weight on her leg. "I felt like the [place] of [place], it felt my leg was gonna give." Felt like she couldn't lift her R leg and felt the need to elevate it to alleviate pain. Denies any fall or injury, friend endorses a similar aggravation shopping 2 weeks ago, but seemed mildler in severity & self-resolved after rest. Pt was subsequently helped out of the store and brought to the ED via EMS. Endorses back pain, tiredness. Denies LE numbness/tingling/weakness, neurological/urinary symptoms. Allergic to erythromycin, penicillin, and shellfish.    Physical Exam:  GENERAL: A&Ox3, well-appearing and in NAD  HEENT: NC/AT, PERRL EOMI, MM mildly dry, normal conjunctiva  RESP: CTAB, no respiratory distress, no wheezes/rhonchi/rales  CV: RRR, no murmurs/rubs/gallops, normal radial pulse  ABDOMEN: soft, non-tender, non-distended, no guarding, no CVA tenderness, BS+  MSK: no visible deformities, neck non-tender supple w/o pain. +Unable to R SLR, mild TTP lateral aspect R hip w/o obvious deformity. RLE DP pulse normal, motor-sensory intact  NEURO: MAEx4, no focal sensory or motor deficits, CNs 2-12 grossly intact, normal speech  SKIN: Warm, normal color, well perfused, intact and w/o rash

## 2023-10-31 NOTE — ED ADULT NURSE NOTE - NSFALLHARMRISKINTERV_ED_ALL_ED
Assessment completed with PI ID number [id number]     Assistance OOB with selected safe patient handling equipment if applicable/Assistance with ambulation/Communicate risk of Fall with Harm to all staff, patient, and family/Monitor gait and stability/Provide visual cue: red socks, yellow wristband, yellow gown, etc/Reinforce activity limits and safety measures with patient and family/Bed in lowest position, wheels locked, appropriate side rails in place/Call bell, personal items and telephone in reach/Instruct patient to call for assistance before getting out of bed/chair/stretcher/Non-slip footwear applied when patient is off stretcher/[place] to call system/Physically safe environment - no spills, clutter or unnecessary equipment/Purposeful Proactive Rounding/Room/bathroom lighting operational, light cord in reach

## 2023-10-31 NOTE — ED PROVIDER NOTE - DATE/TIME FOR CONVERSATION WITH ATTENDING MD
Patient: José Antonio Zeng    Procedure: Procedure(s):  HERNIORRHAPHY, UMBILICAL, OPEN with mesh       Anesthesia Type:  General    Note:  Disposition: Outpatient   Postop Pain Control: Uneventful            Sign Out: Well controlled pain   PONV: No   Neuro/Psych: Uneventful            Sign Out: Acceptable/Baseline neuro status   Airway/Respiratory: Uneventful            Sign Out: Acceptable/Baseline resp. status   CV/Hemodynamics: Uneventful            Sign Out: Acceptable CV status; No obvious hypovolemia; No obvious fluid overload   Other NRE: NONE   DID A NON-ROUTINE EVENT OCCUR? No           Last vitals:  Vitals Value Taken Time   /80 08/08/23 1345   Temp     Pulse 61 08/08/23 1348   Resp 17 08/08/23 1348   SpO2 100 % 08/08/23 1348   Vitals shown include unvalidated device data.    Electronically Signed By: Andi Atkins MD  August 8, 2023  1:49 PM   31-Oct-2023 19:43

## 2023-10-31 NOTE — ED PROVIDER NOTE - OBJECTIVE STATEMENT
Pt is a 88y female with a PMHx of Afib, bronchitis, HTN, HLD, syncope presents to the ED [facility] for an evaluation of her R hip 2/2 pain and difficulty ambulating. Pt had a R hip replacement w/ Jerry in May, saw him a month ago and was told she was w/ good progress. Pt states, "my knee has been clicking." Pt was at the store today and had poor gait, felt like she couldn't lift her leg, endorsed the need to lean back to alleviate pain. Denies any fall or injury. Pt was subsequently helped out of the store and brought to the ED via EMS. Endorses back pain. Denies numbness, tingling, weakness, neurological/urinary symptoms. Allergic to erythromycin, penicillin, and shellfish. Pt is a 88y female with a PMHx of Afib, bronchitis, HTN, HLD, syncope presents to the ED [facility] for an evaluation of her R hip 2/2 pain and difficulty ambulating. Pt had a R hip replacement w/ Jerry in May, saw him a month ago and was told she was w/ good progress. Pt states, "my R knee has been clicking." Pt was at the store today and had poor gait, felt like she couldn't lift her leg, endorsed the need to lean back to alleviate pain. Denies any fall or injury. Pt was subsequently helped out of the store and brought to the ED via EMS. Endorses back pain. Denies numbness, tingling, weakness, neurological/urinary symptoms. Allergic to erythromycin, penicillin, and shellfish. Pt is a 88y female with a PMHx of AFib, bronchitis, HTN, HLD, syncope, BIBEMS to ED for an evaluation of her R hip 2/2 pain and difficulty ambulating. Pt had a R hip replacement by Dr. Edwards in 5/2023, office f/u a month ago and was told she was w/ good progress. Pt states, "my R knee has been clicking." Pt was at the store today and suddenly had poor gait, felt like she couldn't lift her leg, endorsed the need to lean back to alleviate pain. Denies any fall or injury. Pt was subsequently helped out of the store and brought to the ED via EMS. Endorses back pain. Denies numbness, tingling, weakness, neurological/urinary symptoms. Allergic to erythromycin, penicillin, and shellfish.

## 2023-10-31 NOTE — ED PROVIDER NOTE - ATTENDING APP SHARED VISIT CONTRIBUTION OF CARE
JI Poole MD, ED Attending physician:  This was a shared visit with MATTHEW.  I reviewed and verified the documentation and independently performed the documented history/exam/mdm.

## 2023-10-31 NOTE — ED ADULT NURSE NOTE - CAS EDN DISCHARGE INTERVENTIONS
Infant with stable vitals, no episodes this shift. Infant eating well. Infant weaned off HFNC easily and doing well. Infant placed on contact isolation due to positive MRSA. Father in to visit given update and discussed the plan of care. Arm band on/IV intact

## 2023-10-31 NOTE — CHART NOTE - NSCHARTNOTEFT_GEN_A_CORE
Pt is a 89 y/o female who was brought into the ER by EMS for evaluation of her R hip 2/2 to pain and difficulty with ambulation.  Pt has a THR in 5/23 with Dr. Edwards. Pt reports she went to [facility] for SNF rehab after the surgery.  Pt was at a store today and felt like her leg gave out and  the need to lean back to alleviate pain.  Pt lives alone and has two children a son Juwan [phone] hat lives in [place] and a dtr Sarah that lives in [place].  Pt stated she lives in a two story home with a chair lift.  Pt uses a r/w and drives but cannot  drives at this time. Pt does not feel safe to go home as she lives alone and cannot ambulate. Pt requesting to go to SNF rehab.  Pt will be admitted.  Pt understand she will require 3 day stay to qualify for rehab admission. Pt requesting [facility] as first choice for rehab.  Case discussed with RN/MD.

## 2023-10-31 NOTE — ED ADULT NURSE REASSESSMENT NOTE - NS ED NURSE REASSESS COMMENT FT1
Report received from RN. Pt is A&Ox3. Pt does not appear to be in discomfort or acute distress at this time. Pt denies discomfort and pain at this time. Pt updated on plan of care. EKG, urine specimen and ambulation trial pending at this time. Safety maintained, bed in lowest position, call bell within reach.

## 2023-10-31 NOTE — ED PROVIDER NOTE - IV ALTEPLASE ADMIN OUTSIDE HIDDEN
Patient Seen in: [facility]    History   Patient presents with:  Back Pain (musculoskeletal)    Stated Complaint: back pain/fell Jan 13    HPI    42-year-old male here with complaint of bilateral back rib cage/chest wall pain. Physical Exam   Constitutional: He is oriented to person, place, and time. He appears well-developed and well-nourished. HENT:   Head: Normocephalic and atraumatic.    Right Ear: External ear normal.   Left Ear: External ear normal.   Nose: Nose n 17:33.  INDICATIONS:  rib pain / left hip / right ankle pain  PATIENT STATED HISTORY: (As transcribed by Technologist)  Patient fell in garage today. Pain to left lateral mid ribs.     FINDINGS: Cardiac silhouette and pulmonary vasculature are normal.  The cough and control the pain. His incentive spirometer as directed. Follow-up with her primary care physician. The patient is in good condition thru out treatment today and remains so upon discharge.   I discussed the plan of care with the patient, who show

## 2023-10-31 NOTE — ED PROVIDER NOTE - CLINICAL SUMMARY MEDICAL DECISION MAKING FREE TEXT BOX
88y female with Afib on Xarelto w/ recent ?successful cardioversion, bronchitis, HTN, HLD p/w difficulty ambulating 2/2 R hip complications. Had a R hip replacement at  w/ Jerry in May.     Exam non-toxic, unable to R SLR, mild TTP R hip lateral aspect w/o obvious stepoff or deformity motor-sensory and NVI distally.    Plan: XR pelvis/R hip/femur, labs including sedimentation rate and CRP. Ortho consult. Monitor, observe, reassess. 88y female with Afib on Xarelto w/ recent ?successful cardioversion, bronchitis, HTN, HLD p/w difficulty ambulating 2/2 R hip complications. Had a R hip replacement at  w/ [facility] in May.     Exam non-toxic, unable to R SLR, mild TTP R hip lateral aspect w/o obvious stepoff or deformity motor-sensory and NVI distally.    Plan: XR pelvis/R hip/femur, labs including sedimentation rate and CRP. Ortho consult. Monitor, observe, reassess.    19:45, C MD Virgilio:  XRs wet read negative, labs unremarkable.  ortho consult appreciated.  Pt failed ambulation trial, + lives alone.  pt warrants med admission for inpt PT, ANGELO placement.  Rocky Iglesias aware of med admission, requests CT bony pelvis: + ordering. 88y female with AFib on Xarelto w/ recent ?successful cardioversion, bronchitis, HTN, HLD, BIBA re: difficulty ambulating 2/2 R hip complications. Had R hip replacement at  w/ Dr. Edawrds in May.     Exam non-toxic, unable to R SLR, mild TTP R hip lateral aspect w/o obvious stepoff or deformity motor-sensory and NVI distally.    Plan: XR pelvis/R hip/femur, labs including sedimentation rate and CRP. Ortho consult. Monitor, observe, reassess.    19:45, C MD Virgilio:  XRs wet read negative, labs unremarkable.  Ortho consult appreciated.  Pt failed ambulation trial, + lives alone, not safe for DC & outpt management.  Pt warrants Med admission for inpt PT, ANGELO placement.  Rocky Iglesias aware of Med admission, requests CT bony pelvis: + ordering.
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
## 2023-10-31 NOTE — PATIENT PROFILE ADULT - FUNCTIONAL ASSESSMENT - BASIC MOBILITY 6.
2-calculated by average/Not able to assess (calculate score using [facility] averaging method)

## 2023-10-31 NOTE — ED ADULT TRIAGE NOTE - CHIEF COMPLAINT QUOTE
Patient presents to ED BIBA for right hip pain and difficulty walking. Pt hx of right hip replacement with Dr. Edwards, states that "for the past month my hip has been clicking and since today I am having a hard time walking". Pt c/o 5/10 pain. Denies any trauma/falls.

## 2023-10-31 NOTE — ED ADULT NURSE NOTE - OBJECTIVE STATEMENT
Pt arrives to ED complaining of right hip. Pt s/p right total hip replacement. Pt states she "feels clicking" in her hip past month. denies trauma. Ambulatory. alert and oriented x 4.

## 2023-10-31 NOTE — CONSULT NOTE ADULT - SUBJECTIVE AND OBJECTIVE BOX
88y Female community ambulator presents c/o R hip pain. Pt had R PADDY in May with Dr Edwards, and patient and friend present at bedside state she has had little rehab due to several health and social issues that have come up since May. Pt states she has been having difficulty ambulating for the past two days without a clear inciting event, although she states she may have went for a longer walk than normal the day before she began to have difficulty ambulating. Denies HS/LOC. Denies numbness/tingling. Denies fever/chills. Denies pain/injury elsewhere.     No pertinent family history in first degree relatives    Family history of primary TB    Family history of CVA    Family history of diabetes mellitus    MEWS Score    Prior    Syncope    HTN (hypertension)    Hyperlipidemia    Afib    A-fib    Bronchitis    A-fib    Bronchitis    Cardiac abnormality    H/O: hysterectomy    S/P cataract surgery    RIGHT HIP "CLICKING"    90+    SysAdmin_VisitLink      PAST MEDICAL & SURGICAL HISTORY:  Syncope  7/2/2013      HTN (hypertension)      Hyperlipidemia      Afib      A-fib      Bronchitis      Cardiac abnormality  internal cardiac monitor placed 8/2013      H/O: hysterectomy      S/P cataract surgery  Left eye.        MEDICATIONS  (STANDING):    Allergies    penicillin (Unknown)  erythromycin (Diarrhea)  shellfish (Unknown)    Intolerances                            13.5   9.66  )-----------( 349      ( 31 Oct 2023 17:22 )             40.3     31 Oct 2023 17:22    133    |  100    |  17     ----------------------------<  123    4.0     |  28     |  0.74     Ca    8.4        31 Oct 2023 17:22    TPro  6.6    /  Alb  3.1    /  TBili  0.4    /  DBili  x      /  AST  21     /  ALT  31     /  AlkPhos  80     31 Oct 2023 17:22      Vital Signs Last 24 Hrs  T(C): 36.6 (10-31-23 @ 15:51), Max: 36.6 (10-31-23 @ 15:51)  T(F): 97.9 (10-31-23 @ 15:51), Max: 97.9 (10-31-23 @ 15:51)  HR: 67 (10-31-23 @ 15:51) (67 - 67)  BP: 159/75 (10-31-23 @ 15:51) (159/75 - 159/75)  BP(mean): --  RR: 16 (10-31-23 @ 15:51) (16 - 16)  SpO2: 99% (10-31-23 @ 15:51) (99% - 99%)    Imaging: XR Hip/Pelvis negative for fx or dislocation     Physical Exam  General: NAD, Alert, Awake and oriented  Neurologic: No gross deficits, moving all 4s.  Head: NCAT without abrasions, lacerations, or ecchymosis to head, face, or scalp  Eyes: PERRL  Neck/C-Spine: FAROM. No bony TTP.    RIGHT LE:  No open skin. No deformities or other signs of trauma at hip, knee, lower leg, ankle or foot. Unable to actively SLR R leg, but able to maintain SLR if passively lifted. Discomfort about hip flexor w resisted hip flexion. Negative log-roll, axial load and heel strike. No groin or hip  pain on Passive internal or external rotation. SILT toes 1-5. 2+ DP/PT pulses with brisk cap refill distally. Compartments soft and compressible. EHL/FHL/TA/GS intact 5/5.    A/P: 88y Female with Right hip pain s/p R PADDY in may   Pain control  WBAT, PT, trial of ambulation   Antiinflammatories as tolerated  Rolling walker PRN  DVT PE ppx  DIscussed with Orthopedic Attending Dr. Edwards who agrees with the plan above  88y Female community ambulator presents c/o R hip pain. Pt had R PADDY in May with Dr Edwards, and patient and friend present at bedside state she has had little rehab due to several health and social issues that have come up since May. Pt states she has been having difficulty ambulating for the past two days without a clear inciting event, although she states she may have went for a longer walk than normal the day before she began to have difficulty ambulating. Pt was seen as outpatient by Dr Edwards 1 month ago and states he was pleased with her progress.  Denies HS/LOC. Denies numbness/tingling. Denies fever/chills. Denies pain/injury elsewhere.     No pertinent family history in first degree relatives    Family history of primary TB    Family history of CVA    Family history of diabetes mellitus    MEWS Score    Prior    Syncope    HTN (hypertension)    Hyperlipidemia    Afib    A-fib    Bronchitis    A-fib    Bronchitis    Cardiac abnormality    H/O: hysterectomy    S/P cataract surgery    RIGHT HIP "CLICKING"    90+    SysAdmin_VisitLink      PAST MEDICAL & SURGICAL HISTORY:  Syncope  7/2/2013      HTN (hypertension)      Hyperlipidemia      Afib      A-fib      Bronchitis      Cardiac abnormality  internal cardiac monitor placed 8/2013      H/O: hysterectomy      S/P cataract surgery  Left eye.        MEDICATIONS  (STANDING):    Allergies    penicillin (Unknown)  erythromycin (Diarrhea)  shellfish (Unknown)    Intolerances                            13.5   9.66  )-----------( 349      ( 31 Oct 2023 17:22 )             40.3     31 Oct 2023 17:22    133    |  100    |  17     ----------------------------<  123    4.0     |  28     |  0.74     Ca    8.4        31 Oct 2023 17:22    TPro  6.6    /  Alb  3.1    /  TBili  0.4    /  DBili  x      /  AST  21     /  ALT  31     /  AlkPhos  80     31 Oct 2023 17:22      Vital Signs Last 24 Hrs  T(C): 36.6 (10-31-23 @ 15:51), Max: 36.6 (10-31-23 @ 15:51)  T(F): 97.9 (10-31-23 @ 15:51), Max: 97.9 (10-31-23 @ 15:51)  HR: 67 (10-31-23 @ 15:51) (67 - 67)  BP: 159/75 (10-31-23 @ 15:51) (159/75 - 159/75)  BP(mean): --  RR: 16 (10-31-23 @ 15:51) (16 - 16)  SpO2: 99% (10-31-23 @ 15:51) (99% - 99%)    Imaging: XR Hip/Pelvis negative for fx or dislocation     Physical Exam  General: NAD, Alert, Awake and oriented  Neurologic: No gross deficits, moving all 4s.  Head: NCAT without abrasions, lacerations, or ecchymosis to head, face, or scalp  Eyes: PERRL  Neck/C-Spine: FAROM. No bony TTP.    RIGHT LE:  No open skin. No deformities or other signs of trauma at hip, knee, lower leg, ankle or foot. Unable to actively SLR R leg, but able to maintain SLR if passively lifted. Discomfort about hip flexor w resisted hip flexion. Negative log-roll, axial load and heel strike. No groin or hip  pain on Passive internal or external rotation. SILT toes 1-5. 2+ DP/PT pulses with brisk cap refill distally. Compartments soft and compressible. EHL/FHL/TA/GS intact 5/5.    A/P: 88y Female with Right hip pain s/p R PADDY in may   Pain control  WBAT, PT, trial of ambulation   Antiinflammatories as tolerated  Rolling walker PRN  DVT PE ppx  DIscussed with Orthopedic Attending Dr. Edwards who agrees with the plan above

## 2023-10-31 NOTE — ED PROVIDER NOTE - CONSTITUTIONAL, MLM
normal... Well appearing, awake, alert, oriented to person, place, time/situation and in no apparent distress. Older WF, awake, alert, oriented to person, place, time/situation and in no apparent distress.

## 2023-11-01 LAB
CRP SERPL-MCNC: <3 MG/L — SIGNIFICANT CHANGE UP
CRP SERPL-MCNC: <3 MG/L — SIGNIFICANT CHANGE UP

## 2023-11-01 PROCEDURE — 93010 ELECTROCARDIOGRAM REPORT: CPT

## 2023-11-01 PROCEDURE — 99223 1ST HOSP IP/OBS HIGH 75: CPT

## 2023-11-01 RX ORDER — MAGNESIUM SULFATE 500 MG/ML
2 VIAL (ML) INJECTION ONCE
Refills: 0 | Status: COMPLETED | OUTPATIENT
Start: 2023-11-01 | End: 2023-11-01

## 2023-11-01 RX ORDER — LANOLIN ALCOHOL/MO/W.PET/CERES
3 CREAM (GRAM) TOPICAL AT BEDTIME
Refills: 0 | Status: DISCONTINUED | OUTPATIENT
Start: 2023-11-01 | End: 2023-11-03

## 2023-11-01 RX ORDER — AMIODARONE HYDROCHLORIDE 400 MG/1
200 TABLET ORAL DAILY
Refills: 0 | Status: DISCONTINUED | OUTPATIENT
Start: 2023-11-01 | End: 2023-11-03

## 2023-11-01 RX ORDER — LOSARTAN POTASSIUM 100 MG/1
50 TABLET, FILM COATED ORAL DAILY
Refills: 0 | Status: DISCONTINUED | OUTPATIENT
Start: 2023-11-01 | End: 2023-11-03

## 2023-11-01 RX ORDER — METOPROLOL TARTRATE 50 MG
25 TABLET ORAL DAILY
Refills: 0 | Status: DISCONTINUED | OUTPATIENT
Start: 2023-11-01 | End: 2023-11-03

## 2023-11-01 RX ORDER — METOPROLOL TARTRATE 50 MG
50 TABLET ORAL AT BEDTIME
Refills: 0 | Status: DISCONTINUED | OUTPATIENT
Start: 2023-11-01 | End: 2023-11-03

## 2023-11-01 RX ORDER — SIMVASTATIN 20 MG/1
20 TABLET, FILM COATED ORAL AT BEDTIME
Refills: 0 | Status: DISCONTINUED | OUTPATIENT
Start: 2023-11-01 | End: 2023-11-03

## 2023-11-01 RX ORDER — RIVAROXABAN 15 MG-20MG
20 KIT ORAL
Refills: 0 | Status: DISCONTINUED | OUTPATIENT
Start: 2023-11-01 | End: 2023-11-03

## 2023-11-01 RX ORDER — ONDANSETRON 8 MG/1
4 TABLET, FILM COATED ORAL EVERY 8 HOURS
Refills: 0 | Status: DISCONTINUED | OUTPATIENT
Start: 2023-11-01 | End: 2023-11-03

## 2023-11-01 RX ADMIN — Medication 5 MILLIGRAM(S): at 00:34

## 2023-11-01 RX ADMIN — SIMVASTATIN 20 MILLIGRAM(S): 20 TABLET, FILM COATED ORAL at 22:07

## 2023-11-01 RX ADMIN — RIVAROXABAN 20 MILLIGRAM(S): KIT at 22:07

## 2023-11-01 RX ADMIN — AMIODARONE HYDROCHLORIDE 200 MILLIGRAM(S): 400 TABLET ORAL at 10:16

## 2023-11-01 RX ADMIN — Medication 650 MILLIGRAM(S): at 02:15

## 2023-11-01 RX ADMIN — LOSARTAN POTASSIUM 50 MILLIGRAM(S): 100 TABLET, FILM COATED ORAL at 10:16

## 2023-11-01 RX ADMIN — Medication 25 GRAM(S): at 05:24

## 2023-11-01 RX ADMIN — Medication 50 MILLIGRAM(S): at 22:07

## 2023-11-01 RX ADMIN — Medication 25 MILLIGRAM(S): at 10:16

## 2023-11-01 NOTE — PROGRESS NOTE ADULT - SUBJECTIVE AND OBJECTIVE BOX
SUBJECTIVE:       Pt seen and examined at bedside. No acute events overnight. Patient doing well overall. Reports pain improved but endorses BL leg cramps now improved s/p Mg. No CP, SOB, N/V, F/C, new N/T.    Vital Signs (24 Hrs):  T(C): 36.4 (10-31-23 @ 23:52), Max: 36.6 (10-31-23 @ 15:51)  HR: 55 (11-01-23 @ 01:18) (55 - 69)  BP: 133/69 (11-01-23 @ 01:18) (133/69 - 190/73)  RR: 18 (10-31-23 @ 23:52) (16 - 18)  SpO2: 97% (10-31-23 @ 23:52) (97% - 99%)  Wt(kg): --    LABS:                          13.5   9.66  )-----------( 349      ( 31 Oct 2023 17:22 )             40.3     10-31    133<L>  |  100  |  17  ----------------------------<  123<H>  4.0   |  28  |  0.74    Ca    8.4<L>      31 Oct 2023 17:22    TPro  6.6  /  Alb  3.1<L>  /  TBili  0.4  /  DBili  x   /  AST  21  /  ALT  31  /  AlkPhos  80  10-31    LIVER FUNCTIONS - ( 31 Oct 2023 17:22 )  Alb: 3.1 g/dL / Pro: 6.6 gm/dL / ALK PHOS: 80 U/L / ALT: 31 U/L / AST: 21 U/L / GGT: x               Physical Exam  General: NAD, Alert, Awake and oriented  Neurologic: No gross deficits, moving all 4s.  Head: NCAT without abrasions, lacerations, or ecchymosis to head, face, or scalp  Eyes: PERRL  Neck/C-Spine: FAROM. No bony TTP.    RIGHT LE:  No open skin. No deformities or other signs of trauma at hip, knee, lower leg, ankle or foot. Unable to actively SLR R leg, but able to maintain SLR if passively lifted. Discomfort about hip flexor w resisted hip flexion. Negative log-roll, axial load and heel strike. No groin or hip  pain on Passive internal or external rotation. SILT toes 1-5. 2+ DP/PT pulses with brisk cap refill distally. Compartments soft and compressible. EHL/FHL/TA/GS intact 5/5.    A/P: 88y Female with Right hip pain s/p R PADDY in may   Pain control  WBAT, PT, trial of ambulation   Antiinflammatories as tolerated  Rolling walker PRN  DVT PE ppx  DIscussed with Orthopedic Attending Dr. Edwards who agrees with the plan above  SUBJECTIVE:       Pt seen and examined at bedside. No acute events overnight. Patient doing well overall. Reports pain improved but endorses BL leg cramps now improved s/p Mg. No CP, SOB, N/V, F/C, new N/T.    Vital Signs (24 Hrs):  T(C): 36.4 (10-31-23 @ 23:52), Max: 36.6 (10-31-23 @ 15:51)  HR: 55 (11-01-23 @ 01:18) (55 - 69)  BP: 133/69 (11-01-23 @ 01:18) (133/69 - 190/73)  RR: 18 (10-31-23 @ 23:52) (16 - 18)  SpO2: 97% (10-31-23 @ 23:52) (97% - 99%)  Wt(kg): --    LABS:                          13.5   9.66  )-----------( 349      ( 31 Oct 2023 17:22 )             40.3     10-31    133<L>  |  100  |  17  ----------------------------<  123<H>  4.0   |  28  |  0.74    Ca    8.4<L>      31 Oct 2023 17:22    TPro  6.6  /  Alb  3.1<L>  /  TBili  0.4  /  DBili  x   /  AST  21  /  ALT  31  /  AlkPhos  80  10-31    LIVER FUNCTIONS - ( 31 Oct 2023 17:22 )  Alb: 3.1 g/dL / Pro: 6.6 gm/dL / ALK PHOS: 80 U/L / ALT: 31 U/L / AST: 21 U/L / GGT: x               Physical Exam  General: NAD, Alert, Awake and oriented  Neurologic: No gross deficits, moving all 4s.  Head: NCAT without abrasions, lacerations, or ecchymosis to head, face, or scalp  Eyes: PERRL  Neck/C-Spine: FAROM. No bony TTP.    RIGHT LE:  No open skin. No deformities or other signs of trauma at hip, knee, lower leg, ankle or foot. Unable to actively SLR R leg, but able to maintain SLR if passively lifted. Discomfort about hip flexor w resisted hip flexion. Negative log-roll, axial load and heel strike. No groin or hip  pain on Passive internal or external rotation. SILT toes 1-5. 2+ DP/PT pulses with brisk cap refill distally. Compartments soft and compressible. EHL/FHL/TA/GS intact 5/5.    A/P: 88y Female with Right hip pain s/p R PADDY in may   Pain control  WBAT, PT, trial of ambulation   Antiinflammatories as tolerated  Rolling walker PRN  DVT PE ppx  No acute orthopaedic intervention indicated at the moment, plan to FU w/ Dr. Edwards outpatient as needed  DIscussed with Orthopedic Attending Dr. Edwards who agrees with the plan above

## 2023-11-01 NOTE — H&P ADULT - NSHPPHYSICALEXAM_GEN_ALL_CORE
Vital Signs Last 24 Hrs  T(C): 36.4 (31 Oct 2023 23:52), Max: 36.6 (31 Oct 2023 15:51)  T(F): 97.5 (31 Oct 2023 23:52), Max: 97.9 (31 Oct 2023 15:51)  HR: 55 (31 Oct 2023 23:52) (55 - 69)  BP: 184/72 (31 Oct 2023 23:52) (159/75 - 190/73)  BP(mean): 102 (31 Oct 2023 20:58) (102 - 102)  RR: 18 (31 Oct 2023 23:52) (16 - 18)  SpO2: 97% (31 Oct 2023 23:52) (97% - 99%)    Parameters below as of 31 Oct 2023 23:52  Patient On (Oxygen Delivery Method): room air

## 2023-11-01 NOTE — PROGRESS NOTE ADULT - SUBJECTIVE AND OBJECTIVE BOX
88 year old female w Afib, bronchitis, HTN, HLD, syncope presents to  ED [facility] for an evaluation of her R hip 2/2 pain and difficulty ambulating    s/p R hip replacement  w follow up 1 month ago and was told she had good progress  "my R knee has been clicking."  Today at the store today and had poor gait, felt like she couldn't lift her leg, endorsed the need to lean back to alleviate pain.   Denies any fall or injury.  Denied in usual    Pt was subsequently helped out of the store and brought to the ED via EMS. Endorses back pain. Denies numbness, tingling, weakness, neurological/urinary symptoms.    In ED /75   HR 67    RR 16    T 97.9    99% RA   xray neg for fx  CT scan pelvis no fx, prosthesis in place  Ortho consult    11/1/23   88 year old female w Afib, bronchitis, HTN, HLD, syncope presents to  ED [facility] for an evaluation of her R hip 2/2 pain and difficulty ambulating    s/p R hip replacement  w follow up 1 month ago and was told she had good progress  "my R knee has been clicking."  Today at the store today and had poor gait, felt like she couldn't lift her leg, endorsed the need to lean back to alleviate pain.   Denies any fall or injury.  Denied in usual    Pt was subsequently helped out of the store and brought to the ED via EMS. Endorses back pain. Denies numbness, tingling, weakness, neurological/urinary symptoms.    In ED /75   HR 67    RR 16    T 97.9    99% RA   xray neg for fx  CT scan pelvis no fx, prosthesis in place  Ortho consult    11/1/23      < from: DELONTE Complete w/Spect and Color (10.18.23 @ 08:15) >   Summary:   Estimated left ventricular ejection fraction is 60-65 %.   The left ventricle is normal in size, wall thickness, wall motion and   contractility.   The left atrium is moderately dilated.   No thrombus seen in the left atrial appendage.   No left atrial thrombus or mass is seen.   Spontaneous echogenic contrast (smoke) seen in the left atrium.   A saline contrast study was performed and showed no evidence of a patent   foramen ovale.   The aortic valve is trileaflet with normal valve opening.   No Aortic stenosis or insufficieny is seen.   A small echogenic mass is seen attached to the end of the left coronary   cusp of aortic valve measuring 0.3 cm x 0.5 cm in size. This islikely a   fibroelastoma.   The mitral valve leaflets minimally tickened.   Mild mitral annular calcification is present.   Mild (1-2+) mitral regurgitation is present.   No mitral stenosis noted.     Signature     ----------------------------------------------------------------   Electronically signed by Johan Das MD(Interpreting   physician) on 10/18/2023 10:24 AM   ----------------------------------------------------------------    < end of copied text >    · Procedure Name	Cardioversion  · Procedure Date/Time	18-Oct-2023    · Post-Procedure Rhythm	normal sinus rhythm  · Amount of Biphasic Joules	200  · Type of Shock	biphasic  · Pre-Procedure Rhythm	atrial fibrillation  · Number of Attempts	1

## 2023-11-01 NOTE — PROGRESS NOTE ADULT - SUBJECTIVE AND OBJECTIVE BOX
88 year old female w Afib, bronchitis, HTN, HLD, syncope presents to  ED [facility] for an evaluation of her R hip 2/2 pain and difficulty ambulating    s/p R hip replacement  w follow up 1 month ago and was told she had good progress  "my R knee has been clicking."  Today at the store today and had poor gait, felt like she couldn't lift her leg, endorsed the need to lean back to alleviate pain.   Denies any fall or injury.  Denied in usual    Pt was subsequently helped out of the store and brought to the ED via EMS. Endorses back pain. Denies numbness, tingling, weakness, neurological/urinary symptoms.    11/1/23  Patient seen and examined sitting in chair at bedside. Complaining of right hip pain.  States was able to participate with PT with some discomfort in right hip.     T(C): 36.4 (11-01-23 @ 15:51), Max: 36.5 (10-31-23 @ 21:55)  HR: 62 (11-01-23 @ 15:51) (55 - 62)  BP: 117/62 (11-01-23 @ 15:51) (117/62 - 190/73)  RR: 18 (11-01-23 @ 15:51) (18 - 18)  SpO2: 97% (11-01-23 @ 15:51) (96% - 98%)    CONSTITUTIONAL: Thin, no apparent distress  EYES:  EOMI, No conjunctival or scleral injection, non-icteric  ENMT: Oral mucosa with moist membranes.              NECK: Supple, symmetric and without tracheal deviation   RESP: No respiratory distress, no use of accessory muscles; CTA b/l, no WRR  CV: RRR, +S1S2, no MRG; no JVD; no peripheral edema  GI: Soft, NT, ND, no rebound, no guarding  LYMPH: No cervical LAD   SKIN: No rashes or ulcers noted; no subcutaneous nodules or induration palpable  PSYCH: Appropriate insight/judgment; A+O x 3, mood and affect appropriate, recent/remote memory intact                          13.5   9.66  )-----------( 349      ( 31 Oct 2023 17:22 )             40.3   10-31    133<L>  |  100  |  17  ----------------------------<  123<H>  4.0   |  28  |  0.74    Ca    8.4<L>      31 Oct 2023 17:22    TPro  6.6  /  Alb  3.1<L>  /  TBili  0.4  /  DBili  x   /  AST  21  /  ALT  31  /  AlkPhos  80  10-31

## 2023-11-01 NOTE — PHARMACOTHERAPY INTERVENTION NOTE - COMMENTS
Medication reconciliation NOT COMPLETE, pt provided a list of current meds and doses but list from Dr. Joseph shows several different recent medications not shown on list.  Pt was moved out of ED but the board was not updated and as of 11:36pm still shows pt as active in ED and not on a floor; unable to reconcile the med list properly.
Medication history complete, reviewed medication with patient and confirmed with DrFirst and patients list.

## 2023-11-01 NOTE — PHYSICAL THERAPY INITIAL EVALUATION ADULT - ACTIVE RANGE OF MOTION EXAMINATION, REHAB EVAL
except Bilat hip/knee flex 45/45 approx; limited due to c/o "cramps" in Bilat calves/no Active ROM deficits were identified

## 2023-11-01 NOTE — PHYSICAL THERAPY INITIAL EVALUATION ADULT - MODALITIES TREATMENT COMMENTS
pt left seated in chair post Eval; chair alarm, IV intact; callbell in reach; pt instructed not to get up alone; call nursing for assist; ervin well; denied pain; RN Doirs made aware pt OOB

## 2023-11-01 NOTE — H&P ADULT - ASSESSMENT
88 year old female w Afib, bronchitis, HTN, HLD, syncope presents to  ED [facility] for an evaluation of her R hip 2/2 pain and difficulty ambulating      #Hip pain   when standing  #Unable to ambulate  #R THR prosthesis  stable  pt reported doing well w 8 weeks of rehab at [facility] and then she did not continue  expect muscle weakness over time may be responsible  1. admit to med  2. fall risk  3. Ortho consult read ad appreciated  4. PT  5. acetaminophen prn  6. unable to use NSAIDs due to AC  7. trial of lidoderm prior to ambulation      #AFIB  #s/p recent cardioversion  RRR on exam  1. amiodarone  2. rivaroxaban  3. BB      #HL1. statin      #HTN  1. olmesartan interch  2. BB    #Urinary urgency  UA no signs of infection  1. monitor symptoms      #VTE  on AC        #GOC  I am unable to locate prior MOLST in one content      #80 minutes   88 year old female w Afib, bronchitis, HTN, HLD, syncope presents to  ED [facility] for an evaluation of her R hip 2/2 pain and difficulty ambulating      #Hip pain   when standing  #Unable to ambulate  #R THR prosthesis  stable  pt reported doing well w 8 weeks of rehab at [facility] and then she did not continue  expect muscle weakness over time may be responsible  1. admit to med  2. fall risk  3. Ortho consult read ad appreciated  4. PT  5. acetaminophen prn  6. unable to use NSAIDs due to AC  7. trial of lidoderm prior to ambulation      #AFIB  #s/p recent cardioversion  RRR on exam  1. amiodarone  2. rivaroxaban  3. BB      #HL1. statin      #HTN  BP elevated  1. olmesartan interch  2. BB  3. s/p hydralazine 5 mg IV x 1      #Urinary urgency  UA no signs of infection  1. monitor symptoms      #VTE  on AC        #GOC  I am unable to locate prior MOLST in one content      #80 minutes   88 year old female w Afib, bronchitis, HTN, HLD, syncope presents to  ED [facility] for an evaluation of her R hip 2/2 pain and difficulty ambulating      #Hip pain   when standing  #Unable to ambulate  #R THR prosthesis  stable  pt reported doing well w 8 weeks of rehab at [facility] and then she did not continue  expect muscle weakness over time may be responsible  1. admit to med  2. fall risk  3. Ortho consult read ad appreciated  4. PT  5. acetaminophen prn  6. unable to use NSAIDs due to AC  7. trial of lidoderm prior to ambulation      #AFIB  #s/p recent cardioversion  RRR on exam  1. amiodarone  2. rivaroxaban  3. BB      #HLD  1. statin      #HTN  BP elevated  1. olmesartan interch  2. BB  3. s/p hydralazine 5 mg IV x 1      #Urinary urgency  UA no signs of infection  1. monitor symptoms      #VTE  on AC        #GOC  I am unable to locate prior MOLST in one content      #80 minutes

## 2023-11-01 NOTE — H&P ADULT - MUSCULOSKELETAL
no joint warmth/no calf tenderness details… no TTP of lower extrem/no joint warmth/no calf tenderness

## 2023-11-01 NOTE — H&P ADULT - HISTORY OF PRESENT ILLNESS
88 year old female w Afib, bronchitis, HTN, HLD, syncope presents to  ED [facility] for an evaluation of her R hip 2/2 pain and difficulty ambulating    s/p R hip replacement w/ Jerry in May, saw him a month ago and was told she was w/ good progress  "my R knee has been clicking."  Today at the store today and had poor gait, felt like she couldn't lift her leg, endorsed the need to lean back to alleviate pain.   Denies any fall or injury.   Pt was subsequently helped out of the store and brought to the ED via EMS. Endorses back pain. Denies numbness, tingling, weakness, neurological/urinary symptoms. Allergic to erythromycin, penicillin, and shellfish             In ED /75   HR 67    RR 16    T 97.9    99% RA       PAST MEDICAL HX  Afib   Bronchitis   HTN (hypertension)   Hyperlipidemia   Syncope 7/2/2013       PAST SURGICAL HX  Cardiac abnormality internal cardiac monitor placed 8/2013  H/O: hysterectomy   S/P cataract surgery Left eye         FAMILY HX  Family history of CVA  Family history of diabetes mellitus  Family history of primary TB        SOCIAL HX         88 year old female w Afib, bronchitis, HTN, HLD, syncope presents to  ED [facility] for an evaluation of her R hip 2/2 pain and difficulty ambulating    s/p R hip replacement  w follow up 1 month ago and was told she had good progress  "my R knee has been clicking."  Today at the store today and had poor gait, felt like she couldn't lift her leg, endorsed the need to lean back to alleviate pain.   Denies any fall or injury.  Denied in usual    Pt was subsequently helped out of the store and brought to the ED via EMS. Endorses back pain. Denies numbness, tingling, weakness, neurological/urinary symptoms.         In ED /75   HR 67    RR 16    T 97.9    99% RA   xray neg for fx  CT scan pelvis no fx, prosthesis in place  Ortho consult      PAST MEDICAL HX  AFIB  Bronchitis   HTN (hypertension)   Hyperlipidemia   Syncope 7/2/2013       PAST SURGICAL HX  Cardioversion : recent w Dr. Das  Cardiac abnormality internal cardiac monitor placed 8/2013  H/O: hysterectomy   S/P cataract surgery Left eye         FAMILY HX  Family history of CVA  Family history of diabetes mellitus  Family history of primary TB      SOCIAL HX    lives alone  children live out of state  nonsmoker   neighbor drives her to the store  ambulates w cane  does not use stairs at home; has stair master

## 2023-11-01 NOTE — PHYSICAL THERAPY INITIAL EVALUATION ADULT - LEVEL OF CONSCIOUSNESS, REHAB EVAL
alert Otezla Pregnancy And Lactation Text: This medication is Pregnancy Category C and it isn't known if it is safe during pregnancy. It is unknown if it is excreted in breast milk.

## 2023-11-02 PROCEDURE — 99232 SBSQ HOSP IP/OBS MODERATE 35: CPT

## 2023-11-02 RX ADMIN — Medication 25 MILLIGRAM(S): at 10:45

## 2023-11-02 RX ADMIN — AMIODARONE HYDROCHLORIDE 200 MILLIGRAM(S): 400 TABLET ORAL at 10:45

## 2023-11-02 RX ADMIN — LOSARTAN POTASSIUM 50 MILLIGRAM(S): 100 TABLET, FILM COATED ORAL at 10:46

## 2023-11-02 RX ADMIN — Medication 50 MILLIGRAM(S): at 22:18

## 2023-11-02 RX ADMIN — RIVAROXABAN 20 MILLIGRAM(S): KIT at 22:18

## 2023-11-02 RX ADMIN — SIMVASTATIN 20 MILLIGRAM(S): 20 TABLET, FILM COATED ORAL at 22:18

## 2023-11-02 NOTE — PROGRESS NOTE ADULT - REASON FOR ADMISSION
unable to ambulate on R hip/leg

## 2023-11-02 NOTE — PROGRESS NOTE ADULT - ASSESSMENT
88 year old female w Afib, bronchitis, HTN, HLD, syncope presents to  ED [facility] for an evaluation of her R hip 2/2 pain and difficulty ambulating      #Hip pain   when standing  #Unable to ambulate  #R THR prosthesis  stable  pt reported doing well w 8 weeks of rehab at [facility] and then she did not continue  expect muscle weakness over time may be responsible  1. admit to med  2. fall risk  3. Ortho consult read ad appreciated  4. PT  5. acetaminophen prn  6. unable to use NSAIDs due to AC  7. trial of lidoderm prior to ambulation      #AFIB  #s/p recent cardioversion  RRR on exam  1. amiodarone  2. rivaroxaban  3. BB      #HLD  1. statin      #HTN  BP elevated  1. olmesartan interch  2. BB  3. s/p hydralazine 5 mg IV x 1      #Urinary urgency  UA no signs of infection  1. monitor symptoms      #VTE  on AC    
88 year old female w Afib, bronchitis, HTN, HLD, syncope presents to  ED [facility] for an evaluation of her R hip 2/2 pain and difficulty ambulating      #Hip pain   when standing  #Unable to ambulate  #R THR prosthesis  stable  pt reported doing well w 8 weeks of rehab at [facility] and then she did not continue  expect muscle weakness over time may be responsible  1. admit to med  2. fall risk  3. Ortho consult read ad appreciated  4. PT  5. acetaminophen prn  6. unable to use NSAIDs due to AC  7. trial of lidoderm prior to ambulation      #AFIB  #s/p recent cardioversion  RRR on exam  1. amiodarone  2. rivaroxaban  3. BB      #HLD  1. statin      #HTN  BP elevated  1. olmesartan interch  2. BB  3. s/p hydralazine 5 mg IV x 1      #Urinary urgency  UA no signs of infection  1. monitor symptoms      #VTE  on AC    #GOC  will need to verify MOLST   
88 year old female w Afib, bronchitis, HTN, HLD, syncope presents to  ED [facility] for an evaluation of her R hip 2/2 pain and difficulty ambulating      #Hip pain   when standing  #Unable to ambulate  #R THR prosthesis  stable  pt reported doing well w 8 weeks of rehab at [facility] and then she did not continue  expect muscle weakness over time may be responsible  1. Discharge to [facility]  2. fall risk  3. Ortho consult   4. PT  5. acetaminophen prn  6. unable to use NSAIDs due to AC  7. trial of lidoderm prior to ambulation      #AFIB  #s/p recent cardioversion  RRR on exam  1. amiodarone  2. rivaroxaban  3. BB      #HLD  1. statin      #HTN  BP elevated  1. olmesartan interch  2. BB  3. s/p hydralazine 5 mg IV x 1      #Urinary urgency  UA no signs of infection  1. monitor symptoms      #VTE  on AC

## 2023-11-02 NOTE — PROGRESS NOTE ADULT - SUBJECTIVE AND OBJECTIVE BOX
CC: 88 year old female w Afib, bronchitis, HTN, HLD, syncope presents to  ED [facility] for an evaluation of her R hip 2/2 pain and difficulty ambulating  s/p R hip replacement  w follow up 1 month ago and was told she had good progress  "my R knee has been clicking."  Today at the store today and had poor gait, felt like she couldn't lift her leg, endorsed the need to lean back to alleviate pain.   Denies any fall or injury. Pt was subsequently helped out of the store and brought to the ED via EMS. Endorses back pain. Denies numbness, tingling, weakness, neurological/urinary symptoms.    11/2 - pt reports pain is controlled, reports occ cramps - but does nto want flexeril at this time; having BMS  no cp palps sob abdo pain     Vital Signs Last 24 Hrs  T(F): 97.5 (02 Nov 2023 15:57), Max: 97.5 (02 Nov 2023 15:57)  HR: 61 (02 Nov 2023 15:57) (57 - 63)  BP: 133/63 (02 Nov 2023 15:57) (133/63 - 151/69)  RR: 18 (02 Nov 2023 15:57) (18 - 18)  SpO2: 97% (02 Nov 2023 15:57) (96% - 99%)  Constitutional: NAD, awake and alert  HEENT: PERR, EOMI  Neck: Soft and supple,  No JVD  Respiratory: Breath sounds are clear bilaterally, No wheezing, rales or rhonchi  Cardiovascular: S1 and S2, regular rate and rhythm, no Murmurs  Gastrointestinal: Bowel Sounds present, soft, nontender, nondistended  Extremities: No peripheral edema  Vascular: 2+ peripheral pulses  Neurological: A/O x 3, no focal deficits  Musculoskeletal: 5/5 strength b/l upper and lower extremities  Skin: No rashes    MEDICATIONS:  MEDICATIONS  (STANDING):  aMIOdarone    Tablet 200 milliGRAM(s) Oral daily  losartan 50 milliGRAM(s) Oral daily  metoprolol succinate ER 25 milliGRAM(s) Oral daily  metoprolol succinate ER 50 milliGRAM(s) Oral at bedtime  rivaroxaban 20 milliGRAM(s) Oral <User Schedule>  simvastatin 20 milliGRAM(s) Oral at bedtime      LABS: All Labs Reviewed:  Blood Culture: 10-31 @ 20:46  Organism --  Gram Stain Blood -- Gram Stain --  Specimen Source Clean Catch Clean Catch (Midstream)  Culture-Blood --    RADIOLOGY/EKG:  < from: CT 3D Reconstruct w/o Workstation (10.31.23 @ 20:17) >  Right total hip arthroplasty as detailed above without a periprosthetic   fracture or osteolysis. No change is identified.  MRI with metal artifact reduction can be obtained of the right hip there   is persistent concern for soft tissue pathology that can be better imaged   on MRI.

## 2023-11-03 ENCOUNTER — TRANSCRIPTION ENCOUNTER (OUTPATIENT)
Age: 88
End: 2023-11-03

## 2023-11-03 VITALS — SYSTOLIC BLOOD PRESSURE: 129 MMHG | HEART RATE: 62 BPM | DIASTOLIC BLOOD PRESSURE: 75 MMHG

## 2023-11-03 PROCEDURE — 99239 HOSP IP/OBS DSCHRG MGMT >30: CPT

## 2023-11-03 RX ORDER — LOSARTAN POTASSIUM 100 MG/1
75 TABLET, FILM COATED ORAL DAILY
Refills: 0 | Status: DISCONTINUED | OUTPATIENT
Start: 2023-11-03 | End: 2023-11-03

## 2023-11-03 RX ADMIN — AMIODARONE HYDROCHLORIDE 200 MILLIGRAM(S): 400 TABLET ORAL at 08:52

## 2023-11-03 RX ADMIN — LOSARTAN POTASSIUM 75 MILLIGRAM(S): 100 TABLET, FILM COATED ORAL at 08:52

## 2023-11-03 RX ADMIN — Medication 25 MILLIGRAM(S): at 08:52

## 2023-11-03 NOTE — DISCHARGE NOTE NURSING/CASE MANAGEMENT/SOCIAL WORK - PATIENT PORTAL LINK FT
You can access the FollowMyHealth Patient Portal offered by [facility] by registering at the following website: [email or web address]. By joining [facility]’s FollowMyHealth portal, you will also be able to view your health information using other applications (apps) compatible with our system.

## 2023-11-03 NOTE — DISCHARGE NOTE PROVIDER - NSDCFUSCHEDAPPT_GEN_ALL_CORE_FT
Adonis Mistry  [facility]  UROLOGY [address]  Scheduled Appointment: 11/28/2023    Leif John  [facility]  NEUROLOGY [address]  Scheduled Appointment: 01/29/2024

## 2023-11-03 NOTE — DISCHARGE NOTE PROVIDER - CARE PROVIDER_API CALL
Mehran Bowen  [facility]  [address], [address]  [place] [address]  Phone: [phone]  Fax: [phone]  Follow Up Time:     Adonis Mistry  Urology  [address]  [place] [address]  Phone: [phone]  Fax: [phone]  Follow Up Time:

## 2023-11-03 NOTE — DISCHARGE NOTE PROVIDER - HOSPITAL COURSE
CC: 88 year old female w Afib, bronchitis, HTN, HLD, syncope presents to  ED [facility] for an evaluation of her R hip 2/2 pain and difficulty ambulating  s/p R hip replacement  w follow up 1 month ago and was told she had good progress  "my R knee has been clicking." Today at the store today and had poor gait, felt like she couldn't lift her leg, endorsed the need to lean back to alleviate pain.   Denies any fall or injury. Pt was subsequently helped out of the store and brought to the ED via EMS. Endorses back pain. Denies numbness, tingling, weakness, neurological/urinary symptoms.    HOSPITAL COURSE:   #Hip pain when standing  #Unable to ambulate  #R THR prosthesis  stable  pt reported doing well w 8 weeks of rehab at [facility] and then she did not continue  expect muscle weakness over time may be responsible   PT, acetaminophen prn, unable to use NSAIDs due to AC   trial of lidoderm prior to ambulation    #AFIB  #s/p recent cardioversion  RRR on exam  amiodarone rivaroxaban BB    #HLD statin    #HTN ARB BB     #Urinary urgency  UA no signs of infection, no dysuria    OTHER DETAILS:  11/3 - no cp palps sob abdo pain     PHYSICAL EXAM:  GENERAL: NAD, able to lie flat in bed  HEAD:  Atraumatic, Normocephalic  EYES: EOMI, PERRLA, normal sclera  ENT: Moist mucous membranes  NECK: Supple, No JVD, no nuchal rigidity  CHEST/LUNG: Clear to auscultation bilaterally; No rales, rhonchi, wheezing, or rubs. Unlabored respirations  HEART: Regular rate and rhythm; No murmurs, rubs, or gallops  ABDOMEN: Bowel sounds present; Soft, Nontender, Nondistended. No hepatomegaly  EXTREMITIES:  no pitting bilaterally  NERVOUS SYSTEM:  Alert & Oriented X3, speech clear. No focal motor or sensory deficits  MSK: FROM all 4 extremities, full and equal strength  SKIN: No rashes or lesions    LABS: All Labs Reviewed:  RADIOLOGY/EKG:  < from: CT Pelvis Bony Only No Cont (10.31.23 @ 20:17) >  Right total hip arthroplasty as detailed above without a periprosthetic   fracture or osteolysis. No change is identified.  MRI with metal artifact reduction can be obtained of the right hip there   is persistent concern for soft tissue pathology that can be better imaged   on MRI.    time spent on MDM and discharge - 50 mins

## 2023-11-03 NOTE — DISCHARGE NOTE PROVIDER - NSDCCPCAREPLAN_GEN_ALL_CORE_FT
PRINCIPAL DISCHARGE DIAGNOSIS  Diagnosis: Unable to ambulate  Assessment and Plan of Treatment: after rt hip surgery  plan for rehab to strengthen muscles and improve gait and mobility

## 2023-11-03 NOTE — DISCHARGE NOTE PROVIDER - NSDCMRMEDTOKEN_GEN_ALL_CORE_FT
amiodarone 200 mg oral tablet: 1 tab(s) orally once a day  Artificial Tears ophthalmic solution: 1 drop(s) in each eye 3 times a day as needed for  dry eyes  metoprolol succinate 50 mg oral tablet, extended release: 0.5 tab(s) orally once a day (in the morning)  metoprolol succinate 50 mg oral tablet, extended release: 1 tab(s) orally once a day (at bedtime)  mupirocin 2% topical ointment: Apply topically to affected area of left upper arm as directed  olmesartan 20 mg oral tablet: 1 tab(s) orally once a day  rivaroxaban 20 mg oral tablet: 1 tab(s) orally once a day (at bedtime)  simvastatin 20 mg oral tablet: 1 tab(s) orally once a day (at bedtime)

## 2023-11-07 DIAGNOSIS — Z88.1 ALLERGY STATUS TO OTHER ANTIBIOTIC AGENTS STATUS: ICD-10-CM

## 2023-11-07 DIAGNOSIS — Z88.0 ALLERGY STATUS TO PENICILLIN: ICD-10-CM

## 2023-11-07 DIAGNOSIS — I48.91 UNSPECIFIED ATRIAL FIBRILLATION: ICD-10-CM

## 2023-11-07 DIAGNOSIS — R26.2 DIFFICULTY IN WALKING, NOT ELSEWHERE CLASSIFIED: ICD-10-CM

## 2023-11-07 DIAGNOSIS — R39.15 URGENCY OF URINATION: ICD-10-CM

## 2023-11-07 DIAGNOSIS — M25.551 PAIN IN RIGHT HIP: ICD-10-CM

## 2023-11-07 DIAGNOSIS — E78.5 HYPERLIPIDEMIA, UNSPECIFIED: ICD-10-CM

## 2023-11-07 DIAGNOSIS — I10 ESSENTIAL (PRIMARY) HYPERTENSION: ICD-10-CM

## 2023-11-07 DIAGNOSIS — Z91.013 ALLERGY TO SEAFOOD: ICD-10-CM

## 2023-11-23 ENCOUNTER — INPATIENT (INPATIENT)
Facility: HOSPITAL | Age: 88
LOS: 7 days | Discharge: NURSING, REHAB OR LONG-TERM CARE | DRG: 555 | End: 2023-12-01
Attending: FAMILY MEDICINE | Admitting: STUDENT IN AN ORGANIZED HEALTH CARE EDUCATION/TRAINING PROGRAM
Payer: MEDICARE

## 2023-11-23 VITALS
SYSTOLIC BLOOD PRESSURE: 152 MMHG | RESPIRATION RATE: 18 BRPM | DIASTOLIC BLOOD PRESSURE: 67 MMHG | HEIGHT: 62 IN | WEIGHT: 123.02 LBS | TEMPERATURE: 98 F | HEART RATE: 73 BPM | OXYGEN SATURATION: 95 %

## 2023-11-23 DIAGNOSIS — Z98.49 CATARACT EXTRACTION STATUS, UNSPECIFIED EYE: Chronic | ICD-10-CM

## 2023-11-23 DIAGNOSIS — Z90.710 ACQUIRED ABSENCE OF BOTH CERVIX AND UTERUS: Chronic | ICD-10-CM

## 2023-11-23 LAB
BASOPHILS # BLD AUTO: 0.02 K/UL — SIGNIFICANT CHANGE UP (ref 0–0.2)
BASOPHILS # BLD AUTO: 0.02 K/UL — SIGNIFICANT CHANGE UP (ref 0–0.2)
BASOPHILS NFR BLD AUTO: 0.2 % — SIGNIFICANT CHANGE UP (ref 0–2)
BASOPHILS NFR BLD AUTO: 0.2 % — SIGNIFICANT CHANGE UP (ref 0–2)
EOSINOPHIL # BLD AUTO: 0.01 K/UL — SIGNIFICANT CHANGE UP (ref 0–0.5)
EOSINOPHIL # BLD AUTO: 0.01 K/UL — SIGNIFICANT CHANGE UP (ref 0–0.5)
EOSINOPHIL NFR BLD AUTO: 0.1 % — SIGNIFICANT CHANGE UP (ref 0–6)
EOSINOPHIL NFR BLD AUTO: 0.1 % — SIGNIFICANT CHANGE UP (ref 0–6)
HCT VFR BLD CALC: 41.8 % — SIGNIFICANT CHANGE UP (ref 34.5–45)
HCT VFR BLD CALC: 41.8 % — SIGNIFICANT CHANGE UP (ref 34.5–45)
HGB BLD-MCNC: 14.3 G/DL — SIGNIFICANT CHANGE UP (ref 11.5–15.5)
HGB BLD-MCNC: 14.3 G/DL — SIGNIFICANT CHANGE UP (ref 11.5–15.5)
IMM GRANULOCYTES NFR BLD AUTO: 0.3 % — SIGNIFICANT CHANGE UP (ref 0–0.9)
IMM GRANULOCYTES NFR BLD AUTO: 0.3 % — SIGNIFICANT CHANGE UP (ref 0–0.9)
LYMPHOCYTES # BLD AUTO: 0.73 K/UL — LOW (ref 1–3.3)
LYMPHOCYTES # BLD AUTO: 0.73 K/UL — LOW (ref 1–3.3)
LYMPHOCYTES # BLD AUTO: 8.4 % — LOW (ref 13–44)
LYMPHOCYTES # BLD AUTO: 8.4 % — LOW (ref 13–44)
MCHC RBC-ENTMCNC: 28.3 PG — SIGNIFICANT CHANGE UP (ref 27–34)
MCHC RBC-ENTMCNC: 28.3 PG — SIGNIFICANT CHANGE UP (ref 27–34)
MCHC RBC-ENTMCNC: 34.2 GM/DL — SIGNIFICANT CHANGE UP (ref 32–36)
MCHC RBC-ENTMCNC: 34.2 GM/DL — SIGNIFICANT CHANGE UP (ref 32–36)
MCV RBC AUTO: 82.6 FL — SIGNIFICANT CHANGE UP (ref 80–100)
MCV RBC AUTO: 82.6 FL — SIGNIFICANT CHANGE UP (ref 80–100)
MONOCYTES # BLD AUTO: 1.22 K/UL — HIGH (ref 0–0.9)
MONOCYTES # BLD AUTO: 1.22 K/UL — HIGH (ref 0–0.9)
MONOCYTES NFR BLD AUTO: 14.1 % — HIGH (ref 2–14)
MONOCYTES NFR BLD AUTO: 14.1 % — HIGH (ref 2–14)
NEUTROPHILS # BLD AUTO: 6.66 K/UL — SIGNIFICANT CHANGE UP (ref 1.8–7.4)
NEUTROPHILS # BLD AUTO: 6.66 K/UL — SIGNIFICANT CHANGE UP (ref 1.8–7.4)
NEUTROPHILS NFR BLD AUTO: 76.9 % — SIGNIFICANT CHANGE UP (ref 43–77)
NEUTROPHILS NFR BLD AUTO: 76.9 % — SIGNIFICANT CHANGE UP (ref 43–77)
PLATELET # BLD AUTO: 203 K/UL — SIGNIFICANT CHANGE UP (ref 150–400)
PLATELET # BLD AUTO: 203 K/UL — SIGNIFICANT CHANGE UP (ref 150–400)
RBC # BLD: 5.06 M/UL — SIGNIFICANT CHANGE UP (ref 3.8–5.2)
RBC # BLD: 5.06 M/UL — SIGNIFICANT CHANGE UP (ref 3.8–5.2)
RBC # FLD: 14 % — SIGNIFICANT CHANGE UP (ref 10.3–14.5)
RBC # FLD: 14 % — SIGNIFICANT CHANGE UP (ref 10.3–14.5)
WBC # BLD: 8.67 K/UL — SIGNIFICANT CHANGE UP (ref 3.8–10.5)
WBC # BLD: 8.67 K/UL — SIGNIFICANT CHANGE UP (ref 3.8–10.5)
WBC # FLD AUTO: 8.67 K/UL — SIGNIFICANT CHANGE UP (ref 3.8–10.5)
WBC # FLD AUTO: 8.67 K/UL — SIGNIFICANT CHANGE UP (ref 3.8–10.5)

## 2023-11-23 PROCEDURE — 80053 COMPREHEN METABOLIC PANEL: CPT

## 2023-11-23 PROCEDURE — 86255 FLUORESCENT ANTIBODY SCREEN: CPT

## 2023-11-23 PROCEDURE — 0225U NFCT DS DNA&RNA 21 SARSCOV2: CPT

## 2023-11-23 PROCEDURE — 80061 LIPID PANEL: CPT

## 2023-11-23 PROCEDURE — 83880 ASSAY OF NATRIURETIC PEPTIDE: CPT

## 2023-11-23 PROCEDURE — 70498 CT ANGIOGRAPHY NECK: CPT

## 2023-11-23 PROCEDURE — 93010 ELECTROCARDIOGRAM REPORT: CPT

## 2023-11-23 PROCEDURE — 70551 MRI BRAIN STEM W/O DYE: CPT

## 2023-11-23 PROCEDURE — 86038 ANTINUCLEAR ANTIBODIES: CPT

## 2023-11-23 PROCEDURE — 80048 BASIC METABOLIC PNL TOTAL CA: CPT

## 2023-11-23 PROCEDURE — 80074 ACUTE HEPATITIS PANEL: CPT

## 2023-11-23 PROCEDURE — 36415 COLL VENOUS BLD VENIPUNCTURE: CPT

## 2023-11-23 PROCEDURE — 86645 CMV ANTIBODY IGM: CPT

## 2023-11-23 PROCEDURE — 86663 EPSTEIN-BARR ANTIBODY: CPT

## 2023-11-23 PROCEDURE — 83735 ASSAY OF MAGNESIUM: CPT

## 2023-11-23 PROCEDURE — 72146 MRI CHEST SPINE W/O DYE: CPT

## 2023-11-23 PROCEDURE — 71250 CT THORAX DX C-: CPT

## 2023-11-23 PROCEDURE — 70496 CT ANGIOGRAPHY HEAD: CPT

## 2023-11-23 PROCEDURE — 86664 EPSTEIN-BARR NUCLEAR ANTIGEN: CPT

## 2023-11-23 PROCEDURE — 85027 COMPLETE CBC AUTOMATED: CPT

## 2023-11-23 PROCEDURE — 97162 PT EVAL MOD COMPLEX 30 MIN: CPT | Mod: GP

## 2023-11-23 PROCEDURE — 86665 EPSTEIN-BARR CAPSID VCA: CPT

## 2023-11-23 PROCEDURE — 97116 GAIT TRAINING THERAPY: CPT | Mod: GP

## 2023-11-23 PROCEDURE — 93308 TTE F-UP OR LMTD: CPT

## 2023-11-23 PROCEDURE — 86644 CMV ANTIBODY: CPT

## 2023-11-23 PROCEDURE — 83036 HEMOGLOBIN GLYCOSYLATED A1C: CPT

## 2023-11-23 PROCEDURE — 94640 AIRWAY INHALATION TREATMENT: CPT

## 2023-11-23 PROCEDURE — 87635 SARS-COV-2 COVID-19 AMP PRB: CPT

## 2023-11-23 PROCEDURE — 72141 MRI NECK SPINE W/O DYE: CPT

## 2023-11-23 PROCEDURE — 74019 RADEX ABDOMEN 2 VIEWS: CPT

## 2023-11-23 PROCEDURE — 99285 EMERGENCY DEPT VISIT HI MDM: CPT

## 2023-11-23 PROCEDURE — 97530 THERAPEUTIC ACTIVITIES: CPT | Mod: GP

## 2023-11-23 PROCEDURE — 71045 X-RAY EXAM CHEST 1 VIEW: CPT | Mod: 26

## 2023-11-23 PROCEDURE — 86381 MITOCHONDRIAL ANTIBODY EACH: CPT

## 2023-11-23 RX ORDER — SODIUM CHLORIDE 9 MG/ML
500 INJECTION INTRAMUSCULAR; INTRAVENOUS; SUBCUTANEOUS ONCE
Refills: 0 | Status: COMPLETED | OUTPATIENT
Start: 2023-11-23 | End: 2023-11-23

## 2023-11-23 NOTE — ED ADULT TRIAGE NOTE - CHIEF COMPLAINT QUOTE
Patient BIBEMS from home s/p syncope. Per EMS, pt is normally wheelchair bound and was abound to stand up when she slip and ease down on the floor and could not get back up. A&O Patient BIBEMS from home s/p syncope. Per EMS, pt is normally wheelchair bound and was abound to stand up when she slip to the edge of the chair and could not get back up. Incident occur around 45-60mins ago, unwitnessed, Pt denies cp, sob. Admit she normally has lower back pain. -LOC, - blood thinner. PMHx HTN. +allergy, A&O

## 2023-11-24 DIAGNOSIS — R53.1 WEAKNESS: ICD-10-CM

## 2023-11-24 DIAGNOSIS — I48.91 UNSPECIFIED ATRIAL FIBRILLATION: ICD-10-CM

## 2023-11-24 DIAGNOSIS — I10 ESSENTIAL (PRIMARY) HYPERTENSION: ICD-10-CM

## 2023-11-24 LAB
ADD ON TEST-SPECIMEN IN LAB: SIGNIFICANT CHANGE UP
ADD ON TEST-SPECIMEN IN LAB: SIGNIFICANT CHANGE UP
ALBUMIN SERPL ELPH-MCNC: 2.8 G/DL — LOW (ref 3.3–5)
ALBUMIN SERPL ELPH-MCNC: 2.8 G/DL — LOW (ref 3.3–5)
ALBUMIN SERPL ELPH-MCNC: 3.2 G/DL — LOW (ref 3.3–5)
ALBUMIN SERPL ELPH-MCNC: 3.2 G/DL — LOW (ref 3.3–5)
ALP SERPL-CCNC: 67 U/L — SIGNIFICANT CHANGE UP (ref 40–120)
ALP SERPL-CCNC: 67 U/L — SIGNIFICANT CHANGE UP (ref 40–120)
ALP SERPL-CCNC: 78 U/L — SIGNIFICANT CHANGE UP (ref 40–120)
ALP SERPL-CCNC: 78 U/L — SIGNIFICANT CHANGE UP (ref 40–120)
ALT FLD-CCNC: 269 U/L — HIGH (ref 12–78)
ALT FLD-CCNC: 269 U/L — HIGH (ref 12–78)
ALT FLD-CCNC: 271 U/L — HIGH (ref 12–78)
ALT FLD-CCNC: 271 U/L — HIGH (ref 12–78)
ANION GAP SERPL CALC-SCNC: 6 MMOL/L — SIGNIFICANT CHANGE UP (ref 5–17)
ANION GAP SERPL CALC-SCNC: 6 MMOL/L — SIGNIFICANT CHANGE UP (ref 5–17)
ANION GAP SERPL CALC-SCNC: 8 MMOL/L — SIGNIFICANT CHANGE UP (ref 5–17)
ANION GAP SERPL CALC-SCNC: 8 MMOL/L — SIGNIFICANT CHANGE UP (ref 5–17)
APPEARANCE UR: CLEAR — SIGNIFICANT CHANGE UP
APPEARANCE UR: CLEAR — SIGNIFICANT CHANGE UP
APTT BLD: 39.6 SEC — HIGH (ref 24.5–35.6)
APTT BLD: 39.6 SEC — HIGH (ref 24.5–35.6)
AST SERPL-CCNC: 227 U/L — HIGH (ref 15–37)
AST SERPL-CCNC: 227 U/L — HIGH (ref 15–37)
AST SERPL-CCNC: 241 U/L — HIGH (ref 15–37)
AST SERPL-CCNC: 241 U/L — HIGH (ref 15–37)
BACTERIA # UR AUTO: ABNORMAL /HPF
BACTERIA # UR AUTO: ABNORMAL /HPF
BILIRUB SERPL-MCNC: 0.3 MG/DL — SIGNIFICANT CHANGE UP (ref 0.2–1.2)
BILIRUB SERPL-MCNC: 0.3 MG/DL — SIGNIFICANT CHANGE UP (ref 0.2–1.2)
BILIRUB SERPL-MCNC: 0.4 MG/DL — SIGNIFICANT CHANGE UP (ref 0.2–1.2)
BILIRUB SERPL-MCNC: 0.4 MG/DL — SIGNIFICANT CHANGE UP (ref 0.2–1.2)
BILIRUB UR-MCNC: NEGATIVE — SIGNIFICANT CHANGE UP
BILIRUB UR-MCNC: NEGATIVE — SIGNIFICANT CHANGE UP
BUN SERPL-MCNC: 10 MG/DL — SIGNIFICANT CHANGE UP (ref 7–23)
BUN SERPL-MCNC: 10 MG/DL — SIGNIFICANT CHANGE UP (ref 7–23)
BUN SERPL-MCNC: 14 MG/DL — SIGNIFICANT CHANGE UP (ref 7–23)
BUN SERPL-MCNC: 14 MG/DL — SIGNIFICANT CHANGE UP (ref 7–23)
CALCIUM SERPL-MCNC: 8 MG/DL — LOW (ref 8.5–10.1)
CALCIUM SERPL-MCNC: 8 MG/DL — LOW (ref 8.5–10.1)
CALCIUM SERPL-MCNC: 8.3 MG/DL — LOW (ref 8.5–10.1)
CALCIUM SERPL-MCNC: 8.3 MG/DL — LOW (ref 8.5–10.1)
CAST: 1 /LPF — SIGNIFICANT CHANGE UP (ref 0–4)
CAST: 1 /LPF — SIGNIFICANT CHANGE UP (ref 0–4)
CHLORIDE SERPL-SCNC: 100 MMOL/L — SIGNIFICANT CHANGE UP (ref 96–108)
CHLORIDE SERPL-SCNC: 100 MMOL/L — SIGNIFICANT CHANGE UP (ref 96–108)
CHLORIDE SERPL-SCNC: 96 MMOL/L — SIGNIFICANT CHANGE UP (ref 96–108)
CHLORIDE SERPL-SCNC: 96 MMOL/L — SIGNIFICANT CHANGE UP (ref 96–108)
CO2 SERPL-SCNC: 27 MMOL/L — SIGNIFICANT CHANGE UP (ref 22–31)
COLOR SPEC: YELLOW — SIGNIFICANT CHANGE UP
COLOR SPEC: YELLOW — SIGNIFICANT CHANGE UP
CREAT SERPL-MCNC: 0.62 MG/DL — SIGNIFICANT CHANGE UP (ref 0.5–1.3)
CREAT SERPL-MCNC: 0.62 MG/DL — SIGNIFICANT CHANGE UP (ref 0.5–1.3)
CREAT SERPL-MCNC: 0.79 MG/DL — SIGNIFICANT CHANGE UP (ref 0.5–1.3)
CREAT SERPL-MCNC: 0.79 MG/DL — SIGNIFICANT CHANGE UP (ref 0.5–1.3)
DIFF PNL FLD: ABNORMAL
DIFF PNL FLD: ABNORMAL
EGFR: 73 ML/MIN/1.73M2 — SIGNIFICANT CHANGE UP
EGFR: 73 ML/MIN/1.73M2 — SIGNIFICANT CHANGE UP
EGFR: 87 ML/MIN/1.73M2 — SIGNIFICANT CHANGE UP
EGFR: 87 ML/MIN/1.73M2 — SIGNIFICANT CHANGE UP
GLUCOSE SERPL-MCNC: 104 MG/DL — HIGH (ref 70–99)
GLUCOSE SERPL-MCNC: 104 MG/DL — HIGH (ref 70–99)
GLUCOSE SERPL-MCNC: 91 MG/DL — SIGNIFICANT CHANGE UP (ref 70–99)
GLUCOSE SERPL-MCNC: 91 MG/DL — SIGNIFICANT CHANGE UP (ref 70–99)
GLUCOSE UR QL: NEGATIVE MG/DL — SIGNIFICANT CHANGE UP
GLUCOSE UR QL: NEGATIVE MG/DL — SIGNIFICANT CHANGE UP
HCT VFR BLD CALC: 41.1 % — SIGNIFICANT CHANGE UP (ref 34.5–45)
HCT VFR BLD CALC: 41.1 % — SIGNIFICANT CHANGE UP (ref 34.5–45)
HGB BLD-MCNC: 13.8 G/DL — SIGNIFICANT CHANGE UP (ref 11.5–15.5)
HGB BLD-MCNC: 13.8 G/DL — SIGNIFICANT CHANGE UP (ref 11.5–15.5)
INR BLD: 2.22 RATIO — HIGH (ref 0.85–1.18)
INR BLD: 2.22 RATIO — HIGH (ref 0.85–1.18)
KETONES UR-MCNC: NEGATIVE MG/DL — SIGNIFICANT CHANGE UP
KETONES UR-MCNC: NEGATIVE MG/DL — SIGNIFICANT CHANGE UP
LEUKOCYTE ESTERASE UR-ACNC: NEGATIVE — SIGNIFICANT CHANGE UP
LEUKOCYTE ESTERASE UR-ACNC: NEGATIVE — SIGNIFICANT CHANGE UP
MCHC RBC-ENTMCNC: 27.9 PG — SIGNIFICANT CHANGE UP (ref 27–34)
MCHC RBC-ENTMCNC: 27.9 PG — SIGNIFICANT CHANGE UP (ref 27–34)
MCHC RBC-ENTMCNC: 33.6 GM/DL — SIGNIFICANT CHANGE UP (ref 32–36)
MCHC RBC-ENTMCNC: 33.6 GM/DL — SIGNIFICANT CHANGE UP (ref 32–36)
MCV RBC AUTO: 83 FL — SIGNIFICANT CHANGE UP (ref 80–100)
MCV RBC AUTO: 83 FL — SIGNIFICANT CHANGE UP (ref 80–100)
NITRITE UR-MCNC: NEGATIVE — SIGNIFICANT CHANGE UP
NITRITE UR-MCNC: NEGATIVE — SIGNIFICANT CHANGE UP
NT-PROBNP SERPL-SCNC: 411 PG/ML — SIGNIFICANT CHANGE UP (ref 0–450)
NT-PROBNP SERPL-SCNC: 411 PG/ML — SIGNIFICANT CHANGE UP (ref 0–450)
PH UR: 5.5 — SIGNIFICANT CHANGE UP (ref 5–8)
PH UR: 5.5 — SIGNIFICANT CHANGE UP (ref 5–8)
PLATELET # BLD AUTO: 193 K/UL — SIGNIFICANT CHANGE UP (ref 150–400)
PLATELET # BLD AUTO: 193 K/UL — SIGNIFICANT CHANGE UP (ref 150–400)
POTASSIUM SERPL-MCNC: 3.5 MMOL/L — SIGNIFICANT CHANGE UP (ref 3.5–5.3)
POTASSIUM SERPL-MCNC: 3.5 MMOL/L — SIGNIFICANT CHANGE UP (ref 3.5–5.3)
POTASSIUM SERPL-MCNC: 3.7 MMOL/L — SIGNIFICANT CHANGE UP (ref 3.5–5.3)
POTASSIUM SERPL-MCNC: 3.7 MMOL/L — SIGNIFICANT CHANGE UP (ref 3.5–5.3)
POTASSIUM SERPL-SCNC: 3.5 MMOL/L — SIGNIFICANT CHANGE UP (ref 3.5–5.3)
POTASSIUM SERPL-SCNC: 3.5 MMOL/L — SIGNIFICANT CHANGE UP (ref 3.5–5.3)
POTASSIUM SERPL-SCNC: 3.7 MMOL/L — SIGNIFICANT CHANGE UP (ref 3.5–5.3)
POTASSIUM SERPL-SCNC: 3.7 MMOL/L — SIGNIFICANT CHANGE UP (ref 3.5–5.3)
PROT SERPL-MCNC: 6 GM/DL — SIGNIFICANT CHANGE UP (ref 6–8.3)
PROT SERPL-MCNC: 6 GM/DL — SIGNIFICANT CHANGE UP (ref 6–8.3)
PROT SERPL-MCNC: 6.8 GM/DL — SIGNIFICANT CHANGE UP (ref 6–8.3)
PROT SERPL-MCNC: 6.8 GM/DL — SIGNIFICANT CHANGE UP (ref 6–8.3)
PROT UR-MCNC: 30 MG/DL
PROT UR-MCNC: 30 MG/DL
PROTHROM AB SERPL-ACNC: 24.5 SEC — HIGH (ref 9.5–13)
PROTHROM AB SERPL-ACNC: 24.5 SEC — HIGH (ref 9.5–13)
RBC # BLD: 4.95 M/UL — SIGNIFICANT CHANGE UP (ref 3.8–5.2)
RBC # BLD: 4.95 M/UL — SIGNIFICANT CHANGE UP (ref 3.8–5.2)
RBC # FLD: 14 % — SIGNIFICANT CHANGE UP (ref 10.3–14.5)
RBC # FLD: 14 % — SIGNIFICANT CHANGE UP (ref 10.3–14.5)
RBC CASTS # UR COMP ASSIST: 1 /HPF — SIGNIFICANT CHANGE UP (ref 0–4)
RBC CASTS # UR COMP ASSIST: 1 /HPF — SIGNIFICANT CHANGE UP (ref 0–4)
SODIUM SERPL-SCNC: 131 MMOL/L — LOW (ref 135–145)
SODIUM SERPL-SCNC: 131 MMOL/L — LOW (ref 135–145)
SODIUM SERPL-SCNC: 133 MMOL/L — LOW (ref 135–145)
SODIUM SERPL-SCNC: 133 MMOL/L — LOW (ref 135–145)
SP GR SPEC: 1.02 — SIGNIFICANT CHANGE UP (ref 1–1.03)
SP GR SPEC: 1.02 — SIGNIFICANT CHANGE UP (ref 1–1.03)
SQUAMOUS # UR AUTO: 12 /HPF — HIGH (ref 0–5)
SQUAMOUS # UR AUTO: 12 /HPF — HIGH (ref 0–5)
TROPONIN I, HIGH SENSITIVITY RESULT: 12.32 NG/L — SIGNIFICANT CHANGE UP
TROPONIN I, HIGH SENSITIVITY RESULT: 12.32 NG/L — SIGNIFICANT CHANGE UP
UROBILINOGEN FLD QL: 0.2 MG/DL — SIGNIFICANT CHANGE UP (ref 0.2–1)
UROBILINOGEN FLD QL: 0.2 MG/DL — SIGNIFICANT CHANGE UP (ref 0.2–1)
WBC # BLD: 6.06 K/UL — SIGNIFICANT CHANGE UP (ref 3.8–10.5)
WBC # BLD: 6.06 K/UL — SIGNIFICANT CHANGE UP (ref 3.8–10.5)
WBC # FLD AUTO: 6.06 K/UL — SIGNIFICANT CHANGE UP (ref 3.8–10.5)
WBC # FLD AUTO: 6.06 K/UL — SIGNIFICANT CHANGE UP (ref 3.8–10.5)
WBC UR QL: 1 /HPF — SIGNIFICANT CHANGE UP (ref 0–5)
WBC UR QL: 1 /HPF — SIGNIFICANT CHANGE UP (ref 0–5)

## 2023-11-24 PROCEDURE — 99232 SBSQ HOSP IP/OBS MODERATE 35: CPT

## 2023-11-24 PROCEDURE — 76705 ECHO EXAM OF ABDOMEN: CPT | Mod: 26

## 2023-11-24 RX ORDER — RIVAROXABAN 15 MG-20MG
20 KIT ORAL DAILY
Refills: 0 | Status: DISCONTINUED | OUTPATIENT
Start: 2023-11-24 | End: 2023-12-01

## 2023-11-24 RX ORDER — ONDANSETRON 8 MG/1
4 TABLET, FILM COATED ORAL EVERY 8 HOURS
Refills: 0 | Status: DISCONTINUED | OUTPATIENT
Start: 2023-11-24 | End: 2023-12-01

## 2023-11-24 RX ORDER — AMIODARONE HYDROCHLORIDE 400 MG/1
1 TABLET ORAL
Refills: 0 | DISCHARGE

## 2023-11-24 RX ORDER — SODIUM CHLORIDE 9 MG/ML
1000 INJECTION INTRAMUSCULAR; INTRAVENOUS; SUBCUTANEOUS
Refills: 0 | Status: DISCONTINUED | OUTPATIENT
Start: 2023-11-24 | End: 2023-11-29

## 2023-11-24 RX ORDER — LANOLIN ALCOHOL/MO/W.PET/CERES
3 CREAM (GRAM) TOPICAL AT BEDTIME
Refills: 0 | Status: DISCONTINUED | OUTPATIENT
Start: 2023-11-24 | End: 2023-12-01

## 2023-11-24 RX ORDER — SIMVASTATIN 20 MG/1
20 TABLET, FILM COATED ORAL AT BEDTIME
Refills: 0 | Status: DISCONTINUED | OUTPATIENT
Start: 2023-11-24 | End: 2023-11-28

## 2023-11-24 RX ORDER — LOSARTAN POTASSIUM 100 MG/1
50 TABLET, FILM COATED ORAL DAILY
Refills: 0 | Status: DISCONTINUED | OUTPATIENT
Start: 2023-11-24 | End: 2023-11-30

## 2023-11-24 RX ORDER — MUPIROCIN 20 MG/G
1 OINTMENT TOPICAL
Refills: 0 | DISCHARGE

## 2023-11-24 RX ORDER — METOPROLOL TARTRATE 50 MG
50 TABLET ORAL DAILY
Refills: 0 | Status: DISCONTINUED | OUTPATIENT
Start: 2023-11-24 | End: 2023-11-30

## 2023-11-24 RX ORDER — AMIODARONE HYDROCHLORIDE 400 MG/1
200 TABLET ORAL DAILY
Refills: 0 | Status: DISCONTINUED | OUTPATIENT
Start: 2023-11-24 | End: 2023-12-01

## 2023-11-24 RX ORDER — ACETAMINOPHEN 500 MG
650 TABLET ORAL EVERY 6 HOURS
Refills: 0 | Status: DISCONTINUED | OUTPATIENT
Start: 2023-11-24 | End: 2023-12-01

## 2023-11-24 RX ADMIN — AMIODARONE HYDROCHLORIDE 200 MILLIGRAM(S): 400 TABLET ORAL at 11:45

## 2023-11-24 RX ADMIN — SODIUM CHLORIDE 500 MILLILITER(S): 9 INJECTION INTRAMUSCULAR; INTRAVENOUS; SUBCUTANEOUS at 00:23

## 2023-11-24 RX ADMIN — SIMVASTATIN 20 MILLIGRAM(S): 20 TABLET, FILM COATED ORAL at 23:06

## 2023-11-24 RX ADMIN — Medication 600 MILLIGRAM(S): at 23:06

## 2023-11-24 RX ADMIN — SODIUM CHLORIDE 60 MILLILITER(S): 9 INJECTION INTRAMUSCULAR; INTRAVENOUS; SUBCUTANEOUS at 06:39

## 2023-11-24 RX ADMIN — RIVAROXABAN 20 MILLIGRAM(S): KIT at 10:38

## 2023-11-24 RX ADMIN — LOSARTAN POTASSIUM 50 MILLIGRAM(S): 100 TABLET, FILM COATED ORAL at 10:39

## 2023-11-24 RX ADMIN — Medication 50 MILLIGRAM(S): at 10:38

## 2023-11-24 NOTE — PHYSICAL THERAPY INITIAL EVALUATION ADULT - NSPTDISCHREC_GEN_A_CORE
Consider also SUB acute rehab placement then eventual home based aide./Sub-acute Rehab/Long Term Care/SNF

## 2023-11-24 NOTE — CHART NOTE - NSCHARTNOTEFT_GEN_A_CORE
Patient is seen and examined at bedside. C/o overall weakness in whole body. [facility]. No CP/SOB/speech difficulties  Lives alone, daughter visiting from [place] and son lives in [place]  She wants to go back to rehab if its an option

## 2023-11-24 NOTE — ED ADULT NURSE NOTE - CHIEF COMPLAINT QUOTE
Patient BIBEMS from home s/p syncope. Per EMS, pt is normally wheelchair bound and was abound to stand up when she slip to the edge of the chair and could not get back up. Incident occur around 45-60mins ago, unwitnessed, Pt denies cp, sob. Admit she normally has lower back pain. -LOC, - blood thinner. PMHx HTN. +allergy, A&O

## 2023-11-24 NOTE — H&P ADULT - NSHPLABSRESULTS_GEN_ALL_CORE
14.3   8.67  )-----------( 203      ( 2023 23:45 )             41.8     131<L>  |  96  |  14  ----------------------------<  104<H>       3.7   |  27  |  0.79    Ca    8.3<L>      2023 23:45    TPro  6.8  /  Alb  3.2<L>  /  TBili  0.4  /  DBili  x   /  AST  241<H>  /  ALT  269<H>  /  AlkPhos  78      PT/INR: 24.5/2.22 (23 @ 23:45)  PTT: 39.6 (23 @ 23:45)    Pro-BNP: 411 (23 @ 23:45    Urinalysis Basic - ( 2023 00:42 )  Color: Yellow / Appearance: Clear / S.019 / pH: 5.5  Gluc: Negative mg/dL / Ketone: Negative mg/dL  / Bili: Negative / Urobili: 0.2 mg/dL   Blood: Trace / Protein: 30 mg/dL / Nitrite: Negative   Leuk Esterase: Negative / RBC: 1 /HPF / WBC 1 /HPF   Sq Epi: x / Non Sq Epi: x / Bacteria: Occasional /HPF

## 2023-11-24 NOTE — H&P ADULT - ASSESSMENT
88-year-old female with history of A-fib on Xarelto, HTN, HLD presents to the ED for weakness.  Patient was recently in rehab for 18 days and was discharged 3 days ago. Since she got home has been ambulating with the walker but yesterday after dinner she was unable to get up from a sitting position, she was feeling weak and was unable to even lift her legs off the floor.  Daughter attempted to help her up, but failed which prompted EMS call. Denies any recent fall, trauma. fever, chills dizziness, headache, SOB. or any other acute symptoms.

## 2023-11-24 NOTE — PHYSICAL THERAPY INITIAL EVALUATION ADULT - PERTINENT HX OF CURRENT PROBLEM, REHAB EVAL
as per H&P adult note: 88-year-old female with history of A-fib on Xarelto, HTN, HLD presents to the ED for weakness.  Patient was recently in rehab for 18 days and was discharged 3 days ago. Since she got home has been ambulating with the walker but yesterday after dinner she was unable to get up from a sitting position, she was feeling weak and was unable to even lift her legs off the floor.  Daughter attempted to help her up, but failed which prompted EMS call.

## 2023-11-24 NOTE — ED PROVIDER NOTE - PHYSICAL EXAMINATION

## 2023-11-24 NOTE — ED PROVIDER NOTE - CLINICAL SUMMARY MEDICAL DECISION MAKING FREE TEXT BOX
88-year-old female with weakness and inability to ambulate.  Will obtain labs to evaluate for electrolyte dysfunction, infection, ACS.  Workup notable for elevated LFTs.  Right upper quadrant ultrasound obtained.  Ultrasound with normal appearance.    Tried to ambulate patient in ED but too weak to even sit up off the bed. Will admit for worsening progressive weakness. Sign out given to Hospitalist, Dr. Mathur.

## 2023-11-24 NOTE — PHYSICAL THERAPY INITIAL EVALUATION ADULT - DIAGNOSIS, PT EVAL
88 y.o. female presenting with Weakness/ Note: Patient was previously admitted for the same symptoms and was sent to Rehab

## 2023-11-24 NOTE — H&P ADULT - NSHPPHYSICALEXAM_GEN_ALL_CORE
GENERAL: NAD, comfortable at bedside   HEAD:  Atraumatic, Normocephalic  EYES: EOMI, PERRL, conjunctiva and sclera clear  NECK: Supple, No JVD  CHEST: Clear to auscultation bilaterally; No wheezes, rales or rhonchi  HEART: Regular rate and rhythm; No murmurs, rubs, or gallops, (+)S1, S2  ABDOMEN: +BS, Soft, Nontender, Nondistended  EXTREMITIES:  2+ Peripheral Pulses, No clubbing, cyanosis, or edema. Limited ROM of LE due to heaviness sensensation  PSYCH: normal mood and affect  NEUROLOGY: AAOx3, non-focal  SKIN: No rashes or lesions

## 2023-11-24 NOTE — ED ADULT NURSE NOTE - OBJECTIVE STATEMENT
Patient c/o weakness and not being able to get up out of her wheelchair.  Recently in a rehab facility and has been weak since.

## 2023-11-24 NOTE — H&P ADULT - PROBLEM SELECTOR PLAN 1
Patient was previously admitted for the same symptoms and was sent to Rehab   Spent 18 days in rehab & was discharged 3 days ago   - IVF   - PT   - Fall precaution

## 2023-11-24 NOTE — PHYSICAL THERAPY INITIAL EVALUATION ADULT - BED MOBILITY TRAINING, PT EVAL
DISPLAY PLAN FREE TEXT
The pt will perform all bed mobility activities independently by time of discharge from Acute Care PT program

## 2023-11-25 PROCEDURE — 99232 SBSQ HOSP IP/OBS MODERATE 35: CPT

## 2023-11-25 PROCEDURE — 99221 1ST HOSP IP/OBS SF/LOW 40: CPT

## 2023-11-25 PROCEDURE — 99223 1ST HOSP IP/OBS HIGH 75: CPT

## 2023-11-25 RX ADMIN — RIVAROXABAN 20 MILLIGRAM(S): KIT at 10:43

## 2023-11-25 RX ADMIN — Medication 600 MILLIGRAM(S): at 18:09

## 2023-11-25 RX ADMIN — AMIODARONE HYDROCHLORIDE 200 MILLIGRAM(S): 400 TABLET ORAL at 10:42

## 2023-11-25 RX ADMIN — LOSARTAN POTASSIUM 50 MILLIGRAM(S): 100 TABLET, FILM COATED ORAL at 10:43

## 2023-11-25 RX ADMIN — SIMVASTATIN 20 MILLIGRAM(S): 20 TABLET, FILM COATED ORAL at 21:18

## 2023-11-25 RX ADMIN — Medication 50 MILLIGRAM(S): at 10:45

## 2023-11-25 NOTE — CONSULT NOTE ADULT - ASSESSMENT
88 year old woman with HTN, HLD, afib on Xarelto, presenting with recurrent episodes of bilateral lower extremity weakness, typically lasting several days, without other neurological deficits, including sensory loss, and without any pain.  Symptoms recurrent, over last 6 months or so, self limited each time.   On exam, does have 4/5 weakness hip flexors, knee extensors, and ankle dorsiflexion.  Sensation is diminished in the L face, and L trunk.  Temperature sensation is diminished in the L leg.  Has not had any imaging.   Etiology not entirely clear at this point.  Initial suspicion for myelopathic process, however, with facial sensory loss, cerebrum is also implicated.      -will obtain MRI brain, C and T spine.    -continue PT  -continue Xarelto for stroke prevention in setting of afib.   -neurology to follow.     88 year old woman with HTN, HLD, afib on Xarelto, presenting with recurrent episodes of bilateral lower extremity weakness, typically lasting several days, without other neurological deficits, including sensory loss, and without any pain, and no sphincter involvement.  Symptoms recurrent, over last 6 months or so, self limited each time.   On exam, does have 4/5 weakness hip flexors, knee extensors, and ankle dorsiflexion.  Sensation is diminished in the L face, and L trunk.  Temperature sensation is diminished in the L leg.  Has not had any imaging.   Etiology not entirely clear at this point.  Initial suspicion for myelopathic process, however, with facial sensory loss, cerebrum is also implicated.      -will obtain MRI brain, C and T spine.    -continue PT  -continue Xarelto for stroke prevention in setting of afib.   -neurology to follow.

## 2023-11-25 NOTE — CONSULT NOTE ADULT - SUBJECTIVE AND OBJECTIVE BOX
88 year old man with afib, on Xarelto, HTN, HLD, admitted 11/24 with difficulty walking.   88 year old man with afib, on Xarelto, HTN, HLD, admitted 11/24 with low extremity weakness, bilaterally.      Patient reports the symptoms came on suddenly on Thursday evening, 11/23.  She had finished dinner with family sitting by the fire.  She felt suddenly that she could not move either of her legs.  She said it felt like a 'train coming to a stop.'  She could not lift up the legs, had trace movement in the knees, and was able to flex and extend the ankles.  She could not stand up.  She denies having any pain in the back or the legs.  No sensory loss beyond what she normally has from long standing perihperal neuropathy.  She denied any weakness in the upper extremities.  No changes to mental status, or speech.  She had no loss of consciousness, no changes to continence.  The symptoms have improved.  On day after admission, she did not get up out of bed.  However, this AM, she was able to use a walker to walk with PT.  She reports her symptoms have resolved by about 80%.  She does not think she is back to her baseline now.      She reports this is a recurrent issue.  she has had 2 prior episodes of similar symptoms this year.  The initial episode occurred in May, shortly after she had her R hip arthroplasty.  the symptoms lasted about 1-2 days, and she began to improve.  She is unsure when the second episode occurred, but it followed a similar time course.      In between episodes, she is amublatory with either a walker or a rolator walker.  She denies any other neurological complaints at this time.  No medical complaints, other than a dry cough.  She denies having any fevers, chills, unintended weight loss.  No chest pain, or shortness of breath.  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     On exam:   GEN; NAD  CV: rRR, S1, S2  PULM: CTAB  GI: soft, non tender  HEENT: no nuchal rigidity.  Negative Spurling sign.   EXTREm: no CCE.   SKIN: no rashes.     NEURO:   Awake, alert, oriented to hospital, month, date, year, normal naming, and fluency.  Gives a full, clear history.   Pupils 3-2mm, symmetric, full VF's, normal EOM, no nystagmus, no facial weakness, no dysarthria, tongue midline.   MOTOR:  88 year old woman with afib, on Xarelto, HTN, HLD, admitted 11/24 with low extremity weakness, bilaterally.      Patient reports the symptoms came on suddenly on Thursday evening, 11/23.  She had finished dinner with family sitting by the fire.  She felt suddenly that she could not move either of her legs.  She said it felt like a 'train coming to a stop.'  She could not lift up the legs, had trace movement in the knees, and was able to flex and extend the ankles.  She could not stand up.  She denies having any pain in the back or the legs.  No sensory loss beyond what she normally has from long standing perihperal neuropathy.  She denied any weakness in the upper extremities.  No changes to mental status, or speech.  She had no loss of consciousness, no changes to continence.  The symptoms have improved.  On day after admission, she did not get up out of bed.  However, this AM, she was able to use a walker to walk with PT.  She reports her symptoms have resolved by about 80%.  She does not think she is back to her baseline now.      She reports this is a recurrent issue.  she has had 2 prior episodes of similar symptoms this year.  The initial episode occurred in May, shortly after she had her R hip arthroplasty.  the symptoms lasted about 1-2 days, and she began to improve.  She is unsure when the second episode occurred, but it followed a similar time course.      In between episodes, she is amublatory with either a walker or a rolator walker.  She denies any other neurological complaints at this time.  No medical complaints, other than a dry cough.  She denies having any fevers, chills, unintended weight loss.  No chest pain, or shortness of breath.  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     On exam:   GEN; NAD  CV: rRR, S1, S2  PULM: CTAB  GI: soft, non tender  HEENT: no nuchal rigidity.  Negative Spurling sign.   EXTREm: no CCE.   SKIN: no rashes.     NEURO:   Awake, alert, oriented to hospital, month, date, year, normal naming, and fluency.  Gives a full, clear history.   Pupils 3-2mm, symmetric, full VF's, normal EOM, no nystagmus, no facial weakness, no dysarthria, tongue midline.   MOTOR: normal bulk and tone.  Intermittent tremor in the L arm.  Worse with action.  Upper extremities 5/5 , deltoids, biceps.  Lower exremities symmetrically 4-/5 hip flexion, 4/5 knee extension, and ankle dorsiflexion, 4+/5 ankle plantar flexion.  Mildly increaed tone bilateral lower extremities, L>R.  Non spastic.   SENSORY: diminished pinprick sensation L face.  Throughout the trunk, R>L pinprick sensation paraspinal.  Symmetric pinprick, temp, vib sense upper extremities.  Diminished temp sensation L leg compared to R.  Normal position sense.   COORDINATION: normal fNF  REFLEXES: 1+ symmetric BB, Br, triceps, absent Awan, 1+ patellar, trace achilles, toes down.      No imaging to review.   PAST MEDICAL HISTORY:  Benign Hypertension     Cervical disc disease with myelopathy     Cervical disc disorder with radiculopathy     Cervical disc displacement     Depressive disorder, not elsewhere classified     Diabetes     Hypercholesteremia     Lumbar radiculopathy     Seizure 5 years, last one 18 m ago    Seizure disorder generalized ,   Since 2007, last seizure 18 months ago    Type II or unspecified type diabetes mellitus without mention of complication, not stated as uncontr

## 2023-11-25 NOTE — PROGRESS NOTE ADULT - ASSESSMENT
A: 1: bilateral leg weakness  unknown etiology            Neurology consult PT         2. Hand tremor  , rigidity           Neurology consult r/o Parkinson's                      3. Elevated LFT s          abd US neg          GI consult , ID consult            4. Atrial fib.           continue Xarelto           5. R hip pain  improved         6. Cough  neg CXRay             Rx. Mucinex bid x 7 days  A: 1: bilateral leg weakness  unknown etiology            Neurology consult PT         2. Hand tremor  , rigidity           Neurology consult r/o Parkinson's                      3. Elevated LFT s          D/C Simvastatin           abd US neg          GI consult , ID consult            4. Atrial fib.           continue Xarelto           5. R hip pain  improved         6. Cough  neg CXRay             Rx. Mucinex bid x 7 days

## 2023-11-25 NOTE — CONSULT NOTE ADULT - SUBJECTIVE AND OBJECTIVE BOX
Patient is a 88y old  Female who presents with a chief complaint of Weakness & Difficulty ambulating (25 Nov 2023 10:45)      HPI:  88-year-old female with history of A-fib on Xarelto, HTN, HLD presents to the ED for weakness.    GI consulted for elevated liver enzymes  She denies abdominal pain, prior liver enzymes elevation      PAST MEDICAL & SURGICAL HISTORY:  Syncope  7/2/2013      HTN (hypertension)      Hyperlipidemia      Afib      A-fib      Bronchitis      Cardiac abnormality  internal cardiac monitor placed 8/2013      H/O: hysterectomy      S/P cataract surgery  Left eye.          MEDICATIONS  (STANDING):  aMIOdarone    Tablet 200 milliGRAM(s) Oral daily  guaiFENesin  milliGRAM(s) Oral every 12 hours  losartan 50 milliGRAM(s) Oral daily  metoprolol succinate ER 50 milliGRAM(s) Oral daily  rivaroxaban 20 milliGRAM(s) Oral daily  simvastatin 20 milliGRAM(s) Oral at bedtime  sodium chloride 0.9%. 1000 milliLiter(s) (60 mL/Hr) IV Continuous <Continuous>    MEDICATIONS  (PRN):  acetaminophen     Tablet .. 650 milliGRAM(s) Oral every 6 hours PRN Temp greater or equal to 38C (100.4F), Mild Pain (1 - 3)  aluminum hydroxide/magnesium hydroxide/simethicone Suspension 30 milliLiter(s) Oral every 4 hours PRN Dyspepsia  melatonin 3 milliGRAM(s) Oral at bedtime PRN Insomnia  ondansetron Injectable 4 milliGRAM(s) IV Push every 8 hours PRN Nausea and/or Vomiting      Allergies    shellfish (Unknown)  penicillin (Unknown)  erythromycin (Diarrhea)    Intolerances        SOCIAL HISTORY:  rare etoh  FAMILY HISTORY:  Family history of primary TB    Family history of CVA    Family history of diabetes mellitus        REVIEW OF SYSTEMS:    CONSTITUTIONAL: weakness  EYES/ENT: No visual changes;  No vertigo or throat pain   NECK: No pain or stiffness  RESPIRATORY: No cough, wheezing, hemoptysis; No shortness of breath  CARDIOVASCULAR: No chest pain or palpitations  GASTROINTESTINAL: No abdominal or epigastric pain. No nausea, vomiting, or hematemesis; No diarrhea or constipation. No melena or hematochezia.  GENITOURINARY: No dysuria, frequency or hematuria  NEUROLOGICAL: No numbness or weakness  SKIN: No itching, burning, rashes, or lesions   PSYCH: Normal mood and affect  All other review of systems is negative unless indicated above.    Vital Signs Last 24 Hrs  T(C): 36.5 (25 Nov 2023 08:06), Max: 36.8 (25 Nov 2023 00:20)  T(F): 97.7 (25 Nov 2023 08:06), Max: 98.2 (25 Nov 2023 00:20)  HR: 61 (25 Nov 2023 08:06) (54 - 61)  BP: 171/69 (25 Nov 2023 08:06) (140/54 - 171/69)  BP(mean): --  RR: 18 (25 Nov 2023 08:06) (16 - 18)  SpO2: 94% (25 Nov 2023 08:06) (94% - 98%)    Parameters below as of 25 Nov 2023 08:06  Patient On (Oxygen Delivery Method): room air        PHYSICAL EXAM:  Constitutional: NAD  Respiratory: CTA  Cardiovascular: S1 and S2  Gastrointestinal: BS+, soft, NT/ND  Neurological: no focal deficits  Psychiatric: Normal mood, normal affect      LABS:                        13.8   6.06  )-----------( 193      ( 24 Nov 2023 08:40 )             41.1     11-24    133<L>  |  100  |  10  ----------------------------<  91  3.5   |  27  |  0.62    Ca    8.0<L>      24 Nov 2023 08:40    TPro  6.0  /  Alb  2.8<L>  /  TBili  0.3  /  DBili  x   /  AST  227<H>  /  ALT  271<H>  /  AlkPhos  67  11-24    PT/INR - ( 23 Nov 2023 23:45 )   PT: 24.5 sec;   INR: 2.22 ratio         PTT - ( 23 Nov 2023 23:45 )  PTT:39.6 sec  LIVER FUNCTIONS - ( 24 Nov 2023 08:40 )  Alb: 2.8 g/dL / Pro: 6.0 gm/dL / ALK PHOS: 67 U/L / ALT: 271 U/L / AST: 227 U/L / GGT: x             RADIOLOGY & ADDITIONAL STUDIES:

## 2023-11-25 NOTE — CONSULT NOTE ADULT - ASSESSMENT
87yo female with elevation of liver enzymes    negative sono  will send serologic evaluation  ?due to meds as newer finding  ok for d/c to rehab and will continue to f/u as outpt

## 2023-11-25 NOTE — PROGRESS NOTE ADULT - SUBJECTIVE AND OBJECTIVE BOX
CC:   Patient is a 88y old  Female who presents with a chief complaint of Weakness & Difficulty ambulating (24 Nov 2023 05:27)        HPI: 87 yo female&nbsp; with:Ongoing medical problems: HTN, HLD, Atrial fib. 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 Edwards. the patient was sent to [facility] for ANGELO , she was sent back to  on 6/8/23  and readmitted for Atrial fib with RVR . The patient was discharged home on 7/5/23,  Hospitalized on 10/31/23 for R hip pain, right hip clicking, could not ambulate, was d/c to [facility] rehab, she was doing well with rehab and went home, 3 days ago,   she suddenly  was not able to get up from the chair 2 days ago,   was not able to lift her leg off the floor, her daughter called EMS, She was admitted yesterday. The patient is in bed, c/o mild cough, legs still weak , not able to lift straight leg up in bed.           Review of system- All 10 systems reviewed and is as per HPI otherwise negative.           T(C): 36.5 (11-25-23 @ 08:06), Max: 36.8 (11-25-23 @ 00:20)  HR: 61 (11-25-23 @ 08:06) (54 - 61)  BP: 171/69 (11-25-23 @ 08:06) (140/54 - 171/69)  RR: 18 (11-25-23 @ 08:06) (16 - 18)  SpO2: 94% (11-25-23 @ 08:06) (94% - 98%)  Wt(kg): --    PHYSICAL EXAM:    GENERAL: slightly confused   HEAD:  Atraumatic, Normocephalic  EYES: EOMI, PERRLA  HEENT: Moist mucous membranes  NECK: Supple, No JVD  NERVOUS SYSTEM:  Alert & Oriented X 3, had weakness in both legs, has mild tremor in hands and mild cogwheel rigidity  CHEST/LUNG: Clear to auscultation bilaterally, mild upper airway congestion  HEART: S1, S2 , rr,   ABDOMEN: Soft, Nontender, Nondistended; Bowel sounds present  GENITOURINARY- Voiding, no palpable bladder  EXTREMITIES:  No clubbing, cyanosis, or edema  MUSCULOSKELTAL- muscle weakness   SKIN-no rash        LABS:                        13.8   6.06  )-----------( 193      ( 24 Nov 2023 08:40 )             41.1     11-24    133<L>  |  100  |  10  ----------------------------<  91  3.5   |  27  |  0.62    Ca    8.0<L>      24 Nov 2023 08:40    TPro  6.0  /  Alb  2.8<L>  /  TBili  0.3  /  DBili  x   /  AST  227<H>  /  ALT  271<H>  /  AlkPhos  67  11-24    PT/INR - ( 23 Nov 2023 23:45 )   PT: 24.5 sec;   INR: 2.22 ratio         PTT - ( 23 Nov 2023 23:45 )  PTT:39.6 sec  Urinalysis Basic - ( 24 Nov 2023 08:40 )    Color: x / Appearance: x / SG: x / pH: x  Gluc: 91 mg/dL / Ketone: x  / Bili: x / Urobili: x   Blood: x / Protein: x / Nitrite: x   Leuk Esterase: x / RBC: x / WBC x   Sq Epi: x / Non Sq Epi: x / Bacteria: x  CXRay neg,       MEDS  acetaminophen     Tablet .. 650 milliGRAM(s) Oral every 6 hours PRN  aluminum hydroxide/magnesium hydroxide/simethicone Suspension 30 milliLiter(s) Oral every 4 hours PRN  aMIOdarone    Tablet 200 milliGRAM(s) Oral daily  guaiFENesin  milliGRAM(s) Oral every 12 hours  losartan 50 milliGRAM(s) Oral daily  melatonin 3 milliGRAM(s) Oral at bedtime PRN  metoprolol succinate ER 50 milliGRAM(s) Oral daily  ondansetron Injectable 4 milliGRAM(s) IV Push every 8 hours PRN  rivaroxaban 20 milliGRAM(s) Oral daily  simvastatin 20 milliGRAM(s) Oral at bedtime  sodium chloride 0.9%. 1000 milliLiter(s) IV Continuous <Continuous>

## 2023-11-26 LAB
FLUAV H3 RNA SPEC QL NAA+PROBE: DETECTED
FLUAV H3 RNA SPEC QL NAA+PROBE: DETECTED
HAV IGM SER-ACNC: SIGNIFICANT CHANGE UP
HAV IGM SER-ACNC: SIGNIFICANT CHANGE UP
HBV CORE IGM SER-ACNC: SIGNIFICANT CHANGE UP
HBV CORE IGM SER-ACNC: SIGNIFICANT CHANGE UP
HBV SURFACE AG SER-ACNC: SIGNIFICANT CHANGE UP
HBV SURFACE AG SER-ACNC: SIGNIFICANT CHANGE UP
HCV AB S/CO SERPL IA: 0.16 S/CO — SIGNIFICANT CHANGE UP (ref 0–0.99)
HCV AB S/CO SERPL IA: 0.16 S/CO — SIGNIFICANT CHANGE UP (ref 0–0.99)
HCV AB SERPL-IMP: SIGNIFICANT CHANGE UP
HCV AB SERPL-IMP: SIGNIFICANT CHANGE UP
RAPID RVP RESULT: DETECTED
RAPID RVP RESULT: DETECTED
SARS-COV-2 RNA SPEC QL NAA+PROBE: SIGNIFICANT CHANGE UP
SARS-COV-2 RNA SPEC QL NAA+PROBE: SIGNIFICANT CHANGE UP

## 2023-11-26 PROCEDURE — 99232 SBSQ HOSP IP/OBS MODERATE 35: CPT

## 2023-11-26 RX ADMIN — RIVAROXABAN 20 MILLIGRAM(S): KIT at 10:19

## 2023-11-26 RX ADMIN — Medication 75 MILLIGRAM(S): at 21:03

## 2023-11-26 RX ADMIN — SIMVASTATIN 20 MILLIGRAM(S): 20 TABLET, FILM COATED ORAL at 21:03

## 2023-11-26 RX ADMIN — LOSARTAN POTASSIUM 50 MILLIGRAM(S): 100 TABLET, FILM COATED ORAL at 10:18

## 2023-11-26 RX ADMIN — Medication 50 MILLIGRAM(S): at 10:19

## 2023-11-26 RX ADMIN — Medication 600 MILLIGRAM(S): at 16:28

## 2023-11-26 RX ADMIN — Medication 600 MILLIGRAM(S): at 06:36

## 2023-11-26 RX ADMIN — AMIODARONE HYDROCHLORIDE 200 MILLIGRAM(S): 400 TABLET ORAL at 10:18

## 2023-11-26 NOTE — CONSULT NOTE ADULT - SUBJECTIVE AND OBJECTIVE BOX
Patient is a 88y old  Female who presents with a chief complaint of Weakness & Difficulty ambulating    HPI:  89 y/o female with h/o A-fib on Xarelto, HTN, HLD was admitted on 11/25 for increased weakness. The patient was recently in rehab for 18 days and was discharged home 3 days PTA. Since she got home has been ambulating with the walker, but then after dinner she was unable to get up from a sitting position, she was feeling weak and was unable to even lift her legs off the floor. Daughter attempted to help her up, but failed which prompted EMS call. Denies any recent fall, trauma. fever, chills dizziness, headache, SOB. In ER she was noted with elevated liver enzymes and concern about an infectious process was raised.     PMH: as above  PSH: as above  Meds: per reconciliation sheet, noted below  MEDICATIONS  (STANDING):  aMIOdarone    Tablet 200 milliGRAM(s) Oral daily  guaiFENesin  milliGRAM(s) Oral every 12 hours  losartan 50 milliGRAM(s) Oral daily  metoprolol succinate ER 50 milliGRAM(s) Oral daily  rivaroxaban 20 milliGRAM(s) Oral daily  simvastatin 20 milliGRAM(s) Oral at bedtime  sodium chloride 0.9%. 1000 milliLiter(s) (60 mL/Hr) IV Continuous <Continuous>    MEDICATIONS  (PRN):  acetaminophen     Tablet .. 650 milliGRAM(s) Oral every 6 hours PRN Temp greater or equal to 38C (100.4F), Mild Pain (1 - 3)  aluminum hydroxide/magnesium hydroxide/simethicone Suspension 30 milliLiter(s) Oral every 4 hours PRN Dyspepsia  melatonin 3 milliGRAM(s) Oral at bedtime PRN Insomnia  ondansetron Injectable 4 milliGRAM(s) IV Push every 8 hours PRN Nausea and/or Vomiting    Allergies    shellfish (Unknown)  penicillin (Unknown)  erythromycin (Diarrhea)    Intolerances      Social: no smoking, no alcohol, no illegal drugs; no recent travel, no exposure to TB  FAMILY HISTORY:  Family history of primary TB  Family history of CVA  Family history of diabetes mellitus    ROS: the patient denies fever, no chills, no HA, no seizures, no dizziness, no sore throat, no nasal congestion, no blurry vision, no CP, no palpitations, no SOB, no cough, no abdominal pain, no diarrhea, no N/V, no dysuria, no leg pain, no claudication, no rash, no joint aches, no rectal pain or bleeding, no night sweats, has increased weakness  All other systems reviewed and are negative    Vital Signs Last 24 Hrs  T(C): 36.8 (26 Nov 2023 07:55), Max: 36.8 (26 Nov 2023 07:55)  T(F): 98.3 (26 Nov 2023 07:55), Max: 98.3 (26 Nov 2023 07:55)  HR: 78 (26 Nov 2023 07:55) (56 - 78)  BP: 128/60 (26 Nov 2023 07:55) (128/60 - 160/84)  BP(mean): --  RR: 18 (26 Nov 2023 07:55) (16 - 18)  SpO2: 96% (26 Nov 2023 07:55) (96% - 100%)    Parameters below as of 26 Nov 2023 07:55  Patient On (Oxygen Delivery Method): room air    PE:    Constitutional:  No acute distress  HEENT: NC/AT, EOMI, PERRLA, conjunctivae clear; ears and nose atraumatic; pharynx benign  Neck: supple; thyroid not palpable  Back: no tenderness  Respiratory: respiratory effort normal; clear to auscultation  Cardiovascular: S1S2 regular, no murmurs  Abdomen: soft, not tender, not distended, positive BS; no liver or spleen organomegaly  Genitourinary: no suprapubic tenderness  Lymphatic: no LN palpable  Musculoskeletal: no muscle tenderness, no joint swelling or tenderness  Extremities: no pedal edema  Neurological/ Psychiatric: Alert, judgement and insight impaired; moving all extremities  Skin: no rashes; no palpable lesions    Labs: all available labs reviewed    Culture - Urine (collected 24 Nov 2023 00:42)  Source: Clean Catch None  Final Report (25 Nov 2023 10:35):    <10,000 CFU/mL Normal Urogenital Britany    Radiology: all available radiological tests reviewed    < from: US Abdomen Upper Quadrant Right (11.24.23 @ 02:05) >  Unremarkable right upper quadrant abdominal ultrasound.  < end of copied text >    < from: Xray Chest 1 View- PORTABLE-Urgent (11.23.23 @ 23:51) >  No acute pulmonary disease.  < end of copied text >      Advanced directives addressed: full resuscitation Patient is a 88y old  Female who presents with a chief complaint of Weakness & Difficulty ambulating    HPI:  87 y/o female with h/o A-fib on Xarelto, HTN, HLD was admitted on 11/25 for increased weakness. The patient was recently in rehab for 18 days and was discharged home 3 days PTA. Since she got home has been ambulating with the walker, but then after dinner she was unable to get up from a sitting position, she was feeling weak and was unable to even lift her legs off the floor. Daughter attempted to help her up, but failed which prompted EMS call. Denies any recent fall, trauma, fever, chills dizziness, headache, SOB. In ER she was noted with elevated liver enzymes and concern about an infectious process was raised. She feels congested.     PMH: as above  PSH: as above  Meds: per reconciliation sheet, noted below  MEDICATIONS  (STANDING):  aMIOdarone    Tablet 200 milliGRAM(s) Oral daily  guaiFENesin  milliGRAM(s) Oral every 12 hours  losartan 50 milliGRAM(s) Oral daily  metoprolol succinate ER 50 milliGRAM(s) Oral daily  rivaroxaban 20 milliGRAM(s) Oral daily  simvastatin 20 milliGRAM(s) Oral at bedtime  sodium chloride 0.9%. 1000 milliLiter(s) (60 mL/Hr) IV Continuous <Continuous>    MEDICATIONS  (PRN):  acetaminophen     Tablet .. 650 milliGRAM(s) Oral every 6 hours PRN Temp greater or equal to 38C (100.4F), Mild Pain (1 - 3)  aluminum hydroxide/magnesium hydroxide/simethicone Suspension 30 milliLiter(s) Oral every 4 hours PRN Dyspepsia  melatonin 3 milliGRAM(s) Oral at bedtime PRN Insomnia  ondansetron Injectable 4 milliGRAM(s) IV Push every 8 hours PRN Nausea and/or Vomiting    Allergies    shellfish (Unknown)  penicillin (Unknown)  erythromycin (Diarrhea)    Intolerances      Social: no smoking, no alcohol, no illegal drugs; no recent travel, no exposure to TB  FAMILY HISTORY:  Family history of primary TB  Family history of CVA  Family history of diabetes mellitus    ROS: the patient denies fever, no chills, no HA, no seizures, no dizziness, no sore throat, no nasal congestion, no blurry vision, no CP, no palpitations, no SOB, no cough, no abdominal pain, no diarrhea, no N/V, no dysuria, no leg pain, no claudication, no rash, no joint aches, no rectal pain or bleeding, no night sweats, has increased weakness  All other systems reviewed and are negative    Vital Signs Last 24 Hrs  T(C): 36.8 (26 Nov 2023 07:55), Max: 36.8 (26 Nov 2023 07:55)  T(F): 98.3 (26 Nov 2023 07:55), Max: 98.3 (26 Nov 2023 07:55)  HR: 78 (26 Nov 2023 07:55) (56 - 78)  BP: 128/60 (26 Nov 2023 07:55) (128/60 - 160/84)  BP(mean): --  RR: 18 (26 Nov 2023 07:55) (16 - 18)  SpO2: 96% (26 Nov 2023 07:55) (96% - 100%)    Parameters below as of 26 Nov 2023 07:55  Patient On (Oxygen Delivery Method): room air    PE:    Constitutional:  No acute distress  HEENT: NC/AT, EOMI, PERRLA, conjunctivae clear; ears and nose atraumatic; pharynx benign  Neck: supple; thyroid not palpable  Back: no tenderness  Respiratory: respiratory effort normal; clear to auscultation  Cardiovascular: S1S2 regular, no murmurs  Abdomen: soft, not tender, not distended, positive BS; no liver or spleen organomegaly  Genitourinary: no suprapubic tenderness  Lymphatic: no LN palpable  Musculoskeletal: no muscle tenderness, no joint swelling or tenderness  Extremities: no pedal edema  Neurological/ Psychiatric: Alert, judgement and insight impaired; moving all extremities  Skin: no rashes; no palpable lesions    Labs: all available labs reviewed    Culture - Urine (collected 24 Nov 2023 00:42)  Source: Clean Catch None  Final Report (25 Nov 2023 10:35):    <10,000 CFU/mL Normal Urogenital Britany    Radiology: all available radiological tests reviewed    < from: US Abdomen Upper Quadrant Right (11.24.23 @ 02:05) >  Unremarkable right upper quadrant abdominal ultrasound.  < end of copied text >    < from: Xray Chest 1 View- PORTABLE-Urgent (11.23.23 @ 23:51) >  No acute pulmonary disease.  < end of copied text >      Advanced directives addressed: full resuscitation

## 2023-11-26 NOTE — CONSULT NOTE ADULT - ASSESSMENT
87 y/o female with h/o A-fib on Xarelto, HTN, HLD was admitted on 11/25 for increased weakness. The patient was recently in rehab for 18 days and was discharged home 3 days PTA. Since she got home has been ambulating with the walker, but then after dinner she was unable to get up from a sitting position, she was feeling weak and was unable to even lift her legs off the floor. Daughter attempted to help her up, but failed which prompted EMS call. Denies any recent fall, trauma. fever, chills dizziness, headache, SOB. In ER she was noted with elevated liver enzymes and concern about an infectious process was raised.     1. Acute hepatitis. ?cause. ?infectious etiology ?drug side effect. Allergy to PCN.  -hepatitis panel is negative   -obtain EBV profile and CMV serology  -obtain BC x 2  -would observe off abx therapy  -old chart reviewed to assess prior cultures  -monitor temps  -f/u CBC  -supportive care  2. Other issues:   -care per medicine     87 y/o female with h/o A-fib on Xarelto, HTN, HLD was admitted on 11/25 for increased weakness. The patient was recently in rehab for 18 days and was discharged home 3 days PTA. Since she got home has been ambulating with the walker, but then after dinner she was unable to get up from a sitting position, she was feeling weak and was unable to even lift her legs off the floor. Daughter attempted to help her up, but failed which prompted EMS call. Denies any recent fall, trauma. fever, chills dizziness, headache, SOB. In ER she was noted with elevated liver enzymes and concern about an infectious process was raised.     1. Acute hepatitis. ?cause. ?infectious etiology ?drug side effect. URI. Allergy to PCN.  -hepatitis panel is negative   -obtain EBV profile and CMV serology  -obtain BC x 2  -obtain RVP  -would observe off abx therapy  -old chart reviewed to assess prior cultures  -monitor temps  -f/u CBC  -supportive care  2. Other issues:   -care per medicine

## 2023-11-26 NOTE — PROGRESS NOTE ADULT - SUBJECTIVE AND OBJECTIVE BOX
CC:   Patient is a 88y old  Female who presents with a chief complaint of Weakness & Difficulty ambulating    Pt is a 89 yo female w/ ongoing medical problems: HTN, HLD, Atrial fib. 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 Edwards. the patient was sent to [facility] for ANGELO , she was sent back to  on 6/8/23  and readmitted for Atrial fib with RVR . The patient was discharged home on 7/5/23,  Hospitalized on 10/31/23 for R hip pain, right hip clicking, could not ambulate, was d/c to [facility] rehab, she was doing well with rehab and went home, 3 days ago,   she suddenly  was not able to get up from the chair 2 days ago,   was not able to lift her leg off the floor, her daughter called EMS, She was admitted yesterday. The patient is in bed, c/o mild cough, legs still weak , not able to lift straight leg up in bed.     Review of system- All 10 systems reviewed and is as per HPI otherwise negative.       PHYSICAL EXAM:  GENERAL: slightly confused   HEAD:  Atraumatic, Normocephalic  EYES: EOMI, PERRLA  HEENT: Moist mucous membranes  NECK: Supple, No JVD  NERVOUS SYSTEM:  Alert & Oriented X 3, had weakness in both legs, has mild tremor in hands and mild cogwheel rigidity  CHEST/LUNG: Clear to auscultation bilaterally, mild upper airway congestion  HEART: S1, S2 , rr,   ABDOMEN: Soft, Nontender, Nondistended; Bowel sounds present  GENITOURINARY- Voiding, no palpable bladder  EXTREMITIES:  No clubbing, cyanosis, or edema  MUSCULOSKELTAL- muscle weakness   SKIN-no rash    LABS:    CBC Full  -  ( 24 Nov 2023 08:40 )  WBC Count : 6.06 K/uL  RBC Count : 4.95 M/uL  Hemoglobin : 13.8 g/dL  Hematocrit : 41.1 %  Platelet Count - Automated : 193 K/uL  Mean Cell Volume : 83.0 fl  Mean Cell Hemoglobin : 27.9 pg  Mean Cell Hemoglobin Concentration : 33.6 gm/dL  Auto Neutrophil # : x  Auto Lymphocyte # : x  Auto Monocyte # : x  Auto Eosinophil # : x  Auto Basophil # : x  Auto Neutrophil % : x  Auto Lymphocyte % : x  Auto Monocyte % : x  Auto Eosinophil % : x  Auto Basophil % : x      Urinalysis Basic - ( 24 Nov 2023 08:40 )    Color: x / Appearance: x / SG: x / pH: x  Gluc: 91 mg/dL / Ketone: x  / Bili: x / Urobili: x   Blood: x / Protein: x / Nitrite: x   Leuk Esterase: x / RBC: x / WBC x   Sq Epi: x / Non Sq Epi: x / Bacteria: x      11-24    133<L>  |  100  |  10  ----------------------------<  91  3.5   |  27  |  0.62    Ca    8.0<L>      24 Nov 2023 08:40    TPro  6.0  /  Alb  2.8<L>  /  TBili  0.3  /  DBili  x   /  AST  227<H>  /  ALT  271<H>  /  AlkPhos  67  11-24            # bilateral leg weakness  unknown etiology   - will obtain MRI brain, C and T spine.    - continue PT  - continue Xarelto for stroke prevention in setting of afib.   - neurology to follow.       # Hand tremor  , rigidity   - Neurology consult r/o Parkinson's            # Elevated LFT s  - elevation of liver enzymes  - negative sono  - will send serologic evaluation  - ?due to meds as newer finding  - ok for d/c to rehab and will continue to f/u as outpt    # Atrial fib.   - continue Xarelto     # R hip pain  -  improved     #. Cough  neg CXRay   - Rx. Mucinex bid x 7 days          CC:   Patient is a 88y old  Female who presents with a chief complaint of Weakness & Difficulty ambulating    Pt is a 89 yo female w/ ongoing medical problems: HTN, HLD, Atrial fib. 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 Edwards. the patient was sent to [facility] for ANGELO , she was sent back to  on 6/8/23  and readmitted for Atrial fib with RVR . The patient was discharged home on 7/5/23,  Hospitalized on 10/31/23 for R hip pain, right hip clicking, could not ambulate, was d/c to [facility] rehab, she was doing well with rehab and went home, 3 days ago,   she suddenly  was not able to get up from the chair 2 days ago,   was not able to lift her leg off the floor, her daughter called EMS, She was admitted yesterday. The patient is in bed, c/o mild cough, legs still weak , not able to lift straight leg up in bed.     Denies any HA, CP, SOB. No fevers, chills or shakes. Labs and vitals. patient notes of coughing - checked RVP -  + flu. Initiated tamiflue    Review of system- All 10 systems reviewed and is as per HPI otherwise negative.       PHYSICAL EXAM:  GENERAL: slightly confused   HEAD:  Atraumatic, Normocephalic  EYES: EOMI, PERRLA  HEENT: Moist mucous membranes  NECK: Supple, No JVD  NERVOUS SYSTEM:  Alert & Oriented X 3, had weakness in both legs, has mild tremor in hands and mild cogwheel rigidity  CHEST/LUNG: Clear to auscultation bilaterally, mild upper airway congestion  HEART: S1, S2 , rr,   ABDOMEN: Soft, Nontender, Nondistended; Bowel sounds present  GENITOURINARY- Voiding, no palpable bladder  EXTREMITIES:  No clubbing, cyanosis, or edema  MUSCULOSKELTAL- muscle weakness   SKIN-no rash    LABS:    CBC Full  -  ( 24 Nov 2023 08:40 )  WBC Count : 6.06 K/uL  RBC Count : 4.95 M/uL  Hemoglobin : 13.8 g/dL  Hematocrit : 41.1 %  Platelet Count - Automated : 193 K/uL  Mean Cell Volume : 83.0 fl  Mean Cell Hemoglobin : 27.9 pg  Mean Cell Hemoglobin Concentration : 33.6 gm/dL  Auto Neutrophil # : x  Auto Lymphocyte # : x  Auto Monocyte # : x  Auto Eosinophil # : x  Auto Basophil # : x  Auto Neutrophil % : x  Auto Lymphocyte % : x  Auto Monocyte % : x  Auto Eosinophil % : x  Auto Basophil % : x      Urinalysis Basic - ( 24 Nov 2023 08:40 )    Color: x / Appearance: x / SG: x / pH: x  Gluc: 91 mg/dL / Ketone: x  / Bili: x / Urobili: x   Blood: x / Protein: x / Nitrite: x   Leuk Esterase: x / RBC: x / WBC x   Sq Epi: x / Non Sq Epi: x / Bacteria: x      11-24    133<L>  |  100  |  10  ----------------------------<  91  3.5   |  27  |  0.62    Ca    8.0<L>      24 Nov 2023 08:40    TPro  6.0  /  Alb  2.8<L>  /  TBili  0.3  /  DBili  x   /  AST  227<H>  /  ALT  271<H>  /  AlkPhos  67  11-24    # Flu       # bilateral leg weakness  unknown etiology   - will obtain MRI brain, C and T spine.    - started on tamiflu  - continue PT  - continue Xarelto for stroke prevention in setting of afib.   - neurology to follow.       # Hand tremor  , rigidity   - Neurology consult r/o Parkinson's            # Elevated LFT s  - elevation of liver enzymes  - negative sono  - will send serologic evaluation  - ?due to meds as newer finding  - ok for d/c to rehab and will continue to f/u as outpt    # Atrial fib.   - continue Xarelto     # R hip pain  -  improved     #. Cough  neg CXRay   - Rx. Mucinex bid x 7 days

## 2023-11-27 LAB
ANA TITR SER: NEGATIVE — SIGNIFICANT CHANGE UP
ANA TITR SER: NEGATIVE — SIGNIFICANT CHANGE UP
CMV IGG FLD QL: <0.2 U/ML — SIGNIFICANT CHANGE UP
CMV IGG FLD QL: <0.2 U/ML — SIGNIFICANT CHANGE UP
CMV IGG SERPL-IMP: NEGATIVE — SIGNIFICANT CHANGE UP
CMV IGG SERPL-IMP: NEGATIVE — SIGNIFICANT CHANGE UP
CMV IGM FLD-ACNC: <8 AU/ML — SIGNIFICANT CHANGE UP
CMV IGM FLD-ACNC: <8 AU/ML — SIGNIFICANT CHANGE UP
CMV IGM SERPL QL: NEGATIVE — SIGNIFICANT CHANGE UP
CMV IGM SERPL QL: NEGATIVE — SIGNIFICANT CHANGE UP
EBV EA AB SER IA-ACNC: >150 U/ML — HIGH
EBV EA AB SER IA-ACNC: >150 U/ML — HIGH
EBV EA AB TITR SER IF: POSITIVE
EBV EA AB TITR SER IF: POSITIVE
EBV EA IGG SER-ACNC: POSITIVE
EBV EA IGG SER-ACNC: POSITIVE
EBV NA IGG SER IA-ACNC: 110 U/ML — HIGH
EBV NA IGG SER IA-ACNC: 110 U/ML — HIGH
EBV PATRN SPEC IB-IMP: SIGNIFICANT CHANGE UP
EBV PATRN SPEC IB-IMP: SIGNIFICANT CHANGE UP
EBV VCA IGG AVIDITY SER QL IA: POSITIVE
EBV VCA IGG AVIDITY SER QL IA: POSITIVE
EBV VCA IGM SER IA-ACNC: 233 U/ML — HIGH
EBV VCA IGM SER IA-ACNC: 233 U/ML — HIGH
EBV VCA IGM SER IA-ACNC: <10 U/ML — SIGNIFICANT CHANGE UP
EBV VCA IGM SER IA-ACNC: <10 U/ML — SIGNIFICANT CHANGE UP
EBV VCA IGM TITR FLD: NEGATIVE — SIGNIFICANT CHANGE UP
EBV VCA IGM TITR FLD: NEGATIVE — SIGNIFICANT CHANGE UP
MITOCHONDRIA AB SER-ACNC: SIGNIFICANT CHANGE UP
MITOCHONDRIA AB SER-ACNC: SIGNIFICANT CHANGE UP
SMOOTH MUSCLE AB SER-ACNC: SIGNIFICANT CHANGE UP
SMOOTH MUSCLE AB SER-ACNC: SIGNIFICANT CHANGE UP

## 2023-11-27 PROCEDURE — 72141 MRI NECK SPINE W/O DYE: CPT | Mod: 26

## 2023-11-27 PROCEDURE — 99233 SBSQ HOSP IP/OBS HIGH 50: CPT

## 2023-11-27 PROCEDURE — 72146 MRI CHEST SPINE W/O DYE: CPT | Mod: 26

## 2023-11-27 PROCEDURE — 70551 MRI BRAIN STEM W/O DYE: CPT | Mod: 26

## 2023-11-27 RX ADMIN — AMIODARONE HYDROCHLORIDE 200 MILLIGRAM(S): 400 TABLET ORAL at 09:43

## 2023-11-27 RX ADMIN — SODIUM CHLORIDE 60 MILLILITER(S): 9 INJECTION INTRAMUSCULAR; INTRAVENOUS; SUBCUTANEOUS at 13:10

## 2023-11-27 RX ADMIN — SODIUM CHLORIDE 60 MILLILITER(S): 9 INJECTION INTRAMUSCULAR; INTRAVENOUS; SUBCUTANEOUS at 00:43

## 2023-11-27 RX ADMIN — Medication 600 MILLIGRAM(S): at 17:23

## 2023-11-27 RX ADMIN — Medication 75 MILLIGRAM(S): at 21:08

## 2023-11-27 RX ADMIN — SIMVASTATIN 20 MILLIGRAM(S): 20 TABLET, FILM COATED ORAL at 21:08

## 2023-11-27 RX ADMIN — Medication 50 MILLIGRAM(S): at 09:43

## 2023-11-27 RX ADMIN — Medication 600 MILLIGRAM(S): at 04:59

## 2023-11-27 RX ADMIN — LOSARTAN POTASSIUM 50 MILLIGRAM(S): 100 TABLET, FILM COATED ORAL at 09:43

## 2023-11-27 RX ADMIN — Medication 200 MILLIGRAM(S): at 21:53

## 2023-11-27 RX ADMIN — Medication 75 MILLIGRAM(S): at 09:43

## 2023-11-27 RX ADMIN — RIVAROXABAN 20 MILLIGRAM(S): KIT at 09:43

## 2023-11-27 NOTE — PROGRESS NOTE ADULT - ASSESSMENT
# Flu       -Tamiflu 75 mg po bid Day #2/5    # bilateral leg weakness  unknown etiology   - will obtain MRI brain, C and T spine.    - started on tamiflu  - continue PT  - continue Xarelto for stroke prevention in setting of afib.   - neurology to follow.       # Hand tremor  , rigidity   - Neurology consult r/o Parkinson's            # Elevated LFT s  - elevation of liver enzymes  - negative sono  - will send serologic evaluation  - ?due to meds as newer finding  - ok for d/c to rehab and will continue to f/u as outpt    # Atrial fib.   - continue Xarelto     # R hip pain  -  improved     #. Cough  neg CXRay   - Rx. Mucinex bid x 7 days

## 2023-11-27 NOTE — PROGRESS NOTE ADULT - SUBJECTIVE AND OBJECTIVE BOX
Date of service: 11-27-23 @ 11:19    Lying in bed in NAD  Has cough and congestion  Has low grade fever  RVP shows new influenza A    ROS: no fever or chills; denies dizziness, no HA, no abdominal pain, no diarrhea or constipation; no dysuria, no legs pain, no rashes    MEDICATIONS  (STANDING):  aMIOdarone    Tablet 200 milliGRAM(s) Oral daily  guaiFENesin  milliGRAM(s) Oral every 12 hours  losartan 50 milliGRAM(s) Oral daily  metoprolol succinate ER 50 milliGRAM(s) Oral daily  oseltamivir 75 milliGRAM(s) Oral two times a day  rivaroxaban 20 milliGRAM(s) Oral daily  simvastatin 20 milliGRAM(s) Oral at bedtime  sodium chloride 0.9%. 1000 milliLiter(s) (60 mL/Hr) IV Continuous <Continuous>    Vital Signs Last 24 Hrs  T(C): 36.7 (27 Nov 2023 07:30), Max: 37.2 (26 Nov 2023 21:42)  T(F): 98.1 (27 Nov 2023 07:30), Max: 99 (26 Nov 2023 21:42)  HR: 64 (27 Nov 2023 07:30) (58 - 64)  BP: 168/61 (27 Nov 2023 07:30) (156/57 - 168/61)  BP(mean): --  RR: 18 (27 Nov 2023 07:30) (18 - 18)  SpO2: 97% (27 Nov 2023 07:30) (94% - 97%)    Parameters below as of 27 Nov 2023 07:30  Patient On (Oxygen Delivery Method): room air     Physical exam:    Constitutional:  No acute distress  HEENT: NC/AT, EOMI, PERRLA, conjunctivae clear; ears and nose atraumatic  Neck: supple; thyroid not palpable  Back: no tenderness  Respiratory: respiratory effort normal; clear to auscultation  Cardiovascular: S1S2 regular, no murmurs  Abdomen: soft, not tender, not distended, positive BS  Genitourinary: no suprapubic tenderness  Lymphatic: no LN palpable  Musculoskeletal: no muscle tenderness, no joint swelling or tenderness  Extremities: no pedal edema  Neurological/ Psychiatric: Alert; moving all extremities  Skin: no rashes; no palpable lesions    Labs: all available labs reviewed    Culture - Urine (collected 24 Nov 2023 00:42)  Source: Clean Catch None  Final Report (25 Nov 2023 10:35):    <10,000 CFU/mL Normal Urogenital Britany    Radiology: all available radiological tests reviewed    < from: US Abdomen Upper Quadrant Right (11.24.23 @ 02:05) >  Unremarkable right upper quadrant abdominal ultrasound.  < end of copied text >    < from: Xray Chest 1 View- PORTABLE-Urgent (11.23.23 @ 23:51) >  No acute pulmonary disease.  < end of copied text >      Advanced directives addressed: full resuscitation

## 2023-11-27 NOTE — PROGRESS NOTE ADULT - SUBJECTIVE AND OBJECTIVE BOX
89 yo female w/ ongoing medical problems: HTN, HLD, Atrial fib. 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 Edwards. the patient was sent to [facility] for ANGELO , she was sent back to  on 6/8/23  and readmitted for Atrial fib with RVR . The patient was discharged home on 7/5/23,  Hospitalized on 10/31/23 for R hip pain, right hip clicking, could not ambulate, was d/c to [facility] rehab, she was doing well with rehab and went home, 3 days ago,   she suddenly  was not able to get up from the chair 2 days ago,   was not able to lift her leg off the floor, her daughter called EMS, She was admitted yesterday. The patient is in bed, c/o mild cough, legs still weak , not able to lift straight leg up in bed.     Denies any HA, CP, SOB. No fevers, chills or shakes. Labs and vitals. patient notes of coughing - checked RVP -  + flu. Initiated tamiflue      11/27/23     87 yo female w/ ongoing medical problems: HTN, HLD, Atrial fib. 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 Edwards. the patient was sent to [facility] for ANGELO , she was sent back to  on 6/8/23  and readmitted for Atrial fib with RVR . The patient was discharged home on 7/5/23,  Hospitalized on 10/31/23 for R hip pain, right hip clicking, could not ambulate, was d/c to [facility] rehab, she was doing well with rehab and went home, 3 days ago,   she suddenly  was not able to get up from the chair 2 days ago,   was not able to lift her leg off the floor, her daughter called EMS, She was admitted yesterday. The patient is in bed, c/o mild cough, legs still weak , not able to lift straight leg up in bed.     Denies any HA, CP, SOB. No fevers, chills or shakes. Labs and vitals. patient notes of coughing - checked RVP -  + flu. Initiated tamiflue      11/27/23  Patient seen and examined at bedside. Complaining of cough. No cp, no n/v.    T(C): 37 (11-27-23 @ 15:37), Max: 37.2 (11-26-23 @ 21:42)  HR: 60 (11-27-23 @ 15:37) (60 - 64)  BP: 123/67 (11-27-23 @ 15:37) (123/67 - 168/61)  RR: 16 (11-27-23 @ 15:37) (16 - 18)  SpO2: 96% (11-27-23 @ 15:37) (96% - 97%)    CONSTITUTIONAL:  no apparent distress  EYES: PERRLA and symmetric, EOMI, No conjunctival or scleral injection, non-icteric  ENMT: Oral mucosa with moist membranes. Normal dentition; no pharyngeal injection or exudates             NECK: Supple, symmetric and without tracheal deviation   RESP: No respiratory distress, no use of accessory muscles; CTA b/l, no WRR  CV: RRR, +S1S2, no MRG; no JVD; no peripheral edema  GI: Soft, NT, ND, no rebound, no guarding; no palpable masses; no hepatosplenomegaly; no hernia palpated  LYMPH: No cervical LAD  SKIN: No rashes or ulcers noted; no subcutaneous nodules or induration palpable

## 2023-11-27 NOTE — PROGRESS NOTE ADULT - ASSESSMENT
87 y/o female with h/o A-fib on Xarelto, HTN, HLD was admitted on 11/25 for increased weakness. The patient was recently in rehab for 18 days and was discharged home 3 days PTA. Since she got home has been ambulating with the walker, but then after dinner she was unable to get up from a sitting position, she was feeling weak and was unable to even lift her legs off the floor. Daughter attempted to help her up, but failed which prompted EMS call. Denies any recent fall, trauma. fever, chills dizziness, headache, SOB. In ER she was noted with elevated liver enzymes and concern about an infectious process was raised.     1. Influenza A. Acute hepatitis. ?cause. ?infectious etiology ?drug side effect. URI. Allergy to PCN.  -hepatitis panel is negative   -EBV profile is suggestive of old infection  -CMV serology is negative   -obtain BC x 2  -start oseltamivir 75 mg PO q12h  -reason for abx use and side effects reviewed with patient; monitor BMP   -f/u cultures  -monitor temps  -f/u CBC  -supportive care  2. Other issues:   -care per medicine

## 2023-11-28 ENCOUNTER — APPOINTMENT (OUTPATIENT)
Dept: UROLOGY | Facility: CLINIC | Age: 88
End: 2023-11-28

## 2023-11-28 LAB
A1C WITH ESTIMATED AVERAGE GLUCOSE RESULT: 5.9 % — HIGH (ref 4–5.6)
A1C WITH ESTIMATED AVERAGE GLUCOSE RESULT: 5.9 % — HIGH (ref 4–5.6)
ANION GAP SERPL CALC-SCNC: 5 MMOL/L — SIGNIFICANT CHANGE UP (ref 5–17)
ANION GAP SERPL CALC-SCNC: 5 MMOL/L — SIGNIFICANT CHANGE UP (ref 5–17)
BUN SERPL-MCNC: 5 MG/DL — LOW (ref 7–23)
BUN SERPL-MCNC: 5 MG/DL — LOW (ref 7–23)
CALCIUM SERPL-MCNC: 8.4 MG/DL — LOW (ref 8.5–10.1)
CALCIUM SERPL-MCNC: 8.4 MG/DL — LOW (ref 8.5–10.1)
CHLORIDE SERPL-SCNC: 104 MMOL/L — SIGNIFICANT CHANGE UP (ref 96–108)
CHLORIDE SERPL-SCNC: 104 MMOL/L — SIGNIFICANT CHANGE UP (ref 96–108)
CO2 SERPL-SCNC: 28 MMOL/L — SIGNIFICANT CHANGE UP (ref 22–31)
CO2 SERPL-SCNC: 28 MMOL/L — SIGNIFICANT CHANGE UP (ref 22–31)
CREAT SERPL-MCNC: 0.49 MG/DL — LOW (ref 0.5–1.3)
CREAT SERPL-MCNC: 0.49 MG/DL — LOW (ref 0.5–1.3)
EGFR: 92 ML/MIN/1.73M2 — SIGNIFICANT CHANGE UP
EGFR: 92 ML/MIN/1.73M2 — SIGNIFICANT CHANGE UP
ESTIMATED AVERAGE GLUCOSE: 123 MG/DL — HIGH (ref 68–114)
ESTIMATED AVERAGE GLUCOSE: 123 MG/DL — HIGH (ref 68–114)
GLUCOSE SERPL-MCNC: 100 MG/DL — HIGH (ref 70–99)
GLUCOSE SERPL-MCNC: 100 MG/DL — HIGH (ref 70–99)
POTASSIUM SERPL-MCNC: 3.5 MMOL/L — SIGNIFICANT CHANGE UP (ref 3.5–5.3)
POTASSIUM SERPL-MCNC: 3.5 MMOL/L — SIGNIFICANT CHANGE UP (ref 3.5–5.3)
POTASSIUM SERPL-SCNC: 3.5 MMOL/L — SIGNIFICANT CHANGE UP (ref 3.5–5.3)
POTASSIUM SERPL-SCNC: 3.5 MMOL/L — SIGNIFICANT CHANGE UP (ref 3.5–5.3)
SODIUM SERPL-SCNC: 137 MMOL/L — SIGNIFICANT CHANGE UP (ref 135–145)
SODIUM SERPL-SCNC: 137 MMOL/L — SIGNIFICANT CHANGE UP (ref 135–145)

## 2023-11-28 PROCEDURE — 70496 CT ANGIOGRAPHY HEAD: CPT | Mod: 26

## 2023-11-28 PROCEDURE — 99233 SBSQ HOSP IP/OBS HIGH 50: CPT

## 2023-11-28 PROCEDURE — 93308 TTE F-UP OR LMTD: CPT | Mod: 26

## 2023-11-28 PROCEDURE — 70498 CT ANGIOGRAPHY NECK: CPT | Mod: 26

## 2023-11-28 RX ORDER — ATORVASTATIN CALCIUM 80 MG/1
40 TABLET, FILM COATED ORAL AT BEDTIME
Refills: 0 | Status: DISCONTINUED | OUTPATIENT
Start: 2023-11-28 | End: 2023-12-01

## 2023-11-28 RX ORDER — POTASSIUM CHLORIDE 20 MEQ
20 PACKET (EA) ORAL THREE TIMES A DAY
Refills: 0 | Status: COMPLETED | OUTPATIENT
Start: 2023-11-28 | End: 2023-11-30

## 2023-11-28 RX ADMIN — Medication 200 MILLIGRAM(S): at 05:17

## 2023-11-28 RX ADMIN — Medication 600 MILLIGRAM(S): at 05:17

## 2023-11-28 RX ADMIN — RIVAROXABAN 20 MILLIGRAM(S): KIT at 09:30

## 2023-11-28 RX ADMIN — Medication 200 MILLIGRAM(S): at 17:07

## 2023-11-28 RX ADMIN — Medication 3 MILLIGRAM(S): at 21:19

## 2023-11-28 RX ADMIN — Medication 200 MILLIGRAM(S): at 21:19

## 2023-11-28 RX ADMIN — Medication 75 MILLIGRAM(S): at 09:30

## 2023-11-28 RX ADMIN — Medication 600 MILLIGRAM(S): at 17:07

## 2023-11-28 RX ADMIN — LOSARTAN POTASSIUM 50 MILLIGRAM(S): 100 TABLET, FILM COATED ORAL at 09:31

## 2023-11-28 RX ADMIN — Medication 20 MILLIEQUIVALENT(S): at 21:20

## 2023-11-28 RX ADMIN — SODIUM CHLORIDE 60 MILLILITER(S): 9 INJECTION INTRAMUSCULAR; INTRAVENOUS; SUBCUTANEOUS at 03:00

## 2023-11-28 RX ADMIN — Medication 75 MILLIGRAM(S): at 21:19

## 2023-11-28 RX ADMIN — Medication 50 MILLIGRAM(S): at 09:31

## 2023-11-28 RX ADMIN — ATORVASTATIN CALCIUM 40 MILLIGRAM(S): 80 TABLET, FILM COATED ORAL at 21:19

## 2023-11-28 RX ADMIN — AMIODARONE HYDROCHLORIDE 200 MILLIGRAM(S): 400 TABLET ORAL at 09:31

## 2023-11-28 NOTE — PROGRESS NOTE ADULT - SUBJECTIVE AND OBJECTIVE BOX
89 yo female w/ ongoing medical problems: HTN, HLD, Atrial fib. 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 Edwards. the patient was sent to [facility] for ANGELO , she was sent back to  on 6/8/23  and readmitted for Atrial fib with RVR . The patient was discharged home on 7/5/23,  Hospitalized on 10/31/23 for R hip pain, right hip clicking, could not ambulate, was d/c to [facility] rehab, she was doing well with rehab and went home, 3 days ago,   she suddenly  was not able to get up from the chair 2 days ago,   was not able to lift her leg off the floor, her daughter called EMS, She was admitted yesterday. The patient is in bed, c/o mild cough, legs still weak , not able to lift straight leg up in bed.     Denies any HA, CP, SOB. No fevers, chills or shakes. Labs and vitals. patient notes of coughing - checked RVP -  + flu. Initiated tamiflue      11/27/23  Patient seen and examined at bedside. Complaining of cough. No cp, no n/v.    11/28  Patient seen and examined at bedside. States she is having cough and feeling congested.     Vital Signs Last 24 Hrs  T(C): 36.4 (28 Nov 2023 17:07), Max: 36.5 (28 Nov 2023 08:18)  T(F): 97.6 (28 Nov 2023 17:07), Max: 97.7 (28 Nov 2023 08:18)  HR: 68 (28 Nov 2023 17:07) (55 - 68)  BP: 147/69 (28 Nov 2023 17:07) (133/64 - 147/69)  BP(mean): --  RR: 20 (28 Nov 2023 17:07) (16 - 20)  SpO2: 100% (28 Nov 2023 17:07) (95% - 100%)    Parameters below as of 28 Nov 2023 17:07  Patient On (Oxygen Delivery Method): room air    Review of Systems:   · General	negative  · ENMT	negative  · Respiratory and Thorax Symptoms	cough   · Cardiovascular	negative  · Gastrointestinal	negative  · Genitourinary	negative  · Musculoskeletal	negative    CONSTITUTIONAL:  no apparent distress  EYES: PERRLA and symmetric, EOMI, No conjunctival or scleral injection, non-icteric  ENMT: Oral mucosa with moist membranes. Normal dentition; no pharyngeal injection or exudates             NECK: Supple, symmetric and without tracheal deviation   RESP: No respiratory distress, no use of accessory muscles; CTA b/l, no WRR  CV: RRR, +S1S2, no MRG; no JVD; no peripheral edema  GI: Soft, NT, ND, no rebound, no guarding; no palpable masses; no hepatosplenomegaly; no hernia palpated  LYMPH: No cervical LAD  NEURO: cn II - XI intact, no focal deficits, strength 5/5 upper and lowe extremities  SKIN: No rashes or ulcers noted; no subcutaneous nodules or induration palpable    < from: CT Angio Neck w/ IV Cont (11.28.23 @ 10:36) >    IMPRESSION:    Brain CT without contrast:  Small subacute infarcts in the right frontal and temporal lobes are   better seen on prior MRI.    CT angiography neck:  1.  No hemodynamically significant stenosis of the bilateral cervical   ICAs using NASCET criteria.  Patent vertebral arteries.  No evidence of   vascular dissection.  2.  A 1 cm left thyroid nodule.    CT angiography brain: No large vessel occlusion. No evidence of aneurysm.    --- End of Report ---    < end of copied text >  < from: CT Angio Head w/ IV Cont (11.28.23 @ 10:36) >  IMPRESSION:    Brain CT without contrast:  Small subacute infarcts in the right frontal and temporal lobes are   better seen on prior MRI.    CT angiography neck:  1.  No hemodynamically significant stenosis of the bilateral cervical   ICAs using NASCET criteria.  Patent vertebral arteries.  No evidence of   vascular dissection.  2.  A 1 cm left thyroid nodule.    CT angiography brain: No large vessel occlusion. No evidence of aneurysm.    --- End of Report ---    < end of copied text >  < from: MR Head No Cont (11.27.23 @ 13:49) >  IMPRESSION:    MRI BRAIN  1. 3 mm probable subacute cortical and juxtacortical infarcts right   frontal and temporal lobes, respectively. No evidence of associated   hemorrhage or mass effect. No evidence of territorial infarct, hemorrhage   or vasogenic edema.  2. Moderate pontine and bihemispheric altered white matter signal; a   nonspecific finding which statistically reflects chronic microvascular   ischemic change.  3. Additional findings as above, paranasal sinuses and mastoid air cell   disease; the significance of which should be determined on a clinical   basis.    MRI CERVICAL SPINE  1. No gross evidence of abnormal cord signal.  2. Straightening of lordosis may reflect muscle spasm. Grade 1  anterolisthesis C3 over C4 and C7 over T1.  3. C4-C5 central disc protrusion superimposed upon a disc   bulge-spondylitic ridge complex, effacing the subarachnoid space and   impinging upon the ventral cord, resulting in moderate central canal and   severe bilateral neural foramen stenosis with uncovertebral spurring.  4. C5-C6 disc bulge-spondylitic ridge complex, effacing the subarachnoid   space and impinging upon the ventral cord, resulting in mild central   canal, severe right and moderateleft neural foramen stenosis with   uncovertebral spurring.  5. 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    MRI THORACIC SPINE  1. No gross evidence of abnormal cord signal.  2. No acute compression fracture. Mild chronic T3 compression fracture,   without retropulsion. Grade 1 anterolisthesis T2 over T3. Mild kyphosis.  3. T3-T4 central disc protrusion, narrowing the subarachnoid space. No   cord compression.  4. Additional findings, including those degenerative, described in detail   above.    --- End of Report ---    < end of copied text >

## 2023-11-28 NOTE — PROGRESS NOTE ADULT - ASSESSMENT
87 y/o female with h/o A-fib on Xarelto, HTN, HLD was admitted on 11/25 for increased weakness. The patient was recently in rehab for 18 days and was discharged home 3 days PTA. Since she got home has been ambulating with the walker, but then after dinner she was unable to get up from a sitting position, she was feeling weak and was unable to even lift her legs off the floor. Daughter attempted to help her up, but failed which prompted EMS call. Denies any recent fall, trauma. fever, chills dizziness, headache, SOB. In ER she was noted with elevated liver enzymes and concern about an infectious process was raised.     1. Influenza A. Acute hepatitis slightly improving. ?cause. ?infectious etiology ?drug side effect. URI. Allergy to PCN.  -hepatitis panel is negative   -EBV profile is suggestive of old infection  -CMV serology is negative   -obtain BC x 2  -on oseltamivir 75 mg PO q12h # 2  -tolerating abx well so far; no side effects noted  -f/u cultures  -continue antiviral meds for 5 days  -monitor temps  -f/u CBC and CMP  -supportive care  2. Other issues:   -care per medicine

## 2023-11-28 NOTE — PROGRESS NOTE ADULT - ASSESSMENT
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    - started on tamiflu  - continue PT  - continue Xarelto for stroke prevention in setting of afib.   - neurology to follow.       # Hand tremor  , rigidity   - Neurology consult r/o Parkinson's            # Elevated LFT s  - elevation of liver enzymes  - negative sono  - will send serologic evaluation  - ?due to meds as newer finding  - ok for d/c to rehab and will continue to f/u as outpt    # Atrial fib.   - continue Xarelto     # R hip pain  -  improved     #. Cough  neg CXRay   - Rx. Mucinex bid x 7 days

## 2023-11-28 NOTE — PROGRESS NOTE ADULT - SUBJECTIVE AND OBJECTIVE BOX
Date of service: 11-28-23 @ 10:22    OOB to chair  Has dry cough  No SOB at rest    ROS: no fever or chills; denies dizziness, no HA, no abdominal pain, no diarrhea or constipation; no dysuria, no legs pain, no rashes    MEDICATIONS  (STANDING):  aMIOdarone    Tablet 200 milliGRAM(s) Oral daily  atorvastatin 40 milliGRAM(s) Oral at bedtime  guaiFENesin  milliGRAM(s) Oral every 12 hours  losartan 50 milliGRAM(s) Oral daily  metoprolol succinate ER 50 milliGRAM(s) Oral daily  oseltamivir 75 milliGRAM(s) Oral two times a day  rivaroxaban 20 milliGRAM(s) Oral daily  simvastatin 20 milliGRAM(s) Oral at bedtime  sodium chloride 0.9%. 1000 milliLiter(s) (60 mL/Hr) IV Continuous <Continuous>    Vital Signs Last 24 Hrs  T(C): 36.5 (28 Nov 2023 08:18), Max: 37 (27 Nov 2023 15:37)  T(F): 97.7 (28 Nov 2023 08:18), Max: 98.6 (27 Nov 2023 15:37)  HR: 55 (28 Nov 2023 08:18) (55 - 61)  BP: 133/64 (28 Nov 2023 08:18) (123/67 - 144/61)  BP(mean): --  RR: 18 (28 Nov 2023 08:18) (16 - 18)  SpO2: 95% (28 Nov 2023 08:18) (95% - 100%)    Parameters below as of 28 Nov 2023 08:18  Patient On (Oxygen Delivery Method): room air     Physical exam:    Constitutional:  No acute distress  HEENT: NC/AT, EOMI, PERRLA, conjunctivae clear; ears and nose atraumatic  Neck: supple; thyroid not palpable  Back: no tenderness  Respiratory: respiratory effort normal; clear to auscultation  Cardiovascular: S1S2 regular, no murmurs  Abdomen: soft, not tender, not distended, positive BS  Genitourinary: no suprapubic tenderness  Lymphatic: no LN palpable  Musculoskeletal: no muscle tenderness, no joint swelling or tenderness  Extremities: no pedal edema  Neurological/ Psychiatric: Alert; moving all extremities  Skin: no rashes; no palpable lesions    Labs: reviewed    11-28    137  |  104  |  5<L>  ----------------------------<  100<H>  3.5   |  28  |  0.49<L>    Ca    8.4<L>      28 Nov 2023 08:41    Culture - Urine (collected 24 Nov 2023 00:42)  Source: Clean Catch None  Final Report (25 Nov 2023 10:35):    <10,000 CFU/mL Normal Urogenital Britany    Radiology: all available radiological tests reviewed    < from: US Abdomen Upper Quadrant Right (11.24.23 @ 02:05) >  Unremarkable right upper quadrant abdominal ultrasound.  < end of copied text >    < from: Xray Chest 1 View- PORTABLE-Urgent (11.23.23 @ 23:51) >  No acute pulmonary disease.  < end of copied text >      Advanced directives addressed: full resuscitation

## 2023-11-29 LAB
ADD ON TEST-SPECIMEN IN LAB: SIGNIFICANT CHANGE UP
ADD ON TEST-SPECIMEN IN LAB: SIGNIFICANT CHANGE UP
ANION GAP SERPL CALC-SCNC: 5 MMOL/L — SIGNIFICANT CHANGE UP (ref 5–17)
ANION GAP SERPL CALC-SCNC: 5 MMOL/L — SIGNIFICANT CHANGE UP (ref 5–17)
BUN SERPL-MCNC: 6 MG/DL — LOW (ref 7–23)
BUN SERPL-MCNC: 6 MG/DL — LOW (ref 7–23)
CALCIUM SERPL-MCNC: 8.3 MG/DL — LOW (ref 8.5–10.1)
CALCIUM SERPL-MCNC: 8.3 MG/DL — LOW (ref 8.5–10.1)
CHLORIDE SERPL-SCNC: 99 MMOL/L — SIGNIFICANT CHANGE UP (ref 96–108)
CHLORIDE SERPL-SCNC: 99 MMOL/L — SIGNIFICANT CHANGE UP (ref 96–108)
CHOLEST SERPL-MCNC: 142 MG/DL — SIGNIFICANT CHANGE UP
CHOLEST SERPL-MCNC: 142 MG/DL — SIGNIFICANT CHANGE UP
CO2 SERPL-SCNC: 27 MMOL/L — SIGNIFICANT CHANGE UP (ref 22–31)
CO2 SERPL-SCNC: 27 MMOL/L — SIGNIFICANT CHANGE UP (ref 22–31)
CREAT SERPL-MCNC: 0.64 MG/DL — SIGNIFICANT CHANGE UP (ref 0.5–1.3)
CREAT SERPL-MCNC: 0.64 MG/DL — SIGNIFICANT CHANGE UP (ref 0.5–1.3)
EGFR: 86 ML/MIN/1.73M2 — SIGNIFICANT CHANGE UP
EGFR: 86 ML/MIN/1.73M2 — SIGNIFICANT CHANGE UP
GLUCOSE SERPL-MCNC: 101 MG/DL — HIGH (ref 70–99)
GLUCOSE SERPL-MCNC: 101 MG/DL — HIGH (ref 70–99)
HDLC SERPL-MCNC: 61 MG/DL — SIGNIFICANT CHANGE UP
HDLC SERPL-MCNC: 61 MG/DL — SIGNIFICANT CHANGE UP
LIPID PNL WITH DIRECT LDL SERPL: 70 MG/DL — SIGNIFICANT CHANGE UP
LIPID PNL WITH DIRECT LDL SERPL: 70 MG/DL — SIGNIFICANT CHANGE UP
NON HDL CHOLESTEROL: 81 MG/DL — SIGNIFICANT CHANGE UP
NON HDL CHOLESTEROL: 81 MG/DL — SIGNIFICANT CHANGE UP
NT-PROBNP SERPL-SCNC: 1031 PG/ML — HIGH (ref 0–450)
NT-PROBNP SERPL-SCNC: 1031 PG/ML — HIGH (ref 0–450)
POTASSIUM SERPL-MCNC: 4.2 MMOL/L — SIGNIFICANT CHANGE UP (ref 3.5–5.3)
POTASSIUM SERPL-MCNC: 4.2 MMOL/L — SIGNIFICANT CHANGE UP (ref 3.5–5.3)
POTASSIUM SERPL-SCNC: 4.2 MMOL/L — SIGNIFICANT CHANGE UP (ref 3.5–5.3)
POTASSIUM SERPL-SCNC: 4.2 MMOL/L — SIGNIFICANT CHANGE UP (ref 3.5–5.3)
SODIUM SERPL-SCNC: 131 MMOL/L — LOW (ref 135–145)
SODIUM SERPL-SCNC: 131 MMOL/L — LOW (ref 135–145)
TRIGL SERPL-MCNC: 47 MG/DL — SIGNIFICANT CHANGE UP
TRIGL SERPL-MCNC: 47 MG/DL — SIGNIFICANT CHANGE UP

## 2023-11-29 PROCEDURE — 99233 SBSQ HOSP IP/OBS HIGH 50: CPT

## 2023-11-29 PROCEDURE — 74019 RADEX ABDOMEN 2 VIEWS: CPT | Mod: 26

## 2023-11-29 RX ORDER — FUROSEMIDE 40 MG
20 TABLET ORAL ONCE
Refills: 0 | Status: COMPLETED | OUTPATIENT
Start: 2023-11-29 | End: 2023-11-30

## 2023-11-29 RX ADMIN — Medication 650 MILLIGRAM(S): at 20:53

## 2023-11-29 RX ADMIN — Medication 600 MILLIGRAM(S): at 17:11

## 2023-11-29 RX ADMIN — Medication 75 MILLIGRAM(S): at 20:55

## 2023-11-29 RX ADMIN — Medication 600 MILLIGRAM(S): at 05:52

## 2023-11-29 RX ADMIN — Medication 3 MILLIGRAM(S): at 20:53

## 2023-11-29 RX ADMIN — LOSARTAN POTASSIUM 50 MILLIGRAM(S): 100 TABLET, FILM COATED ORAL at 09:32

## 2023-11-29 RX ADMIN — Medication 200 MILLIGRAM(S): at 09:32

## 2023-11-29 RX ADMIN — Medication 50 MILLIGRAM(S): at 09:31

## 2023-11-29 RX ADMIN — Medication 20 MILLIEQUIVALENT(S): at 20:56

## 2023-11-29 RX ADMIN — Medication 200 MILLIGRAM(S): at 18:57

## 2023-11-29 RX ADMIN — ATORVASTATIN CALCIUM 40 MILLIGRAM(S): 80 TABLET, FILM COATED ORAL at 20:55

## 2023-11-29 RX ADMIN — AMIODARONE HYDROCHLORIDE 200 MILLIGRAM(S): 400 TABLET ORAL at 09:31

## 2023-11-29 RX ADMIN — Medication 20 MILLIEQUIVALENT(S): at 05:51

## 2023-11-29 RX ADMIN — Medication 75 MILLIGRAM(S): at 09:31

## 2023-11-29 RX ADMIN — RIVAROXABAN 20 MILLIGRAM(S): KIT at 20:56

## 2023-11-29 NOTE — DIETITIAN INITIAL EVALUATION ADULT - PERTINENT MEDS FT
MEDICATIONS  (STANDING):  aMIOdarone    Tablet 200 milliGRAM(s) Oral daily  atorvastatin 40 milliGRAM(s) Oral at bedtime  guaiFENesin  milliGRAM(s) Oral every 12 hours  losartan 50 milliGRAM(s) Oral daily  metoprolol succinate ER 50 milliGRAM(s) Oral daily  oseltamivir 75 milliGRAM(s) Oral two times a day  potassium chloride   Powder 20 milliEquivalent(s) Oral three times a day  rivaroxaban 20 milliGRAM(s) Oral daily    MEDICATIONS  (PRN):  acetaminophen     Tablet .. 650 milliGRAM(s) Oral every 6 hours PRN Temp greater or equal to 38C (100.4F), Mild Pain (1 - 3)  aluminum hydroxide/magnesium hydroxide/simethicone Suspension 30 milliLiter(s) Oral every 4 hours PRN Dyspepsia  guaiFENesin Oral Liquid (Sugar-Free) 200 milliGRAM(s) Oral every 6 hours PRN Cough  melatonin 3 milliGRAM(s) Oral at bedtime PRN Insomnia  ondansetron Injectable 4 milliGRAM(s) IV Push every 8 hours PRN Nausea and/or Vomiting

## 2023-11-29 NOTE — DIETITIAN INITIAL EVALUATION ADULT - PERTINENT LABORATORY DATA
11-29    131<L>  |  99  |  6<L>  ----------------------------<  101<H>  4.2   |  27  |  0.64    Ca    8.3<L>      29 Nov 2023 12:57    A1C with Estimated Average Glucose Result: 5.9 % (11-28-23 @ 08:41)

## 2023-11-29 NOTE — DIETITIAN INITIAL EVALUATION ADULT - ORAL INTAKE PTA/DIET HISTORY
Pt lives at home alone Pt lives at home alone; daughter grocery shops for pt. Consumes a lot of readily prepared foods & frozen meals (lean cuisine); no longer cooks and has no desire to. Reports poor appetite and consuming <50% of ENN x 1 week 2/2 weakness, tremors. Consumes 1-2 protein shakes at home daily (premier or ensure)

## 2023-11-29 NOTE — DIETITIAN INITIAL EVALUATION ADULT - OTHER INFO
88-year-old female with history of A-fib on Xarelto, HTN, HLD presents to the ED for weakness.  Patient was recently in rehab for 18 days and was discharged 3 days ago. Since she got home has been ambulating with the walker but yesterday after dinner she was unable to get up from a sitting position, she was feeling weak and was unable to even lift her legs off the floor.  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  Patient was recently in rehab for 18 days and was discharged 3 days ago. Since she got home has been ambulating with the walker but yesterday after dinner she was unable to get up from a sitting position, she was feeling weak and was unable to even lift her legs off the floor.  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 Reports improved appetite x 1-2 days; consuming ~50-60% of trays. Reports UBW of 123# x 1 week ago; bed scale wt of 123# taken by RD on 11/29/23. Wt hx as per EMR: 122# (bed scale wt taken by RD on 5/31/23). Unable to identify any pertinent wt changes at this time. NFPE reveals moderate-severe muscle/ fat wasting - pt appears thin and frail. Liberalize diet to regular to maximize caloric and nutrient intake. Add premier protein shake BID (provides 160kcal, 30g protein) - pt is receptive. GOC confirmed - is DNR/DNI w/ NFT. See below for other recommendations.

## 2023-11-29 NOTE — DIETITIAN INITIAL EVALUATION ADULT - ADD RECOMMEND
1) Liberalize diet to regular to maximize caloric and nutrient intake.  2) Add premier protein shake BID (provides 160kcal, 30g protein)  3) Monitor bowel movements, if no BM for >3 days, consider implementing bowel regimen.  4) Monitor lytes/ min and replete prn.  5) Encourage protein-rich foods, maximize food preferences  6) MVI w/ minerals daily to ensure 100% RDA met  7) Consider adding thiamine 100 mg daily 2/2 poor PO intake/ malnutrition  8) GOC confirmed; is DNR/DNI w/ NFT  RD will continue to monitor PO intake, labs, hydration, and wt prn.

## 2023-11-29 NOTE — DIETITIAN NUTRITION RISK NOTIFICATION - ETIOLOGY-BASIS
Acute illness or injury Implemented All Universal Safety Interventions:  [place] to call system. Call bell, personal items and telephone within reach. Instruct patient to call for assistance. Room bathroom lighting operational. Non-slip footwear when patient is off stretcher. Physically safe environment: no spills, clutter or unnecessary equipment. Stretcher in lowest position, wheels locked, appropriate side rails in place.

## 2023-11-29 NOTE — PROGRESS NOTE ADULT - SUBJECTIVE AND OBJECTIVE BOX
ROS: As stated above otherwise negative    MEDICATIONS  (STANDING):  aMIOdarone    Tablet 200 milliGRAM(s) Oral daily  atorvastatin 40 milliGRAM(s) Oral at bedtime  guaiFENesin  milliGRAM(s) Oral every 12 hours  losartan 50 milliGRAM(s) Oral daily  metoprolol succinate ER 50 milliGRAM(s) Oral daily  oseltamivir 75 milliGRAM(s) Oral two times a day  potassium chloride   Powder 20 milliEquivalent(s) Oral three times a day  rivaroxaban 20 milliGRAM(s) Oral daily  sodium chloride 0.9%. 1000 milliLiter(s) (60 mL/Hr) IV Continuous <Continuous>      Vital Signs Last 24 Hrs  T(C): 36.2 (29 Nov 2023 08:41), Max: 36.7 (28 Nov 2023 21:15)  T(F): 97.2 (29 Nov 2023 08:41), Max: 98.1 (28 Nov 2023 21:15)  HR: 60 (29 Nov 2023 08:41) (60 - 68)  BP: 179/77 (29 Nov 2023 08:41) (147/69 - 179/77)  BP(mean): --  RR: 18 (29 Nov 2023 08:41) (18 - 20)  SpO2: 96% (29 Nov 2023 08:41) (96% - 100%)      NEURO:   Awake, alert, oriented to hospital, month, date, year, normal naming, and fluency.  Gives a full, clear history.   Pupils 3-2mm, symmetric, full VF's, normal EOM, no nystagmus, no facial weakness, no dysarthria, tongue midline.   MOTOR: normal bulk and tone.  Intermittent tremor in the L arm.  Worse with action.  Upper extremities 5/5 , deltoids, biceps.  Lower exremities symmetrically 4-/5 hip flexion, 4/5 knee extension, and ankle dorsiflexion, 4+/5 ankle plantar flexion.  Mildly increaed tone bilateral lower extremities, L>R.  Non spastic.   SENSORY: diminished pinprick sensation L face.  Throughout the trunk, R>L pinprick sensation paraspinal.  Symmetric pinprick, temp, vib sense upper extremities.  Diminished temp sensation L leg compared to R.  Normal position sense.   COORDINATION: normal fNF  REFLEXES: 1+ symmetric BB, Br, triceps, absent Awan, 1+ patellar, trace achilles, toes down.            11-28    137  |  104  |  5<L>  ----------------------------<  100<H>  3.5   |  28  |  0.49<L>    Ca    8.4<L>      28 Nov 2023 08:41            Radiology report:  < from: CT Angio Neck w/ IV Cont (11.28.23 @ 10:36) >  IMPRESSION:    Brain CT without contrast:  Small subacute infarcts in the right frontal and temporal lobes are   better seen on prior MRI.    CT angiography neck:  1.  No hemodynamically significant stenosis of the bilateral cervical   ICAs using NASCET criteria.  Patent vertebral arteries.  No evidence of   vascular dissection.  2.  A 1 cm left thyroid nodule.    CT angiography brain: No large vessel occlusion. No evidence of aneurysm.    < from: MR Head No Cont (11.27.23 @ 13:49) >  IMPRESSION:    MRI BRAIN  1. 3 mm probable subacute cortical and juxtacortical infarcts right   frontal and temporal lobes, respectively. No evidence of associated   hemorrhage or mass effect. No evidence of territorial infarct, hemorrhage   or vasogenic edema.  2. Moderate pontine and bihemispheric altered white matter signal; a   nonspecific finding which statistically reflects chronic microvascular   ischemic change.  3. Additional findings as above, paranasal sinuses and mastoid air cell   disease; the significance of which should be determined on a clinical   basis.    MRI CERVICAL SPINE  1. No gross evidence of abnormal cord signal.  2. Straightening of lordosis may reflect muscle spasm. Grade 1  anterolisthesis C3 over C4 and C7 over T1.  3. C4-C5 central disc protrusion superimposed upon a disc   bulge-spondylitic ridge complex, effacing the subarachnoid space and   impinging upon the ventral cord, resulting in moderate central canal and   severe bilateral neural foramen stenosis with uncovertebral spurring.  4. C5-C6 disc bulge-spondylitic ridge complex, effacing the subarachnoid   space and impinging upon the ventral cord, resulting in mild central   canal, severe right and moderateleft neural foramen stenosis with   uncovertebral spurring.  5. 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    MRI THORACIC SPINE  1. No gross evidence of abnormal cord signal.  2. No acute compression fracture. Mild chronic T3 compression fracture,   without retropulsion. Grade 1 anterolisthesis T2 over T3. Mild kyphosis.  3. T3-T4 central disc protrusion, narrowing the subarachnoid space. No   cord compression.  4. Additional findings, including those degenerative, described in detail   above.         No c/o pain, numbness, feeling better observed walking with PT with walker.  MRI    ROS: As stated above otherwise negative    MEDICATIONS  (STANDING):  aMIOdarone    Tablet 200 milliGRAM(s) Oral daily  atorvastatin 40 milliGRAM(s) Oral at bedtime  guaiFENesin  milliGRAM(s) Oral every 12 hours  losartan 50 milliGRAM(s) Oral daily  metoprolol succinate ER 50 milliGRAM(s) Oral daily  oseltamivir 75 milliGRAM(s) Oral two times a day  potassium chloride   Powder 20 milliEquivalent(s) Oral three times a day  rivaroxaban 20 milliGRAM(s) Oral daily  sodium chloride 0.9%. 1000 milliLiter(s) (60 mL/Hr) IV Continuous <Continuous>      Vital Signs Last 24 Hrs  T(C): 36.2 (29 Nov 2023 08:41), Max: 36.7 (28 Nov 2023 21:15)  T(F): 97.2 (29 Nov 2023 08:41), Max: 98.1 (28 Nov 2023 21:15)  HR: 60 (29 Nov 2023 08:41) (60 - 68)  BP: 179/77 (29 Nov 2023 08:41) (147/69 - 179/77)  BP(mean): --  RR: 18 (29 Nov 2023 08:41) (18 - 20)  SpO2: 96% (29 Nov 2023 08:41) (96% - 100%)      NEURO:   Awake, alert, oriented to hospital, month, date, year, normal naming, and fluency.  Gives a full, clear history.   Pupils 3-2mm, symmetric, full VF's, normal EOM, no nystagmus, no facial weakness, no dysarthria, tongue midline.   MOTOR: normal bulk and tone.  Intermittent tremor in the L arm.  Worse with action.  Upper extremities 5/5 , deltoids, biceps.  Lower exremities symmetrically  5/5, nml bulk and tone, no tremors, no rigidity  SENSORY: Intact to FT, PP, Temp  COORDINATION: normal FTN, nml HTS b/l  REFLEXES: 1+ symmetric BB, Br, triceps, absent Awan, 1+ patellar, trace achilles, toes down.            11-28    137  |  104  |  5<L>  ----------------------------<  100<H>  3.5   |  28  |  0.49<L>    Ca    8.4<L>      28 Nov 2023 08:41            Radiology report:  < from: CT Angio Neck w/ IV Cont (11.28.23 @ 10:36) >  IMPRESSION:    Brain CT without contrast:  Small subacute infarcts in the right frontal and temporal lobes are   better seen on prior MRI.    CT angiography neck:  1.  No hemodynamically significant stenosis of the bilateral cervical   ICAs using NASCET criteria.  Patent vertebral arteries.  No evidence of   vascular dissection.  2.  A 1 cm left thyroid nodule.    CT angiography brain: No large vessel occlusion. No evidence of aneurysm.    < from: MR Head No Cont (11.27.23 @ 13:49) >  IMPRESSION:    MRI BRAIN  1. 3 mm probable subacute cortical and juxtacortical infarcts right   frontal and temporal lobes, respectively. No evidence of associated   hemorrhage or mass effect. No evidence of territorial infarct, hemorrhage   or vasogenic edema.  2. Moderate pontine and bihemispheric altered white matter signal; a   nonspecific finding which statistically reflects chronic microvascular   ischemic change.  3. Additional findings as above, paranasal sinuses and mastoid air cell   disease; the significance of which should be determined on a clinical   basis.    MRI CERVICAL SPINE  1. No gross evidence of abnormal cord signal.  2. Straightening of lordosis may reflect muscle spasm. Grade 1  anterolisthesis C3 over C4 and C7 over T1.  3. C4-C5 central disc protrusion superimposed upon a disc   bulge-spondylitic ridge complex, effacing the subarachnoid space and   impinging upon the ventral cord, resulting in moderate central canal and   severe bilateral neural foramen stenosis with uncovertebral spurring.  4. C5-C6 disc bulge-spondylitic ridge complex, effacing the subarachnoid   space and impinging upon the ventral cord, resulting in mild central   canal, severe right and moderateleft neural foramen stenosis with   uncovertebral spurring.  5. 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    MRI THORACIC SPINE  1. No gross evidence of abnormal cord signal.  2. No acute compression fracture. Mild chronic T3 compression fracture,   without retropulsion. Grade 1 anterolisthesis T2 over T3. Mild kyphosis.  3. T3-T4 central disc protrusion, narrowing the subarachnoid space. No   cord compression.  4. Additional findings, including those degenerative, described in detail   above.

## 2023-11-29 NOTE — PROGRESS NOTE ADULT - ASSESSMENT
88 year old woman with HTN, HLD, afib on Xarelto, presenting with recurrent episodes of bilateral lower extremity weakness, typically lasting several days, without other neurological deficits, including sensory loss, and without any pain, and no sphincter involvement.  Symptoms recurrent, over last 6 months or so, self limited each time.   On exam, does have 4/5 weakness hip flexors, knee extensors, and ankle dorsiflexion.  Sensation is diminished in the L face, and L trunk.  Temperature sensation is diminished in the L leg.  Has not had any imaging.   Etiology not entirely clear at this point.  Initial suspicion for myelopathic process, however, with facial sensory loss, cerebrum is also implicated.      -will obtain MRI brain, C and T spine.    -continue PT  -continue Xarelto for stroke prevention in setting of afib.   -neurology to follow.   88 year old woman with HTN, HLD, afib on Xarelto, presenting with recurrent episodes of bilateral lower extremity weakness, typically lasting several days, without other neurological deficits, including sensory loss, and without any pain, and no sphincter involvement.  Symptoms recurrent, over last 6 months or so, self limited each time.   On exam, does have 4/5 weakness hip flexors, knee extensors, and ankle dorsiflexion.  Sensation is diminished in the L face, and L trunk.  Temperature sensation is diminished in the L leg.  Has not had any imaging.   Etiology not entirely clear at this point.  Initial suspicion for myelopathic process, however, with facial sensory loss, cerebrum is also implicated.  MRI brain is negative for any acute ifarct.  There was  3 mm probable subacute cortical and juxtacortical infarcts right frontal and temporal lobes and chronic MV ischemic change.  MRI C/T spine showing chronic findings.    Recommendations:  -continue PT/Rehab evaluation  -continue Xarelto for stroke prevention in setting of afib.    -Continue statin (goal LDL <70)  -F/U with Dr. John outpatient (patient has appointment in Feb)    D/W Dr. Guzman

## 2023-11-29 NOTE — PROGRESS NOTE ADULT - ASSESSMENT
88-year-old female with history of A-fib on Xarelto, HTN, HLD admitted for:       #Influenza A Acute  URI   - supportive care  - CRX neg for PNA< no effusions   - monitor pulse ox, supplement O2 via NC       -c/w Tamiflu 75 mg po bid Day #3/5  -C/w Mucinex  - chest exam with lower lungs poor air entry and crackle, {t with IVF this stay, dc this am. Check BNP, elevated   - Repeat XR reviewed no infiltrates or effusions lower lungs f/u official report Small subacute infarcts in the right frontal and temporal lobes    # subacute cortical and juxtacortical infarcts right   frontal and temporal lobes  MRI brain subacute infarct  CT/CTA head: small subacute infarcts   c/w statin,  xarelto,   start ASA     # bilateral leg weakness  unknown etiology   - MRI brain + subacute infarct,  C and T spine: + multilevel DDD,     - continue PT  - fall precautions   - continue Xarelto      # Hand tremor  , rigidity   improved             # Elevated LFT s  - likely reated to viral infection   - negative  RUQ sono  - EBV, CMV no acute infection  - ?due to meds as newer finding  - ok for d/c to rehab and will continue to f/u as outpt    # Atrial fib.   - continue Xarelto     # R hip pain  -  improved       # DVT PPX:  xarelto    Dispo: f/u offciil XR report, give lasix 20mg IV x 2, liely volume oerload 2/2 IVF, monitor UO. LAbs in am

## 2023-11-29 NOTE — DIETITIAN INITIAL EVALUATION ADULT - SIGNS/SYMPTOMS
AEB mod-severe muscle/fat wasting AEB mod-severe muscle/fat wasting, consuming <50% of ENN > 5 days

## 2023-11-29 NOTE — PROGRESS NOTE ADULT - SUBJECTIVE AND OBJECTIVE BOX
CC: Weakness     (29 Nov 2023 13:50)    HPI:  88-year-old female with history of A-fib on Xarelto, HTN, HLD presents to the ED for weakness.  Patient was recently in rehab for 18 days and was discharged 3 days ago. Since she got home has been ambulating with the walker but yesterday after dinner she was unable to get up from a sitting position, she was feeling weak and was unable to even lift her legs off the floor.  Daughter attempted to help her up, but failed which prompted EMS call. Denies any recent fall, trauma. fever, chills dizziness, headache, SOB. or any other acute symptoms.    INTERVAL HPI/OVERNIGHT EVENTS: Pt was seen and examined this am,  reports has a lot of cough, worse at night. Some SOB but improved during te day. OOb to chair, REULTS AND POC DISCUSSED. Pt reports that would prefer to go to [facility] at dc     Vital Signs Last 24 Hrs  T(C): 36.3 (29 Nov 2023 23:01), Max: 36.7 (29 Nov 2023 15:18)  T(F): 97.3 (29 Nov 2023 23:01), Max: 98.1 (29 Nov 2023 15:18)  HR: 55 (29 Nov 2023 23:01) (55 - 60)  BP: 148/66 (29 Nov 2023 23:01) (124/58 - 179/77)  RR: 18 (29 Nov 2023 23:01) (17 - 18)  SpO2: 97% (29 Nov 2023 23:01) (96% - 99%)    Parameters below as of 29 Nov 2023 23:01  Patient On (Oxygen Delivery Method): room air        REVIEW OF SYSTEMS:  All other review of systems is negative unless indicated above.          PHYSICAL EXAM:  General: in no acute distress  Eyes:  EOMI; conjunctiva and sclera clear  Head: Normocephalic; atraumatic  ENMT: No nasal discharge; airway clear  Respiratory: Decreased BS at bases b. with crackled, No wheezes   Cardiovascular: Regular rate and rhythm. S1 and S2 Normal;   Gastrointestinal: Soft non-tender non-distended; Normal bowel sounds  Genitourinary: No  suprapubic  tenderness  Extremities: No  edema  Vascular: Peripheral pulses palpable 2+ bilaterally  Neurological: Alert and oriented x3, non focal  Musculoskeletal: Normal muscle tone, without deformities  Psychiatric: Cooperative and appropriate      LABS: all reviewed       29 Nov 2023 12:57    131    |  99     |  6      ----------------------------<  101    4.2     |  27     |  0.64     Ca    8.3        29 Nov 2023 12:57    Culture - Urine (11.24.23 @ 00:42)   Specimen Source: Clean Catch None  Culture Results:   <10,000 CFU/mL Normal Urogenital Britany          MEDICATIONS  (STANDING):  aMIOdarone    Tablet 200 milliGRAM(s) Oral daily  atorvastatin 40 milliGRAM(s) Oral at bedtime  guaiFENesin  milliGRAM(s) Oral every 12 hours  losartan 50 milliGRAM(s) Oral daily  metoprolol succinate ER 50 milliGRAM(s) Oral daily  oseltamivir 75 milliGRAM(s) Oral two times a day  potassium chloride   Powder 20 milliEquivalent(s) Oral three times a day  rivaroxaban 20 milliGRAM(s) Oral daily    MEDICATIONS  (PRN):  acetaminophen     Tablet .. 650 milliGRAM(s) Oral every 6 hours PRN Temp greater or equal to 38C (100.4F), Mild Pain (1 - 3)  aluminum hydroxide/magnesium hydroxide/simethicone Suspension 30 milliLiter(s) Oral every 4 hours PRN Dyspepsia  guaiFENesin Oral Liquid (Sugar-Free) 200 milliGRAM(s) Oral every 6 hours PRN Cough  melatonin 3 milliGRAM(s) Oral at bedtime PRN Insomnia  ondansetron Injectable 4 milliGRAM(s) IV Push every 8 hours PRN Nausea and/or Vomiting      RADIOLOGY & ADDITIONAL TESTS:

## 2023-11-30 LAB
ANION GAP SERPL CALC-SCNC: 5 MMOL/L — SIGNIFICANT CHANGE UP (ref 5–17)
ANION GAP SERPL CALC-SCNC: 5 MMOL/L — SIGNIFICANT CHANGE UP (ref 5–17)
BUN SERPL-MCNC: 9 MG/DL — SIGNIFICANT CHANGE UP (ref 7–23)
BUN SERPL-MCNC: 9 MG/DL — SIGNIFICANT CHANGE UP (ref 7–23)
CALCIUM SERPL-MCNC: 8.7 MG/DL — SIGNIFICANT CHANGE UP (ref 8.5–10.1)
CALCIUM SERPL-MCNC: 8.7 MG/DL — SIGNIFICANT CHANGE UP (ref 8.5–10.1)
CHLORIDE SERPL-SCNC: 102 MMOL/L — SIGNIFICANT CHANGE UP (ref 96–108)
CHLORIDE SERPL-SCNC: 102 MMOL/L — SIGNIFICANT CHANGE UP (ref 96–108)
CO2 SERPL-SCNC: 25 MMOL/L — SIGNIFICANT CHANGE UP (ref 22–31)
CO2 SERPL-SCNC: 25 MMOL/L — SIGNIFICANT CHANGE UP (ref 22–31)
CREAT SERPL-MCNC: 0.56 MG/DL — SIGNIFICANT CHANGE UP (ref 0.5–1.3)
CREAT SERPL-MCNC: 0.56 MG/DL — SIGNIFICANT CHANGE UP (ref 0.5–1.3)
EGFR: 89 ML/MIN/1.73M2 — SIGNIFICANT CHANGE UP
EGFR: 89 ML/MIN/1.73M2 — SIGNIFICANT CHANGE UP
GLUCOSE SERPL-MCNC: 102 MG/DL — HIGH (ref 70–99)
GLUCOSE SERPL-MCNC: 102 MG/DL — HIGH (ref 70–99)
HCT VFR BLD CALC: 39.4 % — SIGNIFICANT CHANGE UP (ref 34.5–45)
HCT VFR BLD CALC: 39.4 % — SIGNIFICANT CHANGE UP (ref 34.5–45)
HGB BLD-MCNC: 13.2 G/DL — SIGNIFICANT CHANGE UP (ref 11.5–15.5)
HGB BLD-MCNC: 13.2 G/DL — SIGNIFICANT CHANGE UP (ref 11.5–15.5)
MAGNESIUM SERPL-MCNC: 2.1 MG/DL — SIGNIFICANT CHANGE UP (ref 1.6–2.6)
MAGNESIUM SERPL-MCNC: 2.1 MG/DL — SIGNIFICANT CHANGE UP (ref 1.6–2.6)
MCHC RBC-ENTMCNC: 27.7 PG — SIGNIFICANT CHANGE UP (ref 27–34)
MCHC RBC-ENTMCNC: 27.7 PG — SIGNIFICANT CHANGE UP (ref 27–34)
MCHC RBC-ENTMCNC: 33.5 GM/DL — SIGNIFICANT CHANGE UP (ref 32–36)
MCHC RBC-ENTMCNC: 33.5 GM/DL — SIGNIFICANT CHANGE UP (ref 32–36)
MCV RBC AUTO: 82.6 FL — SIGNIFICANT CHANGE UP (ref 80–100)
MCV RBC AUTO: 82.6 FL — SIGNIFICANT CHANGE UP (ref 80–100)
PLATELET # BLD AUTO: 274 K/UL — SIGNIFICANT CHANGE UP (ref 150–400)
PLATELET # BLD AUTO: 274 K/UL — SIGNIFICANT CHANGE UP (ref 150–400)
POTASSIUM SERPL-MCNC: 4.7 MMOL/L — SIGNIFICANT CHANGE UP (ref 3.5–5.3)
POTASSIUM SERPL-MCNC: 4.7 MMOL/L — SIGNIFICANT CHANGE UP (ref 3.5–5.3)
POTASSIUM SERPL-SCNC: 4.7 MMOL/L — SIGNIFICANT CHANGE UP (ref 3.5–5.3)
POTASSIUM SERPL-SCNC: 4.7 MMOL/L — SIGNIFICANT CHANGE UP (ref 3.5–5.3)
RBC # BLD: 4.77 M/UL — SIGNIFICANT CHANGE UP (ref 3.8–5.2)
RBC # BLD: 4.77 M/UL — SIGNIFICANT CHANGE UP (ref 3.8–5.2)
RBC # FLD: 13.5 % — SIGNIFICANT CHANGE UP (ref 10.3–14.5)
RBC # FLD: 13.5 % — SIGNIFICANT CHANGE UP (ref 10.3–14.5)
SODIUM SERPL-SCNC: 132 MMOL/L — LOW (ref 135–145)
SODIUM SERPL-SCNC: 132 MMOL/L — LOW (ref 135–145)
WBC # BLD: 6.12 K/UL — SIGNIFICANT CHANGE UP (ref 3.8–10.5)
WBC # BLD: 6.12 K/UL — SIGNIFICANT CHANGE UP (ref 3.8–10.5)
WBC # FLD AUTO: 6.12 K/UL — SIGNIFICANT CHANGE UP (ref 3.8–10.5)
WBC # FLD AUTO: 6.12 K/UL — SIGNIFICANT CHANGE UP (ref 3.8–10.5)

## 2023-11-30 PROCEDURE — 71250 CT THORAX DX C-: CPT | Mod: 26

## 2023-11-30 PROCEDURE — 99233 SBSQ HOSP IP/OBS HIGH 50: CPT

## 2023-11-30 RX ORDER — SENNA PLUS 8.6 MG/1
2 TABLET ORAL AT BEDTIME
Refills: 0 | Status: DISCONTINUED | OUTPATIENT
Start: 2023-11-30 | End: 2023-12-01

## 2023-11-30 RX ORDER — METOPROLOL TARTRATE 50 MG
25 TABLET ORAL DAILY
Refills: 0 | Status: DISCONTINUED | OUTPATIENT
Start: 2023-11-30 | End: 2023-12-01

## 2023-11-30 RX ORDER — LOSARTAN POTASSIUM 100 MG/1
25 TABLET, FILM COATED ORAL ONCE
Refills: 0 | Status: COMPLETED | OUTPATIENT
Start: 2023-11-30 | End: 2023-11-30

## 2023-11-30 RX ORDER — LOSARTAN POTASSIUM 100 MG/1
100 TABLET, FILM COATED ORAL DAILY
Refills: 0 | Status: DISCONTINUED | OUTPATIENT
Start: 2023-12-01 | End: 2023-12-01

## 2023-11-30 RX ORDER — IPRATROPIUM/ALBUTEROL SULFATE 18-103MCG
3 AEROSOL WITH ADAPTER (GRAM) INHALATION EVERY 6 HOURS
Refills: 0 | Status: DISCONTINUED | OUTPATIENT
Start: 2023-11-30 | End: 2023-12-01

## 2023-11-30 RX ORDER — POLYETHYLENE GLYCOL 3350 17 G/17G
17 POWDER, FOR SOLUTION ORAL DAILY
Refills: 0 | Status: DISCONTINUED | OUTPATIENT
Start: 2023-11-30 | End: 2023-12-01

## 2023-11-30 RX ORDER — LACTULOSE 10 G/15ML
10 SOLUTION ORAL EVERY 8 HOURS
Refills: 0 | Status: DISCONTINUED | OUTPATIENT
Start: 2023-11-30 | End: 2023-12-01

## 2023-11-30 RX ADMIN — Medication 75 MILLIGRAM(S): at 21:50

## 2023-11-30 RX ADMIN — Medication 20 MILLIEQUIVALENT(S): at 13:40

## 2023-11-30 RX ADMIN — Medication 600 MILLIGRAM(S): at 06:26

## 2023-11-30 RX ADMIN — SENNA PLUS 2 TABLET(S): 8.6 TABLET ORAL at 21:50

## 2023-11-30 RX ADMIN — Medication 600 MILLIGRAM(S): at 17:03

## 2023-11-30 RX ADMIN — AMIODARONE HYDROCHLORIDE 200 MILLIGRAM(S): 400 TABLET ORAL at 09:33

## 2023-11-30 RX ADMIN — LOSARTAN POTASSIUM 25 MILLIGRAM(S): 100 TABLET, FILM COATED ORAL at 21:51

## 2023-11-30 RX ADMIN — ATORVASTATIN CALCIUM 40 MILLIGRAM(S): 80 TABLET, FILM COATED ORAL at 21:50

## 2023-11-30 RX ADMIN — Medication 50 MILLIGRAM(S): at 09:33

## 2023-11-30 RX ADMIN — Medication 200 MILLIGRAM(S): at 11:31

## 2023-11-30 RX ADMIN — POLYETHYLENE GLYCOL 3350 17 GRAM(S): 17 POWDER, FOR SOLUTION ORAL at 11:05

## 2023-11-30 RX ADMIN — Medication 3 MILLILITER(S): at 22:08

## 2023-11-30 RX ADMIN — RIVAROXABAN 20 MILLIGRAM(S): KIT at 21:50

## 2023-11-30 RX ADMIN — Medication 20 MILLIEQUIVALENT(S): at 06:25

## 2023-11-30 RX ADMIN — Medication 20 MILLIGRAM(S): at 09:40

## 2023-11-30 RX ADMIN — LOSARTAN POTASSIUM 50 MILLIGRAM(S): 100 TABLET, FILM COATED ORAL at 09:34

## 2023-11-30 RX ADMIN — Medication 75 MILLIGRAM(S): at 09:34

## 2023-11-30 RX ADMIN — Medication 200 MILLIGRAM(S): at 17:03

## 2023-11-30 RX ADMIN — Medication 3 MILLIGRAM(S): at 21:50

## 2023-11-30 NOTE — PROGRESS NOTE ADULT - PROVIDER SPECIALTY LIST ADULT
Hospitalist
Hospitalist
Neurology
Hospitalist
Infectious Disease
Hospitalist
Infectious Disease

## 2023-11-30 NOTE — PROGRESS NOTE ADULT - SUBJECTIVE AND OBJECTIVE BOX
CC: Weakness     (29 Nov 2023 13:50)    HPI:  88-year-old female with history of A-fib on Xarelto, HTN, HLD presents to the ED for weakness.  Patient was recently in rehab for 18 days and was discharged 3 days PTA.  Since she got home has been ambulating with the walker but yesterday after dinner she was unable to get up from a sitting position, she was feeling weak and was unable to even lift her legs off the floor.  Daughter attempted to help her up, but failed which prompted EMS call. Denies any recent fall, trauma. fever, chills dizziness, headache, SOB. or any other acute symptoms.    INTERVAL HPI /OVERNIGHT EVENTS:  Pt was seen and examined, reports not feeling well, had abd discomfort, still has cough unable to bring up phlegm. Results of Abd XR and plan discussed   Vital Signs Last 24 Hrs  T(C): 36.9 (30 Nov 2023 15:45), Max: 36.9 (30 Nov 2023 15:45)  T(F): 98.4 (30 Nov 2023 15:45), Max: 98.4 (30 Nov 2023 15:45)  HR: 61 (30 Nov 2023 15:45) (53 - 61)  BP: 167/63 (30 Nov 2023 15:45) (148/66 - 179/66)  BP(mean): 94 (30 Nov 2023 08:55) (94 - 94)  RR: 17 (30 Nov 2023 15:45) (17 - 18)  SpO2: 98% (30 Nov 2023 15:45) (96% - 98%)    Parameters below as of 30 Nov 2023 15:45  Patient On (Oxygen Delivery Method): room air      REVIEW OF SYSTEMS:  All other review of systems is negative unless indicated above.      PHYSICAL EXAM:  General: in no acute distress  Eyes:  EOMI; conjunctiva and sclera clear  Head: Normocephalic; atraumatic  ENMT: No nasal discharge; airway clear  Respiratory: Decreased BS at bases with BL rhonchi. No wheezes   Cardiovascular: Regular rate and rhythm. S1 and S2 Normal;   Gastrointestinal: Soft non-tender, mildly distended; Normal bowel sounds  Genitourinary: No  suprapubic  tenderness  Extremities: min  edema  Vascular: Peripheral pulses palpable 2+ bilaterally  Neurological: Alert and oriented x3, non focal  Musculoskeletal: Normal muscle tone, without deformities        LABS: all reviewed                           13.2   6.12  )-----------( 274      ( 30 Nov 2023 08:25 )             39.4     11-30    132<L>  |  102  |  9   ----------------------------<  102<H>  4.7   |  25  |  0.56    Ca    8.7      30 Nov 2023 08:25  Mg     2.1     11-30 29 Nov 2023 12:57    131    |  99     |  6      ----------------------------<  101    4.2     |  27     |  0.64     Ca    8.3        29 Nov 2023 12:57    Culture - Urine (11.24.23 @ 00:42)   Specimen Source: Clean Catch None  Culture Results:   <10,000 CFU/mL Normal Urogenital Britany          MEDICATIONS  (STANDING):  aMIOdarone    Tablet 200 milliGRAM(s) Oral daily  atorvastatin 40 milliGRAM(s) Oral at bedtime  guaiFENesin  milliGRAM(s) Oral every 12 hours  losartan 50 milliGRAM(s) Oral daily  metoprolol succinate ER 50 milliGRAM(s) Oral daily  oseltamivir 75 milliGRAM(s) Oral two times a day  potassium chloride   Powder 20 milliEquivalent(s) Oral three times a day  rivaroxaban 20 milliGRAM(s) Oral daily    MEDICATIONS  (PRN):  acetaminophen     Tablet .. 650 milliGRAM(s) Oral every 6 hours PRN Temp greater or equal to 38C (100.4F), Mild Pain (1 - 3)  aluminum hydroxide/magnesium hydroxide/simethicone Suspension 30 milliLiter(s) Oral every 4 hours PRN Dyspepsia  guaiFENesin Oral Liquid (Sugar-Free) 200 milliGRAM(s) Oral every 6 hours PRN Cough  melatonin 3 milliGRAM(s) Oral at bedtime PRN Insomnia  ondansetron Injectable 4 milliGRAM(s) IV Push every 8 hours PRN Nausea and/or Vomiting      RADIOLOGY & ADDITIONAL TESTS:    ACC: [id number] EXAM:  MR SPINE CERVICAL   ORDERED BY: IVAN LAW     ACC: [id number] EXAM:  MR SPINE THORACIC   ORDERED BY: IVAN LAW     ACC: [id number] EXAM:  MR BRAIN   ORDERED BY: IVAN LAW     PROCEDURE DATE:  11/27/2023          INTERPRETATION:  CLINICAL STATEMENT: Bilateral lower extremity weakness.   Left sensory loss.    TECHNIQUE: Multiplanar multisequence noncontrast MRI of the brain,   cervical and thoracic spine was performed. Diffusion weighted imaging was   performed.  COMPARISON: CT brain 7/19/2023.    FINDINGS:    MRI BRAIN    3 mm cortical and juxtacortical foci of moderately reduced diffusion   right frontal and right parietal lobes, respectively (series 5 image 48   and 44). No additional restricted diffusion.    Corpus callosum, pituitary and pineal regions appear unremarkable. No   tonsillar herniation or evidence of marrow replacement process.    CP angle and IAC regions appear within normal limits, as do the globes,   intraconal regions and major intracranial flow-voids. Bilateral lens   replacement surgery. Paranasal sinus mucosal thickening with contiguous   opacification left posterior ethmoid air cells and ipsilateral sphenoid   sinus. Mild partial opacification bilateral mastoid tips.    No evidence of extra-axial collection, hemorrhage, mass or mass effect.   Age-appropriate atrophy with moderate confluent and scattered foci of   increased T2 and FLAIR signal within the victorina, periventricular and   juxtacortical white matter bilateral cerebral hemispheres.    MRI CERVICAL SPINE    Subcentimeter thyroid nodules incidentally noted. Approximate 1.0 cm   lobulated probable perineural cyst on the left at C7-T1. Probable   subcentimeter left-sided perineural cyst at C6-C7 Otherwise no   intrathecal or paraspinal mass is evident.    Although suboptimally evaluated secondary to motion artifact, no gross   abnormal cord signal is evident. No evidence of marrow replacement   process or acute fracture. Straightening of lordosis. Grade 1   anterolisthesis C3 over C4 and C7 over T1. Multilevel disc space   narrowing, severe in degree at C4-C5 and C5-C6 with reactive,   predominantly type II Modic changes in the endplates. Varying degrees of   diffuse disc desiccation with multilevel endplate anduncovertebral   spurring. Multilevel facet arthrosis, severe in degree at multiple   levels. Evidence of severe atlantodental arthrosis, including   retro-odontoid soft tissue, likely degenerative.    C2-C3 Central disc protrusion, narrowing the subarachnoid space. Mild   left neural foramen stenosis secondary to facet arthrosis.  C3-C4: Aforementioned anterolisthesis with a disc bulge, uncovertebral   spurring and facet arthrosis, effacing the subarachnoid space, resulting   in mild central canal and moderate bilateral neural foramen stenosis.  C4-C5: Central disc protrusion superimposed upon a disc bulge-spondylitic   ridge complex, effacing the subarachnoid space and impinging upon the   ventral cord, resulting in moderate central canal and severe bilateral   neural foramen stenosis with uncovertebral spurring.  C5-C6: Disc bulge-spondylitic ridge complex, effacing the subarachnoid   space and impinging upon the ventral cord, resulting in mild central   canal, severe right and moderateleft neural foramen stenosis with   uncovertebral spurring.  C6-C7: Disc bulge-spondylitic ridge complex, effacing the subarachnoid   space, resulting in mild central canal, severe right and moderate left   neural foramen stenosis with uncovertebral spurring.  C7-T1: Aforementioned anterolisthesis with a disc bulge, uncovertebral   spurring and facet arthrosis, effacing the subarachnoid space, resulting   in mild central canal and moderate bilateral neural foramen stenosis.    MRI THORACIC SPINE    No visualized pleural fluid collection, intrathecal or paraspinal mass.   Findings most concordant with multiple perineural cysts upper-mid   thoracic levels, measuring up to approximately 1 cm at the right T6-T7   and T7-T8 levels. No gross evidence of abnormal cord signal.    No evidence of marrow replacement process or recent compression fracture.   Mild chronic T3 superior endplate compression fracture, without   retropulsion. Grade I anterolisthesis T2 over T3. Mild kyphosis.   Multilevel disc space narrowing, most pronounced at T1-T2. Varying   degrees of diffuse disc desiccation with multilevel endplate spurring,   costovertebral and facet arthrosis.    T1-T2: Disc bulge-spondylitic ridge complex, narrowing the subarachnoid   space, resulting in mild bilateral neural foramen stenosis.  T2-T3: Disc bulge, without stenosis.  T3-T4: Central disc protrusion, narrowing the subarachnoid space, without   stenosis.  T4-T5: No disc herniation or stenosis.  T5-T6: No disc herniation or stenosis.  T6-T7: No disc herniation or stenosis.  T7-T8: No disc herniation or stenosis.  T8-T9: Disc bulge, without stenosis.  T9-T10: No disc herniation or stenosis.  T10-T11: No disc herniation or stenosis.  T11-T12: No disc herniation or stenosis.  T12-L1: No disc herniation or stenosis.    IMPRESSION:    MRI BRAIN  1. 3 mm probable subacute cortical and juxtacortical infarcts right   frontal and temporal lobes, respectively. No evidence of associated   hemorrhage or mass effect. No evidence of territorial infarct, hemorrhage   or vasogenic edema.  2. Moderate pontine and bihemispheric altered white matter signal; a   nonspecific finding which statistically reflects chronic microvascular   ischemic change.  3. Additional findings as above, paranasal sinuses and mastoid air cell   disease; the significance of which should be determined on a clinical   basis.    MRI CERVICAL SPINE  1. No gross evidence of abnormal cord signal.  2. Straightening of lordosis may reflect muscle spasm. Grade 1  anterolisthesis C3 over C4 and C7 over T1.  3. C4-C5 central disc protrusion superimposed upon a disc   bulge-spondylitic ridge complex, effacing the subarachnoid space and   impinging upon the ventral cord, resulting in moderate central canal and   severe bilateral neural foramen stenosis with uncovertebral spurring.  4. C5-C6 disc bulge-spondylitic ridge complex, effacing the subarachnoid   space and impinging upon the ventral cord, resulting in mild central   canal, severe right and moderateleft neural foramen stenosis with   uncovertebral spurring.  5. 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    MRI THORACIC SPINE  1. No gross evidence of abnormal cord signal.  2. No acute compression fracture. Mild chronic T3 compression fracture,   without retropulsion. Grade 1 anterolisthesis T2 over T3. Mild kyphosis.  3. T3-T4 central disc protrusion, narrowing the subarachnoid space. No   cord compression.  4. Additional findings, including those degenerative, described in detail   above.      < from: CT Angio Neck w/ IV Cont (11.28.23 @ 10:36) >    ACC: [id number] EXAM:  CT ANGIO BRAIN (W)AW IC   ORDERED BY: IVAN LAW     ACC: [id number] EXAM:  CT ANGIO NECK (W)AW IC   ORDERED BY: IVAN LAW     PROCEDURE DATE:  11/28/2023          INTERPRETATION:  CLINICAL HISTORY: Stroke on MRI.    TECHNIQUE: CT head without contrast.  Contrast enhanced axial CT images   were acquired from the aortic arch to the vertex of the calvarium, during   the angiographic phase.  Three-dimensional maximum intensity projection   reformats were generated.    CONTRAST: IV contrast documented in unlinked concurrent exam (accession   [id number]), Omnipaque 350 (accession [id number]): 90 cc administered, 10 cc   discarded.    COMPARISON STUDY:  MR brain on 11/27/2023.  CT head 7/19/2023  MRI brain and MRA head 5/3/2009.    FINDINGS:    CT BRAIN WITHOUT CONTRAST:    There is no acute intracranial hemorrhage or mass effect. Small subacute   infarcts in the right frontal and temporal lobes are better seen on prior   MRI. There are areas of hypodensity in the bilateral hemispheric white   matter suggesting white matter microvascular ischemic change. There is   cerebral volume loss.    There is no extraaxial fluid collection.    There is no displaced calvarial fracture. Status post bilateral   intraocular lens implants. Opacification of the left sphenoid sinus. The   mastoid air cells are well aerated.    CT ANGIOGRAPHY NECK:    Thoracic aorta and branch vessels: Patent.  Right carotid system: Patent.  No hemodynamically significant stenosis   using NASCET criteria.  No evidence of dissection.  Left carotid system: Patent.  No hemodynamically significant stenosis   using NASCET criteria.  No evidence of dissection.  Vertebral arteries: No focal stenosis or dissection. Dominant left   vertebral artery.    A 1 cm left thyroid nodule.  Multilevel degenerative changes of the spine.  Biapical scarring.  Mucoid impacted airways in the bilateral upper lobes.    CT ANGIOGRAPHY BRAIN:    Internal carotid arteries: Patent.  Anterior cerebral arteries: Patent.  Middle cerebral arteries: Patent.  Posterior cerebral arteries: Patent.  Vertebrobasilar: Patent. Fetal origin of the left PCA. Dominant left   vertebral artery.    Vascular lesions: No evidence of intracranial aneurysm or large vascular   malformation, within limits of CT technique.      IMPRESSION:    Brain CT without contrast:  Small subacute infarcts in the right frontal and temporal lobes are   better seen on prior MRI.    CT angiography neck:  1.  No hemodynamically significant stenosis of the bilateral cervical   ICAs using NASCET criteria.  Patent vertebral arteries.  No evidence of   vascular dissection.  2.  A 1 cm left thyroid nodule.    CT angiography brain: No large vessel occlusion. No evidence of aneurysm.

## 2023-11-30 NOTE — PROGRESS NOTE ADULT - NUTRITIONAL ASSESSMENT
This patient has been assessed with a concern for Malnutrition and has been determined to have a diagnosis/diagnoses of Severe protein-calorie malnutrition.    This patient is being managed with:   Diet DASH/TLC-  Sodium & Cholesterol Restricted  Entered: Nov 24 2023  5:26AM  
This patient has been assessed with a concern for Malnutrition and has been determined to have a diagnosis/diagnoses of Severe protein-calorie malnutrition.    This patient is being managed with:   Diet DASH/TLC-  Sodium & Cholesterol Restricted  Entered: Nov 24 2023  5:26AM

## 2023-11-30 NOTE — PROGRESS NOTE ADULT - ASSESSMENT
88-year-old female with history of A-fib on Xarelto, HTN, HLD admitted for:       #Influenza A Acute  URI, bronchitis   - supportive care  - CRX on admission  neg for PNA< no effusions   - monitor pulse ox, supplement O2 via NC       - c/w Tamiflu 75 mg po bid Day #5/5  -C/w Mucinex  - add neb Tx   - chest exam with lower lungs poor air entry and crackle, {t with IVF this stay, dc this am. Check BNP, elevated   - CT chest ordered to r/o PNA  as no improvement of cough         # Subacute cortical and juxtacortical infarcts right   frontal and temporal lobes  MRI brain subacute infarct  CT/CTA head: small subacute infarcts, no vascular lesions or occlusions   c/w statin,  xarelto  D/w Neurology team, no need to add ASA as not an acute stroke    # Bilateral leg weakness  unknown etiology   - MRI brain + subacute infarct,  C and T spine: + multilevel DDD,     - continue PT  - fall precautions   - D/w Neuro, will nee dfurther PT and F/u with Dr John     # Hand tremor  , rigidity   improved             # Elevated LFT s  - likely related to viral infection   - negative  RUQ sono  - EBV, CMV no acute infection  - ?due to meds as newer finding      # Constipation   - Abd XR with fecal  retention   - Started on Bowel regimen       # Atrial fib.   - continue Xarelto     # R hip pain  -  improved       # DVT PPX:  xarelto    Dispo: Bowel regimen, CT Chest      88-year-old female with history of A-fib on Xarelto, HTN, HLD admitted for:       #Influenza A Acute  URI, bronchitis   - supportive care  - CRX on admission  neg for PNA< no effusions   - monitor pulse ox, supplement O2 via NC       - c/w Tamiflu 75 mg po bid Day #5/5  -C/w Mucinex  - add neb Tx   - chest exam with lower lungs poor air entry and crackle, {t with IVF this stay, dc this am. Check BNP, elevated   - CT chest ordered to r/o PNA  as no improvement of cough     # Subacute cortical and juxtacortical infarcts right   frontal and temporal lobes  MRI brain subacute infarct  CT/CTA head: small subacute infarcts, no vascular lesions or occlusions   c/w statin,  xarelto  D/w Neurology team, no need to add ASA as not an acute stroke      # Bilateral leg weakness  unknown etiology   - MRI brain + subacute infarct,  C and T spine: + multilevel DDD,     - continue PT  - fall precautions   - D/w Neuro, will nee dfurther PT and F/u with Dr John     # Hand tremor  , rigidity   improved               * Paroxysmal AFIB   s/p ablation  Tele: sinus desiree HR down to 40s   C/w amio, decrease toprol  to 25mg PO QD   C/w XArelto  C/w tele         # Abnormal Lung exam with elevated BNP  suspect volume overload in settings of IVF  hydration, Off now. Mild acute HFpEF   ECHO: EF 60-65%, +1-2 MR/TR  will give lasix 20mg PO x 1  Monitor UO       # HTN   BP not at target   C/w Losartan increase dose to 100mg QD  Toprol dose decreased 2/2 bradycardia     # Elevated LFT s  - likely related to viral infection   - negative  RUQ sono  - EBV, CMV no acute infection  - ?due to meds as newer finding      # Constipation   - Abd XR with fecal  retention   - Started on Bowel regimen       # Atrial fib.   - continue Xarelto     # R hip pain  -  improved       # DVT PPX:  xarelto    Dispo: Bowel regimen, CT Chest

## 2023-11-30 NOTE — PROGRESS NOTE ADULT - REASON FOR ADMISSION
Weakness & Difficulty ambulating

## 2023-12-01 ENCOUNTER — TRANSCRIPTION ENCOUNTER (OUTPATIENT)
Age: 88
End: 2023-12-01

## 2023-12-01 VITALS — OXYGEN SATURATION: 92 %

## 2023-12-01 LAB
ANION GAP SERPL CALC-SCNC: 5 MMOL/L — SIGNIFICANT CHANGE UP (ref 5–17)
ANION GAP SERPL CALC-SCNC: 5 MMOL/L — SIGNIFICANT CHANGE UP (ref 5–17)
BUN SERPL-MCNC: 12 MG/DL — SIGNIFICANT CHANGE UP (ref 7–23)
BUN SERPL-MCNC: 12 MG/DL — SIGNIFICANT CHANGE UP (ref 7–23)
CALCIUM SERPL-MCNC: 8.8 MG/DL — SIGNIFICANT CHANGE UP (ref 8.5–10.1)
CALCIUM SERPL-MCNC: 8.8 MG/DL — SIGNIFICANT CHANGE UP (ref 8.5–10.1)
CHLORIDE SERPL-SCNC: 99 MMOL/L — SIGNIFICANT CHANGE UP (ref 96–108)
CHLORIDE SERPL-SCNC: 99 MMOL/L — SIGNIFICANT CHANGE UP (ref 96–108)
CO2 SERPL-SCNC: 28 MMOL/L — SIGNIFICANT CHANGE UP (ref 22–31)
CO2 SERPL-SCNC: 28 MMOL/L — SIGNIFICANT CHANGE UP (ref 22–31)
CREAT SERPL-MCNC: 0.58 MG/DL — SIGNIFICANT CHANGE UP (ref 0.5–1.3)
CREAT SERPL-MCNC: 0.58 MG/DL — SIGNIFICANT CHANGE UP (ref 0.5–1.3)
EGFR: 88 ML/MIN/1.73M2 — SIGNIFICANT CHANGE UP
EGFR: 88 ML/MIN/1.73M2 — SIGNIFICANT CHANGE UP
GLUCOSE SERPL-MCNC: 110 MG/DL — HIGH (ref 70–99)
GLUCOSE SERPL-MCNC: 110 MG/DL — HIGH (ref 70–99)
MAGNESIUM SERPL-MCNC: 2.3 MG/DL — SIGNIFICANT CHANGE UP (ref 1.6–2.6)
MAGNESIUM SERPL-MCNC: 2.3 MG/DL — SIGNIFICANT CHANGE UP (ref 1.6–2.6)
NT-PROBNP SERPL-SCNC: 438 PG/ML — SIGNIFICANT CHANGE UP (ref 0–450)
NT-PROBNP SERPL-SCNC: 438 PG/ML — SIGNIFICANT CHANGE UP (ref 0–450)
POTASSIUM SERPL-MCNC: 4.2 MMOL/L — SIGNIFICANT CHANGE UP (ref 3.5–5.3)
POTASSIUM SERPL-MCNC: 4.2 MMOL/L — SIGNIFICANT CHANGE UP (ref 3.5–5.3)
POTASSIUM SERPL-SCNC: 4.2 MMOL/L — SIGNIFICANT CHANGE UP (ref 3.5–5.3)
POTASSIUM SERPL-SCNC: 4.2 MMOL/L — SIGNIFICANT CHANGE UP (ref 3.5–5.3)
SARS-COV-2 RNA SPEC QL NAA+PROBE: SIGNIFICANT CHANGE UP
SARS-COV-2 RNA SPEC QL NAA+PROBE: SIGNIFICANT CHANGE UP
SODIUM SERPL-SCNC: 132 MMOL/L — LOW (ref 135–145)
SODIUM SERPL-SCNC: 132 MMOL/L — LOW (ref 135–145)

## 2023-12-01 PROCEDURE — 99232 SBSQ HOSP IP/OBS MODERATE 35: CPT

## 2023-12-01 RX ORDER — METOPROLOL TARTRATE 50 MG
0.5 TABLET ORAL
Refills: 0 | DISCHARGE

## 2023-12-01 RX ORDER — SENNA PLUS 8.6 MG/1
2 TABLET ORAL
Qty: 0 | Refills: 0 | DISCHARGE
Start: 2023-12-01

## 2023-12-01 RX ORDER — SIMVASTATIN 20 MG/1
1 TABLET, FILM COATED ORAL
Qty: 0 | Refills: 0 | DISCHARGE

## 2023-12-01 RX ORDER — POLYETHYLENE GLYCOL 3350 17 G/17G
17 POWDER, FOR SOLUTION ORAL
Qty: 0 | Refills: 0 | DISCHARGE
Start: 2023-12-01

## 2023-12-01 RX ORDER — METOPROLOL TARTRATE 50 MG
1 TABLET ORAL
Qty: 0 | Refills: 0 | DISCHARGE
Start: 2023-12-01

## 2023-12-01 RX ORDER — METOPROLOL TARTRATE 50 MG
1 TABLET ORAL
Refills: 0 | DISCHARGE

## 2023-12-01 RX ORDER — OLMESARTAN MEDOXOMIL 5 MG/1
1 TABLET, FILM COATED ORAL
Refills: 0 | DISCHARGE

## 2023-12-01 RX ORDER — ATORVASTATIN CALCIUM 80 MG/1
1 TABLET, FILM COATED ORAL
Qty: 0 | Refills: 0 | DISCHARGE
Start: 2023-12-01

## 2023-12-01 RX ORDER — IPRATROPIUM/ALBUTEROL SULFATE 18-103MCG
3 AEROSOL WITH ADAPTER (GRAM) INHALATION
Qty: 0 | Refills: 0 | DISCHARGE
Start: 2023-12-01

## 2023-12-01 RX ORDER — LOSARTAN POTASSIUM 100 MG/1
1 TABLET, FILM COATED ORAL
Qty: 0 | Refills: 0 | DISCHARGE
Start: 2023-12-01

## 2023-12-01 RX ADMIN — AMIODARONE HYDROCHLORIDE 200 MILLIGRAM(S): 400 TABLET ORAL at 11:40

## 2023-12-01 RX ADMIN — Medication 25 MILLIGRAM(S): at 11:40

## 2023-12-01 RX ADMIN — Medication 3 MILLILITER(S): at 10:21

## 2023-12-01 RX ADMIN — POLYETHYLENE GLYCOL 3350 17 GRAM(S): 17 POWDER, FOR SOLUTION ORAL at 11:40

## 2023-12-01 RX ADMIN — LOSARTAN POTASSIUM 100 MILLIGRAM(S): 100 TABLET, FILM COATED ORAL at 11:40

## 2023-12-01 RX ADMIN — Medication 3 MILLILITER(S): at 16:42

## 2023-12-01 RX ADMIN — Medication 600 MILLIGRAM(S): at 05:57

## 2023-12-01 RX ADMIN — Medication 75 MILLIGRAM(S): at 11:40

## 2023-12-01 NOTE — DISCHARGE NOTE NURSING/CASE MANAGEMENT/SOCIAL WORK - PATIENT PORTAL LINK FT
You can access the FollowMyHealth Patient Portal offered by [facility] by registering at the following website: [email or web address]. By joining [employer]’s FollowMyHealth portal, you will also be able to view your health information using other applications (apps) compatible with our system.

## 2023-12-01 NOTE — DISCHARGE NOTE PROVIDER - NSDCFUSCHEDAPPT_GEN_ALL_CORE_FT
Leif John  [facility] Physician [facility]  NEUROLOGY [address]  Scheduled Appointment: 01/29/2024

## 2023-12-01 NOTE — DISCHARGE NOTE PROVIDER - CARE PROVIDERS DIRECT ADDRESSES
,DirectAddress_Unknown,[email or web address]

## 2023-12-01 NOTE — DISCHARGE NOTE PROVIDER - CARE PROVIDER_API CALL
Mehran Bowen  [facility]  [address], [address]  [place] [address]  Phone: [phone]  Fax: [phone]  Follow Up Time:     Leif John  Neurology  [address] [place] [address], [address]  [place] [address]  Phone: [phone]  Fax: [phone]  Follow Up Time:

## 2023-12-01 NOTE — DISCHARGE NOTE PROVIDER - NSDCCPCAREPLAN_GEN_ALL_CORE_FT
PRINCIPAL DISCHARGE DIAGNOSIS  Diagnosis: Weakness  Assessment and Plan of Treatment: improved  PT  F/u with VIVI Correa      SECONDARY DISCHARGE DIAGNOSES  Diagnosis: H/O: stroke  Assessment and Plan of Treatment: C/w Xarelto and lipitor   F/u with Dr John    Diagnosis: Influenza A  Assessment and Plan of Treatment: C/w cough meds as needed  neb Tx   Completed course of Tamiflu

## 2023-12-01 NOTE — DISCHARGE NOTE PROVIDER - HOSPITAL COURSE
88-year-old female with history of A-fib on Xarelto, HTN, HLD presents to the ED for weakness.  Patient was recently in rehab for 18 days and was discharged 3 days PTA.  Since she got home has been ambulating with the walker but yesterday after dinner she was unable to get up from a sitting position, she was feeling weak and was unable to even lift her legs off the floor.  Daughter attempted to help her up, but failed which prompted EMS call. Denies any recent fall, trauma. fever, chills dizziness, headache, SOB. or any other acute symptoms.  In ED: VS stable, Pt found to have Influenza, Tx with Tamiflu, completed 5 days. Cough improved. CT chest with no PNA. S/p IVF complicated by mild volume overload s/p 20mg PO Lasix x 1. CT chest no effusions or PVC. Pt  noted to be Sinus desiree on tele, Toprol dose decreases. BP not at target, Up titrated Losartan. BP better. Repeat renal FX outPt   Today Pt reports feeling tired, but no other complains. Was given Hycodan at 5 am.  Later was awake and alert. Dc planning to [facility] discussed     Vital Signs Last 24 Hrs  T(C): 36.8 (01 Dec 2023 15:05), Max: 36.8 (01 Dec 2023 15:05)  T(F): 98.2 (01 Dec 2023 15:05), Max: 98.2 (01 Dec 2023 15:05)  HR: 69 (01 Dec 2023 15:05) (56 - 72)  BP: 127/65 (01 Dec 2023 15:05) (126/56 - 143/58)  RR: 16 (01 Dec 2023 15:05) (16 - 18)  SpO2: 97% (01 Dec 2023 15:05) (97% - 99%)    PHYSICAL EXAM:  General: in no acute distress  Eyes:  EOMI; conjunctiva and sclera clear  Head: Normocephalic; atraumatic  ENMT: No nasal discharge; airway clear  Respiratory: Decreased BS at bases,  No wheezes or rhonchi   Cardiovascular: Regular rate and rhythm. S1 and S2 Normal;   Gastrointestinal: Soft non-tender,non  distended; Normal bowel sounds  Genitourinary: No  suprapubic  tenderness  Extremities: min  edema  Vascular: Peripheral pulses palpable 2+ bilaterally  Neurological: Alert and oriented x3, non focal  Musculoskeletal: Normal muscle tone, without deformities    A/p: 88-year-old female with history of A-fib on Xarelto, HTN, HLD admitted for:     #Influenza A Acute  URI, bronchitis, no sepsis  - CRX on admission  neg for PNA< no effusions   - monitor pulse ox, supplement O2 via NC       - s/p  Tamiflu 75 mg po bid 5 Days  -C/w Mucinex,  neb Tx   -Elevated BNP chest exam with lower lungs poor air entry and crackles  improved after LAsix x1, likely volume overload 2/2 IVF. Miil Acute HFpEF   - CT chest with no effusions or PNA  - on RA     # CT head: Subacute cortical and juxtacortical infarcts right frontal and temporal lobes  MRI brain subacute infarct  CT/CTA head: small subacute infarcts, no vascular lesions or occlusions   c/w statin,  xarelto  D/w Neurology team, no need to add ASA as not an acute stroke    # Bilateral leg weakness  unknown etiology   - MRI brain + subacute infarct,  C and T spine: + multilevel DDD,     - continue PT  - fall precautions   - D/w Neuro, will need further PT and F/u with Dr John     # Hand tremor  , rigidity   improved             * Paroxysmal AFIB , s/p ablation  Tele: sinus desiree HR down to 40s, improved now    C/w amio, decreased toprol  25mg PO QD   C/w XArelto    # Abnormal Lung exam with elevated BNP  suspect volume overload in settings of IVF  hydration, Off now. Mild acute HFpEF   ECHO: EF 60-65%, +1-2 MR/TR  S/p lasix 20mg PO x 1  Clinically improved     # HTN   BP better   C/w Losartan increased  dose to 100mg QD  Toprol dose decreased 2/2 bradycardia     # Elevated LFT s  - likely related to viral infection   - negative  RUQ sono  - EBV, CMV no acute infection  - repeat labs outPt in 3-5 days       # Constipation, resolved   - Abd XR with fecal  retention   -C/w  Bowel regimen   - had few BMs yesterday       Dispo: stable for dc to [facility]  FAx dc summary to PCP  Total time 53 min

## 2023-12-01 NOTE — DISCHARGE NOTE NURSING/CASE MANAGEMENT/SOCIAL WORK - NSDCPEPTCAREGIVEDUMATLIST _GEN_ALL_CORE
Care Suites Admission Nursing Note    Patient Information  Name: Keyon Farias  Age: 60 year old  Reason for admission: G-tube exchange  Care Suites arrival time: 1415    Visitor Information  Name: Savannah  Informed of visitor restrictions: Yes  1 visitor allowed per patient   Visitor must screen negative for COVID symptoms   Visitor must wear a mask  Waiting rooms closed to visitors    Patient Admission/Assessment   Pre-procedure assessment complete: Yes  If abnormal assessment/labs, provider notified: N/A  NPO: Yes  Medications held per instructions/orders: N/A  Consent: obtained  If applicable, pregnancy test status: deferred  Patient oriented to room: Yes  Education/questions answered: Yes  Plan/other: to IR    Discharge Planning  Discharge name/phone number: Savannah [phone]  Overnight post sedation caregiver: Savannah  Discharge location: home (with home nursing services)     Mary Bhatti RN          Stroke/Rivaroxaban/Xarelto

## 2023-12-01 NOTE — DISCHARGE NOTE PROVIDER - NSDCMRMEDTOKEN_GEN_ALL_CORE_FT
amiodarone 200 mg oral tablet: 1 tab(s) orally once a day  Artificial Tears ophthalmic solution: 1 drop(s) in each eye 3 times a day as needed for  dry eyes  atorvastatin 40 mg oral tablet: 1 tab(s) orally once a day (at bedtime)  bisacodyl 10 mg rectal suppository: 1 suppository(ies) rectal once a day As needed Constipation  guaiFENesin 600 mg oral tablet, extended release: 1 tab(s) orally every 12 hours  ipratropium-albuterol 0.5 mg-2.5 mg/3 mL inhalation solution: 3 milliliter(s) inhaled every 6 hours  losartan 100 mg oral tablet: 1 tab(s) orally once a day  metoprolol succinate 25 mg oral tablet, extended release: 1 tab(s) orally once a day  mupirocin 2% topical ointment: Apply topically to affected area of left upper arm as directed  polyethylene glycol 3350 oral powder for reconstitution: 17 gram(s) orally once a day  rivaroxaban 20 mg oral tablet: 1 tab(s) orally once a day (at bedtime)  senna leaf extract oral tablet: 2 tab(s) orally once a day (at bedtime)

## 2023-12-07 DIAGNOSIS — M51.34 OTHER INTERVERTEBRAL DISC DEGENERATION, THORACIC REGION: ICD-10-CM

## 2023-12-07 DIAGNOSIS — Z79.01 LONG TERM (CURRENT) USE OF ANTICOAGULANTS: ICD-10-CM

## 2023-12-07 DIAGNOSIS — M50.30 OTHER CERVICAL DISC DEGENERATION, UNSPECIFIED CERVICAL REGION: ICD-10-CM

## 2023-12-07 DIAGNOSIS — Z88.0 ALLERGY STATUS TO PENICILLIN: ICD-10-CM

## 2023-12-07 DIAGNOSIS — R00.1 BRADYCARDIA, UNSPECIFIED: ICD-10-CM

## 2023-12-07 DIAGNOSIS — J20.8 ACUTE BRONCHITIS DUE TO OTHER SPECIFIED ORGANISMS: ICD-10-CM

## 2023-12-07 DIAGNOSIS — E78.5 HYPERLIPIDEMIA, UNSPECIFIED: ICD-10-CM

## 2023-12-07 DIAGNOSIS — R29.898 OTHER SYMPTOMS AND SIGNS INVOLVING THE MUSCULOSKELETAL SYSTEM: ICD-10-CM

## 2023-12-07 DIAGNOSIS — I50.31 ACUTE DIASTOLIC (CONGESTIVE) HEART FAILURE: ICD-10-CM

## 2023-12-07 DIAGNOSIS — J10.1 INFLUENZA DUE TO OTHER IDENTIFIED INFLUENZA VIRUS WITH OTHER RESPIRATORY MANIFESTATIONS: ICD-10-CM

## 2023-12-07 DIAGNOSIS — I48.0 PAROXYSMAL ATRIAL FIBRILLATION: ICD-10-CM

## 2023-12-07 DIAGNOSIS — Z88.1 ALLERGY STATUS TO OTHER ANTIBIOTIC AGENTS STATUS: ICD-10-CM

## 2023-12-07 DIAGNOSIS — Z91.013 ALLERGY TO SEAFOOD: ICD-10-CM

## 2023-12-07 DIAGNOSIS — E87.70 FLUID OVERLOAD, UNSPECIFIED: ICD-10-CM

## 2023-12-07 DIAGNOSIS — I11.0 HYPERTENSIVE HEART DISEASE WITH HEART FAILURE: ICD-10-CM

## 2023-12-07 DIAGNOSIS — K59.00 CONSTIPATION, UNSPECIFIED: ICD-10-CM

## 2023-12-07 DIAGNOSIS — Z90.710 ACQUIRED ABSENCE OF BOTH CERVIX AND UTERUS: ICD-10-CM

## 2023-12-07 DIAGNOSIS — E43 UNSPECIFIED SEVERE PROTEIN-CALORIE MALNUTRITION: ICD-10-CM

## 2023-12-07 DIAGNOSIS — R25.1 TREMOR, UNSPECIFIED: ICD-10-CM

## 2023-12-29 ENCOUNTER — APPOINTMENT (OUTPATIENT)
Dept: FAMILY MEDICINE | Facility: CLINIC | Age: 88
End: 2023-12-29
Payer: MEDICARE

## 2023-12-29 VITALS
DIASTOLIC BLOOD PRESSURE: 68 MMHG | WEIGHT: 120 LBS | BODY MASS INDEX: 22.08 KG/M2 | HEIGHT: 62 IN | HEART RATE: 72 BPM | SYSTOLIC BLOOD PRESSURE: 142 MMHG | TEMPERATURE: 97 F | OXYGEN SATURATION: 97 %

## 2023-12-29 DIAGNOSIS — R79.89 OTHER SPECIFIED ABNORMAL FINDINGS OF BLOOD CHEMISTRY: ICD-10-CM

## 2023-12-29 DIAGNOSIS — R53.1 WEAKNESS: ICD-10-CM

## 2023-12-29 DIAGNOSIS — J10.1 INFLUENZA DUE TO OTHER IDENTIFIED INFLUENZA VIRUS WITH OTHER RESPIRATORY MANIFESTATIONS: ICD-10-CM

## 2023-12-29 DIAGNOSIS — Z09 ENCOUNTER FOR FOLLOW-UP EXAMINATION AFTER COMPLETED TREATMENT FOR CONDITIONS OTHER THAN MALIGNANT NEOPLASM: ICD-10-CM

## 2023-12-29 PROCEDURE — 36415 COLL VENOUS BLD VENIPUNCTURE: CPT

## 2023-12-29 PROCEDURE — 99214 OFFICE O/P EST MOD 30 MIN: CPT | Mod: 25

## 2023-12-29 RX ORDER — ATORVASTATIN CALCIUM 40 MG/1
40 TABLET, FILM COATED ORAL
Refills: 0 | Status: ACTIVE | COMMUNITY

## 2023-12-29 RX ORDER — DILTIAZEM HYDROCHLORIDE 180 MG/1
180 CAPSULE, EXTENDED RELEASE ORAL
Refills: 0 | Status: DISCONTINUED | COMMUNITY
End: 2023-12-29

## 2023-12-29 RX ORDER — SIMVASTATIN 20 MG/1
20 TABLET, FILM COATED ORAL
Refills: 0 | Status: DISCONTINUED | COMMUNITY
Start: 2022-05-25 | End: 2023-12-29

## 2023-12-29 RX ORDER — AMIODARONE HYDROCHLORIDE 200 MG/1
200 TABLET ORAL
Refills: 0 | Status: ACTIVE | COMMUNITY

## 2023-12-29 NOTE — REVIEW OF SYSTEMS
[Headache] : no headache [Dizziness] : no dizziness [Fainting] : no fainting [Unsteady Walking] : ataxia [Negative] : Heme/Lymph

## 2023-12-29 NOTE — ASSESSMENT
[FreeTextEntry1] : Patient is an 87yo female with PMH A. Fib on Xarelto, CHF, HTN, HLD, osteoporosis, hx seizures, hx hyponatremia who presents to the office for hospital follow up. Pt was hospitalized at [facility] 11/23/2023-12/01/2023 for weakness. Pt was previously in [facility] x18 days 3 days prior to recent hospitalization. Pt found to have influenza in ED, treated with Tamiflu x5 days. Pt was noted to have sinus bradycardia on telemetry, Toprol dose was decreased (25mg daily). BP not at target Losartan increased to 100mg daily with improvement. Pt was discharged to [facility] from . She was discharged from [facility] on 12/23/2023 and is currently living at home. Pt's daughter, Sarah, is staying with her for now, but is planning on having a part time aid for assistance in the New Year.  Pt had elevated LFTs in hospital, abd US normal.  Will repeat LFTs today.  Likely 2/2 viral Influenza infection.  Pt brought medications in today.  Has been taking simvastatin and atorvastatin at home.  Advised to d/c Simvastatin.  Continue Atorvastatin 40mg nightly.  Pt and daughter, Sarah, understanding and agreeable to plan.  Call the office or go to the ED immediately if you develop new, worsening or concerning symptoms including high fever, severe headache/worst headache of your life, confusion, dizziness/lightheadedness, loss of consciousness, severe chest pain, difficulty breathing, shortness of breath, severe abdominal pain, excessive vomiting/diarrhea, inability to feel/move the extremities, or any other concerning symptoms.

## 2023-12-29 NOTE — HISTORY OF PRESENT ILLNESS
[Post-hospitalization from ___ Hospital] : Post-hospitalization from [unfilled] Hospital [Admitted on: ___] : The patient was admitted on [unfilled] [Discharged on ___] : discharged on [unfilled] [FreeTextEntry2] : Patient is an 87yo female with PMH A. Fib on Xarelto, CHF, HTN, HLD, osteoporosis, hx CVA, hx hyponatremia who presents to the office for hospital follow up.  Pt was hospitalized at [facility] 11/23/2023-12/01/2023 for weakness.  Pt was previously in [facility] x18 days 3 days prior to recent hospitalization.  Pt found to have influenza in ED, treated with Tamiflu x5 days.  Pt was noted to have sinus bradycardia on telemetry, Toprol dose was decreased (25mg daily).  BP not at target Losartan increased to 100mg daily with improvement.  Pt was discharged to [facility] from .  She was discharged from [facility] on 12/23/2023 and is currently living at home.  Pt's daughter, Sarah, is staying with her for now, but is planning on having a part time aid for assistance in the New Year.  Hospital records reviewed.  CT brain, MRI brain, CTA head showed subacute infarcts; no acute infarcts, no ASA per Neuro.  LFTs elevated (AST//269, 227/271 on 11/23, 11/24).  Attributed to viral illness (influenza).  Will repeat today.  Abd US RUQ performed and WNL 11/24/23.  UC negative.   Since discharge, pt has been feeling well and improved.  Pt has been using a rollator walker and feels steady on her feet.  Pt feels much better after this rehab stay than last (was at [facility] before most recent hospitalization). Pt is receiving PT services 2x/wk at home.  Working on right leg weakness. Pt has been checking BP at home, has been ~135-140's/80's.

## 2023-12-29 NOTE — PHYSICAL EXAM
[No Edema] : there was no peripheral edema [Normal] : soft, non-tender, non-distended, no masses palpated, no HSM and normal bowel sounds [No Rash] : no rash [No Focal Deficits] : no focal deficits [Normal Affect] : the affect was normal [Normal Insight/Judgement] : insight and judgment were intact [de-identified] : small open wound on medial aspect of left lower leg, healing well.  No signs of cellulitis. [de-identified] : using cane for walking assistance

## 2024-01-02 LAB
ALBUMIN SERPL ELPH-MCNC: 4.6 G/DL
ALP BLD-CCNC: 93 U/L
ALT SERPL-CCNC: 19 U/L
ANION GAP SERPL CALC-SCNC: 13 MMOL/L
AST SERPL-CCNC: 21 U/L
BILIRUB SERPL-MCNC: 0.5 MG/DL
BUN SERPL-MCNC: 11 MG/DL
CALCIUM SERPL-MCNC: 9.4 MG/DL
CHLORIDE SERPL-SCNC: 99 MMOL/L
CO2 SERPL-SCNC: 24 MMOL/L
CREAT SERPL-MCNC: 0.59 MG/DL
EGFR: 88 ML/MIN/1.73M2
GLUCOSE SERPL-MCNC: 100 MG/DL
POTASSIUM SERPL-SCNC: 4.2 MMOL/L
PROT SERPL-MCNC: 7 G/DL
SODIUM SERPL-SCNC: 136 MMOL/L

## 2024-01-10 ENCOUNTER — NON-APPOINTMENT (OUTPATIENT)
Age: 89
End: 2024-01-10

## 2024-01-21 NOTE — ED ADULT NURSE NOTE - NS ED NURSE LEVEL OF CONSCIOUSNESS AFFECT
"Precyous "Precyous" Jovani was seen and treated in our emergency department on 1/21/2024.  She may return to school on 01/24/2024.      If you have any questions or concerns, please don't hesitate to call.      PATRIC Cristobal RN" Calm

## 2024-01-29 ENCOUNTER — APPOINTMENT (OUTPATIENT)
Dept: NEUROLOGY | Facility: CLINIC | Age: 89
End: 2024-01-29
Payer: MEDICARE

## 2024-01-29 VITALS
SYSTOLIC BLOOD PRESSURE: 148 MMHG | WEIGHT: 124 LBS | BODY MASS INDEX: 22.82 KG/M2 | DIASTOLIC BLOOD PRESSURE: 78 MMHG | HEIGHT: 62 IN | TEMPERATURE: 97.8 F | HEART RATE: 63 BPM

## 2024-01-29 DIAGNOSIS — G60.8 OTHER HEREDITARY AND IDIOPATHIC NEUROPATHIES: ICD-10-CM

## 2024-01-29 PROCEDURE — 99214 OFFICE O/P EST MOD 30 MIN: CPT

## 2024-01-29 PROCEDURE — G2211 COMPLEX E/M VISIT ADD ON: CPT

## 2024-01-29 NOTE — REVIEW OF SYSTEMS
[Poor Coordination] : poor coordination [Difficulty Walking] : difficulty walking [As Noted in HPI] : as noted in HPI [Joint Pain] : joint pain [Negative] : Heme/Lymph

## 2024-01-29 NOTE — PHYSICAL EXAM
[General Appearance - Alert] : alert [General Appearance - In No Acute Distress] : in no acute distress [Oriented To Time, Place, And Person] : oriented to person, place, and time [Impaired Insight] : insight and judgment were intact [Affect] : the affect was normal [Person] : oriented to person [Place] : oriented to place [Time] : oriented to time [Registration Intact] : recent registration memory intact [Concentration Intact] : normal concentrating ability [Naming Objects] : no difficulty naming common objects [Repeating Phrases] : no difficulty repeating a phrase [Fluency] : fluency intact [Comprehension] : comprehension intact [Past History] : adequate knowledge of personal past history [Cranial Nerves Optic (II)] : visual acuity intact bilaterally,  visual fields full to confrontation, pupils equal round and reactive to light [Cranial Nerves Oculomotor (III)] : extraocular motion intact [Cranial Nerves Trigeminal (V)] : facial sensation intact symmetrically [Cranial Nerves Facial (VII)] : face symmetrical [Cranial Nerves Vestibulocochlear (VIII)] : hearing was intact bilaterally [Cranial Nerves Glossopharyngeal (IX)] : tongue and palate midline [Cranial Nerves Accessory (XI - Cranial And Spinal)] : head turning and shoulder shrug symmetric [Cranial Nerves Hypoglossal (XII)] : there was no tongue deviation with protrusion [Motor Tone] : muscle tone was normal in all four extremities [Motor Strength] : muscle strength was normal in all four extremities [No Muscle Atrophy] : normal bulk in all four extremities [Sensation Tactile Decrease] : light touch was intact [2+] : Brachioradialis left 2+ [1+] : Patella left 1+ [0] : Ankle jerk left 0 [PERRL With Normal Accommodation] : pupils were equal in size, round, reactive to light, with normal accommodation [Extraocular Movements] : extraocular movements were intact [Full Visual Field] : full visual field [Outer Ear] : the ears and nose were normal in appearance [Hearing Threshold Finger Rub Not [place]] : hearing was normal [Neck Cervical Mass (___cm)] : no neck mass was observed [] : no respiratory distress [Auscultation Breath Sounds / Voice Sounds] : lungs were clear to auscultation bilaterally [Heart Rate And Rhythm] : heart rate was normal and rhythm regular [Heart Sounds] : normal S1 and S2 [Arterial Pulses Carotid] : carotid pulses were normal with no bruits [Edema] : there was no peripheral edema

## 2024-03-18 ENCOUNTER — NON-APPOINTMENT (OUTPATIENT)
Age: 89
End: 2024-03-18

## 2024-03-19 ENCOUNTER — OUTPATIENT (OUTPATIENT)
Dept: OUTPATIENT SERVICES | Facility: HOSPITAL | Age: 89
LOS: 1 days | Discharge: HOME | End: 2024-03-19
Payer: MEDICARE

## 2024-03-19 ENCOUNTER — APPOINTMENT (OUTPATIENT)
Dept: WOUND CARE | Facility: HOSPITAL | Age: 89
End: 2024-03-19
Payer: MEDICARE

## 2024-03-19 VITALS
HEIGHT: 62 IN | OXYGEN SATURATION: 94 % | HEART RATE: 68 BPM | DIASTOLIC BLOOD PRESSURE: 73 MMHG | BODY MASS INDEX: 22.82 KG/M2 | WEIGHT: 124 LBS | SYSTOLIC BLOOD PRESSURE: 163 MMHG | RESPIRATION RATE: 18 BRPM | TEMPERATURE: 97.5 F

## 2024-03-19 DIAGNOSIS — Z90.710 ACQUIRED ABSENCE OF BOTH CERVIX AND UTERUS: Chronic | ICD-10-CM

## 2024-03-19 DIAGNOSIS — Z98.49 CATARACT EXTRACTION STATUS, UNSPECIFIED EYE: Chronic | ICD-10-CM

## 2024-03-19 DIAGNOSIS — L97.801 NON-PRESSURE CHRONIC ULCER OF OTHER PART OF UNSPECIFIED LOWER LEG LIMITED TO BREAKDOWN OF SKIN: ICD-10-CM

## 2024-03-19 PROCEDURE — 99203 OFFICE O/P NEW LOW 30 MIN: CPT

## 2024-03-19 PROCEDURE — G0463: CPT

## 2024-03-19 RX ORDER — RIVAROXABAN 20 MG/1
20 TABLET, FILM COATED ORAL
Refills: 0 | Status: DISCONTINUED | COMMUNITY
Start: 2022-05-25 | End: 2024-03-19

## 2024-03-19 RX ORDER — LOSARTAN POTASSIUM 100 MG/1
100 TABLET, FILM COATED ORAL
Refills: 0 | Status: DISCONTINUED | COMMUNITY
End: 2024-03-19

## 2024-03-19 NOTE — HISTORY OF PRESENT ILLNESS
[FreeTextEntry1] : 89 yo WF, here as a new patient for a LLE laceration that occurred about 3 wks ago. Was seen by a dermatologist initially and who put pt on 2 po abx. The wound is covered completely by a dry adherent eschar. No surrounding erythema/cellulitis. Presently on eliquis.     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 Pt reports hitting her leg on the leg of a chair, pt has an unsteady gait and uses a cane for assistance. She was being treated by Dr. Christie Serrano (Dermatology) who referred her to wound care. She is currently on a PO antibiotic prescribed by the dermatologist but is unsure of the name.

## 2024-03-19 NOTE — PHYSICAL EXAM
[Normal Thyroid] : the thyroid was normal [Normal Breath Sounds] : Normal breath sounds [Normal Heart Sounds] : normal heart sounds [Normal Rate and Rhythm] : normal rate and rhythm [Alert] : alert [Oriented to Person] : oriented to person [Oriented to Place] : oriented to place [Calm] : calm [Oriented to Time] : oriented to time [4 x 4] : 4 x 4  [1+] : left 1+ [Ankle Swelling (On Exam)] : present [Ankle Swelling On The Left] : of the left ankle [2+] : left 2+ [Ankle Swelling On The Right] : mild [JVD] : no jugular venous distention  [Abdomen Masses] : No abdominal massess [Abdomen Tenderness] : ~T ~M No abdominal tenderness [Tender] : nontender [Enlarged] : not enlarged [de-identified] : elderly WF, NAD, alert, Ox3. [FreeTextEntry1] : left anterior leg  [FreeTextEntry2] : 3.4 [FreeTextEntry3] : 2.0 [FreeTextEntry4] : 0.4  [de-identified] : serosanguineous/sanguineous  [de-identified] : hematoma secondary to trauma [de-identified] : necrotic eschar [de-identified] : medihoney [de-identified] : Mechanically cleansed with Sterile gauze and 0.9% Normal Saline kerlix [de-identified] : pulses palpable bilaterally Left Dorsal Pedal: 2+ regular Left Posterior Tibialis: 1+ regular  Right Dorsal Pedal: 2+ regular Right Posterior Tibialis: 1+ regular Cap refill <3 secs skin: normal, intact, warm  [TWNoteComboBox4] : Small [TWNoteComboBox6] : Other [de-identified] : None [de-identified] : No [de-identified] : other [de-identified] : 100% [de-identified] : Every other day [de-identified] : No [de-identified] : None [de-identified] : Primary Dressing

## 2024-03-19 NOTE — ASSESSMENT
[Verbal] : Verbal [Written] : Written [Demo] : Demo [Patient] : Patient [Good - alert, interested, motivated] : Good - alert, interested, motivated [Verbalizes knowledge/Understanding] : Verbalizes knowledge/understanding [Dressing changes] : dressing changes [Skin Care] : skin care [Signs and symptoms of infection] : sign and symptoms of infection [Nutrition] : nutrition [How and When to Call] : how and when to call [Pain Management] : pain management [Patient responsibility to plan of care] : patient responsibility to plan of care [Home Health] : home health [] : Yes [Stable] : stable [Cane] : Cane [Home] : Home [Not Applicable - Long Term Care/Home Health Agency] : Long Term Care/Home Health Agency: Not Applicable [Caregiver] : Caregiver [FreeTextEntry2] : infection prevention promote skin integrity demonstrates use of both pharmacological and non pharmacological pain management interventions maintain acceptable levels of pain nutrition and wound healing  [FreeTextEntry3] : initial visit  [FreeTextEntry4] : F/U 1 week Pt completed PO ABX, no need to continue as per MD No vascular studies not needed at this time as per MD  Pt caregiver will be changing dressings, she demonstrated understanding of proper dressing application. Small amount of supplies provided. Pt to purchase medihoney and cast covers from the local pharmacy. Supply order submitted.  Pt to bring HCP paperwork at next visit

## 2024-03-20 DIAGNOSIS — Z79.899 OTHER LONG TERM (CURRENT) DRUG THERAPY: ICD-10-CM

## 2024-03-20 DIAGNOSIS — M81.0 AGE-RELATED OSTEOPOROSIS WITHOUT CURRENT PATHOLOGICAL FRACTURE: ICD-10-CM

## 2024-03-20 DIAGNOSIS — G60.8 OTHER HEREDITARY AND IDIOPATHIC NEUROPATHIES: ICD-10-CM

## 2024-03-20 DIAGNOSIS — Z83.3 FAMILY HISTORY OF DIABETES MELLITUS: ICD-10-CM
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
## 2024-03-20 DIAGNOSIS — Z80.9 FAMILY HISTORY OF MALIGNANT NEOPLASM, UNSPECIFIED: ICD-10-CM

## 2024-03-20 DIAGNOSIS — Z86.73 PERSONAL HISTORY OF TRANSIENT ISCHEMIC ATTACK (TIA), AND CEREBRAL INFARCTION WITHOUT RESIDUAL DEFICITS: ICD-10-CM

## 2024-03-20 DIAGNOSIS — S81.812D LACERATION WITHOUT FOREIGN BODY, LEFT LOWER LEG, SUBSEQUENT ENCOUNTER: ICD-10-CM
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
## 2024-03-20 DIAGNOSIS — Z96.649 PRESENCE OF UNSPECIFIED ARTIFICIAL HIP JOINT: ICD-10-CM

## 2024-03-20 DIAGNOSIS — Z83.1 FAMILY HISTORY OF OTHER INFECTIOUS AND PARASITIC DISEASES: ICD-10-CM

## 2024-03-20 DIAGNOSIS — Z90.710 ACQUIRED ABSENCE OF BOTH CERVIX AND UTERUS: ICD-10-CM

## 2024-03-20 DIAGNOSIS — Z79.01 LONG TERM (CURRENT) USE OF ANTICOAGULANTS: ICD-10-CM

## 2024-03-20 DIAGNOSIS — J45.909 UNSPECIFIED ASTHMA, UNCOMPLICATED: ICD-10-CM

## 2024-03-20 DIAGNOSIS — Z87.440 PERSONAL HISTORY OF URINARY (TRACT) INFECTIONS: ICD-10-CM

## 2024-03-20 DIAGNOSIS — I48.91 UNSPECIFIED ATRIAL FIBRILLATION: ICD-10-CM
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
## 2024-03-20 DIAGNOSIS — I11.0 HYPERTENSIVE HEART DISEASE WITH HEART FAILURE: ICD-10-CM

## 2024-03-20 DIAGNOSIS — Z88.0 ALLERGY STATUS TO PENICILLIN: ICD-10-CM

## 2024-03-20 DIAGNOSIS — Z82.3 FAMILY HISTORY OF STROKE: ICD-10-CM

## 2024-03-20 DIAGNOSIS — I50.33 ACUTE ON CHRONIC DIASTOLIC (CONGESTIVE) HEART FAILURE: ICD-10-CM

## 2024-03-20 DIAGNOSIS — Z82.61 FAMILY HISTORY OF ARTHRITIS: ICD-10-CM

## 2024-03-20 DIAGNOSIS — E78.5 HYPERLIPIDEMIA, UNSPECIFIED: ICD-10-CM

## 2024-03-20 DIAGNOSIS — Z82.49 FAMILY HISTORY OF ISCHEMIC HEART DISEASE AND OTHER DISEASES OF THE CIRCULATORY SYSTEM: ICD-10-CM

## 2024-03-20 DIAGNOSIS — Z91.013 ALLERGY TO SEAFOOD: ICD-10-CM
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
## 2024-03-27 ENCOUNTER — APPOINTMENT (OUTPATIENT)
Dept: WOUND CARE | Facility: HOSPITAL | Age: 89
End: 2024-03-27
Payer: MEDICARE

## 2024-03-27 ENCOUNTER — NON-APPOINTMENT (OUTPATIENT)
Age: 89
End: 2024-03-27

## 2024-03-27 ENCOUNTER — OUTPATIENT (OUTPATIENT)
Dept: OUTPATIENT SERVICES | Facility: HOSPITAL | Age: 89
LOS: 1 days | Discharge: HOME | End: 2024-03-27
Payer: MEDICARE

## 2024-03-27 VITALS
HEART RATE: 62 BPM | DIASTOLIC BLOOD PRESSURE: 74 MMHG | OXYGEN SATURATION: 97 % | RESPIRATION RATE: 18 BRPM | SYSTOLIC BLOOD PRESSURE: 134 MMHG | TEMPERATURE: 97.5 F | WEIGHT: 124 LBS | BODY MASS INDEX: 22.82 KG/M2 | HEIGHT: 62 IN

## 2024-03-27 DIAGNOSIS — Z91.013 ALLERGY TO SEAFOOD: ICD-10-CM

## 2024-03-27 DIAGNOSIS — Z80.9 FAMILY HISTORY OF MALIGNANT NEOPLASM, UNSPECIFIED: ICD-10-CM
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
## 2024-03-27 DIAGNOSIS — Z79.899 OTHER LONG TERM (CURRENT) DRUG THERAPY: ICD-10-CM

## 2024-03-27 DIAGNOSIS — I11.0 HYPERTENSIVE HEART DISEASE WITH HEART FAILURE: ICD-10-CM

## 2024-03-27 DIAGNOSIS — Z83.1 FAMILY HISTORY OF OTHER INFECTIOUS AND PARASITIC DISEASES: ICD-10-CM

## 2024-03-27 DIAGNOSIS — E78.5 HYPERLIPIDEMIA, UNSPECIFIED: ICD-10-CM

## 2024-03-27 DIAGNOSIS — I48.91 UNSPECIFIED ATRIAL FIBRILLATION: ICD-10-CM

## 2024-03-27 DIAGNOSIS — Z86.73 PERSONAL HISTORY OF TRANSIENT ISCHEMIC ATTACK (TIA), AND CEREBRAL INFARCTION W/OUT RESIDUAL DEFICITS: ICD-10-CM

## 2024-03-27 DIAGNOSIS — Z79.01 LONG TERM (CURRENT) USE OF ANTICOAGULANTS: ICD-10-CM

## 2024-03-27 DIAGNOSIS — Z86.73 PERSONAL HISTORY OF TRANSIENT ISCHEMIC ATTACK (TIA), AND CEREBRAL INFARCTION WITHOUT RESIDUAL DEFICITS: ICD-10-CM

## 2024-03-27 DIAGNOSIS — S81.812D LACERATION W/OUT FOREIGN BODY, LEFT LOWER LEG, SUBSEQUENT ENCOUNTER: ICD-10-CM

## 2024-03-27 DIAGNOSIS — I50.33 ACUTE ON CHRONIC DIASTOLIC (CONGESTIVE) HEART FAILURE: ICD-10-CM
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
## 2024-03-27 DIAGNOSIS — M81.0 AGE-RELATED OSTEOPOROSIS WITHOUT CURRENT PATHOLOGICAL FRACTURE: ICD-10-CM

## 2024-03-27 DIAGNOSIS — S91.309D UNSPECIFIED OPEN WOUND, UNSPECIFIED FOOT, SUBSEQUENT ENCOUNTER: ICD-10-CM

## 2024-03-27 DIAGNOSIS — Z88.0 ALLERGY STATUS TO PENICILLIN: ICD-10-CM

## 2024-03-27 DIAGNOSIS — Z83.3 FAMILY HISTORY OF DIABETES MELLITUS: ICD-10-CM

## 2024-03-27 DIAGNOSIS — S81.812D LACERATION WITHOUT FOREIGN BODY, LEFT LOWER LEG, SUBSEQUENT ENCOUNTER: ICD-10-CM

## 2024-03-27 DIAGNOSIS — Z82.3 FAMILY HISTORY OF STROKE: ICD-10-CM
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
## 2024-03-27 DIAGNOSIS — Z87.440 PERSONAL HISTORY OF URINARY (TRACT) INFECTIONS: ICD-10-CM
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
## 2024-03-27 DIAGNOSIS — J45.909 UNSPECIFIED ASTHMA, UNCOMPLICATED: ICD-10-CM

## 2024-03-27 DIAGNOSIS — G60.8 OTHER HEREDITARY AND IDIOPATHIC NEUROPATHIES: ICD-10-CM

## 2024-03-27 DIAGNOSIS — Z98.49 CATARACT EXTRACTION STATUS, UNSPECIFIED EYE: Chronic | ICD-10-CM

## 2024-03-27 DIAGNOSIS — Z96.649 PRESENCE OF UNSPECIFIED ARTIFICIAL HIP JOINT: ICD-10-CM

## 2024-03-27 DIAGNOSIS — Z82.61 FAMILY HISTORY OF ARTHRITIS: ICD-10-CM

## 2024-03-27 DIAGNOSIS — Z82.49 FAMILY HISTORY OF ISCHEMIC HEART DISEASE AND OTHER DISEASES OF THE CIRCULATORY SYSTEM: ICD-10-CM

## 2024-03-27 DIAGNOSIS — Z90.710 ACQUIRED ABSENCE OF BOTH CERVIX AND UTERUS: ICD-10-CM

## 2024-03-27 DIAGNOSIS — Z90.710 ACQUIRED ABSENCE OF BOTH CERVIX AND UTERUS: Chronic | ICD-10-CM

## 2024-03-27 PROCEDURE — 99213 OFFICE O/P EST LOW 20 MIN: CPT

## 2024-03-27 PROCEDURE — G0463: CPT

## 2024-03-27 NOTE — HISTORY OF PRESENT ILLNESS
[FreeTextEntry1] : 87 yo WF, here for f/u of traumatic wound to her LLE. Healing well. Some loose yellow slough removed today. No SOI.

## 2024-03-27 NOTE — VITALS
[Pain related to present condition?] : The patient's  pain is not related to present condition. [de-identified] : 0/10 [] : No

## 2024-03-27 NOTE — ASSESSMENT
[Verbal] : Verbal [Demo] : Demo [Patient] : Patient [Good - alert, interested, motivated] : Good - alert, interested, motivated [Verbalizes knowledge/Understanding] : Verbalizes knowledge/understanding [Dressing changes] : dressing changes [Skin Care] : skin care [Signs and symptoms of infection] : sign and symptoms of infection [Nutrition] : nutrition [How and When to Call] : how and when to call [Pain Management] : pain management [Patient responsibility to plan of care] : patient responsibility to plan of care [Home Health] : home health [] : Yes [Stable] : stable [Home] : Home [Not Applicable - Long Term Care/Home Health Agency] : Long Term Care/Home Health Agency: Not Applicable [Family member] : Family member [Ambulatory] : Ambulatory [FreeTextEntry4] : F/U 1 week Pt caregiver will be changing dressings, she demonstrated understanding of proper dressing application. Small amount of supplies provided to prevent delay in care. Continue use of Medihoney. [FreeTextEntry2] : infection prevention promote skin integrity demonstrates use of both pharmacological and non pharmacological pain management interventions maintain acceptable levels of pain nutrition and wound healing  autolytic debridement

## 2024-03-27 NOTE — PHYSICAL EXAM
[4 x 4] : 4 x 4  [JVD] : no jugular venous distention  [Normal Thyroid] : the thyroid was normal [Normal Breath Sounds] : Normal breath sounds [Normal Heart Sounds] : normal heart sounds [Normal Rate and Rhythm] : normal rate and rhythm [Ankle Swelling On The Left] : moderate [Ankle Swelling (On Exam)] : present [Abdomen Tenderness] : ~T ~M No abdominal tenderness [Abdomen Masses] : No abdominal massess [Tender] : nontender [Enlarged] : not enlarged [Alert] : alert [Oriented to Person] : oriented to person [Oriented to Place] : oriented to place [Oriented to Time] : oriented to time [Calm] : calm [de-identified] : elderly WF, NAD, alert ,Ox3. [de-identified] : MD Lynch able to remove slough with gentle cleansing. As per the patient, the eschar "came off" at home. [FreeTextEntry1] : left anterior leg  [FreeTextEntry3] : 2.0 cm [FreeTextEntry2] : 3.2cm [de-identified] : serosanguineous/sanguineous  [FreeTextEntry4] : 0.4 cm [de-identified] : hematoma secondary to trauma [de-identified] : yellow, moist slough [de-identified] : medihoney [de-identified] : Mechanically cleansed with Sterile gauze and 0.9% Normal Saline Paper tape [de-identified] : pulses palpable bilaterally Left Dorsal Pedal: 2+ regular Left Posterior Tibialis: 1+ regular  Right Dorsal Pedal: 2+ regular Right Posterior Tibialis: 1+ regular Cap refill <3 secs skin: normal, intact, warm  [TWNoteComboBox4] : Small [TWNoteComboBox6] : Other [TWNoteComboBox5] : No [de-identified] : other [de-identified] : No [de-identified] : None [de-identified] : 50% [de-identified] : 50% [de-identified] : No [de-identified] : Every other day [de-identified] : Primary Dressing

## 2024-04-03 ENCOUNTER — APPOINTMENT (OUTPATIENT)
Dept: WOUND CARE | Facility: HOSPITAL | Age: 89
End: 2024-04-03

## 2024-04-10 NOTE — ASSESSMENT
[FreeTextEntry1] : #1 history of bronchitis, asthmatic bronchitis in the past. She has not had an episode since 2006. She has a slight cough at this time. We'll start Asmanex 220 one inhalation b.i.d. with gargle after. If cough is improved, will decrease to one inhalation per day. For PA and lateral chest x-ray. For following appointment in 6 months. She'll call or return anytime if having symptoms or if there is a clinical change.\par \par #2 consider mild allergic asthma. Asmanex as above.\par \par #3 History of bronchitis and asthmatic bronchitis in past. Last episode about 2006.\par \par #4 History of previous atrial fibrillation. Continue following with cardiology. Will see soon.\par \par #5 HTN.\par \par #6 HPL.  Alert and oriented to person, place and time/Patient baseline mental status/Awake

## 2024-04-15 ENCOUNTER — APPOINTMENT (OUTPATIENT)
Dept: VASCULAR SURGERY | Facility: CLINIC | Age: 89
End: 2024-04-15

## 2024-04-24 ENCOUNTER — APPOINTMENT (OUTPATIENT)
Dept: VASCULAR SURGERY | Facility: CLINIC | Age: 89
End: 2024-04-24
Payer: MEDICARE

## 2024-04-24 ENCOUNTER — APPOINTMENT (OUTPATIENT)
Dept: VASCULAR SURGERY | Facility: CLINIC | Age: 89
End: 2024-04-24

## 2024-04-24 VITALS
HEIGHT: 62 IN | DIASTOLIC BLOOD PRESSURE: 74 MMHG | RESPIRATION RATE: 16 BRPM | BODY MASS INDEX: 23.19 KG/M2 | WEIGHT: 126 LBS | OXYGEN SATURATION: 97 % | HEART RATE: 90 BPM | SYSTOLIC BLOOD PRESSURE: 126 MMHG

## 2024-04-24 PROCEDURE — 99203 OFFICE O/P NEW LOW 30 MIN: CPT

## 2024-04-24 PROCEDURE — 93971 EXTREMITY STUDY: CPT | Mod: LT

## 2024-04-24 NOTE — HISTORY OF PRESENT ILLNESS
[FreeTextEntry1] : 88 yr F with PMHx significant for CHF, HLD, HTN, OA, presents today as a new patient for

## 2024-04-24 NOTE — ASSESSMENT
[FreeTextEntry1] : 88 yr F with bilateral lower extremity swelling -duplex today demonstrated no significant venous disease or insufficiency  -encouraged leg elevation daily  -f/u PRN unless new wounds arise or swelling worsens

## 2024-05-02 ENCOUNTER — APPOINTMENT (OUTPATIENT)
Dept: NEUROLOGY | Facility: CLINIC | Age: 89
End: 2024-05-02
Payer: MEDICARE

## 2024-05-02 ENCOUNTER — NON-APPOINTMENT (OUTPATIENT)
Age: 89
End: 2024-05-02

## 2024-05-02 VITALS
SYSTOLIC BLOOD PRESSURE: 150 MMHG | HEIGHT: 62 IN | HEART RATE: 60 BPM | DIASTOLIC BLOOD PRESSURE: 68 MMHG | WEIGHT: 129 LBS | BODY MASS INDEX: 23.74 KG/M2 | TEMPERATURE: 97.6 F

## 2024-05-02 DIAGNOSIS — G60.3 IDIOPATHIC PROGRESSIVE NEUROPATHY: ICD-10-CM

## 2024-05-02 DIAGNOSIS — I63.9 CEREBRAL INFARCTION, UNSPECIFIED: ICD-10-CM

## 2024-05-02 DIAGNOSIS — R26.81 UNSTEADINESS ON FEET: ICD-10-CM

## 2024-05-02 PROCEDURE — 95910 NRV CNDJ TEST 7-8 STUDIES: CPT | Mod: 59

## 2024-05-02 PROCEDURE — 95886 MUSC TEST DONE W/N TEST COMP: CPT | Mod: 59

## 2024-05-02 PROCEDURE — 99213 OFFICE O/P EST LOW 20 MIN: CPT | Mod: 25

## 2024-05-02 NOTE — REVIEW OF SYSTEMS
[Poor Coordination] : poor coordination [Numbness] : numbness [Tingling] : tingling [Difficulty Walking] : difficulty walking [As Noted in HPI] : as noted in HPI [Joint Pain] : joint pain [Negative] : Heme/Lymph [Seizures] : no convulsions [Fainting] : no fainting

## 2024-05-02 NOTE — DISCUSSION/SUMMARY
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 seizure/convulsive syncope; could be hyponatremia/dehydration; 24 HR EEG was normal, no Evening/seizure-like activity noted  # Subacute cerebral infarcts right frontal and temporal region noted on MRI brain during admission to hospital on 11/23/2023; these occurred while patient was on Xarelto   - Continue Eliquis and atorvastatin 40 Mg daily - Stroke education provided -Patient advised to resume physical therapy for balance and gait training and right leg strengthening

## 2024-05-02 NOTE — HISTORY OF PRESENT ILLNESS
[FreeTextEntry1] : Patient is here for follow-up visit today, last seen on 1/29/2024. Patient continues to have intermittent tingling and numbness sensation in the toes, is chronic, But reports more recently she has noted pain and swelling in the right lower extremity just above the ankle.  Patient has seen vascular surgery, was reassured. she is here for EMG/NCV studies to evaluate for neuropathy today  Patient has not had any more seizure-like episodes or syncope's.  Patient has followed up with her cardiologist, was switched to Eliquis from Xarelto, she continues to take atorvastatin she has not had any more stroke-like symptoms

## 2024-05-02 NOTE — DATA REVIEWED
[de-identified] : 11/2023: MRI brain demonstrated subacute infarct in the right frontal and temporal regions [de-identified] : 8/10-8/11/23: 24-hour EEG normal study [de-identified] : 7/19/23: CT head - negative

## 2024-05-02 NOTE — PROCEDURE
[FreeTextEntry1] : EMG/NCV studies lower extremities.  The electrodiagnostic findings are consistent with mild-moderate distal sensorimotor neuropathy; primarily axonal type

## 2024-05-02 NOTE — REASON FOR VISIT
[Procedure: _________] : a [unfilled] procedure visit [FreeTextEntry1] : For EMG/NCV studies of lower extremities to evaluate for neuropathy

## 2024-05-07 ENCOUNTER — NON-APPOINTMENT (OUTPATIENT)
Age: 89
End: 2024-05-07

## 2024-05-16 ENCOUNTER — APPOINTMENT (OUTPATIENT)
Dept: FAMILY MEDICINE | Facility: CLINIC | Age: 89
End: 2024-05-16
Payer: MEDICARE

## 2024-05-16 VITALS
HEART RATE: 75 BPM | SYSTOLIC BLOOD PRESSURE: 138 MMHG | DIASTOLIC BLOOD PRESSURE: 70 MMHG | OXYGEN SATURATION: 98 % | WEIGHT: 129 LBS | TEMPERATURE: 97.3 F | BODY MASS INDEX: 23.74 KG/M2 | HEIGHT: 62 IN

## 2024-05-16 DIAGNOSIS — S81.801A UNSPECIFIED OPEN WOUND, RIGHT LOWER LEG, INITIAL ENCOUNTER: ICD-10-CM

## 2024-05-16 DIAGNOSIS — R60.0 LOCALIZED EDEMA: ICD-10-CM

## 2024-05-16 DIAGNOSIS — S51.801A UNSPECIFIED OPEN WOUND OF RIGHT FOREARM, INITIAL ENCOUNTER: ICD-10-CM

## 2024-05-16 PROCEDURE — 99214 OFFICE O/P EST MOD 30 MIN: CPT | Mod: 25

## 2024-05-16 PROCEDURE — 97597 DBRDMT OPN WND 1ST 20 CM/<: CPT

## 2024-05-16 RX ORDER — SULFAMETHOXAZOLE AND TRIMETHOPRIM 800; 160 MG/1; MG/1
800-160 TABLET ORAL TWICE DAILY
Qty: 10 | Refills: 0 | Status: ACTIVE | COMMUNITY
Start: 2024-05-16 | End: 1900-01-01

## 2024-05-16 NOTE — HISTORY OF PRESENT ILLNESS
[FreeTextEntry8] : - right calf has been swollen for a few weeks and started leaking last few days - thinks it's infected, wants an antibiotic - denies fever or leg pain  - has seen vascular surgery for this swelling - had a duplex us done - no siginficant venous disease or insufficiency - encouraged leg elevation, pt states that she has been  - thinks the swelling is slightly less but weeping yellow fluid that makes patient worried  - left calf is also swollen, had a wound on it that saw wound care for in March that has finally recently healed  - dressed the right leg with medihoney and gauze yesterday - soaked through  - saw wound care end of march, Dr. Lynch, for a wound on left leg  - has a wound on right forearm - hit her arm on the door - has been putting medihoney on it and covering with a mepilex twice a day  - saw neuro on 5/02, labs showed abnormal JERILYN - told to follow up with rheum  - has an appt with rheum at the end of the month  - started on gabapentin for neuropathy - takes it at night, seems to help with sleep

## 2024-05-16 NOTE — PHYSICAL EXAM
[Normal] : affect was normal and insight and judgment were intact [de-identified] : + BLE pitting edema, right calf weeping serous fluid, no pain, foul odor

## 2024-05-16 NOTE — PLAN
[FreeTextEntry1] : - continue using medihoney and gauze on right forearm and right calf until see wound care  - elevated legs above heart level 3-4 times per day  - hydrate - monitor for s&s of infection - fever, redness, pain, warmth foul odor of the wound

## 2024-05-16 NOTE — REVIEW OF SYSTEMS
[Negative] : Psychiatric [FreeTextEntry9] : BLE pitting edema, right calf weeping serous fluid, no pain, redness, warmth, foul odor

## 2024-05-20 ENCOUNTER — OUTPATIENT (OUTPATIENT)
Dept: OUTPATIENT SERVICES | Facility: HOSPITAL | Age: 89
LOS: 1 days | End: 2024-05-20
Payer: MEDICARE

## 2024-05-20 DIAGNOSIS — S50.11XA CONTUSION OF RIGHT FOREARM, INITIAL ENCOUNTER: ICD-10-CM

## 2024-05-20 DIAGNOSIS — I50.9 HEART FAILURE, UNSPECIFIED: ICD-10-CM

## 2024-05-20 DIAGNOSIS — I11.0 HYPERTENSIVE HEART DISEASE WITH HEART FAILURE: ICD-10-CM
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
## 2024-05-20 DIAGNOSIS — Z98.49 CATARACT EXTRACTION STATUS, UNSPECIFIED EYE: Chronic | ICD-10-CM

## 2024-05-20 DIAGNOSIS — L97.819 NON-PRESSURE CHRONIC ULCER OF OTHER PART OF RIGHT LOWER LEG WITH UNSPECIFIED SEVERITY: ICD-10-CM
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
## 2024-05-20 DIAGNOSIS — I49.9 CARDIAC ARRHYTHMIA, UNSPECIFIED: ICD-10-CM

## 2024-05-20 DIAGNOSIS — S81.801A UNSPECIFIED OPEN WOUND, RIGHT LOWER LEG, INITIAL ENCOUNTER: ICD-10-CM

## 2024-05-20 DIAGNOSIS — Z90.710 ACQUIRED ABSENCE OF BOTH CERVIX AND UTERUS: Chronic | ICD-10-CM

## 2024-05-20 DIAGNOSIS — L03.113 CELLULITIS OF RIGHT UPPER LIMB: ICD-10-CM
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
## 2024-05-20 PROCEDURE — 99203 OFFICE O/P NEW LOW 30 MIN: CPT | Mod: 25

## 2024-05-20 PROCEDURE — 93923 UPR/LXTR ART STDY 3+ LVLS: CPT

## 2024-05-20 PROCEDURE — 10140 I&D HMTMA SEROMA/FLUID COLLJ: CPT

## 2024-05-20 PROCEDURE — 99203 OFFICE O/P NEW LOW 30 MIN: CPT

## 2024-05-20 PROCEDURE — 93923 UPR/LXTR ART STDY 3+ LVLS: CPT | Mod: 26

## 2024-05-28 ENCOUNTER — OUTPATIENT (OUTPATIENT)
Dept: OUTPATIENT SERVICES | Facility: HOSPITAL | Age: 89
LOS: 1 days | End: 2024-05-28
Payer: MEDICARE

## 2024-05-28 DIAGNOSIS — Z90.710 ACQUIRED ABSENCE OF BOTH CERVIX AND UTERUS: Chronic | ICD-10-CM

## 2024-05-28 DIAGNOSIS — L97.819 NON-PRESSURE CHRONIC ULCER OF OTHER PART OF RIGHT LOWER LEG WITH UNSPECIFIED SEVERITY: ICD-10-CM

## 2024-05-28 DIAGNOSIS — Z98.49 CATARACT EXTRACTION STATUS, UNSPECIFIED EYE: Chronic | ICD-10-CM

## 2024-05-28 PROCEDURE — 11043 DBRDMT MUSC&/FSCA 1ST 20/<: CPT

## 2024-05-29 ENCOUNTER — APPOINTMENT (OUTPATIENT)
Dept: VASCULAR SURGERY | Facility: CLINIC | Age: 89
End: 2024-05-29
Payer: MEDICARE

## 2024-05-29 VITALS — DIASTOLIC BLOOD PRESSURE: 60 MMHG | SYSTOLIC BLOOD PRESSURE: 120 MMHG

## 2024-05-29 DIAGNOSIS — I87.2 VENOUS INSUFFICIENCY (CHRONIC) (PERIPHERAL): ICD-10-CM

## 2024-05-29 PROCEDURE — 99213 OFFICE O/P EST LOW 20 MIN: CPT

## 2024-05-31 DIAGNOSIS — I50.9 HEART FAILURE, UNSPECIFIED: ICD-10-CM

## 2024-05-31 DIAGNOSIS — S50.11XA CONTUSION OF RIGHT FOREARM, INITIAL ENCOUNTER: ICD-10-CM

## 2024-05-31 DIAGNOSIS — L03.113 CELLULITIS OF RIGHT UPPER LIMB: ICD-10-CM

## 2024-05-31 DIAGNOSIS — I11.0 HYPERTENSIVE HEART DISEASE WITH HEART FAILURE: ICD-10-CM
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
## 2024-05-31 DIAGNOSIS — I49.9 CARDIAC ARRHYTHMIA, UNSPECIFIED: ICD-10-CM
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
## 2024-06-03 ENCOUNTER — RX RENEWAL (OUTPATIENT)
Age: 89
End: 2024-06-03

## 2024-06-03 RX ORDER — ALENDRONATE SODIUM 70 MG/1
70 TABLET ORAL
Qty: 13 | Refills: 3 | Status: ACTIVE | COMMUNITY
Start: 2023-09-18 | End: 1900-01-01

## 2024-06-05 PROBLEM — I87.2 CHRONIC VENOUS INSUFFICIENCY: Status: ACTIVE | Noted: 2024-06-05

## 2024-06-05 NOTE — ASSESSMENT
[FreeTextEntry1] : 89  with lower extremity edema which is multifactorial at least partially secondary to lymphedema and chronic venous insufficiency; discussed management with patient and family -20-30mmHg compression stockings to be worn daily; prescription given and explained proper wear and use; given patient not compliant with this explained that there is an option for velcro compression stockings (circaid) -calf muscle exercises and leg elevation when possible -Monitor for unilateral swelling, redness and pain if any of these symptoms occur recommend calling office immediately as these are signs of DVT/SVT  Patient presents with lymphedema secondary to _________cvi______ Despite efforts of compression, elevation and exercise for over 4 weeks symptoms persist and lymphedema extends proximally into truncal region. Early signs of hyperpigmentation noted.

## 2024-06-05 NOTE — PHYSICAL EXAM
[Normal Breath Sounds] : Normal breath sounds [Normal Rate and Rhythm] : normal rate and rhythm [2+] : left 2+ [JVD] : no jugular venous distention  [de-identified] : well appearing [de-identified] : wnl [FreeTextEntry1] : chronic lymphedema changes and +1/2 pitting edema to mid upper calf

## 2024-06-05 NOTE — HISTORY OF PRESENT ILLNESS
[FreeTextEntry1] : 88 yr F with PMHx significant for CHF, HLD, HTN, OA, presents today as a new patient for Lower extremity swelling that has worsened over the last few months patient and her daughter mentioned that this has no provoking event.  She was seen by multiple physicians including a podiatrist that referred her to our office for assessment of venous source of these pathologies.  She does have known CHF which is well-controlled.  She denies ever using compression stockings denies ever using a lymphedema pump or being told that she has potentially lymphedema.  She has no history of DVT or PE. [de-identified] : Since last visit patient states that all her wounds are healed however she had an episode which she felt was an infection she took antibiotics and that has resolved.  She also has a new wound on her right forearm which she is being managed for at home and in the wound care center.  She denies using compression stockings as recommended and was not previously interested in the lymphedema pump.  She does elevate her legs and states that this improves her symptoms.  She denies any recurrent weeping from the skin but does have swelling in the right more so than the left lower extremity.

## 2024-06-11 ENCOUNTER — APPOINTMENT (OUTPATIENT)
Dept: RHEUMATOLOGY | Facility: CLINIC | Age: 89
End: 2024-06-11
Payer: MEDICARE

## 2024-06-11 VITALS
TEMPERATURE: 97.6 F | WEIGHT: 127 LBS | HEIGHT: 62 IN | OXYGEN SATURATION: 97 % | HEART RATE: 67 BPM | DIASTOLIC BLOOD PRESSURE: 68 MMHG | BODY MASS INDEX: 23.37 KG/M2 | SYSTOLIC BLOOD PRESSURE: 110 MMHG

## 2024-06-11 DIAGNOSIS — G62.9 POLYNEUROPATHY, UNSPECIFIED: ICD-10-CM

## 2024-06-11 DIAGNOSIS — I89.0 LYMPHEDEMA, NOT ELSEWHERE CLASSIFIED: ICD-10-CM

## 2024-06-11 DIAGNOSIS — R76.8 OTHER SPECIFIED ABNORMAL IMMUNOLOGICAL FINDINGS IN SERUM: ICD-10-CM

## 2024-06-11 DIAGNOSIS — M17.9 OSTEOARTHRITIS OF KNEE, UNSPECIFIED: ICD-10-CM

## 2024-06-11 DIAGNOSIS — M19.049 PRIMARY OSTEOARTHRITIS, UNSPECIFIED HAND: ICD-10-CM

## 2024-06-11 PROCEDURE — 99204 OFFICE O/P NEW MOD 45 MIN: CPT

## 2024-06-11 RX ORDER — AMLODIPINE BESYLATE 5 MG/1
TABLET ORAL
Refills: 0 | Status: ACTIVE | COMMUNITY

## 2024-06-11 RX ORDER — APIXABAN 5 MG/1
5 TABLET, FILM COATED ORAL
Refills: 0 | Status: ACTIVE | COMMUNITY

## 2024-06-11 RX ORDER — AMLODIPINE BESYLATE 2.5 MG/1
2.5 TABLET ORAL
Refills: 0 | Status: ACTIVE | COMMUNITY

## 2024-06-12 NOTE — HISTORY OF PRESENT ILLNESS
[FreeTextEntry1] : 88 yr F with PMHx significant for CHF, HLD, HTN, OA, right hip replacement 2023 osteoporosis, lymphedema and chronic venous insufficiency, neuropathy, Afib  Coming for eval +JERILYN  Reports one time sun caused rash/burning on her arms (she was not wearing sunscreen); did not have facial rash. Patient denies severe joint pain. On and off knee pain but does not bother her Chronic dry eyes; uses eye drops Has numbness/tingling in her feet for a few years. 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 Patient is aking gabapentin now per neuro. She is seeing vascular for  lymphedema and chronic venous insufficiency Patient denies joint pains, joint swelling, joint erythema/warmth, prolonged morning stiffness, fatigue, fever, chills, weight loss, nasopharyngeal ulcers, chest pain, abdominal pain, cough, SOB, nausea, vomiting, diarrhea, constipation, blood in stool, dysuria, hematuria, rash, Raynaud's, alopecia, , dry mouth, headaches, eye pain/redness, vision changes, myalgias, muscle weakness, dysphagia, jaw claudication,, miscarriages, Hx of DVT/PEs.    Labs 5/2024   Neg RF, CK, Immunofixation,  JERILYN 1:160 DFS70 pattern      PSHx:  right hip replacement 2023, hysterectomy  Family Hx: Denies family history of rheumatologic conditions including RA, SLE, Sjogren's, Myositis, scleroderma, or vasculitis Social Hx:  Smoking Hx: denies EtOH Hx: social Drug use: denies Occupation: was a teacher, 2 children, healthy pregnancies

## 2024-06-12 NOTE — ASSESSMENT
[FreeTextEntry1] : 88 yr F with PMHx significant for CHF, HLD, HTN, OA, right hip replacement 2023 osteoporosis, lymphedema and chronic venous insufficiency, neuropathy, Afib  Coming for eval +JERILYN  Reports one time sun caused rash/burning on her arms (she was not wearing sunscreen); did not have facial rash. Patient denies severe joint pain. On and off knee pain but does not bother her Chronic dry eyes; uses eye drops Has numbness/tingling in her feet for a few years. 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 Patient is aking gabapentin now per neuro. She is seeing vascular for  lymphedema and chronic venous insufficiency     Labs 5/2024   Neg RF, CK, Immunofixation,  JERILYN 1:160 DFS70 pattern  Patient does not have  inflammatory joint pain, malar rash, photosensitivity,  Raynauds. Based on existing labs, patient does not have anemia, leukopenia, thrombocytopenia, kidney disease. Exam without synovitis, rashes, changes of Raynauds. There is low suspicion for underlying CTD. JERILYN is a marker that is sensitive but not specific for underlying rheumatologic diseases such as lupus. The most common causes of positive JERILYN in patients without underlying rheumatologic diseases are infections, malignancies, and other autoimmune diseases such as Hashimotos. Up to 15% of patients without any underlying disease can have positive JERILYN. Her pattern DFS70 is not consistent with an autoimmune CTD  - Discussed with patient low suspicion for CTD however offered blood work for further evaluation - Patient at this time wants to hold off on blood work given low suspicion - She has hx of OA which can contribute to pain. Her knee and hand pain is not severe and does not bother her. - Following vascular for chronic venous insuff and lymphadema and is going to have someone come to her house for further eval by them - On gabapentin per neuro for neuropathy  most likely age related    Total time spent in review of patient history, clinical exam, management, counseling, and plan of care:  50min

## 2024-06-12 NOTE — PHYSICAL EXAM
[TextEntry] :   GENERAL: Appears in no acute distress HEENT: EOMI. No conjunctival erythema. Moist mucous membranes. No nasopharyngeal ulcers NECK: Supple, no cervical lymphadenopathy PULMONARY: Clear to auscultation b/l MSK: No active synovitis, swelling, erythema, or warmth. No joint tenderness to palpation. Heberdens nodes No deformities. No crepitus. Normal ROM of neck, back, b/l upper and lower extremities. No dactylitis, enthesitis, nail pitting SKIN: No lesions or rashes No sclerodactyly, telangiectasias, calcinosis NEURO: No focal deficits. Motor strength 5/5 in major muscle groups of b/l UE and LE. PSYCH: Normal affect and thought process.

## 2024-07-01 ENCOUNTER — RX RENEWAL (OUTPATIENT)
Age: 89
End: 2024-07-01

## 2024-07-02 NOTE — DISCHARGE NOTE PROVIDER - NPI NUMBER (FOR SYSADMIN USE ONLY) :
SURVEY:    You may be receiving a survey from [facility] [employer] regarding your visit today.    Please complete the survey to enable us to provide the highest quality of care to you and your family.    If you cannot score us a very good (5 Stars) on any question, please call the office to discuss how we could have made your experience a very good one.    Thank you.    Clinical Care Team: MD Tyrel Bhagat LPN              Triage: Yolanda Gregorio CMA              Clerical Team: Yolanda Avila    20 [[phone]]

## 2024-08-09 ENCOUNTER — APPOINTMENT (OUTPATIENT)
Dept: FAMILY MEDICINE | Facility: CLINIC | Age: 89
End: 2024-08-09

## 2024-08-09 PROBLEM — S61.502A: Status: ACTIVE | Noted: 2024-08-09

## 2024-08-09 PROBLEM — L60.1 ONYCHOLYSIS: Status: ACTIVE | Noted: 2024-08-09

## 2024-08-09 PROCEDURE — 99214 OFFICE O/P EST MOD 30 MIN: CPT

## 2024-08-09 NOTE — PHYSICAL EXAM
[Normal] : affect was normal and insight and judgment were intact [de-identified] : left ting fingernail hald disattached with chunks of dry brown residue which looks like foundation under the nail. an abrasion on left wrist, scan bleeding, no discharge, warmth or tenderness, surrounding skin erythematous, healing.

## 2024-08-09 NOTE — HISTORY OF PRESENT ILLNESS
[FreeTextEntry8] : 1. Nail - 2 days ago noticed that left ring fingernail seems to be disattaching  - denies trauma  - denies pain, swelling  - has been applying mupirocin and a bandaid on it   2. left wrist wound - scraped her wrist while walking with her walker 3 weeks ago  - has been applying mupirocin and a mepilex  - thinks it's improving - slight bleeding - denies foul odor, fever, pain

## 2024-08-09 NOTE — PLAN
[FreeTextEntry1] : - alternate between medihoney and mupirocin on finger and wrist  - cover the fingernail with a bandaid, continue using mepilex for the wrist  - avoid getting foundation or anything under the nail  - monitor for s&s of infection

## 2024-08-12 ENCOUNTER — RX RENEWAL (OUTPATIENT)
Age: 89
End: 2024-08-12

## 2024-08-19 NOTE — ED STATDOCS - NS ED MD DISPO DISCHARGE
Infusion Nursing Note:  Regina Gaspar presents today for Cycle 1, Day 1- Cisplatin Infusion and PO Magnesium Replacement   Patient seen by provider today: No, Silvana Marin NP on 8/16/24   present during visit today: Not Applicable.    Note: Patient new to oncology infusion room and is receiving Cisplatin for the first time. Pt oriented to infusion room, including chair, nutrition station and call light. New patient teaching done previously by Silvana Marin NP and [facility]. Pt received new pt folder prior to coming to infusion. Writer reinforced chemotherapy teaching/side effects and schedule.     Pt instructed to call triage with chills/temp >=100.4, questions/concerns. Provided patient with thermometer. Pt stated understanding of plan. Discussed signs and symptoms of hypersensitivity reaction and how to alert staff if symptoms occur. Patient verbalized understanding.     Patient reports feeling well on arrival to infusion suite today. Denies the following signs of infection: no fever, cough, chills, rash, chest pain, shortness of breath, or diarrhea. Denies signs of bleeding, GI symptoms, and pain today. No new changes or concerns since MELANIE visit last Friday. Wishes to proceed with treatment today.     Patient voided prior to start of Cisplatin.       Intravenous Access:  Peripheral IV placed.    Treatment Conditions:  Lab Results   Component Value Date    HGB 15.2 08/19/2024    WBC 4.7 08/19/2024    ANEUTAUTO 2.7 08/19/2024     08/19/2024        Lab Results   Component Value Date     08/19/2024    POTASSIUM 4.0 08/19/2024    MAG 1.6 (L) 08/19/2024    CR 0.61 08/19/2024    BRANDON 9.0 08/19/2024    BILITOTAL 0.7 08/19/2024    ALBUMIN 4.3 08/19/2024    ALT 16 08/19/2024    AST 31 08/19/2024       Results reviewed, labs MET treatment parameters, ok to proceed with treatment.      Post Infusion Assessment:  Patient tolerated infusion without incident.  Blood return noted pre and post  infusion.  Site patent and intact, free from redness, edema or discomfort.  No evidence of extravasations.  Access discontinued per protocol.       Discharge Plan:   Prescription refills given for Magnesium replacement, Dexamethasone, Compazine, and Zofran.  Discharge instructions reviewed with: Patient.  Patient and/or family verbalized understanding of discharge instructions and all questions answered.  AVS to patient via [facility].  Patient will return 8/26/24 for next appointment.   Patient discharged in stable condition accompanied by: self.  Departure Mode: Ambulatory.      Kera Brink RN   Home

## 2024-09-12 ENCOUNTER — LABORATORY RESULT (OUTPATIENT)
Age: 89
End: 2024-09-12

## 2024-09-12 ENCOUNTER — APPOINTMENT (OUTPATIENT)
Dept: FAMILY MEDICINE | Facility: CLINIC | Age: 89
End: 2024-09-12
Payer: MEDICARE

## 2024-09-12 VITALS
DIASTOLIC BLOOD PRESSURE: 64 MMHG | BODY MASS INDEX: 23.37 KG/M2 | TEMPERATURE: 97.2 F | OXYGEN SATURATION: 95 % | HEART RATE: 64 BPM | WEIGHT: 127 LBS | SYSTOLIC BLOOD PRESSURE: 138 MMHG | HEIGHT: 62 IN

## 2024-09-12 DIAGNOSIS — I10 ESSENTIAL (PRIMARY) HYPERTENSION: ICD-10-CM

## 2024-09-12 DIAGNOSIS — I48.91 UNSPECIFIED ATRIAL FIBRILLATION: ICD-10-CM

## 2024-09-12 DIAGNOSIS — Z00.00 ENCOUNTER FOR GENERAL ADULT MEDICAL EXAMINATION W/OUT ABNORMAL FINDINGS: ICD-10-CM

## 2024-09-12 DIAGNOSIS — E78.5 HYPERLIPIDEMIA, UNSPECIFIED: ICD-10-CM

## 2024-09-12 DIAGNOSIS — I89.0 LYMPHEDEMA, NOT ELSEWHERE CLASSIFIED: ICD-10-CM

## 2024-09-12 PROCEDURE — G0439: CPT

## 2024-09-12 PROCEDURE — 36415 COLL VENOUS BLD VENIPUNCTURE: CPT

## 2024-09-12 NOTE — PLAN
[FreeTextEntry1] : Continue all medications as prescribed. Check labs as above. Will adjust any medications based upon lab results.  Preventive testing is no longer indicated based upon her age.  Discussed clean eating (eg Mediterranean style eating plan) and regular exercise/staying as physically active as possible.  Include balance exercises and strength training and core strengthening exercises for bone health and to decrease risk for falls.  Reviewed importance of good self care (e.g. meditation, yoga, adequate rest, regular exercise, magnesium, clean eating, etc.).  Follow up for next physical in one year.

## 2024-09-12 NOTE — HEALTH RISK ASSESSMENT
[No falls in past year] : Patient reported no falls in the past year [0] : 2) Feeling down, depressed, or hopeless: Not at all (0) [PHQ-2 Negative - No further assessment needed] : PHQ-2 Negative - No further assessment needed [Never] : Never [[id number]] : 0

## 2024-09-12 NOTE — HISTORY OF PRESENT ILLNESS
[FreeTextEntry1] : CLOVIS GONCALVES is a 89 year old female here for a physical exam. [de-identified] : Her last physical exam was last year  Vaccines: Tetanus is up to date per patient Pneumococcal vaccination is up to date per patient Shingrix is NOT up to date, patient declined  Her last dentist visit was less than one year ago Her last eye doctor appointment was less than one year ago, Dr. Cat Her last dermatologist visit was less than one year ago, Dr. Serrano  GYN visit is no longer indicated at her age Mammogram is no longer indicated at her age Colon cancer screening is no longer indicated at her age DEXA is NOT up to date, she reports the last one was normal  Her diet is healthy overall Exercise: walking, physical therapy

## 2024-09-12 NOTE — HEALTH RISK ASSESSMENT
[No falls in past year] : Patient reported no falls in the past year [0] : 2) Feeling down, depressed, or hopeless: Not at all (0) [PHQ-2 Negative - No further assessment needed] : PHQ-2 Negative - No further assessment needed [Never] : Never [[id number]] : 0

## 2024-09-12 NOTE — REVIEW OF SYSTEMS
[Negative] : Heme/Lymph [Lower Ext Edema] : lower extremity edema [FreeTextEntry3] : eye discharge, seeing Dr. Cat [de-identified] : peripheral neuropathy, walks with a cane

## 2024-09-12 NOTE — REVIEW OF SYSTEMS
[Negative] : Heme/Lymph [Lower Ext Edema] : lower extremity edema [FreeTextEntry3] : eye discharge, seeing Dr. Cat [de-identified] : peripheral neuropathy, walks with a cane

## 2024-09-12 NOTE — HISTORY OF PRESENT ILLNESS
[FreeTextEntry1] : CLOVIS GONCALVES is a 89 year old female here for a physical exam. [de-identified] : Her last physical exam was last year  Vaccines: Tetanus is up to date per patient Pneumococcal vaccination is up to date per patient Shingrix is NOT up to date, patient declined  Her last dentist visit was less than one year ago Her last eye doctor appointment was less than one year ago, Dr. Cat Her last dermatologist visit was less than one year ago, Dr. Serrano  GYN visit is no longer indicated at her age Mammogram is no longer indicated at her age Colon cancer screening is no longer indicated at her age DEXA is NOT up to date, she reports the last one was normal  Her diet is healthy overall Exercise: walking, physical therapy

## 2024-09-12 NOTE — ASSESSMENT
[FreeTextEntry1] : CLOVIS GONCALVES is a 89 year old female here for a physical exam.  She has a history of atrial fibrillation, hypertension, hyperlipidemia, and CHF. She also has a history of edema treated by vascular surgery as well as the wound care center. Overall her leg swelling has improved. She is using compression sleeves at home but not every day.  She sees a cardiologist (Dr. Das) regularly and does not need an EKG today.

## 2024-09-13 DIAGNOSIS — E03.9 HYPOTHYROIDISM, UNSPECIFIED: ICD-10-CM

## 2024-09-13 LAB
ALBUMIN SERPL ELPH-MCNC: 4.6 G/DL
ALP BLD-CCNC: 113 U/L
ALT SERPL-CCNC: 31 U/L
ANION GAP SERPL CALC-SCNC: 12 MMOL/L
AST SERPL-CCNC: 36 U/L
BASOPHILS # BLD AUTO: 0.06 K/UL
BASOPHILS NFR BLD AUTO: 0.8 %
BILIRUB SERPL-MCNC: 0.6 MG/DL
BUN SERPL-MCNC: 9 MG/DL
CALCIUM SERPL-MCNC: 9.5 MG/DL
CHLORIDE SERPL-SCNC: 98 MMOL/L
CHOLEST SERPL-MCNC: 164 MG/DL
CO2 SERPL-SCNC: 25 MMOL/L
CREAT SERPL-MCNC: 0.68 MG/DL
EGFR: 84 ML/MIN/1.73M2
EOSINOPHIL # BLD AUTO: 0.11 K/UL
EOSINOPHIL NFR BLD AUTO: 1.4 %
GLUCOSE SERPL-MCNC: 97 MG/DL
HCT VFR BLD CALC: 40.9 %
HDLC SERPL-MCNC: 89 MG/DL
HGB BLD-MCNC: 13.3 G/DL
IMM GRANULOCYTES NFR BLD AUTO: 0.4 %
LDLC SERPL CALC-MCNC: 66 MG/DL
LYMPHOCYTES # BLD AUTO: 1.25 K/UL
LYMPHOCYTES NFR BLD AUTO: 16.4 %
MAN DIFF?: NORMAL
MCHC RBC-ENTMCNC: 29.5 PG
MCHC RBC-ENTMCNC: 32.5 GM/DL
MCV RBC AUTO: 90.7 FL
MONOCYTES # BLD AUTO: 0.82 K/UL
MONOCYTES NFR BLD AUTO: 10.8 %
NEUTROPHILS # BLD AUTO: 5.35 K/UL
NEUTROPHILS NFR BLD AUTO: 70.2 %
NONHDLC SERPL-MCNC: 75 MG/DL
PLATELET # BLD AUTO: 309 K/UL
POTASSIUM SERPL-SCNC: 4.4 MMOL/L
PROT SERPL-MCNC: 7.1 G/DL
RBC # BLD: 4.51 M/UL
RBC # FLD: 14.2 %
SODIUM SERPL-SCNC: 136 MMOL/L
TRIGL SERPL-MCNC: 38 MG/DL
TSH SERPL-ACNC: 26.9 UIU/ML
WBC # FLD AUTO: 7.62 K/UL

## 2024-09-13 RX ORDER — LEVOTHYROXINE SODIUM 0.05 MG/1
50 TABLET ORAL
Qty: 90 | Refills: 1 | Status: ACTIVE | COMMUNITY
Start: 2024-09-13 | End: 1900-01-01

## 2024-10-07 ENCOUNTER — RX RENEWAL (OUTPATIENT)
Age: 89
End: 2024-10-07

## 2024-10-09 ENCOUNTER — APPOINTMENT (OUTPATIENT)
Dept: VASCULAR SURGERY | Facility: CLINIC | Age: 89
End: 2024-10-09
Payer: MEDICARE

## 2024-10-09 VITALS
HEIGHT: 62 IN | OXYGEN SATURATION: 97 % | HEART RATE: 59 BPM | SYSTOLIC BLOOD PRESSURE: 147 MMHG | BODY MASS INDEX: 23 KG/M2 | DIASTOLIC BLOOD PRESSURE: 67 MMHG | WEIGHT: 125 LBS

## 2024-10-09 DIAGNOSIS — I89.0 LYMPHEDEMA, NOT ELSEWHERE CLASSIFIED: ICD-10-CM

## 2024-10-09 PROCEDURE — 99213 OFFICE O/P EST LOW 20 MIN: CPT

## 2024-10-11 ENCOUNTER — RX RENEWAL (OUTPATIENT)
Age: 89
End: 2024-10-11

## 2024-10-28 ENCOUNTER — APPOINTMENT (OUTPATIENT)
Dept: FAMILY MEDICINE | Facility: CLINIC | Age: 89
End: 2024-10-28
Payer: MEDICARE

## 2024-10-28 VITALS
WEIGHT: 125 LBS | TEMPERATURE: 97.7 F | HEIGHT: 62 IN | SYSTOLIC BLOOD PRESSURE: 160 MMHG | OXYGEN SATURATION: 96 % | DIASTOLIC BLOOD PRESSURE: 66 MMHG | HEART RATE: 64 BPM | BODY MASS INDEX: 23 KG/M2

## 2024-10-28 DIAGNOSIS — M53.3 SACROCOCCYGEAL DISORDERS, NOT ELSEWHERE CLASSIFIED: ICD-10-CM

## 2024-10-28 DIAGNOSIS — M54.50 LOW BACK PAIN, UNSPECIFIED: ICD-10-CM

## 2024-10-28 PROCEDURE — 99213 OFFICE O/P EST LOW 20 MIN: CPT

## 2024-11-02 ENCOUNTER — TRANSCRIPTION ENCOUNTER (OUTPATIENT)
Age: 89
End: 2024-11-02

## 2024-11-04 RX ORDER — LIDOCAINE 5% 700 MG/1
5 PATCH TOPICAL
Qty: 1 | Refills: 0 | Status: ACTIVE | COMMUNITY
Start: 2024-10-28 | End: 1900-01-01

## 2024-11-05 ENCOUNTER — APPOINTMENT (OUTPATIENT)
Dept: NEUROLOGY | Facility: CLINIC | Age: 89
End: 2024-11-05
Payer: MEDICARE

## 2024-11-05 ENCOUNTER — NON-APPOINTMENT (OUTPATIENT)
Age: 89
End: 2024-11-05

## 2024-11-05 VITALS
BODY MASS INDEX: 23.37 KG/M2 | TEMPERATURE: 98.2 F | DIASTOLIC BLOOD PRESSURE: 75 MMHG | HEIGHT: 62 IN | SYSTOLIC BLOOD PRESSURE: 132 MMHG | WEIGHT: 127 LBS | HEART RATE: 72 BPM

## 2024-11-05 DIAGNOSIS — G60.8 OTHER HEREDITARY AND IDIOPATHIC NEUROPATHIES: ICD-10-CM
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
## 2024-11-05 DIAGNOSIS — R26.81 UNSTEADINESS ON FEET: ICD-10-CM

## 2024-11-05 DIAGNOSIS — Z86.73 PERSONAL HISTORY OF TRANSIENT ISCHEMIC ATTACK (TIA), AND CEREBRAL INFARCTION W/OUT RESIDUAL DEFICITS: ICD-10-CM
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
## 2024-11-05 PROCEDURE — 99214 OFFICE O/P EST MOD 30 MIN: CPT

## 2024-11-21 NOTE — ED ADULT NURSE NOTE - NS ED NURSE PATIENT LEFT UNIT TIME
Patient presented to [facility] ER in sickle cell crisis on  - to be transferred to [facility] for admission and pain management    Last DFO delivery on     on   
14:39

## 2024-12-09 ENCOUNTER — APPOINTMENT (OUTPATIENT)
Dept: FAMILY MEDICINE | Facility: CLINIC | Age: 89
End: 2024-12-09
Payer: MEDICARE

## 2024-12-09 PROCEDURE — 36415 COLL VENOUS BLD VENIPUNCTURE: CPT

## 2024-12-10 ENCOUNTER — TRANSCRIPTION ENCOUNTER (OUTPATIENT)
Age: 89
End: 2024-12-10

## 2024-12-10 LAB
T4 FREE SERPL-MCNC: 1.3 NG/DL
TSH SERPL-ACNC: 3.79 UIU/ML

## 2024-12-12 ENCOUNTER — RX RENEWAL (OUTPATIENT)
Age: 89
End: 2024-12-12

## 2025-01-09 ENCOUNTER — RX RENEWAL (OUTPATIENT)
Age: 89
End: 2025-01-09

## 2025-02-06 NOTE — ED PROVIDER NOTE - NS_EDPROVIDERDISPOUSERTYPE_ED_A_ED
CC: Facial Swelling (Facial  swelling since this am.  No c/o teeth or mouth pain)     HPI:  Patient is a 12-year-old male with a past medical history of asthma who presented to the ED for right facial swelling.  Patient and mom are the primary historians.  Patient noted to wake up this morning with right-sided facial swelling.  Patient was noted to have pain of the right side of his neck yesterday which patient was admitted Motrin for.  Since this past Sunday, patient was noted to have cough and congestion as well as feeling feverish.  Patient was noted to have improvement of his overall ill symptoms over the past 3 days.  Notes pain with range of motion of his jaw.  Denied trauma, falls, tooth pain, chest pain, difficulty breathing, neck pain, abdominal pain, and headache.      Records Reviewed: Recent available ED and inpatient notes reviewed in EMR.    PMHx/PSHx:  Per HPI.   - has a past medical history of Asthma, Personal history of other diseases of the nervous system and sense organs (07/02/2015), Personal history of other infectious and parasitic diseases (04/20/2015), Personal history of other specified conditions (06/22/2017), and Rash and other nonspecific skin eruption (01/21/2015).  - has no past surgical history on file.    Medications:  Reviewed in EMR. See EMR for complete list of medications and doses.    Allergies:  Patient has no known allergies.    Social History:  - Tobacco:  reports that he has never smoked. He has never been exposed to tobacco smoke. He does not have any smokeless tobacco history on file.   - Alcohol:  has no history on file for alcohol use.   - Illicit Drugs:  has no history on file for drug use.     ROS:  Per HPI.       ???????????????????????????????????????????????????????????????  Triage Vitals:  T 37.1 °C (98.8 °F)  HR 88  BP (!) 119/88  RR 20  O2 100 %      Physical Exam  Vitals and nursing note reviewed.   Constitutional:       General: He is active. He is not in  acute distress.  HENT:      Right Ear: Tympanic membrane normal.      Left Ear: Tympanic membrane normal.      Mouth/Throat:      Mouth: Mucous membranes are moist.      Comments: edema and TTP of the right side of the patient's face from the right cheek to the right mandible.  No significant lymphadenopathy.  Dentition intact.  No abscesses noted.  No significant erythema of the oropharynx noted.  No tonsillar edema.  Uvula midline.  No submandibular edema.  Eyes:      General:         Right eye: No discharge.         Left eye: No discharge.      Conjunctiva/sclera: Conjunctivae normal.   Cardiovascular:      Rate and Rhythm: Normal rate and regular rhythm.      Heart sounds: S1 normal and S2 normal. No murmur heard.  Pulmonary:      Effort: Pulmonary effort is normal. No respiratory distress.      Breath sounds: Normal breath sounds. No wheezing, rhonchi or rales.   Abdominal:      General: Bowel sounds are normal.      Palpations: Abdomen is soft.      Tenderness: There is no abdominal tenderness.   Genitourinary:     Penis: Normal.    Musculoskeletal:         General: No swelling. Normal range of motion.      Cervical back: Normal range of motion and neck supple. No rigidity.   Lymphadenopathy:      Cervical: No cervical adenopathy.   Skin:     General: Skin is warm and dry.      Capillary Refill: Capillary refill takes less than 2 seconds.      Findings: No rash.   Neurological:      Mental Status: He is alert.   Psychiatric:         Mood and Affect: Mood normal.         ???????????????????????????????????????????????????????????????  Labs:   Labs Reviewed - No data to display     Imaging:   No orders to display        MDM:  Patient is a 12-year-old male with a past medical history of asthma who presented to the ED for right facial swelling.  Patient presented HDS.  Physical exam findings significant for edema and TTP of the right side of the patient's face. No evidence of pneumonia, acute otitis media, strep  pharyngitis, or other concerning bacterial infection.  Patient symptoms likely secondary to viral parotitis.  Local concern for lymphadenitis.  No indication for labs or imaging at this time. Discussed symptomatic management with family, including Tylenol/Motrin PRN for fever, sour candy, massaging parotid gland, ensuring good fluid intake, and rest.  Advised return to the ED with any difficulty breathing, signs of neck stiffness/severe headache/altered mental status, persistent facial swelling after 36 hours, concerning pain, signs of dehydration, significant abdominal pain, abdominal distention, blood in stool or vomit, or prolonged fevers >5 days. Advised close follow-up within 2-3 days with primary care physician. Family expressed understanding of and agreement with the plan, all questions were answered, and patient was discharged home in stable condition.    ED Course:  Diagnoses as of 02/06/25 1648   Parotitis       Social Determinants Limiting Care:  None identified    Disposition:  Discharge    Kleber Salcido MD   Emergency Medicine PGY-3  [facility]    Comment: Please note this report has been produced using speech recognition software and may contain errors related to that system including errors in grammar, punctuation, and spelling as well as words and phrases that may be inappropriate.  If there are any questions or concerns please feel free to contact the dictating provider for clarification.    Procedures ? SmartLinks last updated 2/6/2025 4:14 PM        Kleber Salcido MD  Resident  02/06/25 3881     Scribe Attestation (For Scribes USE Only)... Attending Attestation (For Attendings USE Only).../Scribe Attestation (For Scribes USE Only)...

## 2025-02-20 ENCOUNTER — APPOINTMENT (OUTPATIENT)
Dept: FAMILY MEDICINE | Facility: CLINIC | Age: 89
End: 2025-02-20
Payer: MEDICARE

## 2025-02-20 ENCOUNTER — RESULT CHARGE (OUTPATIENT)
Age: 89
End: 2025-02-20

## 2025-02-20 VITALS
HEART RATE: 70 BPM | OXYGEN SATURATION: 97 % | DIASTOLIC BLOOD PRESSURE: 76 MMHG | HEIGHT: 62 IN | TEMPERATURE: 97.8 F | BODY MASS INDEX: 23.37 KG/M2 | SYSTOLIC BLOOD PRESSURE: 144 MMHG | WEIGHT: 127 LBS

## 2025-02-20 DIAGNOSIS — N30.00 ACUTE CYSTITIS W/OUT HEMATURIA: ICD-10-CM

## 2025-02-20 LAB
BILIRUB UR QL STRIP: NEGATIVE
CLARITY UR: CLEAR
COLLECTION METHOD: NORMAL
GLUCOSE UR-MCNC: NEGATIVE
HCG UR QL: 0.2 EU/DL
HGB UR QL STRIP.AUTO: NORMAL
KETONES UR-MCNC: NEGATIVE
LEUKOCYTE ESTERASE UR QL STRIP: NORMAL
NITRITE UR QL STRIP: POSITIVE
PH UR STRIP: 5.5
PROT UR STRIP-MCNC: NEGATIVE
SP GR UR STRIP: 1.02

## 2025-02-20 PROCEDURE — 81003 URINALYSIS AUTO W/O SCOPE: CPT | Mod: QW

## 2025-02-20 PROCEDURE — 99213 OFFICE O/P EST LOW 20 MIN: CPT

## 2025-02-20 RX ORDER — SULFAMETHOXAZOLE AND TRIMETHOPRIM 800; 160 MG/1; MG/1
800-160 TABLET ORAL TWICE DAILY
Qty: 10 | Refills: 0 | Status: ACTIVE | COMMUNITY
Start: 2025-02-20 | End: 1900-01-01

## 2025-02-22 ENCOUNTER — INPATIENT (INPATIENT)
Facility: HOSPITAL | Age: 89
LOS: 2 days | Discharge: NURSING, REHAB OR LONG-TERM CARE | DRG: 536 | End: 2025-02-25
Attending: FAMILY MEDICINE | Admitting: INTERNAL MEDICINE
Payer: MEDICARE

## 2025-02-22 VITALS
OXYGEN SATURATION: 98 % | TEMPERATURE: 98 F | HEART RATE: 67 BPM | DIASTOLIC BLOOD PRESSURE: 58 MMHG | SYSTOLIC BLOOD PRESSURE: 146 MMHG | RESPIRATION RATE: 18 BRPM

## 2025-02-22 DIAGNOSIS — S72.002A FRACTURE OF UNSPECIFIED PART OF NECK OF LEFT FEMUR, INITIAL ENCOUNTER FOR CLOSED FRACTURE: ICD-10-CM

## 2025-02-22 DIAGNOSIS — Z98.49 CATARACT EXTRACTION STATUS, UNSPECIFIED EYE: Chronic | ICD-10-CM

## 2025-02-22 DIAGNOSIS — Z90.710 ACQUIRED ABSENCE OF BOTH CERVIX AND UTERUS: Chronic | ICD-10-CM

## 2025-02-22 LAB
ALBUMIN SERPL ELPH-MCNC: 3.6 G/DL — SIGNIFICANT CHANGE UP (ref 3.3–5)
ALP SERPL-CCNC: 100 U/L — SIGNIFICANT CHANGE UP (ref 40–120)
ALT FLD-CCNC: 39 U/L — SIGNIFICANT CHANGE UP (ref 12–78)
ANION GAP SERPL CALC-SCNC: 4 MMOL/L — LOW (ref 5–17)
APPEARANCE UR: CLEAR — SIGNIFICANT CHANGE UP
APTT BLD: 31.2 SEC — SIGNIFICANT CHANGE UP (ref 24.5–35.6)
AST SERPL-CCNC: 36 U/L — SIGNIFICANT CHANGE UP (ref 15–37)
BACTERIA # UR AUTO: ABNORMAL /HPF
BASOPHILS # BLD AUTO: 0.05 K/UL — SIGNIFICANT CHANGE UP (ref 0–0.2)
BASOPHILS NFR BLD AUTO: 0.5 % — SIGNIFICANT CHANGE UP (ref 0–2)
BILIRUB SERPL-MCNC: 0.5 MG/DL — SIGNIFICANT CHANGE UP (ref 0.2–1.2)
BILIRUB UR-MCNC: NEGATIVE — SIGNIFICANT CHANGE UP
BLD GP AB SCN SERPL QL: SIGNIFICANT CHANGE UP
BUN SERPL-MCNC: 16 MG/DL — SIGNIFICANT CHANGE UP (ref 7–23)
CALCIUM SERPL-MCNC: 9.2 MG/DL — SIGNIFICANT CHANGE UP (ref 8.5–10.1)
CAST: 0 /LPF — SIGNIFICANT CHANGE UP (ref 0–4)
CHLORIDE SERPL-SCNC: 102 MMOL/L — SIGNIFICANT CHANGE UP (ref 96–108)
CO2 SERPL-SCNC: 26 MMOL/L — SIGNIFICANT CHANGE UP (ref 22–31)
COLOR SPEC: YELLOW — SIGNIFICANT CHANGE UP
CREAT SERPL-MCNC: 0.85 MG/DL — SIGNIFICANT CHANGE UP (ref 0.5–1.3)
DIFF PNL FLD: NEGATIVE — SIGNIFICANT CHANGE UP
EGFR: 66 ML/MIN/1.73M2 — SIGNIFICANT CHANGE UP
EOSINOPHIL # BLD AUTO: 0.1 K/UL — SIGNIFICANT CHANGE UP (ref 0–0.5)
EOSINOPHIL NFR BLD AUTO: 0.9 % — SIGNIFICANT CHANGE UP (ref 0–6)
GLUCOSE SERPL-MCNC: 127 MG/DL — HIGH (ref 70–99)
GLUCOSE UR QL: NEGATIVE MG/DL — SIGNIFICANT CHANGE UP
HCT VFR BLD CALC: 37.9 % — SIGNIFICANT CHANGE UP (ref 34.5–45)
HGB BLD-MCNC: 12.5 G/DL — SIGNIFICANT CHANGE UP (ref 11.5–15.5)
IMM GRANULOCYTES # BLD AUTO: 0.08 K/UL — HIGH (ref 0–0.07)
IMM GRANULOCYTES NFR BLD AUTO: 0.8 % — SIGNIFICANT CHANGE UP (ref 0–0.9)
INR BLD: 1.18 RATIO — HIGH (ref 0.85–1.16)
KETONES UR-MCNC: NEGATIVE MG/DL — SIGNIFICANT CHANGE UP
LEUKOCYTE ESTERASE UR-ACNC: ABNORMAL
LYMPHOCYTES # BLD AUTO: 1.04 K/UL — SIGNIFICANT CHANGE UP (ref 1–3.3)
LYMPHOCYTES NFR BLD AUTO: 9.8 % — LOW (ref 13–44)
MCHC RBC-ENTMCNC: 28.7 PG — SIGNIFICANT CHANGE UP (ref 27–34)
MCHC RBC-ENTMCNC: 33 G/DL — SIGNIFICANT CHANGE UP (ref 32–36)
MCV RBC AUTO: 87.1 FL — SIGNIFICANT CHANGE UP (ref 80–100)
MONOCYTES # BLD AUTO: 1.07 K/UL — HIGH (ref 0–0.9)
MONOCYTES NFR BLD AUTO: 10 % — SIGNIFICANT CHANGE UP (ref 2–14)
NEUTROPHILS # BLD AUTO: 8.32 K/UL — HIGH (ref 1.8–7.4)
NEUTROPHILS NFR BLD AUTO: 78 % — HIGH (ref 43–77)
NITRITE UR-MCNC: NEGATIVE — SIGNIFICANT CHANGE UP
NRBC # BLD AUTO: 0 K/UL — SIGNIFICANT CHANGE UP (ref 0–0)
NRBC # FLD: 0 K/UL — SIGNIFICANT CHANGE UP (ref 0–0)
NRBC BLD AUTO-RTO: 0 /100 WBCS — SIGNIFICANT CHANGE UP (ref 0–0)
PH UR: 7.5 — SIGNIFICANT CHANGE UP (ref 5–8)
PLATELET # BLD AUTO: 288 K/UL — SIGNIFICANT CHANGE UP (ref 150–400)
PMV BLD: 9.1 FL — SIGNIFICANT CHANGE UP (ref 7–13)
POTASSIUM SERPL-MCNC: 4.6 MMOL/L — SIGNIFICANT CHANGE UP (ref 3.5–5.3)
POTASSIUM SERPL-SCNC: 4.6 MMOL/L — SIGNIFICANT CHANGE UP (ref 3.5–5.3)
PROT SERPL-MCNC: 6.8 GM/DL — SIGNIFICANT CHANGE UP (ref 6–8.3)
PROT UR-MCNC: NEGATIVE MG/DL — SIGNIFICANT CHANGE UP
PROTHROM AB SERPL-ACNC: 13.6 SEC — HIGH (ref 9.9–13.4)
RBC # BLD: 4.35 M/UL — SIGNIFICANT CHANGE UP (ref 3.8–5.2)
RBC # FLD: 13 % — SIGNIFICANT CHANGE UP (ref 10.3–14.5)
RBC CASTS # UR COMP ASSIST: 0 /HPF — SIGNIFICANT CHANGE UP (ref 0–4)
SODIUM SERPL-SCNC: 132 MMOL/L — LOW (ref 135–145)
SP GR SPEC: 1.02 — SIGNIFICANT CHANGE UP (ref 1–1.03)
SQUAMOUS # UR AUTO: 5 /HPF — SIGNIFICANT CHANGE UP (ref 0–5)
TROPONIN I, HIGH SENSITIVITY RESULT: 5.69 NG/L — SIGNIFICANT CHANGE UP
UROBILINOGEN FLD QL: 0.2 MG/DL — SIGNIFICANT CHANGE UP (ref 0.2–1)
WBC # BLD: 10.66 K/UL — HIGH (ref 3.8–10.5)
WBC # FLD AUTO: 10.66 K/UL — HIGH (ref 3.8–10.5)
WBC UR QL: 8 /HPF — HIGH (ref 0–5)

## 2025-02-22 PROCEDURE — 81001 URINALYSIS AUTO W/SCOPE: CPT

## 2025-02-22 PROCEDURE — 97162 PT EVAL MOD COMPLEX 30 MIN: CPT | Mod: GP

## 2025-02-22 PROCEDURE — 99222 1ST HOSP IP/OBS MODERATE 55: CPT

## 2025-02-22 PROCEDURE — 97530 THERAPEUTIC ACTIVITIES: CPT | Mod: GP

## 2025-02-22 PROCEDURE — 85027 COMPLETE CBC AUTOMATED: CPT

## 2025-02-22 PROCEDURE — 82962 GLUCOSE BLOOD TEST: CPT

## 2025-02-22 PROCEDURE — 73562 X-RAY EXAM OF KNEE 3: CPT | Mod: 26,LT

## 2025-02-22 PROCEDURE — 70450 CT HEAD/BRAIN W/O DYE: CPT | Mod: 26

## 2025-02-22 PROCEDURE — 97166 OT EVAL MOD COMPLEX 45 MIN: CPT | Mod: GO

## 2025-02-22 PROCEDURE — 85610 PROTHROMBIN TIME: CPT

## 2025-02-22 PROCEDURE — C1776: CPT

## 2025-02-22 PROCEDURE — 36415 COLL VENOUS BLD VENIPUNCTURE: CPT

## 2025-02-22 PROCEDURE — 97116 GAIT TRAINING THERAPY: CPT | Mod: GP

## 2025-02-22 PROCEDURE — C1713: CPT

## 2025-02-22 PROCEDURE — 99285 EMERGENCY DEPT VISIT HI MDM: CPT

## 2025-02-22 PROCEDURE — 80048 BASIC METABOLIC PNL TOTAL CA: CPT

## 2025-02-22 PROCEDURE — C9399: CPT

## 2025-02-22 PROCEDURE — 71045 X-RAY EXAM CHEST 1 VIEW: CPT | Mod: 26

## 2025-02-22 PROCEDURE — 88311 DECALCIFY TISSUE: CPT

## 2025-02-22 PROCEDURE — 73552 X-RAY EXAM OF FEMUR 2/>: CPT | Mod: 26,LT

## 2025-02-22 PROCEDURE — 87086 URINE CULTURE/COLONY COUNT: CPT

## 2025-02-22 PROCEDURE — 93010 ELECTROCARDIOGRAM REPORT: CPT

## 2025-02-22 PROCEDURE — 73501 X-RAY EXAM HIP UNI 1 VIEW: CPT | Mod: LT

## 2025-02-22 PROCEDURE — 82306 VITAMIN D 25 HYDROXY: CPT

## 2025-02-22 PROCEDURE — 85730 THROMBOPLASTIN TIME PARTIAL: CPT

## 2025-02-22 PROCEDURE — 72125 CT NECK SPINE W/O DYE: CPT | Mod: 26

## 2025-02-22 PROCEDURE — 88305 TISSUE EXAM BY PATHOLOGIST: CPT

## 2025-02-22 PROCEDURE — 73502 X-RAY EXAM HIP UNI 2-3 VIEWS: CPT | Mod: 26,LT

## 2025-02-22 RX ORDER — ONDANSETRON HCL/PF 4 MG/2 ML
4 VIAL (ML) INJECTION EVERY 6 HOURS
Refills: 0 | Status: DISCONTINUED | OUTPATIENT
Start: 2025-02-22 | End: 2025-02-23

## 2025-02-22 RX ORDER — GABAPENTIN 400 MG/1
1 CAPSULE ORAL
Refills: 0 | DISCHARGE

## 2025-02-22 RX ORDER — ACETAMINOPHEN 500 MG/5ML
650 LIQUID (ML) ORAL EVERY 6 HOURS
Refills: 0 | Status: DISCONTINUED | OUTPATIENT
Start: 2025-02-22 | End: 2025-02-22

## 2025-02-22 RX ORDER — METOPROLOL SUCCINATE 50 MG/1
25 TABLET, EXTENDED RELEASE ORAL
Refills: 0 | Status: DISCONTINUED | OUTPATIENT
Start: 2025-02-24 | End: 2025-02-25

## 2025-02-22 RX ORDER — LEVOTHYROXINE SODIUM 300 MCG
1 TABLET ORAL
Refills: 0 | DISCHARGE

## 2025-02-22 RX ORDER — B1/B2/B3/B5/B6/B12/VIT C/FOLIC 500-0.5 MG
1 TABLET ORAL
Refills: 0 | DISCHARGE

## 2025-02-22 RX ORDER — ACETAMINOPHEN 500 MG/5ML
1000 LIQUID (ML) ORAL ONCE
Refills: 0 | Status: COMPLETED | OUTPATIENT
Start: 2025-02-22 | End: 2025-02-22

## 2025-02-22 RX ORDER — POLYETHYLENE GLYCOL 3350 17 G/17G
17 POWDER, FOR SOLUTION ORAL AT BEDTIME
Refills: 0 | Status: DISCONTINUED | OUTPATIENT
Start: 2025-02-23 | End: 2025-02-25

## 2025-02-22 RX ORDER — CYANOCOBALAMIN 1000 UG/ML
1 INJECTION INTRAMUSCULAR; SUBCUTANEOUS
Refills: 0 | DISCHARGE

## 2025-02-22 RX ORDER — OXYCODONE HYDROCHLORIDE 30 MG/1
10 TABLET ORAL EVERY 4 HOURS
Refills: 0 | Status: DISCONTINUED | OUTPATIENT
Start: 2025-02-23 | End: 2025-02-25

## 2025-02-22 RX ORDER — ALENDRONATE SODIUM 70 MG/1
1 TABLET ORAL
Refills: 0 | DISCHARGE

## 2025-02-22 RX ORDER — POLYETHYLENE GLYCOL 3350 17 G/17G
17 POWDER, FOR SOLUTION ORAL DAILY
Refills: 0 | Status: DISCONTINUED | OUTPATIENT
Start: 2025-02-22 | End: 2025-02-23

## 2025-02-22 RX ORDER — ACETAMINOPHEN 500 MG/5ML
1000 LIQUID (ML) ORAL EVERY 8 HOURS
Refills: 0 | Status: DISCONTINUED | OUTPATIENT
Start: 2025-02-22 | End: 2025-02-25

## 2025-02-22 RX ORDER — AMLODIPINE BESYLATE 10 MG/1
1 TABLET ORAL
Refills: 0 | DISCHARGE

## 2025-02-22 RX ORDER — METOPROLOL SUCCINATE 50 MG/1
25 TABLET, EXTENDED RELEASE ORAL DAILY
Refills: 0 | Status: DISCONTINUED | OUTPATIENT
Start: 2025-02-22 | End: 2025-02-22

## 2025-02-22 RX ORDER — ATORVASTATIN CALCIUM 80 MG/1
40 TABLET, FILM COATED ORAL AT BEDTIME
Refills: 0 | Status: DISCONTINUED | OUTPATIENT
Start: 2025-02-22 | End: 2025-02-25

## 2025-02-22 RX ORDER — MAGNESIUM HYDROXIDE 400 MG/5ML
30 SUSPENSION ORAL DAILY
Refills: 0 | Status: DISCONTINUED | OUTPATIENT
Start: 2025-02-23 | End: 2025-02-25

## 2025-02-22 RX ORDER — SENNA 187 MG
2 TABLET ORAL AT BEDTIME
Refills: 0 | Status: DISCONTINUED | OUTPATIENT
Start: 2025-02-23 | End: 2025-02-25

## 2025-02-22 RX ORDER — TRAMADOL HYDROCHLORIDE 50 MG/1
50 TABLET, FILM COATED ORAL EVERY 6 HOURS
Refills: 0 | Status: DISCONTINUED | OUTPATIENT
Start: 2025-02-23 | End: 2025-02-25

## 2025-02-22 RX ORDER — AMIODARONE HYDROCHLORIDE 50 MG/ML
200 INJECTION, SOLUTION INTRAVENOUS DAILY
Refills: 0 | Status: DISCONTINUED | OUTPATIENT
Start: 2025-02-22 | End: 2025-02-25

## 2025-02-22 RX ORDER — OXYCODONE HYDROCHLORIDE 30 MG/1
5 TABLET ORAL EVERY 4 HOURS
Refills: 0 | Status: DISCONTINUED | OUTPATIENT
Start: 2025-02-23 | End: 2025-02-25

## 2025-02-22 RX ORDER — HYDROMORPHONE/SOD CHLOR,ISO/PF 2 MG/10 ML
1 SYRINGE (ML) INJECTION EVERY 4 HOURS
Refills: 0 | Status: DISCONTINUED | OUTPATIENT
Start: 2025-02-22 | End: 2025-02-23

## 2025-02-22 RX ORDER — AMLODIPINE BESYLATE 10 MG/1
2.5 TABLET ORAL
Refills: 0 | Status: DISCONTINUED | OUTPATIENT
Start: 2025-02-22 | End: 2025-02-25

## 2025-02-22 RX ORDER — ONDANSETRON HCL/PF 4 MG/2 ML
4 VIAL (ML) INJECTION EVERY 6 HOURS
Refills: 0 | Status: DISCONTINUED | OUTPATIENT
Start: 2025-02-23 | End: 2025-02-25

## 2025-02-22 RX ORDER — LEVOTHYROXINE SODIUM 300 MCG
75 TABLET ORAL DAILY
Refills: 0 | Status: DISCONTINUED | OUTPATIENT
Start: 2025-02-22 | End: 2025-02-25

## 2025-02-22 RX ORDER — TRANEXAMIC ACID 1000 MG/10
1000 AMPUL (ML) INTRAVENOUS ONCE
Refills: 0 | Status: DISCONTINUED | OUTPATIENT
Start: 2025-02-22 | End: 2025-02-25

## 2025-02-22 RX ORDER — OXYCODONE HYDROCHLORIDE 30 MG/1
5 TABLET ORAL EVERY 4 HOURS
Refills: 0 | Status: DISCONTINUED | OUTPATIENT
Start: 2025-02-22 | End: 2025-02-23

## 2025-02-22 RX ADMIN — Medication 1000 MILLIGRAM(S): at 21:51

## 2025-02-22 RX ADMIN — Medication 80 MILLILITER(S): at 03:53

## 2025-02-22 RX ADMIN — Medication 1 MILLIGRAM(S): at 15:55

## 2025-02-22 RX ADMIN — ATORVASTATIN CALCIUM 40 MILLIGRAM(S): 80 TABLET, FILM COATED ORAL at 21:51

## 2025-02-22 RX ADMIN — Medication 4 MILLIGRAM(S): at 10:16

## 2025-02-22 RX ADMIN — AMIODARONE HYDROCHLORIDE 200 MILLIGRAM(S): 50 INJECTION, SOLUTION INTRAVENOUS at 10:06

## 2025-02-22 RX ADMIN — Medication 2 MILLIGRAM(S): at 10:07

## 2025-02-22 RX ADMIN — Medication 80 MILLILITER(S): at 04:55

## 2025-02-22 RX ADMIN — Medication 2 MILLIGRAM(S): at 03:48

## 2025-02-22 RX ADMIN — AMLODIPINE BESYLATE 2.5 MILLIGRAM(S): 10 TABLET ORAL at 21:50

## 2025-02-22 RX ADMIN — Medication 400 MILLIGRAM(S): at 01:58

## 2025-02-22 RX ADMIN — Medication 1 MILLIGRAM(S): at 22:27

## 2025-02-22 RX ADMIN — Medication 2 MILLIGRAM(S): at 10:17

## 2025-02-22 RX ADMIN — Medication 1 MILLIGRAM(S): at 21:57

## 2025-02-22 RX ADMIN — METOPROLOL SUCCINATE 25 MILLIGRAM(S): 50 TABLET, EXTENDED RELEASE ORAL at 10:06

## 2025-02-22 RX ADMIN — Medication 1 MILLIGRAM(S): at 15:41

## 2025-02-22 NOTE — ED PROVIDER NOTE - OBJECTIVE STATEMENT
Pt. is a 89 yo F with hx of right hip replacement, afib on eliquis, osteoporosis, HTN, hyperlipidemia presenting s/p fall at home.  States she was walking from one room to another and randomly lost balance and fell. She states she fell onto her back and then rolled to her left side.  She has skin tear on left elbow and pain in left hip.  She is unable to move left hip due to pain.  She was unable to walk after the fall. Daughter called ambulance but did not witness the fall. Patient is a good historian.  Denies head injury or LOC.

## 2025-02-22 NOTE — ED ADULT NURSE NOTE - NSFALLRISKINTERV_ED_ALL_ED

## 2025-02-22 NOTE — PATIENT PROFILE ADULT - NSPROSPHOSPCHAPLAINYN_GEN_A_NUR
Unable to speak to patient, phone kept ringing. Need to see what mail order pharmacy patient would like to use for anti depression medication.     has a chairlift/no

## 2025-02-22 NOTE — PROGRESS NOTE ADULT - SUBJECTIVE AND OBJECTIVE BOX
Chart and meds reviewed.  Patient seen and examined.    2/22 - patient admitted after a mechanical fall complicated by an acute left hip fracture, she is anticipated to have surgery tomorrow with Dr. Edwards, she complains of pain in the left hip.    All other systems reviewed and found to be negative with the exception of what has been described above.    MEDICATIONS  (STANDING):  aMIOdarone    Tablet 200 milliGRAM(s) Oral daily  atorvastatin 40 milliGRAM(s) Oral at bedtime  metoprolol succinate ER 25 milliGRAM(s) Oral daily  sodium chloride 0.9%. 1000 milliLiter(s) (80 mL/Hr) IV Continuous <Continuous>  tranexamic acid IVPB 1000 milliGRAM(s) IV Intermittent once  tranexamic acid IVPB 1000 milliGRAM(s) IV Intermittent once    MEDICATIONS  (PRN):  acetaminophen     Tablet .. 650 milliGRAM(s) Oral every 6 hours PRN Temp greater or equal to 38C (100.4F), Mild Pain (1 - 3)  morphine  - Injectable 2 milliGRAM(s) IV Push every 4 hours PRN Severe Pain (7 - 10)  ondansetron Injectable 4 milliGRAM(s) IV Push every 6 hours PRN Nausea and/or Vomiting  polyethylene glycol 3350 17 Gram(s) Oral daily PRN Constipation    VITAL SIGNS:  T(F): 97.9 (02-22-25 @ 08:08), Max: 98 (02-22-25 @ 01:08)  HR: 62 (02-22-25 @ 09:55) (59 - 67)  BP: 144/86 (02-22-25 @ 09:55) (140/51 - 146/58)  RR: 18 (02-22-25 @ 09:55) (18 - 18)  SpO2: 93% (02-22-25 @ 09:55) (93% - 98%)    I&O's Summary    21 Feb 2025 07:01  -  22 Feb 2025 07:00  --------------------------------------------------------  IN: 240 mL / OUT: 0 mL / NET: 240 mL    PHYSICAL EXAM:  HEENT:  pupils equal and reactive, EOMI, no oropharyngeal lesions, erythema, exudates, oral thrush  NECK:   supple, no carotid bruits  CV:  +S1, +S2, regular, no murmurs  RESP:   lungs clear to auscultation bilaterally, no wheezing, rales, rhonchi, good air entry bilaterally  GI:  abdomen soft, non-tender, non-distended, normal BS  EXT:  no LE edema  NEURO:  AAOX3, no focal neurological deficits, follows all commands, able to all extremities except for her left leg due to pain  SKIN:  no ulcers, lesions or rashes    LABS:                        12.5   10.66 )-----------( 288      ( 22 Feb 2025 01:55 )             37.9     02-22    132[L]  |  102  |  16  ----------------------------<  127[H]  4.6   |  26  |  0.85    Ca    9.2      22 Feb 2025 01:55    TPro  6.8  /  Alb  3.6  /  TBili  0.5  /  DBili  x   /  AST  36  /  ALT  39  /  AlkPhos  100  02-22    LIVER FUNCTIONS - ( 22 Feb 2025 01:55 )  Alb: 3.6 g/dL / Pro: 6.8 gm/dL / ALK PHOS: 100 U/L / ALT: 39 U/L / AST: 36 U/L / GGT: x           PT/INR - ( 22 Feb 2025 01:55 )   PT: 13.6 sec;   INR: 1.18 ratio

## 2025-02-22 NOTE — ED PROVIDER NOTE - CLINICAL SUMMARY MEDICAL DECISION MAKING FREE TEXT BOX
88 yo F with mechanical fall; CLosed head injury on eliquis ; Neuro Alert called and CTs of head and C spine ordered. +left hip pain; Xrays of left hip, femur, pelvis, and knee ordered.     +left hip fracture on xray.  Ortho consulted.  Resident coming to see patient.  Wants patient kept NPO, will get pre op labs and UA and admit to medicine.  Patient is known to Dr. Edwards from prior right hip replacement.

## 2025-02-22 NOTE — ED ADULT TRIAGE NOTE - CHIEF COMPLAINT QUOTE
Patient tripped and fell hit head, on eliquis.  C/o left hip pain and skin tear to left forearm.  A&Ox3.

## 2025-02-22 NOTE — H&P ADULT - NSHPPHYSICALEXAM_GEN_ALL_CORE
GEN: elderly, frail, no acute distress  HEENT:  Pupils equal and reactive, no oropharyngeal lesions, erythema, exudates, oral thrush  NECK:  Supple, no palpable lymph nodes, no thyromegaly  CV:  Regular rate and rhythm, +S1, +S2, no murmurs or rubs  PULM: Lungs clear to auscultation bilaterally, no wheezing, rales, rhonchi  GI:  Abdomen soft, non-tender, non-distended,  no palpable masses  EXT:  No clubbing, no cyanosis, no edema, no  swelling or erythema. Venous stasis changes.   VASCULAR:  pulses equal and symmetric in the upper and lower extremities  NEURO:  AAOX3, no focal neurological deficits, follows all commands, able to move extremities spontaneously  SKIN:  no ulcers, lesions or rashes

## 2025-02-22 NOTE — PROGRESS NOTE ADULT - ASSESSMENT
89 F with Hx of paroxysmal atrial fibrillation s/p DCCV in the past, s/p right hip replacement, Hyperlipidemia, Osteoporosis, HTN, recent UTI treated with ABXs, presented with an acute mechanical fall at home complicated by an acute left hip fracture.    Acute Mechanical Fall complicated by Acute Left Hip Fracture  -  patient will require surgical intervention on this admission  -  surgery is anticipated for tomorrow  -  NPO except meds after midnight  -  pain control  -  bedrest  -  check Vitamin D level  -  hold Eliquis    Hypothyroidism  -  continue Levothyroxine    HTN  -  hold Diovan prior to surgery  -  continue Toprol    Osteoporosis  -  on Fosamax at home  -  follow up Vitamin D level    Hyperlipidemia  -  continue statin    Paroxysmal Atrial Fibrillation  -  currently in NSR  -  continue Amiodarone and Toprol  -  holding Eliquis for surgery tomorrow      Preoperative evaluation   Patient can perform ADLs but overall METS likely <4 given her age and physical limitations   No symptoms to suggest syncope or cardiac etiology  Previous echocardiogram shows normal EF and no AS  There is no active ACS, CHF, unstable arrhythmia, or severe valvulopathy   Check UA and if positive can check urine culture and start ceftriaxone - no signs of systemic infection at this time   Hold Eliquis - postop DVT prophylaxis per orthopedics   RCRI is 0 - patient is low risk for perioperative MACE with an intermediate procedure, however this is likely underestimated due to her comorbidities and age  There are no contraindications to this procedure and the patient may proceed without further testing     DVT prophylaxis  -  venodynes and Lovenox  -  resume Eliquis after surgery    d/w patient and RN   89 F with Hx of paroxysmal atrial fibrillation s/p DCCV in the past, s/p right hip replacement, Hyperlipidemia, Osteoporosis, HTN, recent UTI treated with ABXs, presented with an acute mechanical fall at home complicated by an acute left hip fracture.    Acute Mechanical Fall complicated by Acute Left Hip Fracture  -  patient will require surgical intervention on this admission  -  surgery is anticipated for tomorrow with Dr. Edwards  -  NPO except meds after midnight  -  pain control  -  bedrest  -  check Vitamin D level  -  hold Eliquis    Hypothyroidism  -  continue Levothyroxine    HTN  -  hold Diovan prior to surgery  -  continue Toprol and Norvasc    Osteoporosis  -  on Fosamax at home once a week  -  follow up Vitamin D level    Hyperlipidemia  -  continue statin    Paroxysmal Atrial Fibrillation  -  currently in NSR  -  continue Amiodarone and Toprol  -  holding Eliquis for surgery tomorrow    Recent UTI  -  s/p treatment with ABXs  -  currently asymptomatic and UA with only 8 WBCs  -  no need for further treatment    Pre-Operative Medical Evaluation  -  EKG:  NSR, LAD, ? old septal infarct, no acute ischemic changes  -  CXR:  no acute infiltrates of effusions  -  ECHO from 2023:  EF 60-65%, mild-mod MR, mild AR, mild TR  -  RCRI:  0  -  patient is currently without any decompensated CHF, arrthymias or active cardiac ischemia  -  patient requires urgent orthopedic surgical intervention  -  based on patient's co-morbidities and clinical predictors, patient is considered low risk for perioperative MACE for the necessary intermediate risk orthopedic procedure  -  at this time, the patient is medically optimized for the surgical procedure, there are no medical contraindications to proceed with surgery and patient does not need further cardiac workup  -  patient will be continued on beta blockers    DVT prophylaxis  -  venodynes  -  resume Eliquis after surgery    d/w patient and RN

## 2025-02-22 NOTE — PATIENT PROFILE ADULT - FALL HARM RISK - FACTORS NURSING JUDGEMENT
“Patient's name, , procedure and correct site were confirmed during the [facility] Timeout.”
“Patient's name, , procedure and correct site were confirmed during the [facility] Timeout.”
Yes

## 2025-02-22 NOTE — PATIENT PROFILE ADULT - FALL HARM RISK - HARM RISK INTERVENTIONS

## 2025-02-22 NOTE — H&P ADULT - HISTORY OF PRESENT ILLNESS
This patient is an 89 year old female with PMH of paroxysmal atrial fibrillation s/p DCCV in the past, prior right hip replacement, osteoporosis, and HTN who  This patient is an 89 year old female with PMH of paroxysmal atrial fibrillation s/p DCCV in the past, prior right hip replacement, osteoporosis, and HTN who presented to the ER after an unwitnessed mechanical fall. Patient states she was walking in her home with her rolling walker when she fell on her left side and immediately felt left hip pain. No loss of consciousness and no prodromal symptoms such as palpitations or chest pain. Found to have a left hip fracture on admission. She was diagnosed with a UTI a few days ago and is taking antibiotics but cannot remember the name.     Patient lives alone and is able to perform all ADLs without assistance, including cooking. She does have a  who comes a few times a week. No baseline chest pain or SOB.

## 2025-02-22 NOTE — ED ADULT NURSE NOTE - OBJECTIVE STATEMENT
88 y/o female, A&Ox4, presents to the ED s/p unwitnessed fall. Pt states she tripped on a carpet and was unable to get up on her own. Positive head strike, positive blood thinner use, negative LOC. At baseline pt uses a rollator or cane. Pt endorsing pain to the L hip/lower extremity at this time and skin tear noted to L forearm. Neuro alert called by MD and pt brought directly to CT scan. Pt PMHx UTI (started antibiotics yesterday), HTN, R hip replacement 2 years ago. Per daughter at bedside, pt had a "THC infused drink" prior to fall. Pt endorses no other complaints at this time, safety and comfort maintained.

## 2025-02-22 NOTE — H&P ADULT - NSHPREVIEWOFSYSTEMS_GEN_ALL_CORE
CONSTITUTIONAL: No weakness, fevers or chills  EYES/ENT: No visual changes; eye pain; eye discharge  NECK: No pain or stiffness  RESPIRATORY: No cough, wheezing, hemoptysis; No shortness of breath  CARDIOVASCULAR: No chest pain or palpitations   GASTROINTESTINAL: No abdominal or epigastric pain. No nausea, vomiting; No diarrhea or constipation.   GENITOURINARY: No dysuria, frequency or hematuria  MSK: Left hip pain   NEUROLOGICAL: No dizziness, lightheadedness, numbness or weakness  SKIN: No itching, rashes  PSYCH: No anxiety or depression

## 2025-02-22 NOTE — ED PROVIDER NOTE - CONSTITUTIONAL, MLM
normal... Well appearing, awake, alert, oriented to person, place, time/situation and in no apparent distress. HEAD: no palpable tenderness or hematoma

## 2025-02-22 NOTE — ED PROVIDER NOTE - SKIN, MLM
Skin normal color for race, warm, dry; 2 subcentimeter superficial skin tears of left elbow over olecranon.; +old areas of healing ecchymosis on left upper extremity

## 2025-02-22 NOTE — CONSULT NOTE ADULT - SUBJECTIVE AND OBJECTIVE BOX
Patient is a 89yFemale ambulator with assistive devices who presents to [facility] ED w/ a c/o of R hip pain. Pt states she had a MF earlier this evening. Denies HT/LOC. States inability to walk immediately following the injury. Denies any numbness or tingling. Denies having any other pain elsewhere. No other orthopedic concerns at this time. Previous R PADDY done by Dr. Edwards in 2023 for a femoral neck fracture.     T(C): 36.7 (02-22-25 @ 01:08), Max: 36.7 (02-22-25 @ 01:08)  HR: 67 (02-22-25 @ 01:08) (67 - 67)  BP: 146/58 (02-22-25 @ 01:08) (146/58 - 146/58)  RR: 18 (02-22-25 @ 01:08) (18 - 18)  SpO2: 98% (02-22-25 @ 01:08) (98% - 98%)    No pertinent family history in first degree relatives    Family history of primary TB    Family history of CVA    Family history of diabetes mellitus    MEWS Score    Syncope    HTN (hypertension)    Hyperlipidemia    Afib    A-fib    Bronchitis    A-fib    Bronchitis    Cardiac abnormality    H/O: hysterectomy    S/P cataract surgery    FALL    90+    [email or web address]        Gen: NAD, Resting comfortably    LLE:  Skin intact, no erythema or ecchymosis  Externally and shortened leg  TTP by hip, nowhere else along LE  Motor: + EHL/FHL/TA/GSC  +SILT: SPN/DPN/Edwin/Saph/Tib  + DP  Compartments soft and compressible  No calf tenderness    Secondary Assessment:  NC/AT, NTTP of clavicles, NTTP of C-,T-,L-Spine,  UEs: NTTP of Shoulders, Elbows, Wrists, Hands; NT with AROM/PROM of Shoulders, Elbows, Wrists, Hands; AIN/PIN/Med/Uln/Msc/Rad/Ax intact  R LE: Able to SLR, NT with Log Roll, NT with Heel Strike, NTTP of Hip, Knee, Ankle, foot; NT with AROM/PROM of Hip, Knee, Ankle, foot; Q/H/Gsc/TA/EHL/FHL intact    Imaging:  XRs demonstrate a L FN fx, personal read.     A/P:   89yFemale with L FN Fx.    Plan for OR today with Dr. Edwards for a L PADDY versus Ramone  NPO, except medications  IVF while NPO  Hold chemical DVT ppx for surgery  Please document necessary medical optimizations for surgery  Analgesia  NWB  Ice hip as tolerated  Medical recommendations appreciated  To be Discussed with attending will update accordingly, if needed

## 2025-02-22 NOTE — ED PROVIDER NOTE - MUSCULOSKELETAL, MLM
Spine appears normal without any midline spinal tenderness, range of motion is decreased in left hip; +TTP left hip; left leg slightly  shortened; normal pedal pulses; no UE tenderness

## 2025-02-22 NOTE — H&P ADULT - ASSESSMENT
This patient is an 89 year old female with PMH of paroxysmal atrial fibrillation s/p DCCV in the past, prior right hip replacement, osteoporosis, and HTN who presented to the ER after an unwitnessed mechanical fall. Patient states she was walking in her home with her rolling walker when she fell on her left side and immediately felt left hip pain. No loss of consciousness and no prodromal symptoms such as palpitations or chest pain. Found to have a left hip fracture on admission. She was diagnosed with a UTI a few days ago and is taking antibiotics but cannot remember the name.     EKG reviewed  X-rays and CT scans reviewed    #Left hip fracture  Secondary to mechanical fall, low suspicion for syncope   LLE is neurovascularly intact   Ortho planning for OR sometime today  NPO and hold anticoagulation   Pain control   PT evaluation post-op    #Preoperative evaluation   Patient can perform ADLs but overall METS likely <4 given her age and physical limitations   No symptoms to suggest syncope or cardiac etiology  Previous echocardiogram shows normal EF and no AS  There is no active ACS, CHF, unstable arrhythmia, or severe valvulopathy   Check UA and if positive can check urine culture and start ceftriaxone - no signs of systemic infection at this time   Hold Eliquis - postop DVT prophylaxis per orthopedics   RCRI is 0 - patient is low risk for perioperative MACE with an intermediate procedure, however this is likely underestimated due to her comorbidities and age  There are no contraindications to this procedure and the patient may proceed without further testing     #Paroxysmal atrial fibrillation  Currently maintained in sinus rhythm on amiodarone   Continue Toprol 25mg daily   Hold Eliquis and resume after surgery once cleared by ortho     #HTN  Hold ARB in the perioperative period  Continue Toprol   Pain control     DVT ppx: SCD's. No chemoprophylaxis prior to surgery   GI ppx: N/A  Diet: NPO except medications   Code status: Full

## 2025-02-22 NOTE — H&P ADULT - NSHPLABSRESULTS_GEN_ALL_CORE
EKG: sinus rhythm with first degree AVB  CT head and C spine negative for acute pathology   TTE 11/2023: EF 60-65% with mild LVH, mild/moderate MR, mild AI and TR

## 2025-02-23 LAB
24R-OH-CALCIDIOL SERPL-MCNC: 21.9 NG/ML — LOW (ref 30–80)
ANION GAP SERPL CALC-SCNC: 10 MMOL/L — SIGNIFICANT CHANGE UP (ref 5–17)
ANION GAP SERPL CALC-SCNC: 4 MMOL/L — LOW (ref 5–17)
APTT BLD: 29.3 SEC — SIGNIFICANT CHANGE UP (ref 24.5–35.6)
BACTERIA UR CULT: ABNORMAL
BUN SERPL-MCNC: 14 MG/DL — SIGNIFICANT CHANGE UP (ref 7–23)
BUN SERPL-MCNC: 15 MG/DL — SIGNIFICANT CHANGE UP (ref 7–23)
CALCIUM SERPL-MCNC: 8.5 MG/DL — SIGNIFICANT CHANGE UP (ref 8.5–10.1)
CALCIUM SERPL-MCNC: 8.6 MG/DL — SIGNIFICANT CHANGE UP (ref 8.5–10.1)
CHLORIDE SERPL-SCNC: 100 MMOL/L — SIGNIFICANT CHANGE UP (ref 96–108)
CHLORIDE SERPL-SCNC: 99 MMOL/L — SIGNIFICANT CHANGE UP (ref 96–108)
CO2 SERPL-SCNC: 21 MMOL/L — LOW (ref 22–31)
CO2 SERPL-SCNC: 26 MMOL/L — SIGNIFICANT CHANGE UP (ref 22–31)
CREAT SERPL-MCNC: 0.72 MG/DL — SIGNIFICANT CHANGE UP (ref 0.5–1.3)
CREAT SERPL-MCNC: 0.77 MG/DL — SIGNIFICANT CHANGE UP (ref 0.5–1.3)
CULTURE RESULTS: SIGNIFICANT CHANGE UP
EGFR: 75 ML/MIN/1.73M2 — SIGNIFICANT CHANGE UP
EGFR: 81 ML/MIN/1.73M2 — SIGNIFICANT CHANGE UP
GLUCOSE BLDC GLUCOMTR-MCNC: 127 MG/DL — HIGH (ref 70–99)
GLUCOSE SERPL-MCNC: 112 MG/DL — HIGH (ref 70–99)
GLUCOSE SERPL-MCNC: 135 MG/DL — HIGH (ref 70–99)
HCT VFR BLD CALC: 36.2 % — SIGNIFICANT CHANGE UP (ref 34.5–45)
HCT VFR BLD CALC: 38.7 % — SIGNIFICANT CHANGE UP (ref 34.5–45)
HGB BLD-MCNC: 12.3 G/DL — SIGNIFICANT CHANGE UP (ref 11.5–15.5)
HGB BLD-MCNC: 12.6 G/DL — SIGNIFICANT CHANGE UP (ref 11.5–15.5)
INR BLD: 1.1 RATIO — SIGNIFICANT CHANGE UP (ref 0.85–1.16)
MCHC RBC-ENTMCNC: 29.2 PG — SIGNIFICANT CHANGE UP (ref 27–34)
MCHC RBC-ENTMCNC: 29.6 PG — SIGNIFICANT CHANGE UP (ref 27–34)
MCHC RBC-ENTMCNC: 32.6 G/DL — SIGNIFICANT CHANGE UP (ref 32–36)
MCHC RBC-ENTMCNC: 34 G/DL — SIGNIFICANT CHANGE UP (ref 32–36)
MCV RBC AUTO: 87 FL — SIGNIFICANT CHANGE UP (ref 80–100)
MCV RBC AUTO: 89.6 FL — SIGNIFICANT CHANGE UP (ref 80–100)
NRBC # BLD AUTO: 0 K/UL — SIGNIFICANT CHANGE UP (ref 0–0)
NRBC # BLD AUTO: 0 K/UL — SIGNIFICANT CHANGE UP (ref 0–0)
NRBC # FLD: 0 K/UL — SIGNIFICANT CHANGE UP (ref 0–0)
NRBC # FLD: 0 K/UL — SIGNIFICANT CHANGE UP (ref 0–0)
NRBC BLD AUTO-RTO: 0 /100 WBCS — SIGNIFICANT CHANGE UP (ref 0–0)
NRBC BLD AUTO-RTO: 0 /100 WBCS — SIGNIFICANT CHANGE UP (ref 0–0)
PLATELET # BLD AUTO: 229 K/UL — SIGNIFICANT CHANGE UP (ref 150–400)
PLATELET # BLD AUTO: 253 K/UL — SIGNIFICANT CHANGE UP (ref 150–400)
PMV BLD: 8.8 FL — SIGNIFICANT CHANGE UP (ref 7–13)
PMV BLD: 9 FL — SIGNIFICANT CHANGE UP (ref 7–13)
POTASSIUM SERPL-MCNC: 3.9 MMOL/L — SIGNIFICANT CHANGE UP (ref 3.5–5.3)
POTASSIUM SERPL-MCNC: 4.4 MMOL/L — SIGNIFICANT CHANGE UP (ref 3.5–5.3)
POTASSIUM SERPL-SCNC: 3.9 MMOL/L — SIGNIFICANT CHANGE UP (ref 3.5–5.3)
POTASSIUM SERPL-SCNC: 4.4 MMOL/L — SIGNIFICANT CHANGE UP (ref 3.5–5.3)
PROTHROM AB SERPL-ACNC: 12.6 SEC — SIGNIFICANT CHANGE UP (ref 9.9–13.4)
RBC # BLD: 4.16 M/UL — SIGNIFICANT CHANGE UP (ref 3.8–5.2)
RBC # BLD: 4.32 M/UL — SIGNIFICANT CHANGE UP (ref 3.8–5.2)
RBC # FLD: 13.2 % — SIGNIFICANT CHANGE UP (ref 10.3–14.5)
RBC # FLD: 13.3 % — SIGNIFICANT CHANGE UP (ref 10.3–14.5)
SODIUM SERPL-SCNC: 130 MMOL/L — LOW (ref 135–145)
SODIUM SERPL-SCNC: 130 MMOL/L — LOW (ref 135–145)
SPECIMEN SOURCE: SIGNIFICANT CHANGE UP
WBC # BLD: 14.77 K/UL — HIGH (ref 3.8–10.5)
WBC # BLD: 16.53 K/UL — HIGH (ref 3.8–10.5)
WBC # FLD AUTO: 14.77 K/UL — HIGH (ref 3.8–10.5)
WBC # FLD AUTO: 16.53 K/UL — HIGH (ref 3.8–10.5)

## 2025-02-23 PROCEDURE — 99233 SBSQ HOSP IP/OBS HIGH 50: CPT

## 2025-02-23 PROCEDURE — 73501 X-RAY EXAM HIP UNI 1 VIEW: CPT | Mod: 26,LT

## 2025-02-23 PROCEDURE — 88305 TISSUE EXAM BY PATHOLOGIST: CPT | Mod: 26

## 2025-02-23 PROCEDURE — 88311 DECALCIFY TISSUE: CPT | Mod: 26

## 2025-02-23 RX ORDER — CEFAZOLIN SODIUM IN 0.9 % NACL 3 G/100 ML
2000 INTRAVENOUS SOLUTION, PIGGYBACK (ML) INTRAVENOUS EVERY 8 HOURS
Refills: 0 | Status: COMPLETED | OUTPATIENT
Start: 2025-02-23 | End: 2025-02-24

## 2025-02-23 RX ORDER — SODIUM CHLORIDE 9 G/1000ML
1000 INJECTION, SOLUTION INTRAVENOUS
Refills: 0 | Status: DISCONTINUED | OUTPATIENT
Start: 2025-02-23 | End: 2025-02-23

## 2025-02-23 RX ORDER — ACETAMINOPHEN 500 MG/5ML
1000 LIQUID (ML) ORAL ONCE
Refills: 0 | Status: COMPLETED | OUTPATIENT
Start: 2025-02-23 | End: 2025-02-23

## 2025-02-23 RX ORDER — ONDANSETRON HCL/PF 4 MG/2 ML
4 VIAL (ML) INJECTION ONCE
Refills: 0 | Status: DISCONTINUED | OUTPATIENT
Start: 2025-02-23 | End: 2025-02-23

## 2025-02-23 RX ORDER — FENTANYL CITRATE-0.9 % NACL/PF 100MCG/2ML
25 SYRINGE (ML) INTRAVENOUS
Refills: 0 | Status: DISCONTINUED | OUTPATIENT
Start: 2025-02-23 | End: 2025-02-23

## 2025-02-23 RX ORDER — APIXABAN 2.5 MG/1
2.5 TABLET, FILM COATED ORAL
Refills: 0 | Status: COMPLETED | OUTPATIENT
Start: 2025-02-24 | End: 2025-02-24

## 2025-02-23 RX ORDER — ACETAMINOPHEN 500 MG/5ML
650 LIQUID (ML) ORAL EVERY 6 HOURS
Refills: 0 | Status: DISCONTINUED | OUTPATIENT
Start: 2025-02-23 | End: 2025-02-23

## 2025-02-23 RX ORDER — CEFAZOLIN SODIUM IN 0.9 % NACL 3 G/100 ML
2000 INTRAVENOUS SOLUTION, PIGGYBACK (ML) INTRAVENOUS EVERY 8 HOURS
Refills: 0 | Status: DISCONTINUED | OUTPATIENT
Start: 2025-02-23 | End: 2025-02-23

## 2025-02-23 RX ORDER — APIXABAN 2.5 MG/1
5 TABLET, FILM COATED ORAL
Refills: 0 | Status: DISCONTINUED | OUTPATIENT
Start: 2025-02-25 | End: 2025-02-25

## 2025-02-23 RX ORDER — ACETAMINOPHEN 500 MG/5ML
1000 LIQUID (ML) ORAL ONCE
Refills: 0 | Status: DISCONTINUED | OUTPATIENT
Start: 2025-02-23 | End: 2025-02-23

## 2025-02-23 RX ADMIN — Medication 1000 MILLIGRAM(S): at 05:19

## 2025-02-23 RX ADMIN — AMIODARONE HYDROCHLORIDE 200 MILLIGRAM(S): 50 INJECTION, SOLUTION INTRAVENOUS at 11:57

## 2025-02-23 RX ADMIN — ATORVASTATIN CALCIUM 40 MILLIGRAM(S): 80 TABLET, FILM COATED ORAL at 20:55

## 2025-02-23 RX ADMIN — Medication 1000 MILLIGRAM(S): at 15:13

## 2025-02-23 RX ADMIN — Medication 2 TABLET(S): at 20:55

## 2025-02-23 RX ADMIN — Medication 75 MICROGRAM(S): at 05:19

## 2025-02-23 RX ADMIN — Medication 2000 MILLIGRAM(S): at 16:38

## 2025-02-23 RX ADMIN — AMLODIPINE BESYLATE 2.5 MILLIGRAM(S): 10 TABLET ORAL at 20:55

## 2025-02-23 RX ADMIN — Medication 400 MILLIGRAM(S): at 20:56

## 2025-02-23 RX ADMIN — Medication 1000 MILLIGRAM(S): at 20:56

## 2025-02-23 RX ADMIN — POLYETHYLENE GLYCOL 3350 17 GRAM(S): 17 POWDER, FOR SOLUTION ORAL at 20:56

## 2025-02-23 NOTE — PROGRESS NOTE ADULT - SUBJECTIVE AND OBJECTIVE BOX
Chart and meds reviewed.  Patient seen and examined.    2/23 - s/p left hip surgery this morning    All other systems reviewed and found to be negative with the exception of what has been described above.    MEDICATIONS  (STANDING):  acetaminophen     Tablet .. 1000 milliGRAM(s) Oral every 8 hours  acetaminophen   IVPB .. 1000 milliGRAM(s) IV Intermittent once  aMIOdarone    Tablet 200 milliGRAM(s) Oral daily  amLODIPine   Tablet 2.5 milliGRAM(s) Oral <User Schedule>  atorvastatin 40 milliGRAM(s) Oral at bedtime  ceFAZolin  Injectable. 2000 milliGRAM(s) IV Push every 8 hours  levothyroxine 75 MICROGram(s) Oral daily  metoprolol succinate ER 25 milliGRAM(s) Oral <User Schedule>  polyethylene glycol 3350 17 Gram(s) Oral at bedtime  senna 2 Tablet(s) Oral at bedtime  tranexamic acid IVPB 1000 milliGRAM(s) IV Intermittent once  tranexamic acid IVPB 1000 milliGRAM(s) IV Intermittent once    MEDICATIONS  (PRN):  magnesium hydroxide Suspension 30 milliLiter(s) Oral daily PRN Constipation  ondansetron Injectable 4 milliGRAM(s) IV Push every 6 hours PRN Nausea and/or Vomiting  oxyCODONE    IR 5 milliGRAM(s) Oral every 4 hours PRN Moderate Pain (4 - 6)  oxyCODONE    IR 10 milliGRAM(s) Oral every 4 hours PRN Severe Pain (7 - 10)  traMADol 50 milliGRAM(s) Oral every 6 hours PRN Mild Pain (1 - 3)    VITAL SIGNS:  T(F): 98.1 (02-23-25 @ 11:30), Max: 98.3 (02-22-25 @ 16:00)  HR: 62 (02-23-25 @ 11:45) (59 - 76)  BP: 101/58 (02-23-25 @ 11:45) (98/48 - 162/71)  RR: 14 (02-23-25 @ 11:45) (14 - 18)  SpO2: 98% (02-23-25 @ 11:45) (94% - 100%)    I&O's Summary    23 Feb 2025 07:01  -  23 Feb 2025 14:44  --------------------------------------------------------  IN: 1150 mL / OUT: 100 mL / NET: 1050 mL    CAPILLARY BLOOD GLUCOSE  POCT Blood Glucose.: 127 mg/dL (23 Feb 2025 10:36)    PHYSICAL EXAM:  HEENT:  pupils equal and reactive, EOMI, no oropharyngeal lesions, erythema, exudates, oral thrush  NECK:   supple, no carotid bruits  CV:  +S1, +S2, regular, no murmurs  RESP:   lungs clear to auscultation bilaterally, no wheezing, rales, rhonchi, good air entry bilaterally  GI:  abdomen soft, non-tender, non-distended, normal BS  EXT:  + left hip swelling and edema, + wound and dressing, no LE edema  NEURO:  AAOX3, no focal neurological deficits, follows all commands, able to move extremities spontaneously  SKIN:  no ulcers, lesions or rashes    LABS:                        12.6   16.53 )-----------( 229      ( 23 Feb 2025 10:59 )             38.7     02-23    130[L]  |  99  |  15  ----------------------------<  135[H]  3.9   |  21[L]  |  0.77    Ca    8.6      23 Feb 2025 10:59   Chart and meds reviewed.  Patient seen and examined.    2/23 - s/p left hip surgery this morning, no complaints at this time, denies any shortness of breath or chest pain.    All other systems reviewed and found to be negative with the exception of what has been described above.    MEDICATIONS  (STANDING):  acetaminophen     Tablet .. 1000 milliGRAM(s) Oral every 8 hours  acetaminophen   IVPB .. 1000 milliGRAM(s) IV Intermittent once  aMIOdarone    Tablet 200 milliGRAM(s) Oral daily  amLODIPine   Tablet 2.5 milliGRAM(s) Oral <User Schedule>  atorvastatin 40 milliGRAM(s) Oral at bedtime  ceFAZolin  Injectable. 2000 milliGRAM(s) IV Push every 8 hours  levothyroxine 75 MICROGram(s) Oral daily  metoprolol succinate ER 25 milliGRAM(s) Oral <User Schedule>  polyethylene glycol 3350 17 Gram(s) Oral at bedtime  senna 2 Tablet(s) Oral at bedtime  tranexamic acid IVPB 1000 milliGRAM(s) IV Intermittent once  tranexamic acid IVPB 1000 milliGRAM(s) IV Intermittent once    MEDICATIONS  (PRN):  magnesium hydroxide Suspension 30 milliLiter(s) Oral daily PRN Constipation  ondansetron Injectable 4 milliGRAM(s) IV Push every 6 hours PRN Nausea and/or Vomiting  oxyCODONE    IR 5 milliGRAM(s) Oral every 4 hours PRN Moderate Pain (4 - 6)  oxyCODONE    IR 10 milliGRAM(s) Oral every 4 hours PRN Severe Pain (7 - 10)  traMADol 50 milliGRAM(s) Oral every 6 hours PRN Mild Pain (1 - 3)    VITAL SIGNS:  T(F): 98.1 (02-23-25 @ 11:30), Max: 98.3 (02-22-25 @ 16:00)  HR: 62 (02-23-25 @ 11:45) (59 - 76)  BP: 101/58 (02-23-25 @ 11:45) (98/48 - 162/71)  RR: 14 (02-23-25 @ 11:45) (14 - 18)  SpO2: 98% (02-23-25 @ 11:45) (94% - 100%)    I&O's Summary    23 Feb 2025 07:01  -  23 Feb 2025 14:44  --------------------------------------------------------  IN: 1150 mL / OUT: 100 mL / NET: 1050 mL    CAPILLARY BLOOD GLUCOSE  POCT Blood Glucose.: 127 mg/dL (23 Feb 2025 10:36)    PHYSICAL EXAM:  HEENT:  pupils equal and reactive, EOMI, no oropharyngeal lesions, erythema, exudates, oral thrush  NECK:   supple, no carotid bruits  CV:  +S1, +S2, regular, no murmurs  RESP:   lungs clear to auscultation bilaterally, no wheezing, rales, rhonchi, good air entry bilaterally  GI:  abdomen soft, non-tender, non-distended, normal BS  EXT:  + left hip swelling and edema, + wound and dressing, no LE edema  NEURO:  AAOX3, no focal neurological deficits, follows all commands, able to move extremities spontaneously  SKIN:  no ulcers, lesions or rashes    LABS:                        12.6   16.53 )-----------( 229      ( 23 Feb 2025 10:59 )             38.7     02-23    130[L]  |  99  |  15  ----------------------------<  135[H]  3.9   |  21[L]  |  0.77    Ca    8.6      23 Feb 2025 10:59

## 2025-02-23 NOTE — DISCHARGE NOTE PROVIDER - NSDCFUADDINST_GEN_ALL_CORE_FT
Discharge Instructions Total Hip Arthroplasty    1. ACTIVITY: You may weight bear as tolerated on your lower extremity. Please use assistive devices (i.e. rolling walker or cane). You should receive Physical Therapy daily. Please adhere to the posterior hip dislocation precautions. You should wear the Abduction Pillow while in bed or while sitting in chair for the first 6 weeks after surgery.   2. CALL with fever over 101F, wound redness, drainage or open area, calf pain/calf swelling.  3. BANDAGE: On postoperative day 7, please place a new Mepilex Ag bandage over the incision. You may change sooner ONLY if leaks or falls off. DO NOT REMOVE BANDAGE TO CHECK WOUND ON INTAKE.  4. SHOWER:  You are allowed to shower. Do NOT submerge surgical site in water. No baths/pools/hot tubs.  5. STAPLES: RN Remove Fritz POD14 (March 9th)   6. DVT PE Prophylaxis: Managed by Anticoag Team. See Med Rec.  7.GI: Continue Protonix daily while on Anticoagulant. an eRx has been sent to your pharmacy.  8. LABS: INR if on Coumadin.  9. FOLLOW UP: Dr. Edwards in 1 month. Please call office to schedule.   10. MEDICATIONS:  If going home, eRX sent to your pharmacy for .   11. Please call the office if medications are not covered under your insurance, especially BLOOD CLOT PREVENTION/anticoagulant medication.

## 2025-02-23 NOTE — DISCHARGE NOTE PROVIDER - NSDCCAREPROVSEEN_GEN_ALL_CORE_FT
Gennaro, Peña Jj, Gigi Rodrigez, Melo Galvan, Vinnie Ayon, Johana Anne, Agustin Sotelo, Mitchel Marroquin, Juwan

## 2025-02-23 NOTE — DISCHARGE NOTE PROVIDER - CARE PROVIDERS DIRECT ADDRESSES
,DirectAddress_Unknown ,DirectAddress_Unknown,[email or web address]

## 2025-02-23 NOTE — DISCHARGE NOTE PROVIDER - HOSPITAL COURSE
89 F with Hx of paroxysmal atrial fibrillation s/p DCCV in the past, s/p right hip replacement, Hyperlipidemia, Osteoporosis, HTN, recent UTI treated with ABXs, presented with an acute mechanical fall at home complicated by an acute left hip fracture. Patient is s/p L PADDY on 2/23. Patient doing well plan for dc to rehab.    2/25- Patient was seen and examined. VSS. No acute overnight events. Denies fever, pain or SOB. Tolerating diet.     ROS:   All 10 systems reviewed and found to be negative with the exception of what has been described above.     Vital Signs Last 24 Hrs  T(C): 36.6 (25 Feb 2025 07:49), Max: 37.1 (24 Feb 2025 12:00)  T(F): 97.8 (25 Feb 2025 07:49), Max: 98.7 (24 Feb 2025 12:00)  HR: 67 (25 Feb 2025 07:49) (65 - 68)  BP: 123/43 (25 Feb 2025 07:49) (114/53 - 139/51)  BP(mean): 72 (25 Feb 2025 00:00) (72 - 72)  RR: 18 (25 Feb 2025 07:49) (17 - 18)  SpO2: 99% (25 Feb 2025 07:49) (93% - 99%)    Parameters below as of 25 Feb 2025 07:49  Patient On (Oxygen Delivery Method): room air    PE:  Constitutional: NAD, OOB ambulating   HEENT: NC/AT  Back: no tenderness  Respiratory: respirations even and non labored, LCTA- diminished at the base   Cardiovascular: S1S2 regular, no murmurs  Abdomen: soft, not tender, not distended, positive BS  Genitourinary: voiding  Musculoskeletal: no muscle tenderness, no joint swelling or tenderness  Extremities: no pedal edema   Neurological: no focal deficits    Meds/Labs- reviewed    PLAN   Acute Mechanical Fall complicated by Acute Left Hip Fracture  -  s/p left total hip replacement on 2/23 by Dr. Edwards, POD #2  -  pain control  -  incentive spirometry  -  OOB to chair  -  PT evaluation and treatment  -  wound care per ortho  -  neurovascular checks  -  hip precautions  -  fall precautions  -  DVT prophylaxis    Vitamin D Insufficiency  -  Vitamin D level 22  -  PO Vitamin D    Acute Post-Op Blood Loss Anemia  -  HgB on dc 9.7- has been stable the past two days       Leukocytosis  -  post-op WBC 16k, likely reactive and due to pain, stress and surgery  -  WBC trending down  -  afebrile  -  urine culture negative    Hypothyroidism  -  continue Levothyroxine    HTN  -  BP stable  -  holding Diovan perioperatively  -  continue Toprol and Norvasc    Osteoporosis  -  on Fosamax at home once a week    Hyperlipidemia  -  continue statin    Paroxysmal Atrial Fibrillation  -  currently in NSR  -  continue Amiodarone and Toprol  -  continue Eliquis    Recent UTI  -  s/p treatment with ABXs  -  currently asymptomatic and UA with only 8 WBCs  -  no need for further treatment    Hyponatremia  -  review of old labs appears to show that she has some degree of chronic hyponatremia  -  asymptomatic    DVT prophylaxis  -  leana and Eliquis    Case d/w team on IDR. Plan for dc to rehab.

## 2025-02-23 NOTE — DISCHARGE NOTE PROVIDER - CARE PROVIDER_API CALL
GALA BAE  [address] [place] [id number]  Phone: [phone]  Follow Up Time:    GALA BAE  [address] [place] [address] [address] [place] [address]  Phone: [phone]  Follow Up Time:     Mehran Bowen  [facility]  [address], [address]  [place] [address]  Phone: [phone]  Fax: [phone]  Follow Up Time:

## 2025-02-23 NOTE — DISCHARGE NOTE PROVIDER - ATTENDING DISCHARGE PHYSICAL EXAMINATION:
Patient was seen and examined by me at bedside with NP, Edna Braswell.  I personally had a face-to-face  encounter with the patient. I was physically present for the key portions of the evaluation and management (E/M) service provided.  I agree with the above history, physical, and plan which I have reviewed and edited where appropriate      PHYSICAL EXAM:  HEENT:  pupils equal and reactive, EOMI, no oropharyngeal lesions, erythema, exudates, oral thrush  NECK:   supple, no carotid bruits  CV:  +S1, +S2, regular, 2/6 systolic murmur  RESP:   lungs clear to auscultation bilaterally, no wheezing, rales, rhonchi, good air entry bilaterally  GI:  abdomen soft, non-tender, non-distended, normal BS  EXT:  left hip swelling and edema, + wound and dressing, no LE edema  NEURO:  AAOX3, no focal neurological deficits, follows all commands, able to move extremities spontaneously  SKIN:  no ulcers, lesions or rashes

## 2025-02-23 NOTE — DISCHARGE NOTE PROVIDER - NSDCFUSCHEDAPPT_GEN_ALL_CORE_FT
Leif John  [facility] Physician [facility]  NEUROLOGY [address]  Scheduled Appointment: 03/27/2025     normal...

## 2025-02-23 NOTE — DISCHARGE NOTE PROVIDER - NSDCCPCAREPLAN_GEN_ALL_CORE_FT
PRINCIPAL DISCHARGE DIAGNOSIS  Diagnosis: Closed fracture of left hip  Assessment and Plan of Treatment:      PRINCIPAL DISCHARGE DIAGNOSIS  Diagnosis: Closed fracture of left hip  Assessment and Plan of Treatment: you had surger for the fracture  you need to follow up with your orthopedic surgeon after dc from rehab.  PT As per rehab  pain meds as needed  Work with a physical therapist to learn exercises to make the muscles around your hip stronger. They will work with you on bending, walking, and climbing stairs so you can move normally. Do the exercises as instructed. This is very important to help you recover as quickly as possible and get back to your usual daily activities. Moving your leg regularly also helps lower the risk of blood clots.  Work with a physical therapist to learn exercises to make the muscles around your hip stronger. They will work with you on bending, walking, and climbing stairs so you can move normally. Do the exercises as instructed. This is very important to help you recover as quickly as possible and get back to your usual daily activities. Moving your leg regularly also helps lower the risk of blood clots.  Take all your medicines as instructed, including any medicines to prevent blood clots.  If you take prescription opioid pain medicine, you might get constipated. Take a stool softener to prevent this.  Increase your activity slowly – Most people can stand and walk (with help) shortly after surgery.  Call for emergency help right away if you  -Feel short of breath or have trouble breathing  - Have sharp or severe chest pain when breathing  - Cough up blood  Call the doctor or nurse for advice if:  -You have a fever of 100.4°F (38°C) or higher, or chills.  - You have increasing redness or swelling around your incision.  -You have severe pain or swelling in your hip or leg.  - Your leg becomes cool, blue, gray, or numb or tingles.  - Your hip joint feels like it moved out of place.        SECONDARY DISCHARGE DIAGNOSES  Diagnosis: Paroxysmal atrial fibrillation  Assessment and Plan of Treatment: c/w apixaban    Diagnosis: Hypertension  Assessment and Plan of Treatment: diovan was held during this admission. You BP has been well controlled while you are in the hospital. f/u with your PCP when DIovan should be restarted.     PRINCIPAL DISCHARGE DIAGNOSIS  Diagnosis: Closed fracture of left hip  Assessment and Plan of Treatment: you had surger for the fracture  you need to follow up with your orthopedic surgeon after dc from rehab.  PT As per rehab  pain meds as needed  Work with a physical therapist to learn exercises to make the muscles around your hip stronger. They will work with you on bending, walking, and climbing stairs so you can move normally. Do the exercises as instructed. This is very important to help you recover as quickly as possible and get back to your usual daily activities. Moving your leg regularly also helps lower the risk of blood clots.  Work with a physical therapist to learn exercises to make the muscles around your hip stronger. They will work with you on bending, walking, and climbing stairs so you can move normally. Do the exercises as instructed. This is very important to help you recover as quickly as possible and get back to your usual daily activities. Moving your leg regularly also helps lower the risk of blood clots.  Take all your medicines as instructed, including any medicines to prevent blood clots.  If you take prescription opioid pain medicine, you might get constipated. Take a stool softener to prevent this.  Increase your activity slowly – Most people can stand and walk (with help) shortly after surgery.  Call for emergency help right away if you  -Feel short of breath or have trouble breathing  - Have sharp or severe chest pain when breathing  - Cough up blood  Call the doctor or nurse for advice if:  -You have a fever of 100.4°F (38°C) or higher, or chills.  - You have increasing redness or swelling around your incision.  -You have severe pain or swelling in your hip or leg.  - Your leg becomes cool, blue, gray, or numb or tingles.  - Your hip joint feels like it moved out of place.        SECONDARY DISCHARGE DIAGNOSES  Diagnosis: Paroxysmal atrial fibrillation  Assessment and Plan of Treatment: c/w apixaban    Diagnosis: Hypertension  Assessment and Plan of Treatment: diovan was held during this admission. You BP has been well controlled while you are in the hospital. f/u with your PCP when DIovan should be restarted.    Diagnosis: Thyroid with heterogeneous echotexture determined by ultrasound  Assessment and Plan of Treatment: Heterogeneous thyroid gland, which can be further characterized on a   nonemergent ultrasound. FU outpatient

## 2025-02-23 NOTE — PROGRESS NOTE ADULT - SUBJECTIVE AND OBJECTIVE BOX
Postop Check    Patient tolerated the procedure well. Patient seen and examined at bedside.  No acute complaints at this time. Pain well controlled. Denies chest pain, shortness of breath, nausea or vomiting.     PE:  Vital Signs Last 24 Hrs  T(C): 36.7 (02-23-25 @ 11:30), Max: 36.8 (02-22-25 @ 16:00)  T(F): 98.1 (02-23-25 @ 11:30), Max: 98.3 (02-22-25 @ 16:00)  HR: 62 (02-23-25 @ 11:45) (59 - 76)  BP: 101/58 (02-23-25 @ 11:45) (98/48 - 162/71)  BP(mean): --  RR: 14 (02-23-25 @ 11:45) (14 - 18)  SpO2: 98% (02-23-25 @ 11:45) (94% - 100%)    General: NAD, resting comfortably in bed  LLE:   Dressing in place C/D/I  Pillow in place  SCD in place bilaterally  No calf tenderness   Motor: + EHL/FHL/TA/GSC  +SILT: SPN/DPN/Edwin/Saph/Tib  DP/PT 2+      A/P:  89y f s/p L PADDY on 2/23/2025  -PT/OT  -WBAT on the LLE with posterior precautions and abduction pillow in place while in bed  -Pain Control  -DVT ppx Eliquis  -Continue perioperative abx x 24 hours  -FU AM Labs  -Rest, ice, compress and elevate the extremity as needed  -Incentive Spirometry  -Medical management appreciated  -Dispo pending PT eval  -Discussed above with Dr. Edwards, who agrees with plan

## 2025-02-23 NOTE — PROGRESS NOTE ADULT - ASSESSMENT
89 F with Hx of paroxysmal atrial fibrillation s/p DCCV in the past, s/p right hip replacement, Hyperlipidemia, Osteoporosis, HTN, recent UTI treated with ABXs, presented with an acute mechanical fall at home complicated by an acute left hip fracture.    Acute Mechanical Fall complicated by Acute Left Hip Fracture  -  s/p left total hip replacement by Dr. Edwards, POD #0  -  pain control  -  incentive spirometry  -  OOB to chair  -  PT evaluation and treatment  -  wound care per ortho  -  neurovascular checks  -  hip precautions  -  fall precautions  -  DVT prophylaxis    Vitamin D Insufficiency  -  PO Vitamin D    Leukocytosis  -  post-op WBC 16k, likely reactive and due to pain, stress and surgery  -  monitor for now  -  afebrile  -  urine culture negative    Hypothyroidism  -  continue Levothyroxine    HTN  -  BP stable  -  hold Diovan perioperatively  -  continue Toprol and Norvasc    Osteoporosis  -  on Fosamax at home once a week    Hyperlipidemia  -  continue statin    Paroxysmal Atrial Fibrillation  -  currently in NSR  -  continue Amiodarone and Toprol  -  holding Eliquis for surgery tomorrow    Recent UTI  -  s/p treatment with ABXs  -  currently asymptomatic and UA with only 8 WBCs  -  no need for further treatment    Hyponatremia  -  review of old labs appears to show that she has some degree of chronic hyponatremia  -  sodium 130 today  -  asymptomatic  -  monitor for now    DVT prophylaxis  -  venodynes and Eliquis    d/w patient and RN

## 2025-02-23 NOTE — BRIEF OPERATIVE NOTE - NSICDXBRIEFPROCEDURE_GEN_ALL_CORE_FT
PROCEDURES:  Hemiarthroplasty, hip 23-Feb-2025 10:21:25 left Vinnie Galvan   PROCEDURES:  Total left hip replacement 23-Feb-2025 10:26:06  Vinnie Galvan

## 2025-02-23 NOTE — DISCHARGE NOTE PROVIDER - PROVIDER TOKENS
PROVIDER:[TOKEN:[[id number]]] PROVIDER:[TOKEN:[[id number]]],PROVIDER:[TOKEN:[[id number]]]

## 2025-02-23 NOTE — DISCHARGE NOTE PROVIDER - NSDCMRMEDTOKEN_GEN_ALL_CORE_FT
alendronate 70 mg oral tablet: 1 tab(s) orally once a week on Sundays  amiodarone 200 mg oral tablet: 1 tab(s) orally once a day  amLODIPine 2.5 mg oral tablet: 1 tab(s) orally 2 times a day  apixaban 5 mg oral tablet: 1 tab(s) orally 2 times a day  atorvastatin 40 mg oral tablet: 1 tab(s) orally once a day (at bedtime)  cholecalciferol 50 mcg (2000 intl units) oral tablet: 1 tab(s) orally once a day  cyanocobalamin 1000 mcg oral tablet: 1 tab(s) orally once a day  gabapentin 100 mg oral capsule: 1 cap(s) orally 2 times a day  levothyroxine 75 mcg (0.075 mg) oral tablet: 1 tab(s) orally once a day  metoprolol succinate 25 mg oral tablet, extended release: 1 tab(s) orally every other day  Multiple Vitamins oral tablet: 1 tab(s) orally once a day   acetaminophen 500 mg oral tablet: 2 tab(s) orally every 8 hours as needed for  mild pain  alendronate 70 mg oral tablet: 1 tab(s) orally once a week on Sundays  amiodarone 200 mg oral tablet: 1 tab(s) orally once a day  amLODIPine 2.5 mg oral tablet: 1 tab(s) orally 2 times a day  apixaban 5 mg oral tablet: 1 tab(s) orally 2 times a day  atorvastatin 40 mg oral tablet: 1 tab(s) orally once a day (at bedtime)  cholecalciferol 50 mcg (2000 intl units) oral tablet: 1 tab(s) orally once a day  cyanocobalamin 1000 mcg oral tablet: 1 tab(s) orally once a day  gabapentin 100 mg oral capsule: 1 cap(s) orally 2 times a day  levothyroxine 75 mcg (0.075 mg) oral tablet: 1 tab(s) orally once a day  metoprolol succinate 25 mg oral tablet, extended release: 1 tab(s) orally every other day  Multiple Vitamins oral tablet: 1 tab(s) orally once a day  oxyCODONE 5 mg oral tablet: 1 tab(s) orally every 6 hours as needed for  severe pain  polyethylene glycol 3350 oral powder for reconstitution: 17 gram(s) orally once a day (at bedtime)  senna leaf extract oral tablet: 2 tab(s) orally once a day (at bedtime)  traMADol 50 mg oral tablet: 1 tab(s) orally every 6 hours as needed for  moderate pain

## 2025-02-23 NOTE — PROGRESS NOTE ADULT - SUBJECTIVE AND OBJECTIVE BOX
Patient seen and examined at bedside. No acute complaints at this time. Pain well controlled at rest. Denies chest pain, shortness of breath, nausea or vomiting.     PE:  T(C): 36.8 (02-23-25 @ 00:00), Max: 36.8 (02-22-25 @ 16:00)  HR: 76 (02-23-25 @ 00:00) (62 - 76)  BP: 131/57 (02-23-25 @ 00:00) (131/57 - 144/86)  RR: 18 (02-23-25 @ 00:00) (18 - 18)  SpO2: 97% (02-23-25 @ 00:00) (93% - 97%)    General: NAD, resting comfortably in bed  LLE:   skin intact  Compartments soft and compressible  No calf tenderness bilaterally  +TA/EHL/FHL/GSC  SILT patrizia/saph/sp/dp/tib  palpable DP/PT    LABS:                        12.3   14.77 )-----------( 253      ( 23 Feb 2025 04:30 )             36.2     02-23    130[L]  |  100  |  14  ----------------------------<  112[H]  4.4   |  26  |  0.72    Ca    8.5      23 Feb 2025 04:30    TPro  6.8  /  Alb  3.6  /  TBili  0.5  /  DBili  x   /  AST  36  /  ALT  39  /  AlkPhos  100  02-22    PT/INR - ( 23 Feb 2025 04:30 )   PT: 12.6 sec;   INR: 1.10 ratio         PTT - ( 23 Feb 2025 04:30 )  PTT:29.3 sec    A/P:   89yFemale with L FN Fx.    Plan for OR today with Dr. Edwards for a L PADDY  NPO, except medications  IVF while NPO  Hold chemical DVT ppx for surgery  medically optimized  Analgesia  NWB, bedrest  Ice hip as tolerated  Medical recommendations appreciated  To be Discussed with attending will update accordingly, if needed

## 2025-02-24 LAB
ANION GAP SERPL CALC-SCNC: 3 MMOL/L — LOW (ref 5–17)
BUN SERPL-MCNC: 13 MG/DL — SIGNIFICANT CHANGE UP (ref 7–23)
CALCIUM SERPL-MCNC: 8.4 MG/DL — LOW (ref 8.5–10.1)
CHLORIDE SERPL-SCNC: 100 MMOL/L — SIGNIFICANT CHANGE UP (ref 96–108)
CO2 SERPL-SCNC: 27 MMOL/L — SIGNIFICANT CHANGE UP (ref 22–31)
CREAT SERPL-MCNC: 0.58 MG/DL — SIGNIFICANT CHANGE UP (ref 0.5–1.3)
EGFR: 88 ML/MIN/1.73M2 — SIGNIFICANT CHANGE UP
GLUCOSE SERPL-MCNC: 120 MG/DL — HIGH (ref 70–99)
HCT VFR BLD CALC: 29.4 % — LOW (ref 34.5–45)
HGB BLD-MCNC: 9.7 G/DL — LOW (ref 11.5–15.5)
MCHC RBC-ENTMCNC: 29 PG — SIGNIFICANT CHANGE UP (ref 27–34)
MCHC RBC-ENTMCNC: 33 G/DL — SIGNIFICANT CHANGE UP (ref 32–36)
MCV RBC AUTO: 88 FL — SIGNIFICANT CHANGE UP (ref 80–100)
NRBC # BLD AUTO: 0 K/UL — SIGNIFICANT CHANGE UP (ref 0–0)
NRBC # FLD: 0 K/UL — SIGNIFICANT CHANGE UP (ref 0–0)
NRBC BLD AUTO-RTO: 0 /100 WBCS — SIGNIFICANT CHANGE UP (ref 0–0)
PLATELET # BLD AUTO: 194 K/UL — SIGNIFICANT CHANGE UP (ref 150–400)
PMV BLD: 9.1 FL — SIGNIFICANT CHANGE UP (ref 7–13)
POTASSIUM SERPL-MCNC: 4.3 MMOL/L — SIGNIFICANT CHANGE UP (ref 3.5–5.3)
POTASSIUM SERPL-SCNC: 4.3 MMOL/L — SIGNIFICANT CHANGE UP (ref 3.5–5.3)
RBC # BLD: 3.34 M/UL — LOW (ref 3.8–5.2)
RBC # FLD: 13.2 % — SIGNIFICANT CHANGE UP (ref 10.3–14.5)
SODIUM SERPL-SCNC: 130 MMOL/L — LOW (ref 135–145)
WBC # BLD: 11.76 K/UL — HIGH (ref 3.8–10.5)
WBC # FLD AUTO: 11.76 K/UL — HIGH (ref 3.8–10.5)

## 2025-02-24 PROCEDURE — 99233 SBSQ HOSP IP/OBS HIGH 50: CPT

## 2025-02-24 RX ADMIN — AMLODIPINE BESYLATE 2.5 MILLIGRAM(S): 10 TABLET ORAL at 08:02

## 2025-02-24 RX ADMIN — Medication 1000 MILLIGRAM(S): at 05:03

## 2025-02-24 RX ADMIN — ATORVASTATIN CALCIUM 40 MILLIGRAM(S): 80 TABLET, FILM COATED ORAL at 21:01

## 2025-02-24 RX ADMIN — Medication 1000 MILLIGRAM(S): at 13:35

## 2025-02-24 RX ADMIN — Medication 1000 MILLIGRAM(S): at 21:01

## 2025-02-24 RX ADMIN — Medication 2000 MILLIGRAM(S): at 00:21

## 2025-02-24 RX ADMIN — METOPROLOL SUCCINATE 25 MILLIGRAM(S): 50 TABLET, EXTENDED RELEASE ORAL at 08:03

## 2025-02-24 RX ADMIN — OXYCODONE HYDROCHLORIDE 5 MILLIGRAM(S): 30 TABLET ORAL at 09:48

## 2025-02-24 RX ADMIN — Medication 2000 UNIT(S): at 09:48

## 2025-02-24 RX ADMIN — APIXABAN 2.5 MILLIGRAM(S): 2.5 TABLET, FILM COATED ORAL at 09:48

## 2025-02-24 RX ADMIN — OXYCODONE HYDROCHLORIDE 5 MILLIGRAM(S): 30 TABLET ORAL at 10:40

## 2025-02-24 RX ADMIN — POLYETHYLENE GLYCOL 3350 17 GRAM(S): 17 POWDER, FOR SOLUTION ORAL at 21:01

## 2025-02-24 RX ADMIN — AMIODARONE HYDROCHLORIDE 200 MILLIGRAM(S): 50 INJECTION, SOLUTION INTRAVENOUS at 09:48

## 2025-02-24 RX ADMIN — AMLODIPINE BESYLATE 2.5 MILLIGRAM(S): 10 TABLET ORAL at 21:01

## 2025-02-24 RX ADMIN — Medication 75 MICROGRAM(S): at 05:04

## 2025-02-24 RX ADMIN — APIXABAN 2.5 MILLIGRAM(S): 2.5 TABLET, FILM COATED ORAL at 21:01

## 2025-02-24 RX ADMIN — Medication 2 TABLET(S): at 21:01

## 2025-02-24 NOTE — PHYSICAL THERAPY INITIAL EVALUATION ADULT - PERTINENT HX OF CURRENT PROBLEM, REHAB EVAL
89 F with Hx of paroxysmal atrial fibrillation s/p DCCV in the past, s/p right hip replacement, Hyperlipidemia, Osteoporosis, HTN, recent UTI treated with ABXs, presented with an acute mechanical fall at home complicated by an acute left hip fracture.

## 2025-02-24 NOTE — PROGRESS NOTE ADULT - ASSESSMENT
89 F with Hx of paroxysmal atrial fibrillation s/p DCCV in the past, s/p right hip replacement, Hyperlipidemia, Osteoporosis, HTN, recent UTI treated with ABXs, presented with an acute mechanical fall at home complicated by an acute left hip fracture.    Acute Mechanical Fall complicated by Acute Left Hip Fracture  -  s/p left total hip replacement on 2/23 by Dr. Edwards, POD #1  -  pain control  -  incentive spirometry  -  OOB to chair  -  PT evaluation and treatment  -  d/c IVFs  -  wound care per ortho  -  neurovascular checks  -  hip precautions  -  fall precautions  -  DVT prophylaxis    Vitamin D Insufficiency  -  Vitamin D level 22  -  PO Vitamin D    Acute Post-Op Blood Loss Anemia  -  HgB 12k -> 9.7 today  -  monitor for now  -  check CBC in am    Leukocytosis  -  post-op WBC 16k, likely reactive and due to pain, stress and surgery  -  WBC today 11k  -  monitor for now  -  afebrile  -  urine culture negative    Hypothyroidism  -  continue Levothyroxine    HTN  -  BP stable  -  holding Diovan perioperatively  -  continue Toprol and Norvasc    Osteoporosis  -  on Fosamax at home once a week    Hyperlipidemia  -  continue statin    Paroxysmal Atrial Fibrillation  -  currently in NSR  -  continue Amiodarone and Toprol  -  continue Eliquis    Recent UTI  -  s/p treatment with ABXs  -  currently asymptomatic and UA with only 8 WBCs  -  no need for further treatment    Hyponatremia  -  review of old labs appears to show that she has some degree of chronic hyponatremia  -  sodium stable at 130 today  -  asymptomatic  -  monitor for now    DVT prophylaxis  -  venodynes and Eliquis    Dispo:  possible d/c to rehab tomorrow if medically stable    d/w patient and RN

## 2025-02-24 NOTE — PROGRESS NOTE ADULT - SUBJECTIVE AND OBJECTIVE BOX
Patient seen and examined at bedside.  No acute complaints at this time. Pain well controlled. Denies chest pain, shortness of breath, nausea or vomiting.     Vital Signs (24 Hrs):  T(C): 36.6 (02-24-25 @ 04:00), Max: 36.8 (02-23-25 @ 08:18)  HR: 66 (02-24-25 @ 04:00) (59 - 66)  BP: 123/50 (02-24-25 @ 04:00) (98/48 - 162/71)  RR: 17 (02-24-25 @ 04:00) (14 - 18)  SpO2: 97% (02-24-25 @ 04:00) (95% - 100%)  Wt(kg): --    LABS:                          12.6   16.53 )-----------( 229      ( 23 Feb 2025 10:59 )             38.7     02-23    130[L]  |  99  |  15  ----------------------------<  135[H]  3.9   |  21[L]  |  0.77    Ca    8.6      23 Feb 2025 10:59        PT/INR - ( 23 Feb 2025 04:30 )   PT: 12.6 sec;   INR: 1.10 ratio         PTT - ( 23 Feb 2025 04:30 )  PTT:29.3 sec  PE:  General: NAD, resting comfortably in bed  LLE:   Dressing in place C/D/I  Pillow in place  SCD in place bilaterally  No calf tenderness   Motor: + EHL/FHL/TA/GSC  +SILT: SPN/DPN/Edwin/Saph/Tib  DP/PT 2+      A/P:  89y f s/p L PADDY on 2/23/2025  -PT/OT  -WBAT on the LLE with posterior precautions and abduction pillow in place while in bed  -Pain Control  -DVT ppx Eliquis  -FU AM Labs  -Rest, ice, compress and elevate the extremity as needed  -Incentive Spirometry  -Medical management appreciated  -Dispo pending PT eval  -Discussed above with Dr. Edwards, who agrees with plan

## 2025-02-24 NOTE — OCCUPATIONAL THERAPY INITIAL EVALUATION ADULT - LIVES WITH, PROFILE
Pt lives alone with daughter available to assist as she transitions home, tub shower and walk in shower with GBs, chairlift inside, has commode and RTS, chairlift inside

## 2025-02-24 NOTE — OCCUPATIONAL THERAPY INITIAL EVALUATION ADULT - NSACTIVITYREC_GEN_A_OT
Patient educated on posterior hip  precautions and how to participate in dressing tasks, grooming tasks, toileting tasks, bed mobility and  all functional transfers while abiding by precautions. Handout provided to promote carryover. Pt educated on DME and AE throughout session and how to self purchase

## 2025-02-24 NOTE — OCCUPATIONAL THERAPY INITIAL EVALUATION ADULT - GENERAL OBSERVATIONS, REHAB EVAL
Pt received semi-supine in bed, vss, nad, agreeable to OT. Pt left seated in chair with chair alarm on, abduction wedge donned

## 2025-02-24 NOTE — OCCUPATIONAL THERAPY INITIAL EVALUATION ADULT - ADL RETRAINING, OT EVAL
Patient will participate in lower body dressing with MIN A  in 2 weeks  Patient will participate in toilet transfer with CGA   in 2 weeks  Patient will participate in toileting with  MIN A    in 2 weeks  Patient will participate in grooming standing at the sink with CGA   in 2 weeks

## 2025-02-24 NOTE — PROGRESS NOTE ADULT - SUBJECTIVE AND OBJECTIVE BOX
Chart and meds reviewed.  Patient seen and examined.        All other systems reviewed and found to be negative with the exception of what has been described above.    MEDICATIONS  (STANDING):  acetaminophen     Tablet .. 1000 milliGRAM(s) Oral every 8 hours  aMIOdarone    Tablet 200 milliGRAM(s) Oral daily  amLODIPine   Tablet 2.5 milliGRAM(s) Oral <User Schedule>  apixaban 2.5 milliGRAM(s) Oral two times a day  atorvastatin 40 milliGRAM(s) Oral at bedtime  cholecalciferol 2000 Unit(s) Oral daily  levothyroxine 75 MICROGram(s) Oral daily  metoprolol succinate ER 25 milliGRAM(s) Oral <User Schedule>  polyethylene glycol 3350 17 Gram(s) Oral at bedtime  senna 2 Tablet(s) Oral at bedtime  tranexamic acid IVPB 1000 milliGRAM(s) IV Intermittent once  tranexamic acid IVPB 1000 milliGRAM(s) IV Intermittent once    MEDICATIONS  (PRN):  magnesium hydroxide Suspension 30 milliLiter(s) Oral daily PRN Constipation  ondansetron Injectable 4 milliGRAM(s) IV Push every 6 hours PRN Nausea and/or Vomiting  oxyCODONE    IR 5 milliGRAM(s) Oral every 4 hours PRN Moderate Pain (4 - 6)  oxyCODONE    IR 10 milliGRAM(s) Oral every 4 hours PRN Severe Pain (7 - 10)  traMADol 50 milliGRAM(s) Oral every 6 hours PRN Mild Pain (1 - 3)      VITAL SIGNS:  T(F): 97.9 (02-24-25 @ 08:00), Max: 98.1 (02-23-25 @ 11:30)  HR: 64 (02-24-25 @ 08:00) (59 - 66)  BP: 117/50 (02-24-25 @ 08:00) (98/48 - 162/71)  RR: 17 (02-24-25 @ 08:00) (14 - 18)  SpO2: 95% (02-24-25 @ 08:00) (95% - 100%)  Wt(kg): --    I&O's Summary    23 Feb 2025 07:01  -  24 Feb 2025 07:00  --------------------------------------------------------  IN: 1150 mL / OUT: 100 mL / NET: 1050 mL        CAPILLARY BLOOD GLUCOSE      POCT Blood Glucose.: 127 mg/dL (23 Feb 2025 10:36)      PHYSICAL EXAM:  HEENT:  pupils equal and reactive, EOMI, no oropharyngeal lesions, erythema, exudates, oral thrush  NECK:   supple, no carotid bruits  CV:  +S1, +S2, regular, no murmurs  RESP:   lungs clear to auscultation bilaterally, no wheezing, rales, rhonchi, good air entry bilaterally  GI:  abdomen soft, non-tender, non-distended, normal BS  EXT:  no clubbing, no cyanosis, no edema, no calf pain, swelling or erythema  NEURO:  AAOX3, no focal neurological deficits, follows all commands, able to move extremities spontaneously  SKIN:  no ulcers, lesions or rashes    LABS:                          9.7    11.76 )-----------( 194      ( 24 Feb 2025 07:30 )             29.4     02-24    130[L]  |  100  |  13  ----------------------------<  120[H]  4.3   |  27  |  0.58    Ca    8.4[L]      24 Feb 2025 07:30            PT/INR - ( 23 Feb 2025 04:30 )   PT: 12.6 sec;   INR: 1.10 ratio         PTT - ( 23 Feb 2025 04:30 )  PTT:29.3 sec    Urinalysis Basic - ( 24 Feb 2025 07:30 )    Color: x / Appearance: x / SG: x / pH: x  Gluc: 120 mg/dL / Ketone: x  / Bili: x / Urobili: x   Blood: x / Protein: x / Nitrite: x   Leuk Esterase: x / RBC: x / WBC x   Sq Epi: x / Non Sq Epi: x / Bacteria: x            Troponin Trend:                         CULTURES:     Chart and meds reviewed.  Patient seen and examined.    2/24 - s/p left hip surgery yesterday, no events overnight, no complaints other than had trouble sleeping overnight due to noise    All other systems reviewed and found to be negative with the exception of what has been described above.    MEDICATIONS  (STANDING):  acetaminophen     Tablet .. 1000 milliGRAM(s) Oral every 8 hours  aMIOdarone    Tablet 200 milliGRAM(s) Oral daily  amLODIPine   Tablet 2.5 milliGRAM(s) Oral <User Schedule>  apixaban 2.5 milliGRAM(s) Oral two times a day  atorvastatin 40 milliGRAM(s) Oral at bedtime  cholecalciferol 2000 Unit(s) Oral daily  levothyroxine 75 MICROGram(s) Oral daily  metoprolol succinate ER 25 milliGRAM(s) Oral <User Schedule>  polyethylene glycol 3350 17 Gram(s) Oral at bedtime  senna 2 Tablet(s) Oral at bedtime  tranexamic acid IVPB 1000 milliGRAM(s) IV Intermittent once  tranexamic acid IVPB 1000 milliGRAM(s) IV Intermittent once    MEDICATIONS  (PRN):  magnesium hydroxide Suspension 30 milliLiter(s) Oral daily PRN Constipation  ondansetron Injectable 4 milliGRAM(s) IV Push every 6 hours PRN Nausea and/or Vomiting  oxyCODONE    IR 5 milliGRAM(s) Oral every 4 hours PRN Moderate Pain (4 - 6)  oxyCODONE    IR 10 milliGRAM(s) Oral every 4 hours PRN Severe Pain (7 - 10)  traMADol 50 milliGRAM(s) Oral every 6 hours PRN Mild Pain (1 - 3)    VITAL SIGNS:  T(F): 97.9 (02-24-25 @ 08:00), Max: 98.1 (02-23-25 @ 11:30)  HR: 64 (02-24-25 @ 08:00) (59 - 66)  BP: 117/50 (02-24-25 @ 08:00) (98/48 - 162/71)  RR: 17 (02-24-25 @ 08:00) (14 - 18)  SpO2: 95% (02-24-25 @ 08:00) (95% - 100%)    I&O's Summary    23 Feb 2025 07:01  -  24 Feb 2025 07:00  --------------------------------------------------------  IN: 1150 mL / OUT: 100 mL / NET: 1050 mL    CAPILLARY BLOOD GLUCOSE  POCT Blood Glucose.: 127 mg/dL (23 Feb 2025 10:36)    PHYSICAL EXAM:  HEENT:  pupils equal and reactive, EOMI, no oropharyngeal lesions, erythema, exudates, oral thrush  NECK:   supple, no carotid bruits  CV:  +S1, +S2, regular, 2/6 systolic murmur  RESP:   lungs clear to auscultation bilaterally, no wheezing, rales, rhonchi, good air entry bilaterally  GI:  abdomen soft, non-tender, non-distended, normal BS  EXT:  left hip swelling and edema, + wound and dressing, no LE edema  NEURO:  AAOX3, no focal neurological deficits, follows all commands, able to move extremities spontaneously  SKIN:  no ulcers, lesions or rashes    LABS:                        9.7    11.76 )-----------( 194      ( 24 Feb 2025 07:30 )             29.4     02-24    130[L]  |  100  |  13  ----------------------------<  120[H]  4.3   |  27  |  0.58    Ca    8.4[L]      24 Feb 2025 07:30

## 2025-02-24 NOTE — PHYSICAL THERAPY INITIAL EVALUATION ADULT - NSPTDMEREC_GEN_A_CORE
The pt will benefit from the use of a RW to aide in Mobility Related ADLS due to having a dx of s/p PADDY/rolling walker

## 2025-02-25 ENCOUNTER — TRANSCRIPTION ENCOUNTER (OUTPATIENT)
Age: 89
End: 2025-02-25

## 2025-02-25 VITALS
DIASTOLIC BLOOD PRESSURE: 54 MMHG | OXYGEN SATURATION: 96 % | HEART RATE: 60 BPM | SYSTOLIC BLOOD PRESSURE: 118 MMHG | TEMPERATURE: 98 F | RESPIRATION RATE: 18 BRPM

## 2025-02-25 LAB
ANION GAP SERPL CALC-SCNC: 3 MMOL/L — LOW (ref 5–17)
BUN SERPL-MCNC: 10 MG/DL — SIGNIFICANT CHANGE UP (ref 7–23)
CALCIUM SERPL-MCNC: 8.3 MG/DL — LOW (ref 8.5–10.1)
CHLORIDE SERPL-SCNC: 101 MMOL/L — SIGNIFICANT CHANGE UP (ref 96–108)
CO2 SERPL-SCNC: 27 MMOL/L — SIGNIFICANT CHANGE UP (ref 22–31)
CREAT SERPL-MCNC: 0.55 MG/DL — SIGNIFICANT CHANGE UP (ref 0.5–1.3)
EGFR: 89 ML/MIN/1.73M2 — SIGNIFICANT CHANGE UP
GLUCOSE SERPL-MCNC: 128 MG/DL — HIGH (ref 70–99)
HCT VFR BLD CALC: 29 % — LOW (ref 34.5–45)
HGB BLD-MCNC: 9.7 G/DL — LOW (ref 11.5–15.5)
MCHC RBC-ENTMCNC: 29.1 PG — SIGNIFICANT CHANGE UP (ref 27–34)
MCHC RBC-ENTMCNC: 33.4 G/DL — SIGNIFICANT CHANGE UP (ref 32–36)
MCV RBC AUTO: 87.1 FL — SIGNIFICANT CHANGE UP (ref 80–100)
NRBC # BLD AUTO: 0 K/UL — SIGNIFICANT CHANGE UP (ref 0–0)
NRBC # FLD: 0 K/UL — SIGNIFICANT CHANGE UP (ref 0–0)
NRBC BLD AUTO-RTO: 0 /100 WBCS — SIGNIFICANT CHANGE UP (ref 0–0)
PLATELET # BLD AUTO: 203 K/UL — SIGNIFICANT CHANGE UP (ref 150–400)
PMV BLD: 9.4 FL — SIGNIFICANT CHANGE UP (ref 7–13)
POTASSIUM SERPL-MCNC: 3.9 MMOL/L — SIGNIFICANT CHANGE UP (ref 3.5–5.3)
POTASSIUM SERPL-SCNC: 3.9 MMOL/L — SIGNIFICANT CHANGE UP (ref 3.5–5.3)
RBC # BLD: 3.33 M/UL — LOW (ref 3.8–5.2)
RBC # FLD: 13.2 % — SIGNIFICANT CHANGE UP (ref 10.3–14.5)
SODIUM SERPL-SCNC: 131 MMOL/L — LOW (ref 135–145)
WBC # BLD: 11.51 K/UL — HIGH (ref 3.8–10.5)
WBC # FLD AUTO: 11.51 K/UL — HIGH (ref 3.8–10.5)

## 2025-02-25 PROCEDURE — 99239 HOSP IP/OBS DSCHRG MGMT >30: CPT

## 2025-02-25 RX ORDER — APIXABAN 2.5 MG/1
1 TABLET, FILM COATED ORAL
Refills: 0 | DISCHARGE

## 2025-02-25 RX ORDER — ATORVASTATIN CALCIUM 80 MG/1
1 TABLET, FILM COATED ORAL
Qty: 0 | Refills: 0 | DISCHARGE
Start: 2025-02-25

## 2025-02-25 RX ORDER — POLYETHYLENE GLYCOL 3350 17 G/17G
17 POWDER, FOR SOLUTION ORAL
Qty: 0 | Refills: 0 | DISCHARGE
Start: 2025-02-25

## 2025-02-25 RX ORDER — ACETAMINOPHEN 500 MG/5ML
2 LIQUID (ML) ORAL
Qty: 0 | Refills: 0 | DISCHARGE
Start: 2025-02-25

## 2025-02-25 RX ORDER — APIXABAN 2.5 MG/1
1 TABLET, FILM COATED ORAL
Qty: 60 | Refills: 0
Start: 2025-02-25 | End: 2025-03-26

## 2025-02-25 RX ORDER — APIXABAN 2.5 MG/1
1 TABLET, FILM COATED ORAL
Qty: 0 | Refills: 0 | DISCHARGE
Start: 2025-02-25

## 2025-02-25 RX ORDER — OXYCODONE HYDROCHLORIDE 30 MG/1
1 TABLET ORAL
Qty: 0 | Refills: 0 | DISCHARGE
Start: 2025-02-25

## 2025-02-25 RX ORDER — TRAMADOL HYDROCHLORIDE 50 MG/1
1 TABLET, FILM COATED ORAL
Qty: 0 | Refills: 0 | DISCHARGE
Start: 2025-02-25

## 2025-02-25 RX ORDER — AMIODARONE HYDROCHLORIDE 50 MG/ML
1 INJECTION, SOLUTION INTRAVENOUS
Qty: 0 | Refills: 0 | DISCHARGE
Start: 2025-02-25

## 2025-02-25 RX ORDER — SENNA 187 MG
2 TABLET ORAL
Qty: 0 | Refills: 0 | DISCHARGE
Start: 2025-02-25

## 2025-02-25 RX ADMIN — AMLODIPINE BESYLATE 2.5 MILLIGRAM(S): 10 TABLET ORAL at 08:40

## 2025-02-25 RX ADMIN — AMIODARONE HYDROCHLORIDE 200 MILLIGRAM(S): 50 INJECTION, SOLUTION INTRAVENOUS at 09:46

## 2025-02-25 RX ADMIN — Medication 2000 UNIT(S): at 09:48

## 2025-02-25 RX ADMIN — OXYCODONE HYDROCHLORIDE 5 MILLIGRAM(S): 30 TABLET ORAL at 09:49

## 2025-02-25 RX ADMIN — APIXABAN 5 MILLIGRAM(S): 2.5 TABLET, FILM COATED ORAL at 09:49

## 2025-02-25 RX ADMIN — Medication 1000 MILLIGRAM(S): at 12:09

## 2025-02-25 RX ADMIN — Medication 75 MICROGRAM(S): at 05:36

## 2025-02-25 RX ADMIN — OXYCODONE HYDROCHLORIDE 5 MILLIGRAM(S): 30 TABLET ORAL at 10:40

## 2025-02-25 RX ADMIN — Medication 1000 MILLIGRAM(S): at 05:36

## 2025-02-25 NOTE — DISCHARGE NOTE NURSING/CASE MANAGEMENT/SOCIAL WORK - FINANCIAL ASSISTANCE
[facility] provides services at a reduced cost to those who are determined to be eligible through [facility]’s financial assistance program. Information regarding [facility]’s financial assistance program can be found by going to [email or web address] or by calling [phone].

## 2025-02-25 NOTE — PROGRESS NOTE ADULT - REASON FOR ADMISSION
Left hip fracture

## 2025-02-25 NOTE — DISCHARGE NOTE NURSING/CASE MANAGEMENT/SOCIAL WORK - PATIENT PORTAL LINK FT
You can access the FollowMyHealth Patient Portal offered by [facility] by registering at the following website: [email or web address]. By joining [facility]’s FollowMyHealth portal, you will also be able to view your health information using other applications (apps) compatible with our system.

## 2025-02-25 NOTE — PROGRESS NOTE ADULT - SUBJECTIVE AND OBJECTIVE BOX
Patient seen and examined at bedside.  No acute complaints at this time. Pain well controlled. Denies chest pain, shortness of breath, nausea or vomiting.     LABS:                        9.7    11.76 )-----------( 194      ( 24 Feb 2025 07:30 )             29.4     02-24    130[L]  |  100  |  13  ----------------------------<  120[H]  4.3   |  27  |  0.58    Ca    8.4[L]      24 Feb 2025 07:30        Urinalysis Basic - ( 24 Feb 2025 07:30 )    Color: x / Appearance: x / SG: x / pH: x  Gluc: 120 mg/dL / Ketone: x  / Bili: x / Urobili: x   Blood: x / Protein: x / Nitrite: x   Leuk Esterase: x / RBC: x / WBC x   Sq Epi: x / Non Sq Epi: x / Bacteria: x        VITAL SIGNS:  T(C): 36.7 (02-25-25 @ 00:00), Max: 37.1 (02-24-25 @ 12:00)  HR: 66 (02-25-25 @ 00:00) (64 - 68)  BP: 139/51 (02-25-25 @ 00:00) (114/53 - 139/51)  RR: 18 (02-25-25 @ 00:00) (17 - 18)  SpO2: 96% (02-25-25 @ 00:00) (93% - 97%)     PTT - ( 23 Feb 2025 04:30 )  PTT:29.3 sec        PE:  General: NAD, resting comfortably in bed  LLE:   Dressing in place C/D/I  Pillow in place  SCD in place bilaterally  No calf tenderness   Motor: + EHL/FHL/TA/GSC  +SILT: SPN/DPN/Edwin/Saph/Tib  DP/PT 2+      A/P:  89y f s/p L PADDY on 2/23/2025  -PT/OT  -WBAT on the LLE with posterior precautions and abduction pillow in place while in bed  -Pain Control  -DVT ppx Eliquis  -FU AM Labs  -Rest, ice, compress and elevate the extremity as needed  -Incentive Spirometry  -Medical management appreciated  -Dispo pending PT eval; still pending further eval  -Discussed above with Dr. Edwards, who agrees with plan

## 2025-02-25 NOTE — PHARMACOTHERAPY INTERVENTION NOTE - COMMENTS
Patient is 88YO and weighs 55.8kg --> Eliquis should be 2.5mg q12h for AF. Patient received 1 dose Eliquis 5mg this morning and was DC'd to [facility] this afternoon. Hospitalist will contact [facility] to revise the order.
Patient tolerated cefazolin perioperatively despite reported allergy to penicillin. Profile updated.

## 2025-02-26 ENCOUNTER — TRANSCRIPTION ENCOUNTER (OUTPATIENT)
Age: 89
End: 2025-02-26

## 2025-02-28 DIAGNOSIS — Z88.1 ALLERGY STATUS TO OTHER ANTIBIOTIC AGENTS: ICD-10-CM
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
## 2025-02-28 DIAGNOSIS — I10 ESSENTIAL (PRIMARY) HYPERTENSION: ICD-10-CM

## 2025-02-28 DIAGNOSIS — Z87.440 PERSONAL HISTORY OF URINARY (TRACT) INFECTIONS: ICD-10-CM

## 2025-02-28 DIAGNOSIS — E87.1 HYPO-OSMOLALITY AND HYPONATREMIA: ICD-10-CM

## 2025-02-28 DIAGNOSIS — Z96.641 PRESENCE OF RIGHT ARTIFICIAL HIP JOINT: ICD-10-CM

## 2025-02-28 DIAGNOSIS — M16.12 UNILATERAL PRIMARY OSTEOARTHRITIS, LEFT HIP: ICD-10-CM

## 2025-02-28 DIAGNOSIS — Z79.01 LONG TERM (CURRENT) USE OF ANTICOAGULANTS: ICD-10-CM

## 2025-02-28 DIAGNOSIS — D62 ACUTE POSTHEMORRHAGIC ANEMIA: ICD-10-CM

## 2025-02-28 DIAGNOSIS — E55.9 VITAMIN D DEFICIENCY, UNSPECIFIED: ICD-10-CM
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
## 2025-02-28 DIAGNOSIS — Z91.013 ALLERGY TO SEAFOOD: ICD-10-CM

## 2025-02-28 DIAGNOSIS — M81.0 AGE-RELATED OSTEOPOROSIS WITHOUT CURRENT PATHOLOGICAL FRACTURE: ICD-10-CM

## 2025-02-28 DIAGNOSIS — E03.9 HYPOTHYROIDISM, UNSPECIFIED: ICD-10-CM

## 2025-02-28 DIAGNOSIS — D72.829 ELEVATED WHITE BLOOD CELL COUNT, UNSPECIFIED: ICD-10-CM

## 2025-02-28 DIAGNOSIS — Z88.0 ALLERGY STATUS TO PENICILLIN: ICD-10-CM

## 2025-02-28 DIAGNOSIS — I48.0 PAROXYSMAL ATRIAL FIBRILLATION: ICD-10-CM

## 2025-02-28 DIAGNOSIS — S72.002A FRACTURE OF UNSPECIFIED PART OF NECK OF LEFT FEMUR, INITIAL ENCOUNTER FOR CLOSED FRACTURE: ICD-10-CM

## 2025-02-28 DIAGNOSIS — E78.5 HYPERLIPIDEMIA, UNSPECIFIED: ICD-10-CM

## 2025-03-05 ENCOUNTER — TRANSCRIPTION ENCOUNTER (OUTPATIENT)
Age: 89
End: 2025-03-05

## 2025-03-11 LAB — SURGICAL PATHOLOGY STUDY: SIGNIFICANT CHANGE UP

## 2025-03-12 ENCOUNTER — TRANSCRIPTION ENCOUNTER (OUTPATIENT)
Age: 89
End: 2025-03-12

## 2025-03-19 ENCOUNTER — TRANSCRIPTION ENCOUNTER (OUTPATIENT)
Age: 89
End: 2025-03-19

## 2025-03-24 NOTE — ED ADULT NURSE NOTE - NSFALLCONCLUSION_ED_ALL_ED
Orders:    LORazepam (ATIVAN) 1 mg tablet; Take 1 tablet (1 mg total) by mouth 2 (two) times a day     Fall with Harm Risk

## 2025-03-26 ENCOUNTER — APPOINTMENT (OUTPATIENT)
Facility: CLINIC | Age: 89
End: 2025-03-26
Payer: MEDICARE

## 2025-03-26 VITALS — BODY MASS INDEX: 23.55 KG/M2 | WEIGHT: 128 LBS | HEIGHT: 62 IN

## 2025-03-26 DIAGNOSIS — Z96.642 PRESENCE OF LEFT ARTIFICIAL HIP JOINT: ICD-10-CM

## 2025-03-26 PROCEDURE — 99024 POSTOP FOLLOW-UP VISIT: CPT

## 2025-03-26 PROCEDURE — 73502 X-RAY EXAM HIP UNI 2-3 VIEWS: CPT

## 2025-03-27 ENCOUNTER — APPOINTMENT (OUTPATIENT)
Dept: NEUROLOGY | Facility: CLINIC | Age: 89
End: 2025-03-27

## 2025-03-27 PROBLEM — S72.032D CLOSED DISPLACED MIDCERVICAL FRACTURE OF LEFT FEMUR WITH ROUTINE HEALING, SUBSEQUENT ENCOUNTER: Status: ACTIVE | Noted: 2025-03-27

## 2025-04-03 PROBLEM — D64.9 ANEMIA, UNSPECIFIED TYPE: Status: ACTIVE | Noted: 2025-04-03

## 2025-04-03 PROBLEM — D72.829 LEUKOCYTOSIS: Status: ACTIVE | Noted: 2025-04-03

## 2025-04-04 ENCOUNTER — APPOINTMENT (OUTPATIENT)
Dept: FAMILY MEDICINE | Facility: CLINIC | Age: 89
End: 2025-04-04
Payer: MEDICARE

## 2025-04-04 ENCOUNTER — NON-APPOINTMENT (OUTPATIENT)
Age: 89
End: 2025-04-04

## 2025-04-04 VITALS
OXYGEN SATURATION: 96 % | BODY MASS INDEX: 23.55 KG/M2 | DIASTOLIC BLOOD PRESSURE: 60 MMHG | SYSTOLIC BLOOD PRESSURE: 132 MMHG | TEMPERATURE: 98 F | HEART RATE: 68 BPM | HEIGHT: 62 IN | WEIGHT: 128 LBS

## 2025-04-04 DIAGNOSIS — I10 ESSENTIAL (PRIMARY) HYPERTENSION: ICD-10-CM
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
## 2025-04-04 DIAGNOSIS — D72.829 ELEVATED WHITE BLOOD CELL COUNT, UNSPECIFIED: ICD-10-CM

## 2025-04-04 DIAGNOSIS — E87.1 HYPO-OSMOLALITY AND HYPONATREMIA: ICD-10-CM

## 2025-04-04 DIAGNOSIS — Z09 ENCOUNTER FOR FOLLOW-UP EXAMINATION AFTER COMPLETED TREATMENT FOR CONDITIONS OTHER THAN MALIGNANT NEOPLASM: ICD-10-CM

## 2025-04-04 DIAGNOSIS — S72.032D DISPLACED MIDCERVICAL FRACTURE OF LEFT FEMUR, SUBSEQUENT ENCOUNTER FOR CLOSED FRACTURE WITH ROUTINE HEALING: ICD-10-CM

## 2025-04-04 DIAGNOSIS — E03.9 HYPOTHYROIDISM, UNSPECIFIED: ICD-10-CM
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
## 2025-04-04 DIAGNOSIS — D64.9 ANEMIA, UNSPECIFIED: ICD-10-CM

## 2025-04-04 DIAGNOSIS — I48.91 UNSPECIFIED ATRIAL FIBRILLATION: ICD-10-CM

## 2025-04-04 DIAGNOSIS — M80.00XD AGE-RELATED OSTEOPOROSIS WITH CURRENT PATHOLOGICAL FRACTURE, UNSPECIFIED SITE, SUBSEQUENT ENCOUNTER FOR FRACTURE WITH ROUTINE HEALING: ICD-10-CM

## 2025-04-04 DIAGNOSIS — E78.5 HYPERLIPIDEMIA, UNSPECIFIED: ICD-10-CM

## 2025-04-04 PROCEDURE — 36415 COLL VENOUS BLD VENIPUNCTURE: CPT

## 2025-04-04 PROCEDURE — 99215 OFFICE O/P EST HI 40 MIN: CPT

## 2025-04-04 PROCEDURE — G2211 COMPLEX E/M VISIT ADD ON: CPT

## 2025-04-04 RX ORDER — METOPROLOL SUCCINATE 25 MG/1
25 TABLET, EXTENDED RELEASE ORAL
Qty: 90 | Refills: 3 | Status: ACTIVE | COMMUNITY
Start: 2025-04-04 | End: 1900-01-01

## 2025-04-05 LAB
ALBUMIN SERPL ELPH-MCNC: 4.1 G/DL
ALP BLD-CCNC: 145 U/L
ALT SERPL-CCNC: 23 U/L
ANION GAP SERPL CALC-SCNC: 13 MMOL/L
AST SERPL-CCNC: 26 U/L
BASOPHILS # BLD AUTO: 0.06 K/UL
BASOPHILS NFR BLD AUTO: 0.6 %
BILIRUB SERPL-MCNC: 0.5 MG/DL
BUN SERPL-MCNC: 10 MG/DL
CALCIUM SERPL-MCNC: 8.9 MG/DL
CHLORIDE SERPL-SCNC: 100 MMOL/L
CHOLEST SERPL-MCNC: 152 MG/DL
CO2 SERPL-SCNC: 24 MMOL/L
CREAT SERPL-MCNC: 0.57 MG/DL
EGFRCR SERPLBLD CKD-EPI 2021: 88 ML/MIN/1.73M2
EOSINOPHIL # BLD AUTO: 0.13 K/UL
EOSINOPHIL NFR BLD AUTO: 1.2 %
FERRITIN SERPL-MCNC: 152 NG/ML
FOLATE SERPL-MCNC: 14.3 NG/ML
GLUCOSE SERPL-MCNC: 78 MG/DL
HCT VFR BLD CALC: 36.1 %
HDLC SERPL-MCNC: 76 MG/DL
HGB BLD-MCNC: 11.9 G/DL
IMM GRANULOCYTES NFR BLD AUTO: 0.5 %
IRON SATN MFR SERPL: 16 %
IRON SERPL-MCNC: 52 UG/DL
LDLC SERPL-MCNC: 68 MG/DL
LYMPHOCYTES # BLD AUTO: 1.32 K/UL
LYMPHOCYTES NFR BLD AUTO: 12.7 %
MAN DIFF?: NORMAL
MCHC RBC-ENTMCNC: 28.6 PG
MCHC RBC-ENTMCNC: 33 G/DL
MCV RBC AUTO: 86.8 FL
MONOCYTES # BLD AUTO: 1.29 K/UL
MONOCYTES NFR BLD AUTO: 12.4 %
NEUTROPHILS # BLD AUTO: 7.57 K/UL
NEUTROPHILS NFR BLD AUTO: 72.6 %
NONHDLC SERPL-MCNC: 76 MG/DL
PLATELET # BLD AUTO: 384 K/UL
POTASSIUM SERPL-SCNC: 4.6 MMOL/L
PROT SERPL-MCNC: 6.6 G/DL
RBC # BLD: 4.16 M/UL
RBC # FLD: 14.7 %
SODIUM SERPL-SCNC: 137 MMOL/L
TIBC SERPL-MCNC: 321 UG/DL
TRIGL SERPL-MCNC: 33 MG/DL
TSH SERPL-ACNC: 2.25 UIU/ML
UIBC SERPL-MCNC: 268 UG/DL
VIT B12 SERPL-MCNC: 1065 PG/ML
WBC # FLD AUTO: 10.42 K/UL

## 2025-05-07 NOTE — ASU PATIENT PROFILE, ADULT - MENTAL HEALTH CONDITIONS/SYMPTOMS, PROFILE
none

## 2025-05-28 ENCOUNTER — APPOINTMENT (OUTPATIENT)
Facility: CLINIC | Age: 89
End: 2025-05-28
Payer: MEDICARE

## 2025-05-28 VITALS — HEIGHT: 62 IN | BODY MASS INDEX: 23.74 KG/M2 | WEIGHT: 129 LBS

## 2025-05-28 DIAGNOSIS — S72.032D DISPLACED MIDCERVICAL FRACTURE OF LEFT FEMUR, SUBSEQUENT ENCOUNTER FOR CLOSED FRACTURE WITH ROUTINE HEALING: ICD-10-CM

## 2025-05-28 DIAGNOSIS — M17.11 UNILATERAL PRIMARY OSTEOARTHRITIS, RIGHT KNEE: ICD-10-CM

## 2025-05-28 DIAGNOSIS — Z96.642 PRESENCE OF LEFT ARTIFICIAL HIP JOINT: ICD-10-CM

## 2025-05-28 PROCEDURE — J3490M: CUSTOM | Mod: NC,JZ

## 2025-05-28 PROCEDURE — 20611 DRAIN/INJ JOINT/BURSA W/US: CPT | Mod: RT

## 2025-05-28 PROCEDURE — 73564 X-RAY EXAM KNEE 4 OR MORE: CPT | Mod: RT

## 2025-05-28 PROCEDURE — 99213 OFFICE O/P EST LOW 20 MIN: CPT | Mod: 25

## 2025-06-02 ENCOUNTER — RX RENEWAL (OUTPATIENT)
Age: 89
End: 2025-06-02

## 2025-06-03 ENCOUNTER — RX RENEWAL (OUTPATIENT)
Age: 89
End: 2025-06-03

## 2025-07-14 ENCOUNTER — RX RENEWAL (OUTPATIENT)
Age: 89
End: 2025-07-14

## 2025-07-28 ENCOUNTER — RX RENEWAL (OUTPATIENT)
Age: 89
End: 2025-07-28

## 2025-08-19 ENCOUNTER — APPOINTMENT (OUTPATIENT)
Dept: FAMILY MEDICINE | Facility: CLINIC | Age: 89
End: 2025-08-19
Payer: MEDICARE

## 2025-08-19 VITALS
HEIGHT: 62 IN | RESPIRATION RATE: 16 BRPM | TEMPERATURE: 97.9 F | SYSTOLIC BLOOD PRESSURE: 138 MMHG | OXYGEN SATURATION: 93 % | WEIGHT: 131 LBS | HEART RATE: 60 BPM | DIASTOLIC BLOOD PRESSURE: 60 MMHG | BODY MASS INDEX: 24.11 KG/M2

## 2025-08-19 DIAGNOSIS — R39.9 UNSPECIFIED SYMPTOMS AND SIGNS INVOLVING THE GENITOURINARY SYSTEM: ICD-10-CM

## 2025-08-19 LAB
BILIRUB UR QL STRIP: NEGATIVE
GLUCOSE UR-MCNC: NEGATIVE
HCG UR QL: 0.2 EU/DL
HGB UR QL STRIP.AUTO: NORMAL
KETONES UR-MCNC: NEGATIVE
LEUKOCYTE ESTERASE UR QL STRIP: NORMAL
NITRITE UR QL STRIP: NEGATIVE
PH UR STRIP: 5.5
PROT UR STRIP-MCNC: 30
SP GR UR STRIP: 1.01

## 2025-08-19 PROCEDURE — 99213 OFFICE O/P EST LOW 20 MIN: CPT

## 2025-08-19 PROCEDURE — 81003 URINALYSIS AUTO W/O SCOPE: CPT | Mod: QW

## 2025-08-19 RX ORDER — PHENAZOPYRIDINE 200 MG/1
200 TABLET, FILM COATED ORAL 3 TIMES DAILY
Qty: 6 | Refills: 0 | Status: ACTIVE | COMMUNITY
Start: 2025-08-19 | End: 1900-01-01

## 2025-08-19 RX ORDER — SULFAMETHOXAZOLE AND TRIMETHOPRIM 800; 160 MG/1; MG/1
800-160 TABLET ORAL TWICE DAILY
Qty: 10 | Refills: 0 | Status: ACTIVE | COMMUNITY
Start: 2025-08-19 | End: 1900-01-01

## 2025-08-22 LAB — BACTERIA UR CULT: ABNORMAL

## 2025-08-22 RX ORDER — AMOXICILLIN AND CLAVULANATE POTASSIUM 875; 125 MG/1; MG/1
875-125 TABLET, COATED ORAL
Qty: 14 | Refills: 0 | Status: ACTIVE | COMMUNITY
Start: 2025-08-22 | End: 1900-01-01

## 2025-08-28 ENCOUNTER — RX RENEWAL (OUTPATIENT)
Age: 89
End: 2025-08-28

## 2025-09-03 ENCOUNTER — APPOINTMENT (OUTPATIENT)
Facility: CLINIC | Age: 89
End: 2025-09-03
Payer: MEDICARE

## 2025-09-03 VITALS — HEIGHT: 62 IN | BODY MASS INDEX: 24.11 KG/M2 | WEIGHT: 131 LBS

## 2025-09-03 DIAGNOSIS — S50.01XA CONTUSION OF RIGHT ELBOW, INITIAL ENCOUNTER: ICD-10-CM

## 2025-09-03 DIAGNOSIS — M70.61 TROCHANTERIC BURSITIS, RIGHT HIP: ICD-10-CM

## 2025-09-03 PROCEDURE — 73080 X-RAY EXAM OF ELBOW: CPT | Mod: RT

## 2025-09-03 PROCEDURE — 99213 OFFICE O/P EST LOW 20 MIN: CPT

## 2025-09-03 PROCEDURE — 73502 X-RAY EXAM HIP UNI 2-3 VIEWS: CPT

## 2025-09-22 PROBLEM — M25.552 LEFT HIP PAIN: Status: ACTIVE | Noted: 2025-09-22

## 2025-09-28 PROBLEM — N39.0 FREQUENT UTI: Status: ACTIVE | Noted: 2025-09-28
